# Patient Record
Sex: MALE | Race: WHITE | Employment: OTHER | URBAN - METROPOLITAN AREA
[De-identification: names, ages, dates, MRNs, and addresses within clinical notes are randomized per-mention and may not be internally consistent; named-entity substitution may affect disease eponyms.]

---

## 2017-02-16 ENCOUNTER — APPOINTMENT (OUTPATIENT)
Dept: GENERAL RADIOLOGY | Age: 77
DRG: 442 | End: 2017-02-16
Attending: EMERGENCY MEDICINE
Payer: MEDICARE

## 2017-02-16 ENCOUNTER — HOSPITAL ENCOUNTER (INPATIENT)
Age: 77
LOS: 5 days | Discharge: NURSING, REHAB OR LONG-TERM CARE | DRG: 442 | End: 2017-02-21
Attending: EMERGENCY MEDICINE | Admitting: INTERNAL MEDICINE
Payer: MEDICARE

## 2017-02-16 ENCOUNTER — HOSPITAL ENCOUNTER (OUTPATIENT)
Dept: CT IMAGING | Age: 77
Discharge: HOME | DRG: 442 | End: 2017-02-16
Attending: PHYSICIAN ASSISTANT
Payer: MEDICARE

## 2017-02-16 DIAGNOSIS — K76.9 LIVER LESION: Primary | ICD-10-CM

## 2017-02-16 DIAGNOSIS — R50.9 FEVER, UNSPECIFIED FEVER CAUSE: ICD-10-CM

## 2017-02-16 DIAGNOSIS — R10.31 COLICKY RLQ ABDOMINAL PAIN: ICD-10-CM

## 2017-02-16 DIAGNOSIS — R14.2 BELCHING: ICD-10-CM

## 2017-02-16 DIAGNOSIS — K59.00 CONSTIPATION, UNSPECIFIED CONSTIPATION TYPE: ICD-10-CM

## 2017-02-16 DIAGNOSIS — R50.9 FEVER AND CHILLS: ICD-10-CM

## 2017-02-16 PROBLEM — R10.9 ABDOMINAL PAIN: Status: ACTIVE | Noted: 2017-02-16

## 2017-02-16 PROBLEM — R16.0 LIVER MASS: Status: ACTIVE | Noted: 2017-02-16

## 2017-02-16 LAB
ALBUMIN SERPL BCP-MCNC: 3.4 G/DL (ref 3.2–4.6)
ALBUMIN/GLOB SERPL: 1.1 {RATIO} (ref 1.2–3.5)
ALP SERPL-CCNC: 62 U/L (ref 50–136)
ALT SERPL-CCNC: 17 U/L (ref 12–65)
ANION GAP BLD CALC-SCNC: 9 MMOL/L (ref 7–16)
AST SERPL W P-5'-P-CCNC: 9 U/L (ref 15–37)
BASOPHILS # BLD AUTO: 0 K/UL (ref 0–0.2)
BASOPHILS # BLD: 0 % (ref 0–2)
BILIRUB SERPL-MCNC: 1.3 MG/DL (ref 0.2–1.1)
BUN SERPL-MCNC: 23 MG/DL (ref 8–23)
CALCIUM SERPL-MCNC: 9 MG/DL (ref 8.3–10.4)
CHLORIDE SERPL-SCNC: 105 MMOL/L (ref 98–107)
CO2 SERPL-SCNC: 27 MMOL/L (ref 21–32)
CREAT BLD-MCNC: 1 MG/DL (ref 0.6–1)
CREAT SERPL-MCNC: 0.97 MG/DL (ref 0.8–1.5)
DIFFERENTIAL METHOD BLD: ABNORMAL
EOSINOPHIL # BLD: 0 K/UL (ref 0–0.8)
EOSINOPHIL NFR BLD: 0 % (ref 0.5–7.8)
ERYTHROCYTE [DISTWIDTH] IN BLOOD BY AUTOMATED COUNT: 13.5 % (ref 11.9–14.6)
FLUAV AG NPH QL IA: NEGATIVE
FLUBV AG NPH QL IA: NEGATIVE
GLOBULIN SER CALC-MCNC: 3.1 G/DL (ref 2.3–3.5)
GLUCOSE SERPL-MCNC: 115 MG/DL (ref 65–100)
HCT VFR BLD AUTO: 35 % (ref 41.1–50.3)
HGB BLD-MCNC: 11.5 G/DL (ref 13.6–17.2)
IMM GRANULOCYTES # BLD: 0 K/UL (ref 0–0.5)
IMM GRANULOCYTES NFR BLD AUTO: 0.2 % (ref 0–5)
LACTATE BLD-SCNC: 0.8 MMOL/L (ref 0.5–1.9)
LIPASE SERPL-CCNC: 141 U/L (ref 73–393)
LYMPHOCYTES # BLD AUTO: 12 % (ref 13–44)
LYMPHOCYTES # BLD: 0.7 K/UL (ref 0.5–4.6)
MCH RBC QN AUTO: 30 PG (ref 26.1–32.9)
MCHC RBC AUTO-ENTMCNC: 32.9 G/DL (ref 31.4–35)
MCV RBC AUTO: 91.4 FL (ref 79.6–97.8)
MONOCYTES # BLD: 0.7 K/UL (ref 0.1–1.3)
MONOCYTES NFR BLD AUTO: 12 % (ref 4–12)
NEUTS SEG # BLD: 4.5 K/UL (ref 1.7–8.2)
NEUTS SEG NFR BLD AUTO: 76 % (ref 43–78)
PLATELET # BLD AUTO: 102 K/UL (ref 150–450)
PMV BLD AUTO: 9.9 FL (ref 10.8–14.1)
POTASSIUM SERPL-SCNC: 4 MMOL/L (ref 3.5–5.1)
PROT SERPL-MCNC: 6.5 G/DL (ref 6.3–8.2)
RBC # BLD AUTO: 3.83 M/UL (ref 4.23–5.67)
SODIUM SERPL-SCNC: 141 MMOL/L (ref 136–145)
WBC # BLD AUTO: 5.9 K/UL (ref 4.3–11.1)

## 2017-02-16 PROCEDURE — 96361 HYDRATE IV INFUSION ADD-ON: CPT | Performed by: EMERGENCY MEDICINE

## 2017-02-16 PROCEDURE — 87040 BLOOD CULTURE FOR BACTERIA: CPT | Performed by: EMERGENCY MEDICINE

## 2017-02-16 PROCEDURE — 74011250637 HC RX REV CODE- 250/637: Performed by: INTERNAL MEDICINE

## 2017-02-16 PROCEDURE — 71020 XR CHEST PA LAT: CPT

## 2017-02-16 PROCEDURE — 96365 THER/PROPH/DIAG IV INF INIT: CPT | Performed by: EMERGENCY MEDICINE

## 2017-02-16 PROCEDURE — 65270000029 HC RM PRIVATE

## 2017-02-16 PROCEDURE — 85025 COMPLETE CBC W/AUTO DIFF WBC: CPT | Performed by: EMERGENCY MEDICINE

## 2017-02-16 PROCEDURE — 87804 INFLUENZA ASSAY W/OPTIC: CPT | Performed by: EMERGENCY MEDICINE

## 2017-02-16 PROCEDURE — 74011250636 HC RX REV CODE- 250/636: Performed by: INTERNAL MEDICINE

## 2017-02-16 PROCEDURE — 80053 COMPREHEN METABOLIC PANEL: CPT | Performed by: EMERGENCY MEDICINE

## 2017-02-16 PROCEDURE — 74011250637 HC RX REV CODE- 250/637: Performed by: EMERGENCY MEDICINE

## 2017-02-16 PROCEDURE — 84145 PROCALCITONIN (PCT): CPT | Performed by: INTERNAL MEDICINE

## 2017-02-16 PROCEDURE — 83605 ASSAY OF LACTIC ACID: CPT

## 2017-02-16 PROCEDURE — 83690 ASSAY OF LIPASE: CPT | Performed by: EMERGENCY MEDICINE

## 2017-02-16 PROCEDURE — 99285 EMERGENCY DEPT VISIT HI MDM: CPT | Performed by: EMERGENCY MEDICINE

## 2017-02-16 PROCEDURE — 74011250636 HC RX REV CODE- 250/636: Performed by: EMERGENCY MEDICINE

## 2017-02-16 PROCEDURE — 74011000258 HC RX REV CODE- 258: Performed by: EMERGENCY MEDICINE

## 2017-02-16 RX ORDER — MIRTAZAPINE 15 MG/1
15 TABLET, FILM COATED ORAL
Status: DISCONTINUED | OUTPATIENT
Start: 2017-02-16 | End: 2017-02-17

## 2017-02-16 RX ORDER — SODIUM CHLORIDE 0.9 % (FLUSH) 0.9 %
5-10 SYRINGE (ML) INJECTION AS NEEDED
Status: DISCONTINUED | OUTPATIENT
Start: 2017-02-16 | End: 2017-02-21 | Stop reason: HOSPADM

## 2017-02-16 RX ORDER — LISINOPRIL 5 MG/1
10 TABLET ORAL DAILY
Status: DISCONTINUED | OUTPATIENT
Start: 2017-02-17 | End: 2017-02-21 | Stop reason: HOSPADM

## 2017-02-16 RX ORDER — RIVASTIGMINE 9.5 MG/24H
1 PATCH, EXTENDED RELEASE TRANSDERMAL DAILY
COMMUNITY
End: 2019-11-06 | Stop reason: SDUPTHER

## 2017-02-16 RX ORDER — CARBIDOPA AND LEVODOPA 25; 250 MG/1; MG/1
0.5 TABLET ORAL 4 TIMES DAILY
COMMUNITY
End: 2017-02-21

## 2017-02-16 RX ORDER — MONTELUKAST SODIUM 10 MG/1
10 TABLET ORAL
Status: DISCONTINUED | OUTPATIENT
Start: 2017-02-17 | End: 2017-02-21 | Stop reason: HOSPADM

## 2017-02-16 RX ORDER — SODIUM CHLORIDE 0.9 % (FLUSH) 0.9 %
5-10 SYRINGE (ML) INJECTION AS NEEDED
Status: DISCONTINUED | OUTPATIENT
Start: 2017-02-16 | End: 2017-02-16 | Stop reason: SDUPTHER

## 2017-02-16 RX ORDER — ONDANSETRON 2 MG/ML
4 INJECTION INTRAMUSCULAR; INTRAVENOUS
Status: DISCONTINUED | OUTPATIENT
Start: 2017-02-16 | End: 2017-02-21 | Stop reason: HOSPADM

## 2017-02-16 RX ORDER — PANTOPRAZOLE SODIUM 40 MG/1
40 TABLET, DELAYED RELEASE ORAL
Status: DISCONTINUED | OUTPATIENT
Start: 2017-02-17 | End: 2017-02-21 | Stop reason: HOSPADM

## 2017-02-16 RX ORDER — ACETAMINOPHEN 325 MG/1
650 TABLET ORAL
Status: DISCONTINUED | OUTPATIENT
Start: 2017-02-16 | End: 2017-02-21 | Stop reason: HOSPADM

## 2017-02-16 RX ORDER — SODIUM CHLORIDE 9 MG/ML
125 INJECTION, SOLUTION INTRAVENOUS CONTINUOUS
Status: DISCONTINUED | OUTPATIENT
Start: 2017-02-16 | End: 2017-02-18

## 2017-02-16 RX ORDER — RIVASTIGMINE 4.6 MG/24H
1 PATCH, EXTENDED RELEASE TRANSDERMAL DAILY
Status: DISCONTINUED | OUTPATIENT
Start: 2017-02-17 | End: 2017-02-17

## 2017-02-16 RX ORDER — SODIUM CHLORIDE 0.9 % (FLUSH) 0.9 %
5-10 SYRINGE (ML) INJECTION EVERY 8 HOURS
Status: DISCONTINUED | OUTPATIENT
Start: 2017-02-16 | End: 2017-02-21 | Stop reason: HOSPADM

## 2017-02-16 RX ORDER — ACETAMINOPHEN 500 MG
1000 TABLET ORAL
Status: COMPLETED | OUTPATIENT
Start: 2017-02-16 | End: 2017-02-16

## 2017-02-16 RX ORDER — ESCITALOPRAM OXALATE 10 MG/1
10 TABLET ORAL DAILY
Status: DISCONTINUED | OUTPATIENT
Start: 2017-02-17 | End: 2017-02-21 | Stop reason: HOSPADM

## 2017-02-16 RX ORDER — CARBIDOPA AND LEVODOPA 25; 100 MG/1; MG/1
1 TABLET ORAL 3 TIMES DAILY
Status: DISCONTINUED | OUTPATIENT
Start: 2017-02-16 | End: 2017-02-21 | Stop reason: HOSPADM

## 2017-02-16 RX ORDER — ATORVASTATIN CALCIUM 10 MG/1
10 TABLET, FILM COATED ORAL
Status: DISCONTINUED | OUTPATIENT
Start: 2017-02-17 | End: 2017-02-21 | Stop reason: HOSPADM

## 2017-02-16 RX ORDER — OXYCODONE HYDROCHLORIDE 5 MG/1
5 TABLET ORAL
Status: DISCONTINUED | OUTPATIENT
Start: 2017-02-16 | End: 2017-02-21 | Stop reason: HOSPADM

## 2017-02-16 RX ORDER — SODIUM CHLORIDE 0.9 % (FLUSH) 0.9 %
5-10 SYRINGE (ML) INJECTION EVERY 8 HOURS
Status: DISCONTINUED | OUTPATIENT
Start: 2017-02-16 | End: 2017-02-16 | Stop reason: SDUPTHER

## 2017-02-16 RX ORDER — SODIUM CHLORIDE 9 MG/ML
100 INJECTION, SOLUTION INTRAVENOUS CONTINUOUS
Status: DISCONTINUED | OUTPATIENT
Start: 2017-02-16 | End: 2017-02-17

## 2017-02-16 RX ORDER — SODIUM CHLORIDE 0.9 % (FLUSH) 0.9 %
10 SYRINGE (ML) INJECTION
Status: COMPLETED | OUTPATIENT
Start: 2017-02-16 | End: 2017-02-16

## 2017-02-16 RX ADMIN — MIRTAZAPINE 15 MG: 15 TABLET, FILM COATED ORAL at 23:42

## 2017-02-16 RX ADMIN — MONTELUKAST SODIUM 10 MG: 10 TABLET, FILM COATED ORAL at 23:43

## 2017-02-16 RX ADMIN — CARBIDOPA AND LEVODOPA 1 TABLET: 25; 100 TABLET ORAL at 23:42

## 2017-02-16 RX ADMIN — SODIUM CHLORIDE 125 ML/HR: 900 INJECTION, SOLUTION INTRAVENOUS at 19:25

## 2017-02-16 RX ADMIN — PIPERACILLIN SODIUM,TAZOBACTAM SODIUM 4.5 G: 4; .5 INJECTION, POWDER, FOR SOLUTION INTRAVENOUS at 19:25

## 2017-02-16 RX ADMIN — DIATRIZOATE MEGLUMINE AND DIATRIZOATE SODIUM 15 ML: 660; 100 LIQUID ORAL; RECTAL at 17:43

## 2017-02-16 RX ADMIN — SODIUM CHLORIDE 100 ML/HR: 900 INJECTION, SOLUTION INTRAVENOUS at 23:47

## 2017-02-16 RX ADMIN — ATORVASTATIN CALCIUM 10 MG: 10 TABLET, FILM COATED ORAL at 23:42

## 2017-02-16 RX ADMIN — IOVERSOL 100 ML: 741 INJECTION INTRA-ARTERIAL; INTRAVENOUS at 17:43

## 2017-02-16 RX ADMIN — Medication 10 ML: at 17:43

## 2017-02-16 RX ADMIN — Medication 5 ML: at 23:44

## 2017-02-16 RX ADMIN — ACETAMINOPHEN 1000 MG: 500 TABLET ORAL at 19:43

## 2017-02-16 RX ADMIN — SODIUM CHLORIDE 100 ML: 900 INJECTION, SOLUTION INTRAVENOUS at 17:43

## 2017-02-16 NOTE — ED TRIAGE NOTES
Pt was sent from ultrasound because he had a positive test, fever for last 2 days and he is hurting in his right side

## 2017-02-17 ENCOUNTER — APPOINTMENT (OUTPATIENT)
Dept: ULTRASOUND IMAGING | Age: 77
DRG: 442 | End: 2017-02-17
Attending: INTERNAL MEDICINE
Payer: MEDICARE

## 2017-02-17 LAB
ANION GAP BLD CALC-SCNC: 7 MMOL/L (ref 7–16)
APPEARANCE UR: CLEAR
BILIRUB UR QL: ABNORMAL
BUN SERPL-MCNC: 19 MG/DL (ref 8–23)
CALCIUM SERPL-MCNC: 8.5 MG/DL (ref 8.3–10.4)
CHLORIDE SERPL-SCNC: 111 MMOL/L (ref 98–107)
CO2 SERPL-SCNC: 28 MMOL/L (ref 21–32)
COLOR UR: ABNORMAL
CREAT SERPL-MCNC: 1.02 MG/DL (ref 0.8–1.5)
GLUCOSE SERPL-MCNC: 100 MG/DL (ref 65–100)
GLUCOSE UR STRIP.AUTO-MCNC: NEGATIVE MG/DL
HGB UR QL STRIP: NEGATIVE
KETONES UR QL STRIP.AUTO: NEGATIVE MG/DL
LEUKOCYTE ESTERASE UR QL STRIP.AUTO: NEGATIVE
NITRITE UR QL STRIP.AUTO: NEGATIVE
PH UR STRIP: 5.5 [PH] (ref 5–9)
POTASSIUM SERPL-SCNC: 3.7 MMOL/L (ref 3.5–5.1)
PROCALCITONIN SERPL-MCNC: 0.3 NG/ML
PROT UR STRIP-MCNC: NEGATIVE MG/DL
SODIUM SERPL-SCNC: 146 MMOL/L (ref 136–145)
SP GR UR REFRACTOMETRY: 1.03 (ref 1–1.02)
UROBILINOGEN UR QL STRIP.AUTO: 1 EU/DL (ref 0.2–1)

## 2017-02-17 PROCEDURE — 36415 COLL VENOUS BLD VENIPUNCTURE: CPT | Performed by: INTERNAL MEDICINE

## 2017-02-17 PROCEDURE — 81003 URINALYSIS AUTO W/O SCOPE: CPT | Performed by: INTERNAL MEDICINE

## 2017-02-17 PROCEDURE — 74011250637 HC RX REV CODE- 250/637: Performed by: INTERNAL MEDICINE

## 2017-02-17 PROCEDURE — 97161 PT EVAL LOW COMPLEX 20 MIN: CPT

## 2017-02-17 PROCEDURE — 93970 EXTREMITY STUDY: CPT

## 2017-02-17 PROCEDURE — 74011000302 HC RX REV CODE- 302: Performed by: INTERNAL MEDICINE

## 2017-02-17 PROCEDURE — 76700 US EXAM ABDOM COMPLETE: CPT

## 2017-02-17 PROCEDURE — 86580 TB INTRADERMAL TEST: CPT | Performed by: INTERNAL MEDICINE

## 2017-02-17 PROCEDURE — 76705 ECHO EXAM OF ABDOMEN: CPT

## 2017-02-17 PROCEDURE — 65270000029 HC RM PRIVATE

## 2017-02-17 PROCEDURE — 80048 BASIC METABOLIC PNL TOTAL CA: CPT | Performed by: INTERNAL MEDICINE

## 2017-02-17 PROCEDURE — 74011250636 HC RX REV CODE- 250/636: Performed by: INTERNAL MEDICINE

## 2017-02-17 PROCEDURE — 74011000258 HC RX REV CODE- 258: Performed by: INTERNAL MEDICINE

## 2017-02-17 PROCEDURE — 97165 OT EVAL LOW COMPLEX 30 MIN: CPT

## 2017-02-17 RX ORDER — RIVASTIGMINE 9.5 MG/24H
1 PATCH, EXTENDED RELEASE TRANSDERMAL DAILY
Status: DISCONTINUED | OUTPATIENT
Start: 2017-02-17 | End: 2017-02-21 | Stop reason: HOSPADM

## 2017-02-17 RX ADMIN — Medication 10 ML: at 21:08

## 2017-02-17 RX ADMIN — CARBIDOPA AND LEVODOPA 1 TABLET: 25; 100 TABLET ORAL at 09:06

## 2017-02-17 RX ADMIN — CARBIDOPA AND LEVODOPA 1 TABLET: 25; 100 TABLET ORAL at 16:11

## 2017-02-17 RX ADMIN — Medication 5 ML: at 06:32

## 2017-02-17 RX ADMIN — PIPERACILLIN SODIUM,TAZOBACTAM SODIUM 3.38 G: 3; .375 INJECTION, POWDER, FOR SOLUTION INTRAVENOUS at 01:23

## 2017-02-17 RX ADMIN — PIPERACILLIN SODIUM,TAZOBACTAM SODIUM 3.38 G: 3; .375 INJECTION, POWDER, FOR SOLUTION INTRAVENOUS at 09:06

## 2017-02-17 RX ADMIN — ESCITALOPRAM OXALATE 10 MG: 10 TABLET ORAL at 09:06

## 2017-02-17 RX ADMIN — CARBIDOPA AND LEVODOPA 1 TABLET: 25; 100 TABLET ORAL at 21:08

## 2017-02-17 RX ADMIN — PANTOPRAZOLE SODIUM 40 MG: 40 TABLET, DELAYED RELEASE ORAL at 16:11

## 2017-02-17 RX ADMIN — MONTELUKAST SODIUM 10 MG: 10 TABLET, FILM COATED ORAL at 21:08

## 2017-02-17 RX ADMIN — PANTOPRAZOLE SODIUM 40 MG: 40 TABLET, DELAYED RELEASE ORAL at 07:46

## 2017-02-17 RX ADMIN — PIPERACILLIN SODIUM,TAZOBACTAM SODIUM 3.38 G: 3; .375 INJECTION, POWDER, FOR SOLUTION INTRAVENOUS at 16:11

## 2017-02-17 RX ADMIN — TUBERCULIN PURIFIED PROTEIN DERIVATIVE 5 UNITS: 5 INJECTION, SOLUTION INTRADERMAL at 13:19

## 2017-02-17 RX ADMIN — ATORVASTATIN CALCIUM 10 MG: 10 TABLET, FILM COATED ORAL at 21:08

## 2017-02-17 RX ADMIN — LISINOPRIL 10 MG: 5 TABLET ORAL at 09:06

## 2017-02-17 NOTE — PROGRESS NOTES
Problem: Nutrition Deficit  Goal: *Optimize nutritional status  Nutrition  Reason for assessment: Referral received from nursing admission Malnutrition Screening Tool for recently lost ~ 50-60# past 1-2 years without trying and eating poorly due to decreased appetite. Assessment:   Diet order(s):  NPO  Food/Nutrition Patient History:  Pt presented with abdominal pain and fever; pt has intractable hiccups (only 1 episode per family since admission), liver mass. Pt NPO today r/t scheduled test downtown today. Pt states he is hungry; hasn't had anything to eat since breakfast yesterday. Pt typically eats breakfast and supper meal outside home-[facility] for breakfast and will have a snack in the afternoon; states he has a good appetite; pt does not consume nutritional supplements at home. No difficult with chewing or swallowing per patient. Pertinent Labs:  Hypernatremia,   Pertinent Rx:  IVF: 0.9% sodium chloride at 100 ml/hr, Zosyn, Protonix, Sinemet  Anthropometrics:Height: 5' 11\" (180.3 cm),  Weight: 76.7 kg (169 lb), Weight Source: Patient stated, Body mass index is 23.57 kg/(m^2). BMI class of normal range. Recommend BMI > 23 for Older American Males. Pt currently at 77% usual body weight from 2 years ago and 98% IBW. **Please obtain an actual weight to assess reported weight loss. Macronutrient needs:  EER:  5529-6085 kcal /day (25-30 kcal/kg BW)  EPR:  77-92 grams protein/day (1-1.2 grams/kg BW)  Intake/Comparative Standards: NPO; does not meet estimated kcal or protein needs. Nutrition Diagnosis: Inadequate oral intake r/t dietary restriction as evidenced by NPO status. Intervention:  1. Meals and snacks: NPO; advance per MD recommendations.   2.  Nutrition Supplement Therapy: Initiate Ensure Enlive 2 X day once diet advanced to full liquid or >.   3.  Ordered weight  Sherron Kim RD, LD, MPH  [phone]     Addendum:  Patient's diet advanced to Regular; pt ate 100% mid afternoon meal; supper tray to be delivered soon. Ensure Enlive added 2 X day.   Yazmin Prieto, [address] [place] [address], [address] [place] [address], [facility]  [phone]

## 2017-02-17 NOTE — PROGRESS NOTES
Problem: Mobility Impaired (Adult and Pediatric)  Goal: *Acute Goals and Plan of Care (Insert Text)  STG:  (1.)Mr. Colletta Schlossman will move from supine to sit and sit to supine with SUPERVISION within 3 day(s). (2.)Mr. Colletta Schlossman will transfer from bed to chair and chair to bed with SUPERVISION using the least restrictive device within 3 day(s). (3.)Mr. Colletta Schlossman will ambulate with SUPERVISION for 150 feet with the least restrictive device within 3 day(s). (4.)Pt. Will increase B LE strength 1/2 grade within 3 days ________________________________________________________________________________________________      PHYSICAL THERAPY: INITIAL ASSESSMENT, PM 2/17/2017  INPATIENT: Hospital Day: 2  [place] / Product Type: Medicare /      NAME/AGE/GENDER: Ilir Bergeron is a 67 y.o. male    PRIMARY DIAGNOSIS: Abdominal pain Abdominal pain Abdominal pain        ICD-10: Treatment Diagnosis:       · Generalized Muscle Weakness (M62.81)  · Difficulty in walking, Not elsewhere classified (R26.2)  · Other abnormalities of gait and mobility (R26.89)   Precaution/Allergies:  Aricept [donepezil] and Onion       ASSESSMENT:      Mr. Colletta Schlossman presents with abdominal pain and demonstrates the following PT deficits: decreased general strength and ROm, standing balance, functional mobility and tremor in R hand. He would benefit from further PT while here to address these deficits and may benefit from [facility] PT at [place]. He has a very supportive friend here while his wife is at home. This section established at most recent assessment   PROBLEM LIST (Impairments causing functional limitations):  1. Decreased Strength  2. Decreased Transfer Abilities  3. Decreased Ambulation Ability/Technique  4. Decreased Balance  5. Decreased Activity Tolerance  6. Decreased Flexibility/Joint Mobility    INTERVENTIONS PLANNED: (Benefits and precautions of physical therapy have been discussed with the patient.)  1.  Bed Mobility  2. Cold  3. Family Education  4. Gait Training  5. Range of Motion (ROM)  6. Therapeutic Activites  7. Therapeutic Exercise/Strengthening  8. Transfer Training      TREATMENT PLAN: Frequency/Duration: daily for 1 week  Rehabilitation Potential For Stated Goals: FAIR      RECOMMENDED REHABILITATION/EQUIPMENT: (at time of discharge pending progress): Continue Skilled Therapy and Home Health: Physical Therapy. HISTORY:   History of Present Injury/Illness (Reason for Referral): Admitted with abdominal pain. Has a hx of Parkinson's  Past Medical History/Comorbidities:   Mr. Aan Shelley  has a past medical history of Acoustic neuroma ([address] (2012); Arthritis; Cancer ([address] Environmental allergies; GERD (gastroesophageal reflux disease); Hearing difficulty; Hiatal hernia; Hypertension; and Parkinson disease ([address] He also has no past medical history of Arrhythmia; Asthma; Autoimmune disease ([address] CAD (coronary artery disease); Chronic kidney disease; Chronic pain; Coagulation defects; COPD; Diabetes ([address] Difficult intubation; Heart failure ([address] Liver disease; Malignant hyperthermia due to anesthesia; Nausea & vomiting; Other ill-defined conditions(799.89); Pseudocholinesterase deficiency; Psychiatric disorder; PUD (peptic ulcer disease); Seizures ([address] Stroke [facility]); Thromboembolus ([address] Thyroid disease; Unspecified adverse effect of anesthesia; or Unspecified sleep apnea. Mr. Ana Shelley  has a past surgical history that includes abdomen surgery proc unlisted; gi; heent; prostatectomy (); and knee replacement.   Social History/Living Environment:   Home Environment: Private residence  # Steps to Enter: 2  One/Two Story Residence: One story  Living Alone: No  Support Systems: Spouse/Significant Other/Partner, Friends \ neighbors  Patient Expects to be Discharged to[de-identified] Private residence  Current DME Used/Available at Home: None  Tub or Shower Type: Shower  Prior Level of Function/Work/Activity:  Performs ADLs with his wife's supervision, ambulates without an assistive device. Does have some memory issues so wife offers supervision      Number of Personal Factors/Comorbidities that affect the Plan of Care: 1-2: MODERATE COMPLEXITY   EXAMINATION:   Most Recent Physical Functioning:   Gross Assessment:  AROM: Generally decreased, functional (B LEs)  Strength: Generally decreased, functional (B LEs)  Sensation: Intact (B LEs)               Posture:  Posture Assessment: Forward head, Rounded shoulders  Balance:  Sitting: Intact  Standing:  (high guard) Bed Mobility:  Supine to Sit: Contact guard assistance  Sit to Supine: Contact guard assistance  Wheelchair Mobility:     Transfers:  Sit to Stand: Minimum assistance  Stand to Sit: Contact guard assistance  Gait:     Base of Support: Narrowed  Speed/Gisell: Pace decreased (<100 feet/min)  Distance (ft): 15 Feet (ft) (x2)  Assistive Device:  (HHA)  Ambulation - Level of Assistance:  (close CGA)  Interventions: Safety awareness training;Verbal cues       Body Structures Involved:  1. Joints  2. Muscles Body Functions Affected:  1. Neuromusculoskeletal  2. Movement Related Activities and Participation Affected:  1. Mobility  2. Self Care   Number of elements that affect the Plan of Care: 4+: HIGH COMPLEXITY   CLINICAL PRESENTATION:   Presentation: Stable and uncomplicated: LOW COMPLEXITY   CLINICAL DECISION MAKIN [facility] [address] [id number] AM-PAC 6 Clicks   Basic Mobility Inpatient Short Form  How much difficulty does the patient currently have. .. Unable A Lot A Little None   1. Turning over in bed (including adjusting bedclothes, sheets and blankets)? [ ] 1   [ ] 2   [ ] 3   [X] 4   2. Sitting down on and standing up from a chair with arms ( e.g., wheelchair, bedside commode, etc.)   [ ] 1   [ ] 2   [X] 3   [ ] 4   3. Moving from lying on back to sitting on the side of the bed?    [ ] 1   [ ] 2   [X] 3   [ ] 4   How much help from another person does the patient currently need. .. Total A Lot A Little None   4. Moving to and from a bed to a chair (including a wheelchair)? [ ] 1   [ ] 2   [X] 3   [ ] 4   5. Need to walk in hospital room? [ ] 1   [ ] 2   [X] 3   [ ] 4   6. Climbing 3-5 steps with a railing? [ ] 1   [X] 2   [ ] 3   [ ] 4   © 2007, Trustees of [address] [place] [address] [address] [address], under license to [employer]. All rights reserved    Score:  Initial: 18 Most Recent: X (Date: -- )     Interpretation of Tool:  Represents activities that are increasingly more difficult (i.e. Bed mobility, Transfers, Gait). Score 24 23 22-20 19-15 14-10 9-7 6       Modifier CH CI CJ CK CL CM CN         · Mobility - Walking and Moving Around:               - CURRENT STATUS:    CK - 40%-59% impaired, limited or restricted               - GOAL STATUS:           CJ - 20%-39% impaired, limited or restricted               - D/C STATUS:                       ---------------To be determined---------------  [place] / [place] / Product Type: Medicare /       Medical Necessity:     · Patient demonstrates fair rehab potential due to higher previous functional level. Reason for Services/Other Comments:  · Patient continues to require present interventions due to patient's inability to perform functional mobility independently. Use of outcome tool(s) and clinical judgement create a POC that gives a: Clear prediction of patient's progress: LOW COMPLEXITY                 TREATMENT:   (In addition to Assessment/Re-Assessment sessions the following treatments were rendered)   Pre-treatment Symptoms/Complaints:  none  Pain: Initial:   Pain Intensity 1: 0  Post Session:  0      Assessment/Reassessment only, no treatment provided today     Braces/Orthotics/Lines/Etc:   · IV  · O2 Device: Room air  Treatment/Session Assessment:    · Response to Treatment:  Tolerated well.  Returned to bed  · Interdisciplinary Collaboration:  · Physical Therapist  · Occupational Therapist  · Registered Nurse  · After treatment position/precautions:  · Supine in bed  · Bed/Chair-wheels locked  · Bed in low position  · Call light within reach  · RN notified  · Visitors at bedside  · Side rails x 3  · Compliance with Program/Exercises: Will assess as treatment progresses. · Recommendations/Intent for next treatment session: \"Next visit will focus on advancements to more challenging activities and reduction in assistance provided\".   Total Treatment Duration:  PT Patient Time In/Time Out  Time In: 1310  Time Out: [address]

## 2017-02-17 NOTE — PROGRESS NOTES
TRANSFER - IN REPORT:    Verbal report received from Tahir Fuller RN (name) on David Singh  being received from ED (unit) for routine progression of care      Report consisted of patients Situation, Background, Assessment and   Recommendations(SBAR). Information from the following report(s) SBAR, Kardex, ED Summary, Intake/Output, MAR, Accordion, Recent Results and Med Rec Status was reviewed with the receiving nurse. Opportunity for questions and clarification was provided. Assessment completed upon patients arrival to unit and care assumed.

## 2017-02-17 NOTE — H&P
History and Physical    Subjective:     Deepak Tao is a 67 y.o.  male who presents with abd pain and fever. Started 2 days ago. RUQ. Has chronic hiccups. 60 lb weight loss over last 1-2 years. Recent DVT in left leg in November of 2016 and on xarelto ever since. Had CT scan ordered by PCP showing a complex cyst (hemorrhagic vs abscess). This cyst has been noted on CT since 2015 but the change in appearance has occurred since last CT in 2016. Lactic, WBC normal and flu negative. Hospitalist asked to admit to further workup the mass to rule out infectious process. 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    Hiatal hernia     Hypertension      on medication    Parkinson disease ([address]       Past Surgical History   Procedure Laterality Date    Pr abdomen surgery proc unlisted       inquinal hernia repair     Hx gi       hemrrhoidectomy    Hx heent       tonsilectomy    Hx prostatectomy       Astrid Brown DT    Hx knee replacement       left     Family History   Problem Relation Age of Onset    Cancer Father 77     prostate    Heart Disease Brother     Cancer Brother      Prostate    Cancer Mother      breast,melanoma    Malignant Hyperthermia Neg Hx     Pseudocholinesterase Deficiency Neg Hx     Delayed Awakening Neg Hx     Post-op Nausea/Vomiting Neg Hx     Emergence Delirium Neg Hx     Post-op Cognitive Dysfunction Neg Hx     Other Neg Hx       Social History   Substance Use Topics    Smoking status: Never Smoker    Smokeless tobacco: Not on file    Alcohol use No       Prior to Admission medications    Medication Sig Start Date End Date Taking?  Authorizing Provider HYDROcodone-acetaminophen (NORCO) 5-325 mg per tablet  2/15/17   Historical Provider   pimozide (ORAP) 1 mg tablet Take 1 mg by mouth. Historical Provider   escitalopram oxalate (LEXAPRO) 10 mg tablet Take 10 mg by mouth. Historical Provider   atorvastatin (LIPITOR) 10 mg tablet Take 1 Tab by mouth daily. 12/9/16   Conner Phillips MD   omeprazole (PRILOSEC) 40 mg capsule Take 1 Cap by mouth two (2) times a day. 12/9/16   Conner Phillips MD   rivaroxaban (XARELTO) 20 mg tab tablet Take 1 Tab by mouth daily (with breakfast). 12/9/16   Conner Phillips MD   megestrol (MEGACE) 400 mg/10 mL (10 mL) suspension Take 5 mL by mouth daily. 9/16/16   Conner Phillips MD   rivaroxaban (XARELTO) 15 mg (42)- 20 mg (9) DsPk Take  by mouth. Take with food, at approximately the same time each day. Historical Provider   rivastigmine (EXELON) 4.6 mg/24 hr patch 1 Patch by TransDERmal route. Historical Provider   mirtazapine (REMERON) 15 mg tablet Take  by mouth. Historical Provider   lisinopril (PRINIVIL, ZESTRIL) 10 mg tablet Take 1 Tab by mouth daily. 8/11/16   Conner Phillips MD   montelukast (SINGULAIR) 10 mg tablet Take 1 Tab by mouth daily. 8/11/16   Conner Phillips MD   carbidopa-levodopa (SINEMET)  mg per tablet Take 1 Tab by mouth three (3) times daily. 5/18/16   Conner Phillips MD   multivitamin (ONE A DAY) tablet Take 1 Tab by mouth daily. Historical Provider     Allergies   Allergen Reactions    Aricept [Donepezil] Nausea and Vomiting and Rash    Onion Nausea and Vomiting        Review of Systems:  A comprehensive review of systems was negative except for that written in the History of Present Illness. Objective:      Intake and Output:            Physical Exam:   Visit Vitals    /77 (BP 1 Location: Left arm, BP Patient Position: Sitting)    Pulse 86    Temp 99.3 °F (37.4 °C)    Resp 16    Ht 5' 11\" (1.803 m)    Wt 76.7 kg (169 lb)  SpO2 97%    BMI 23.57 kg/m2     General appearance: alert, cooperative, no distress, appears stated age  Head: Normocephalic, without obvious abnormality, atraumatic  Throat: Lips, mucosa, and tongue normal. Teeth and gums normal  Lungs: clear to auscultation bilaterally  Heart: regular rate and rhythm, S1, S2 normal, no murmur, click, rub or gallop  Abdomen: soft. abnormal findings:  tenderness moderate in the RUQ,  And no rebound present  Extremities: extremities normal, atraumatic, no cyanosis or edema  Skin: Skin color, texture, turgor normal. No rashes or lesions  Neurologic: Grossly normal        Data Review:   Recent Results (from the past 24 hour(s))   MRI/CT POC CREATININE    Collection Time: 02/16/17  4:35 PM   Result Value Ref Range    Creatinine (POC) 1.0 0.6 - 1.0 MG/DL    GFR-AA (POC) >60 >60 ml/min/1.73m2    GFR, non-AA (POC) >60 >60 EU/DTR/0.07W9   METABOLIC PANEL, COMPREHENSIVE    Collection Time: 02/16/17  7:35 PM   Result Value Ref Range    Sodium 141 136 - 145 mmol/L    Potassium 4.0 3.5 - 5.1 mmol/L    Chloride 105 98 - 107 mmol/L    CO2 27 21 - 32 mmol/L    Anion gap 9 7 - 16 mmol/L    Glucose 115 (H) 65 - 100 mg/dL    BUN 23 8 - 23 MG/DL    Creatinine 0.97 0.8 - 1.5 MG/DL    GFR est AA >60 >60 ml/min/1.73m2    GFR est non-AA >60 >60 ml/min/1.73m2    Calcium 9.0 8.3 - 10.4 MG/DL    Bilirubin, total 1.3 (H) 0.2 - 1.1 MG/DL    ALT (SGPT) 17 12 - 65 U/L    AST (SGOT) 9 (L) 15 - 37 U/L    Alk.  phosphatase 62 50 - 136 U/L    Protein, total 6.5 6.3 - 8.2 g/dL    Albumin 3.4 3.2 - 4.6 g/dL    Globulin 3.1 2.3 - 3.5 g/dL    A-G Ratio 1.1 (L) 1.2 - 3.5     LIPASE    Collection Time: 02/16/17  7:35 PM   Result Value Ref Range    Lipase 141 73 - 393 U/L   CBC WITH AUTOMATED DIFF    Collection Time: 02/16/17  7:35 PM   Result Value Ref Range    WBC 5.9 4.3 - 11.1 K/uL    RBC 3.83 (L) 4.23 - 5.67 M/uL    HGB 11.5 (L) 13.6 - 17.2 g/dL    HCT 35.0 (L) 41.1 - 50.3 %    MCV 91.4 79.6 - 97.8 FL    MCH 30.0 26.1 - 32.9 PG    MCHC 32.9 31.4 - 35.0 g/dL    RDW 13.5 11.9 - 14.6 %    PLATELET 091 (L) 826 - 450 K/uL    MPV 9.9 (L) 10.8 - 14.1 FL    DF AUTOMATED      NEUTROPHILS 76 43 - 78 %    LYMPHOCYTES 12 (L) 13 - 44 %    MONOCYTES 12 4.0 - 12.0 %    EOSINOPHILS 0 (L) 0.5 - 7.8 %    BASOPHILS 0 0.0 - 2.0 %    IMMATURE GRANULOCYTES 0.2 0.0 - 5.0 %    ABS. NEUTROPHILS 4.5 1.7 - 8.2 K/UL    ABS. LYMPHOCYTES 0.7 0.5 - 4.6 K/UL    ABS. MONOCYTES 0.7 0.1 - 1.3 K/UL    ABS. EOSINOPHILS 0.0 0.0 - 0.8 K/UL    ABS. BASOPHILS 0.0 0.0 - 0.2 K/UL    ABS. IMM. GRANS. 0.0 0.0 - 0.5 K/UL   POC LACTIC ACID    Collection Time: 02/16/17  7:38 PM   Result Value Ref Range    Lactic Acid (POC) 0.8 0.5 - 1.9 mmol/L   INFLUENZA A & B AG (RAPID TEST)    Collection Time: 02/16/17  8:28 PM   Result Value Ref Range    Influenza A Ag NEGATIVE  NEG      Influenza B Ag NEGATIVE  NEG             Assessment:     Principal Problem:    Abdominal pain (2/16/2017)    Active Problems:    Intractable hiccups (1/30/2015)      Liver mass (2/16/2017)      Fever (2/16/2017)      Recent DVT, 60 lb weight loss, and change in liver mass is concerning for malignancy. ? Mucinous tumor vs abscess. Plan:     Admit to medical bed  IVF  Hold xarelto for now in case this is hemorrhagic. Tre duplex of BLE to see if there is resolution of clot and if xarelto can be DC'd  Abd US to better visualize the cyst.   May need IR drainage vs interval CT following IV abx  Start zosyn  FU on blood cultures  Add procal  Labs in AM  FULL Code  DC in 2-3 days.      Signed By: Shantell Cerrato, DO     February 16, 2017

## 2017-02-17 NOTE — PROGRESS NOTES
Hospitalist Progress Note    2017  11:31 AM  Admit Date: 2017  7:10 PM   NAME: Elijah Henry   :  1940   MRN:  [id number]   Attending: Beryl Dyer DO  PCP:  Ulises Le MD  Treatment Team: Attending Provider: Beryl Dyer DO  SUBJECTIVE:       Mr. Arnav Phillip is a 68 yo WM with PMH of dementia and parkinsons disease, DVT on xarelto since   admitted with fever/abd pain. CT AP showing known complex liver mass, compatible with hemorrhagic cyst and gallstones. ABD US shows similar findings. I spoke with IR Dr. Goldstein Led who recommends stopping xarelto and IR outpatient followup for cyst drainage and subsequent alcohol ablation in 3 months- can make apt at discharge at [id number]. IR additionally recommends cholecystectomy at some point. Additionally he has persistent DVT LE, IR consulted for IVC filter. On zosyn day 1, CXR negative, BC pending, flu negative    Wife would prefer rehab at discharge       17 hard of hearing and has dementia, ROS difficult, wife at bedside          Recent Results (from the past 24 hour(s))   MRI/CT POC CREATININE    Collection Time: 17  4:35 PM   Result Value Ref Range    Creatinine (POC) 1.0 0.6 - 1.0 MG/DL    GFR-AA (POC) >60 >60 ml/min/1.73m2    GFR, non-AA (POC) >60 >60 DE/DOF/7.29D1   METABOLIC PANEL, COMPREHENSIVE    Collection Time: 17  7:35 PM   Result Value Ref Range    Sodium 141 136 - 145 mmol/L    Potassium 4.0 3.5 - 5.1 mmol/L    Chloride 105 98 - 107 mmol/L    CO2 27 21 - 32 mmol/L    Anion gap 9 7 - 16 mmol/L    Glucose 115 (H) 65 - 100 mg/dL    BUN 23 8 - 23 MG/DL    Creatinine 0.97 0.8 - 1.5 MG/DL    GFR est AA >60 >60 ml/min/1.73m2    GFR est non-AA >60 >60 ml/min/1.73m2    Calcium 9.0 8.3 - 10.4 MG/DL    Bilirubin, total 1.3 (H) 0.2 - 1.1 MG/DL    ALT (SGPT) 17 12 - 65 U/L    AST (SGOT) 9 (L) 15 - 37 U/L    Alk.  phosphatase 62 50 - 136 U/L    Protein, total 6.5 6.3 - 8.2 g/dL    Albumin 3.4 3.2 - 4.6 g/dL    Globulin 3.1 2.3 - 3.5 g/dL    A-G Ratio 1.1 (L) 1.2 - 3.5     LIPASE    Collection Time: 02/16/17  7:35 PM   Result Value Ref Range    Lipase 141 73 - 393 U/L   CBC WITH AUTOMATED DIFF    Collection Time: 02/16/17  7:35 PM   Result Value Ref Range    WBC 5.9 4.3 - 11.1 K/uL    RBC 3.83 (L) 4.23 - 5.67 M/uL    HGB 11.5 (L) 13.6 - 17.2 g/dL    HCT 35.0 (L) 41.1 - 50.3 %    MCV 91.4 79.6 - 97.8 FL    MCH 30.0 26.1 - 32.9 PG    MCHC 32.9 31.4 - 35.0 g/dL    RDW 13.5 11.9 - 14.6 %    PLATELET 455 (L) 323 - 450 K/uL    MPV 9.9 (L) 10.8 - 14.1 FL    DF AUTOMATED      NEUTROPHILS 76 43 - 78 %    LYMPHOCYTES 12 (L) 13 - 44 %    MONOCYTES 12 4.0 - 12.0 %    EOSINOPHILS 0 (L) 0.5 - 7.8 %    BASOPHILS 0 0.0 - 2.0 %    IMMATURE GRANULOCYTES 0.2 0.0 - 5.0 %    ABS. NEUTROPHILS 4.5 1.7 - 8.2 K/UL    ABS. LYMPHOCYTES 0.7 0.5 - 4.6 K/UL    ABS. MONOCYTES 0.7 0.1 - 1.3 K/UL    ABS. EOSINOPHILS 0.0 0.0 - 0.8 K/UL    ABS. BASOPHILS 0.0 0.0 - 0.2 K/UL    ABS. IMM.  GRANS. 0.0 0.0 - 0.5 K/UL   CULTURE, BLOOD    Collection Time: 02/16/17  7:35 PM   Result Value Ref Range    Special Requests: LEFT FOREARM      Culture result: NO GROWTH AFTER 10 HOURS     CULTURE, BLOOD    Collection Time: 02/16/17  7:35 PM   Result Value Ref Range    Special Requests: RIGHT ANTECUBITAL      Culture result: NO GROWTH AFTER 10 HOURS     PROCALCITONIN    Collection Time: 02/16/17  7:35 PM   Result Value Ref Range    Procalcitonin 0.3 ng/mL   POC LACTIC ACID    Collection Time: 02/16/17  7:38 PM   Result Value Ref Range    Lactic Acid (POC) 0.8 0.5 - 1.9 mmol/L   INFLUENZA A & B AG (RAPID TEST)    Collection Time: 02/16/17  8:28 PM   Result Value Ref Range    Influenza A Ag NEGATIVE  NEG      Influenza B Ag NEGATIVE  NEG     METABOLIC PANEL, BASIC    Collection Time: 02/17/17  5:07 AM   Result Value Ref Range    Sodium 146 (H) 136 - 145 mmol/L    Potassium 3.7 3.5 - 5.1 mmol/L    Chloride 111 (H) 98 - 107 mmol/L    CO2 28 21 - 32 mmol/L    Anion gap 7 7 - 16 mmol/L Glucose 100 65 - 100 mg/dL    BUN 19 8 - 23 MG/DL    Creatinine 1.02 0.8 - 1.5 MG/DL    GFR est AA >60 >60 ml/min/1.73m2    GFR est non-AA >60 >60 ml/min/1.73m2    Calcium 8.5 8.3 - 10.4 MG/DL       Allergies   Allergen Reactions    Aricept [Donepezil] Nausea and Vomiting and Rash    Onion Nausea and Vomiting     Current Facility-Administered Medications   Medication Dose Route Frequency Provider Last Rate Last Dose    rivastigmine (EXELON) 9.5 mg/24 hr patch 1 Patch  1 Patch TransDERmal DAILY Sonia Bile, DO   1 Patch at 02/17/17 0912    piperacillin-tazobactam (ZOSYN) 3.375 g in 0.9% sodium chloride (MBP/ADV) 100 mL  3.375 g IntraVENous Q8H Sonia Bile, DO 25 mL/hr at 02/17/17 0906 3.375 g at 02/17/17 4576    tuberculin injection 5 Units  5 Units IntraDERMal ONCE Chacorta Lima MD        0.9% sodium chloride infusion  125 mL/hr IntraVENous CONTINUOUS Kelly Naylor  mL/hr at 02/16/17 1925 125 mL/hr at 02/16/17 1925    atorvastatin (LIPITOR) tablet 10 mg  10 mg Oral QHS Sonia Bile, DO   10 mg at 02/16/17 2342    carbidopa-levodopa (SINEMET)  mg per tablet 1 Tab  1 Tab Oral TID Sonia Bile, DO   1 Tab at 02/17/17 7538    escitalopram oxalate (LEXAPRO) tablet 10 mg  10 mg Oral DAILY Sonia Bile, DO   10 mg at 02/17/17 1081    lisinopril (PRINIVIL, ZESTRIL) tablet 10 mg  10 mg Oral DAILY Sonia Bile, DO   10 mg at 02/17/17 0906    montelukast (SINGULAIR) tablet 10 mg  10 mg Oral QHS Sonia Bile, DO   10 mg at 02/16/17 2343    pantoprazole (PROTONIX) tablet 40 mg  40 mg Oral ACB&D Sonia Bile, DO   40 mg at 02/17/17 0746    sodium chloride (NS) flush 5-10 mL  5-10 mL IntraVENous Q8H Sonia Bile, DO   5 mL at 02/17/17 9615    sodium chloride (NS) flush 5-10 mL  5-10 mL IntraVENous PRN Sonia Bile, DO        0.9% sodium chloride infusion  100 mL/hr IntraVENous CONTINUOUS Sonia Bile,  mL/hr at 02/16/17 2347 100 mL/hr at 02/16/17 2344    acetaminophen (TYLENOL) tablet 650 mg  650 mg Oral Q4H PRN Romel Moots, DO        ondansetron [facility]) injection 4 mg  4 mg IntraVENous Q4H PRN Romel Moots, DO        oxyCODONE IR (ROXICODONE) tablet 5 mg  5 mg Oral Q4H PRN Romel Moots, DO               Review of Systems as noted above in HPI   PHYSICAL EXAM     Visit Vitals    /75 (BP 1 Location: Right arm, BP Patient Position: At rest)    Pulse 64    Temp 97.5 °F (36.4 °C)    Resp 16    Ht 5' 11\" (1.803 m)    Wt 76.7 kg (169 lb)    SpO2 99%    BMI 23.57 kg/m2      Temp (24hrs), Av.6 °F (37 °C), Min:96.3 °F (35.7 °C), Max:101.2 °F (38.4 °C)    Oxygen Therapy  O2 Sat (%): 99 % (17 0065)  O2 Device: Room air (17 6165)    Intake/Output Summary (Last 24 hours) at 17 1131  Last data filed at 17 5989   Gross per 24 hour   Intake                0 ml   Output              400 ml   Net             -400 ml      General: No acute distress,conversive  Lungs:  CTAB, good effort anterior exam   Heart:  Regular rate and rhythm, no murmur, no edema   Abdomen: Soft, nontender and nondistended, normal bowel sounds  Extremities: Warm and dry         DIAGNOSTIC STUDIES      Labs and Studies from previous 24 hours have been personally reviewed by myself           Full Code    ASSESSMENT / Tracy Chung 169 Problems    Diagnosis Date Noted    Abdominal pain 2017    Liver mass 2017    Fever 2017    Intractable hiccups 2015       - discussed with Dr. Evans Garay of IR above plans for cyst drainage/ablation in 3 months  -hold xarelto indefinitely   -IR consulted for IVC filter   -workup ongoing for fever etiology/possibility hemorrhagic cyst related, BC pending , ordered UA , continue zosyn   -PPD/PT/OT/SW for dispo   FEN:NPO,IVF  DVT Prophylaxis: off xarelto, no SCD due to having filter  Disposition:pending   Time spent with patient:  Care plan addressed:wife   Risk Assessment:  Signed By: Marni Maldonado Popeye Hutchison MD     February 17, 2017

## 2017-02-17 NOTE — PROGRESS NOTES
SW consult placed per Md. Pt just adm due to abd pain-pt found to have a hemorraghic liver cyst, DVT, Parkinsons, and Dementia. Wife has gone home to sleep. Per nurse, friends sitting with pt. Pt is fairly medically complex at this time, but per Md she believes wife is worn out and wants placement possibly even long term. Pt may require surg for filter due to DVTs. Sw to follow up with wife and determine a discharge plan once pt more stable.  Gary Tuttle

## 2017-02-17 NOTE — PROGRESS NOTES
Problem: Self Care Deficits Care Plan (Adult)  Goal: *Acute Goals and Plan of Care (Insert Text)  1. Patient will perform grooming with supervision. 2. Patient will perform Upper body dressing with supervision  3. Patient will perform lower body dressing with supervision  4. Patient will perform upper and lower body bathing with supervision. 5. Patient will perform toilet transfers with supervision. 6. Patient will perform shower transfer with supervision. 7. Patient will participate in 30 + minutes of ADL/ therapeutic exercise/therapeutic activity with min rest breaks to increase activity tolerance for self care. 8. Patient will perform ADL functional mobility in room with supervision. Goals to be achieved in 7 days. OCCUPATIONAL THERAPY: Initial Assessment 2/17/2017  INPATIENT: Hospital Day: 2  [place] / Product Type: Medicare /      NAME/AGE/GENDER: Steve Gabriel is a 67 y.o. male    PRIMARY DIAGNOSIS:  Abdominal pain Abdominal pain Abdominal pain        ICD-10: Treatment Diagnosis:        · Generalized Muscle Weakness (M62.81)   Precautions/Allergies:         Aricept [donepezil] and Onion       ASSESSMENT:      Mr. Valeria Garcia presents with above diagnosis. Pt is a pleasant man with dementia seen in room with friend sitting with him while his wife was out. Pt was noted to have generalized weakness and decrease balance. Pt would benefit from OT to maximize independence and safety with self care and functional mobility. This section established at most recent assessment   PROBLEM LIST (Impairments causing functional limitations):  1. Decreased Strength  2. Decreased ADL/Functional Activities  3. Decreased Transfer Abilities  4. Decreased Balance  5. Decreased Activity Tolerance  6. Increased Fatigue  7. Decreased Cognition    INTERVENTIONS PLANNED: (Benefits and precautions of occupational therapy have been discussed with the patient.)  1.  Activities of daily living training  2. Balance training  3. Cognitive training  4. Theraputic activity  5. Theraputic exercise      TREATMENT PLAN: Frequency/Duration: Follow patient 3 times per week to address above goals. Rehabilitation Potential For Stated Goals: GOOD      RECOMMENDED REHABILITATION/EQUIPMENT: (at time of discharge pending progress): Continue Skilled Therapy and Home Health: Physical Therapy and Occupational Therapy. OCCUPATIONAL PROFILE AND HISTORY:   History of Present Injury/Illness (Reason for Referral):  Weakness   Past Medical History/Comorbidities:   Mr. Ben Neumann  has a past medical history of Acoustic neuroma ([facility] [address] (2012); Arthritis; Cancer ([address] Environmental allergies; GERD (gastroesophageal reflux disease); Hearing difficulty; Hiatal hernia; Hypertension; and Parkinson disease ([address] He also has no past medical history of Arrhythmia; Asthma; Autoimmune disease ([address] CAD (coronary artery disease); Chronic kidney disease; Chronic pain; Coagulation defects; COPD; Diabetes ([address] Difficult intubation; Heart failure ([address] Liver disease; Malignant hyperthermia due to anesthesia; Nausea & vomiting; Other ill-defined conditions(799.89); Pseudocholinesterase deficiency; Psychiatric disorder; PUD (peptic ulcer disease); Seizures ([facility] [address] Stroke [facility]); Thromboembolus ([facility] [address] Thyroid disease; Unspecified adverse effect of anesthesia; or Unspecified sleep apnea. Mr. Ben Neumann  has a past surgical history that includes abdomen surgery proc unlisted; gi; heent; prostatectomy (); and knee replacement.   Social History/Living Environment:   Home Environment: Private residence  # Steps to Enter: 2  One/Two Story Residence: One story  Living Alone: No  Support Systems: Spouse/Significant Other/Partner, Friends \ neighbors  Patient Expects to be Discharged to[de-identified] Private residence  Current DME Used/Available at Home: None  Tub or Shower Type: Shower  Prior Level of Function/Work/Activity:  Independent to supervision with self care per friend       Number of Personal Factors/Comorbidities that affect the Plan of Care: Brief history (0):  LOW COMPLEXITY   ASSESSMENT OF OCCUPATIONAL PERFORMANCE[de-identified]   Activities of Daily Living:           Basic ADLs (From Assessment) Complex ADLs (From Assessment)   Basic ADL  Feeding: Setup  Oral Facial Hygiene/Grooming: Setup  Bathing: Minimum assistance  Upper Body Dressing: Minimum assistance  Lower Body Dressing: Minimum assistance  Toileting: Minimum assistance     Grooming/Bathing/Dressing Activities of Daily Living     Cognitive Retraining  Safety/Judgement: Awareness of environment                 Functional Transfers  Toilet Transfer : Contact guard assistance  Shower Transfer: Contact guard assistance     Bed/Mat Mobility  Supine to Sit: Contact guard assistance  Sit to Supine: Contact guard assistance  Sit to Stand: Minimum assistance          Most Recent Physical Functioning:   Gross Assessment:                  Posture:  Posture Assessment: Forward head, Rounded shoulders  Balance:  Sitting: Intact  Standing: Pull to stand; With support Bed Mobility:  Supine to Sit: Contact guard assistance  Sit to Supine: Contact guard assistance  Wheelchair Mobility:     Transfers:  Sit to Stand: Minimum assistance  Stand to Sit: Contact guard assistance                    Patient Vitals for the past 6 hrs:       BP BP Patient Position SpO2 Pulse   02/17/17 1514 134/77 At rest 98 % 68        Mental Status  Neurologic State: Alert  Orientation Level: Oriented to person  Cognition: Memory loss, Follows commands  Perception: Appears intact  Perseveration: No perseveration noted  Safety/Judgement: Awareness of environment                               Physical Skills Involved:  1. Balance  2. Mobility  3. Endurance Cognitive Skills Affected (resulting in the inability to perform in a timely and safe manner):  1. Thinking  2.  Remembering Psychosocial Skills Affected:  1. Environmental Adaptations   Number of elements that affect the Plan of Care: 3-5:  MODERATE COMPLEXITY   CLINICAL DECISION MAKIN[address] [place] [address] AM-PAC 6 Clicks   Basic Mobility Inpatient Short Form  How much help from another person does the patient currently need. .. Total A Lot A Little None   1. Putting on and taking off regular lower body clothing?   [ ] 1   [ ] 2   [X] 3   [ ] 4   2. Bathing (including washing, rinsing, drying)? [ ] 1   [ ] 2   [X] 3   [ ] 4   3. Toileting, which includes using toilet, bedpan or urinal?   [ ] 1   [ ] 2   [X] 3   [ ] 4   4. Putting on and taking off regular upper body clothing?   [ ] 1   [ ] 2   [X] 3   [ ] 4   5. Taking care of personal grooming such as brushing teeth? [ ] 1   [ ] 2   [ ] 3   [X] 4   6. Eating meals? [ ] 1   [ ] 2   [ ] 3   [X] 4   © , Trustees of [address] [place] [address], under license to [employer]. All rights reserved    Score:  Initial: 20 Most Recent: X (Date: -- )     Interpretation of Tool:  Represents activities that are increasingly more difficult (i.e. Bed mobility, Transfers, Gait). Score 24 23 22-20 19-15 14-10 9-7 6       Modifier CH CI CJ CK CL CM CN         · Self Care:               - CURRENT STATUS:    CJ - 20%-39% impaired, limited or restricted               - GOAL STATUS:           CI - 1%-19% impaired, limited or restricted               - D/C STATUS:                       ---------------To be determined---------------  [place] / Product Type: Medicare /       Medical Necessity:     · Patient is expected to demonstrate progress in balance and endurance to increase independence with self care and functional mobility. Reason for Services/Other Comments:  · Patient would benefit from OT to maximize independence and safety with self care and functional mobility.  .   Use of outcome tool(s) and clinical judgement create a POC that gives a: LOW COMPLEXITY             TREATMENT:   (In addition to Assessment/Re-Assessment sessions the following treatments were rendered)      Pre-treatment Symptoms/Complaints:  No complaint   Pain: Initial:   Pain Intensity 1: 0 0 Post Session:  0      Assessment/Reassessment only, no treatment provided today     Braces/Orthotics/Lines/Etc:   · IV  · O2 Device: Room air  Treatment/Session Assessment:    · Response to Treatment:  Pt did well and very pleasant  · Interdisciplinary Collaboration:  · Physical Therapist  · Occupational Therapist  · Registered Nurse  · After treatment position/precautions:  · Supine in bed  · Bed/Chair-wheels locked  · Bed in low position  · Call light within reach  · RN notified  · friend at bedside  · Compliance with Program/Exercises: Will assess as treatment progresses. · Recommendations/Intent for next treatment session: \"Next visit will focus on reduction in assistance provided\".   Total Treatment Duration:  OT Patient Time In/Time Out  Time In: 1320  Time Out: Danny Sampson [address]

## 2017-02-17 NOTE — PROGRESS NOTES
Assessment done via doc flow sheet. 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 Resp easy & regular, abd soft, pt denies pain. Pt to be taken to radiology by tech via stretcher.

## 2017-02-17 NOTE — ED PROVIDER NOTES
HPI Comments: 43-year-old gentleman with a history of a fever and right-sided pain that has gotten worse over the last 2 days. Patient saw the primary care doctor today who ordered a CT scan of his abdomen to evaluate those symptoms. The CT scan showed a large cystic structure that could potentially represent an abscess. Given the patient's fevers and pain he was then sent to the department for further care. Patient says he has not been having any significant cough and has had no nausea, vomiting, or diarrhea. Overall he says his pain is a 4 out of 10. Elements of this note were created using speech recognition software. As such, there may be errors of speech recognition present. Patient is a 67 y.o. male presenting with abdominal pain. The history is provided by the patient and the spouse. Abdominal Pain    Associated symptoms include a fever. Pertinent negatives include no diarrhea, no nausea, no vomiting, no dysuria, no hematuria, no headaches, no arthralgias, no myalgias and no chest pain.         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    Hx knee replacement       left         Family History:   Problem Relation Age of Onset    Cancer Father 77     prostate    Heart Disease Brother     Cancer Brother      Prostate    Cancer Mother      breast,melanoma    Malignant Hyperthermia Neg Hx     Pseudocholinesterase Deficiency Neg Hx     Delayed Awakening Neg Hx     Post-op Nausea/Vomiting Neg Hx     Emergence Delirium Neg Hx     Post-op Cognitive Dysfunction Neg Hx     Other Neg Hx        Social History     Social History    Marital status:      Spouse name: N/A    Number of children: N/A    Years of education: N/A     Occupational History    Not on file. Social History Main Topics    Smoking status: Never Smoker    Smokeless tobacco: Not on file    Alcohol use No    Drug use: No    Sexual activity: Not on file     Other Topics Concern    Not on file     Social History Narrative         ALLERGIES: Aricept [donepezil] and Onion    Review of Systems   Constitutional: Positive for chills and fever. Negative for diaphoresis. HENT: Negative for congestion, rhinorrhea and sore throat. Eyes: Negative for redness and visual disturbance. Respiratory: Negative for cough, chest tightness, shortness of breath and wheezing. Cardiovascular: Negative for chest pain and palpitations. Gastrointestinal: Positive for abdominal pain. Negative for blood in stool, diarrhea, nausea and vomiting. Endocrine: Negative for polydipsia and polyuria. Genitourinary: Negative for dysuria and hematuria. Musculoskeletal: Negative for arthralgias, myalgias and neck stiffness. Skin: Negative for rash. Allergic/Immunologic: Negative for environmental allergies and food allergies. Neurological: Negative for dizziness, weakness and headaches. Hematological: Negative for adenopathy. Does not bruise/bleed easily. Psychiatric/Behavioral: Negative for confusion and sleep disturbance. The patient is not nervous/anxious. Vitals:    02/16/17 1901   BP: 148/77   Pulse: 82   Resp: 16   Temp: (!) 101.2 °F (38.4 °C)   SpO2: 96%   Weight: 76.7 kg (169 lb)   Height: 5' 11\" (1.803 m)            Physical Exam   Constitutional: He is oriented to person, place, and time.  He appears well-developed and well-nourished. HENT:   Head: Normocephalic and atraumatic. Eyes: Conjunctivae and EOM are normal. Pupils are equal, round, and reactive to light. Neck: Normal range of motion. Cardiovascular: Normal rate and regular rhythm. Pulmonary/Chest: Effort normal and breath sounds normal. No respiratory distress. He has no wheezes. He has no rales. He exhibits no tenderness. Abdominal: Soft. Bowel sounds are normal. There is tenderness. There is no rebound and no guarding. Tenderness to palpation in the area of his right upper quadrant. Musculoskeletal: Normal range of motion. He exhibits no edema or tenderness. Lymphadenopathy:     He has no cervical adenopathy. Neurological: He is alert and oriented to person, place, and time. Skin: Skin is warm and dry. Psychiatric: He has a normal mood and affect. Nursing note and vitals reviewed. MDM  Number of Diagnoses or Management Options  Diagnosis management comments: Patient's blood work shows no elevated white blood cell count and no elevated lactic acid. He has no signs of sepsis at this time other than his fever spike do not think he needs a full septic workup. I will send some blood cultures and start him on some antibiotics due to the nature of the liver lesion can be further clarified. 9:35 PM  I discussed the case with the hospitalist who kindly agreed to see the patient.     ED Course       Procedures

## 2017-02-17 NOTE — CONSULTS
Department of Interventional Radiology  [phone]        Consult Note     Patient: Sheeba Gutierrez MRN: [id number]  SSN: [id number]    YOB: 1940  Age: 67 y.o. Sex: male      Referring Physician: Dr Sulma Griffin Date: 2/17/2017          EMR (including Care Everywhere) reviewed, [facility] imaging studies reviewed, case discussed w Dr Noble Guardian. The patient was not examined. No charge generated. History of LLE DVT in 2010. By report, acute DVT (Left CFV, FV, PopV, TibialVs) diagnosed in August, 2016 at [facility]. This was treated w Xarelto. Patient admitted 2/16/17 to [facility] with diagnosis of painful, hemorrhagic right hepatic cyst.  Xarelto has been held. Clearly, the risk of additional cyst hemorrhage supercedes the potential benefit of Xarelto anticoagulation. At this point, nearly 6 months after the diagnosis of acute DVT, I feel it is safe to stop Xarelto. I do recommend starting Aspirin 81 mg per day as he has had two episodes of LLE DVT. In the absence of acute DVT on today's US exam, I see no reason to place an IVC Filter. If the cyst pain warrants, it could be percutaneously drained in order to relieve the pressure. This does, of course, increase the risk of additional hemorrhage slightly and would only be performed if pain control was an issue. Consider percutaneous cyst drainage with alcohol sclerosis in the future (in 3 months) to minimize the risk of future hemorrhage.       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    Hiatal hernia     Hypertension      on medication    Parkinson disease [facility])      Past Surgical History Procedure Laterality Date    Pr abdomen surgery proc unlisted       inquinal hernia repair     Hx gi       hemrrhoidectomy    Hx heent       tonsilectomy    Hx prostatectomy       Chandrakant Menchaca DT    Hx knee replacement       left      Family History   Problem Relation Age of Onset    Cancer Father 77     prostate    Heart Disease Brother     Cancer Brother      Prostate    Cancer Mother      breast,melanoma    Malignant Hyperthermia Neg Hx     Pseudocholinesterase Deficiency Neg Hx     Delayed Awakening Neg Hx     Post-op Nausea/Vomiting Neg Hx     Emergence Delirium Neg Hx     Post-op Cognitive Dysfunction Neg Hx     Other Neg Hx      Social History   Substance Use Topics    Smoking status: Never Smoker    Smokeless tobacco: Not on file    Alcohol use No      Allergies   Allergen Reactions    Aricept [Donepezil] Nausea and Vomiting and Rash    Onion Nausea and Vomiting     Current Facility-Administered Medications   Medication Dose Route Frequency    rivastigmine (EXELON) 9.5 mg/24 hr patch 1 Patch  1 Patch TransDERmal DAILY    piperacillin-tazobactam (ZOSYN) 3.375 g in 0.9% sodium chloride (MBP/ADV) 100 mL  3.375 g IntraVENous Q8H    tuberculin injection 5 Units  5 Units IntraDERMal ONCE    0.9% sodium chloride infusion  125 mL/hr IntraVENous CONTINUOUS    atorvastatin (LIPITOR) tablet 10 mg  10 mg Oral QHS    carbidopa-levodopa (SINEMET)  mg per tablet 1 Tab  1 Tab Oral TID    escitalopram oxalate (LEXAPRO) tablet 10 mg  10 mg Oral DAILY    lisinopril (PRINIVIL, ZESTRIL) tablet 10 mg  10 mg Oral DAILY    montelukast (SINGULAIR) tablet 10 mg  10 mg Oral QHS    pantoprazole (PROTONIX) tablet 40 mg  40 mg Oral ACB&D    sodium chloride (NS) flush 5-10 mL  5-10 mL IntraVENous Q8H    sodium chloride (NS) flush 5-10 mL  5-10 mL IntraVENous PRN    acetaminophen (TYLENOL) tablet 650 mg  650 mg Oral Q4H PRN    ondansetron (ZOFRAN) injection 4 mg  4 mg IntraVENous Q4H PRN    oxyCODONE IR (ROXICODONE) tablet 5 mg  5 mg Oral Q4H PRN      Visit Vitals    /76 (BP 1 Location: Right arm, BP Patient Position: At rest)    Pulse 70    Temp 98 °F (36.7 °C)    Resp 16    Ht 5' 11\" (1.803 m)    Wt 76.7 kg (169 lb)    SpO2 98%    BMI 23.57 kg/m2        Principal Problem:    Abdominal pain (2/16/2017)    Active Problems:    Intractable hiccups (1/30/2015)      Liver mass (2/16/2017)      Fever (2/16/2017)

## 2017-02-17 NOTE — PROGRESS NOTES
Admission assessment complete via doc flow sheet. Patient is alert and oriented x 4. Respirations even and unlabored on room air. Lung sounds CTA bilaterally. Heart sounds S1, S2 auscultated and regular. Abdomen soft but tender to the right upper quadrant. Bowel sounds hypoactive to all 4 quadrants. Patient reported last BM on 2/15/17. Patient has  two IV sites that are patent and flushed without difficulty. Skin is warm and dry without breakdown or ecchymosis. Patient uses urinal at bedside. Patient denies pain and other needs at this time. Family at bedside. Bed is locked and in low position. Bed rails x 3. Patient is encouraged to call for assistance. Call light within reach.

## 2017-02-17 NOTE — ED NOTES
TRANSFER - OUT REPORT:    Verbal report given to [address] [facility]  being [facility] to  for routine progression of care       Report consisted of patients Situation, Background, Assessment and   Recommendations(SBAR). Information from the following report(s) SBAR, ED Summary, Procedure Summary, MAR and Recent Results was reviewed with the receiving nurse. Lines:       Opportunity for questions and clarification was provided.       Patient transported with:   [employer]

## 2017-02-17 NOTE — ED NOTES
IMPRESSION:   1. Complex liver mass, most compatible with hemorrhagic cyst. Abscess is less  likely but not excluded if there are clinically suspicious factors. 2. Complex small volume ascites. 3. Trace right pleural effusion. Gallstones again noted. This was the impression from CT prior to patient being sent to the ED.

## 2017-02-18 LAB
ALBUMIN SERPL BCP-MCNC: 2.6 G/DL (ref 3.2–4.6)
ALBUMIN/GLOB SERPL: 1 {RATIO} (ref 1.2–3.5)
ALP SERPL-CCNC: 50 U/L (ref 50–136)
ALT SERPL-CCNC: 9 U/L (ref 12–65)
ANION GAP BLD CALC-SCNC: 5 MMOL/L (ref 7–16)
AST SERPL W P-5'-P-CCNC: 11 U/L (ref 15–37)
BASOPHILS # BLD AUTO: 0 K/UL (ref 0–0.2)
BASOPHILS # BLD: 0 % (ref 0–2)
BILIRUB SERPL-MCNC: 1.1 MG/DL (ref 0.2–1.1)
BUN SERPL-MCNC: 15 MG/DL (ref 8–23)
CALCIUM SERPL-MCNC: 8.5 MG/DL (ref 8.3–10.4)
CHLORIDE SERPL-SCNC: 109 MMOL/L (ref 98–107)
CO2 SERPL-SCNC: 27 MMOL/L (ref 21–32)
CREAT SERPL-MCNC: 1.05 MG/DL (ref 0.8–1.5)
DIFFERENTIAL METHOD BLD: ABNORMAL
EOSINOPHIL # BLD: 0.1 K/UL (ref 0–0.8)
EOSINOPHIL NFR BLD: 2 % (ref 0.5–7.8)
ERYTHROCYTE [DISTWIDTH] IN BLOOD BY AUTOMATED COUNT: 13.4 % (ref 11.9–14.6)
GLOBULIN SER CALC-MCNC: 2.7 G/DL (ref 2.3–3.5)
GLUCOSE SERPL-MCNC: 93 MG/DL (ref 65–100)
HCT VFR BLD AUTO: 29.6 % (ref 41.1–50.3)
HCT VFR BLD AUTO: 31.3 % (ref 41.1–50.3)
HGB BLD-MCNC: 10.2 G/DL (ref 13.6–17.2)
HGB BLD-MCNC: 9.6 G/DL (ref 13.6–17.2)
IMM GRANULOCYTES # BLD: 0 K/UL (ref 0–0.5)
IMM GRANULOCYTES NFR BLD AUTO: 0 % (ref 0–5)
LYMPHOCYTES # BLD AUTO: 29 % (ref 13–44)
LYMPHOCYTES # BLD: 1.2 K/UL (ref 0.5–4.6)
MCH RBC QN AUTO: 30 PG (ref 26.1–32.9)
MCHC RBC AUTO-ENTMCNC: 32.4 G/DL (ref 31.4–35)
MCV RBC AUTO: 92.5 FL (ref 79.6–97.8)
MM INDURATION POC: NORMAL MM (ref 0–5)
MONOCYTES # BLD: 0.6 K/UL (ref 0.1–1.3)
MONOCYTES NFR BLD AUTO: 14 % (ref 4–12)
NEUTS SEG # BLD: 2.3 K/UL (ref 1.7–8.2)
NEUTS SEG NFR BLD AUTO: 55 % (ref 43–78)
PLATELET # BLD AUTO: 92 K/UL (ref 150–450)
PMV BLD AUTO: 9.9 FL (ref 10.8–14.1)
POTASSIUM SERPL-SCNC: 3.7 MMOL/L (ref 3.5–5.1)
PPD POC: NORMAL NEGATIVE
PROT SERPL-MCNC: 5.3 G/DL (ref 6.3–8.2)
RBC # BLD AUTO: 3.2 M/UL (ref 4.23–5.67)
SODIUM SERPL-SCNC: 141 MMOL/L (ref 136–145)
WBC # BLD AUTO: 4.1 K/UL (ref 4.3–11.1)

## 2017-02-18 PROCEDURE — 74011250637 HC RX REV CODE- 250/637: Performed by: INTERNAL MEDICINE

## 2017-02-18 PROCEDURE — 97535 SELF CARE MNGMENT TRAINING: CPT

## 2017-02-18 PROCEDURE — 36415 COLL VENOUS BLD VENIPUNCTURE: CPT | Performed by: INTERNAL MEDICINE

## 2017-02-18 PROCEDURE — 65270000029 HC RM PRIVATE

## 2017-02-18 PROCEDURE — 74011250636 HC RX REV CODE- 250/636: Performed by: INTERNAL MEDICINE

## 2017-02-18 PROCEDURE — 85018 HEMOGLOBIN: CPT | Performed by: INTERNAL MEDICINE

## 2017-02-18 PROCEDURE — 85025 COMPLETE CBC W/AUTO DIFF WBC: CPT | Performed by: INTERNAL MEDICINE

## 2017-02-18 PROCEDURE — 80053 COMPREHEN METABOLIC PANEL: CPT | Performed by: INTERNAL MEDICINE

## 2017-02-18 PROCEDURE — 74011250636 HC RX REV CODE- 250/636: Performed by: EMERGENCY MEDICINE

## 2017-02-18 PROCEDURE — 74011000258 HC RX REV CODE- 258: Performed by: INTERNAL MEDICINE

## 2017-02-18 RX ADMIN — ACETAMINOPHEN 650 MG: 325 TABLET, FILM COATED ORAL at 18:56

## 2017-02-18 RX ADMIN — PANTOPRAZOLE SODIUM 40 MG: 40 TABLET, DELAYED RELEASE ORAL at 17:44

## 2017-02-18 RX ADMIN — CARBIDOPA AND LEVODOPA 1 TABLET: 25; 100 TABLET ORAL at 21:38

## 2017-02-18 RX ADMIN — ATORVASTATIN CALCIUM 10 MG: 10 TABLET, FILM COATED ORAL at 21:39

## 2017-02-18 RX ADMIN — PIPERACILLIN SODIUM,TAZOBACTAM SODIUM 3.38 G: 3; .375 INJECTION, POWDER, FOR SOLUTION INTRAVENOUS at 01:37

## 2017-02-18 RX ADMIN — PANTOPRAZOLE SODIUM 40 MG: 40 TABLET, DELAYED RELEASE ORAL at 08:10

## 2017-02-18 RX ADMIN — Medication 10 ML: at 21:39

## 2017-02-18 RX ADMIN — CARBIDOPA AND LEVODOPA 1 TABLET: 25; 100 TABLET ORAL at 17:44

## 2017-02-18 RX ADMIN — PIPERACILLIN SODIUM,TAZOBACTAM SODIUM 3.38 G: 3; .375 INJECTION, POWDER, FOR SOLUTION INTRAVENOUS at 08:19

## 2017-02-18 RX ADMIN — ESCITALOPRAM OXALATE 10 MG: 10 TABLET ORAL at 08:10

## 2017-02-18 RX ADMIN — LISINOPRIL 10 MG: 5 TABLET ORAL at 08:10

## 2017-02-18 RX ADMIN — PIPERACILLIN SODIUM,TAZOBACTAM SODIUM 3.38 G: 3; .375 INJECTION, POWDER, FOR SOLUTION INTRAVENOUS at 17:45

## 2017-02-18 RX ADMIN — MONTELUKAST SODIUM 10 MG: 10 TABLET, FILM COATED ORAL at 21:38

## 2017-02-18 RX ADMIN — SODIUM CHLORIDE 125 ML/HR: 900 INJECTION, SOLUTION INTRAVENOUS at 05:05

## 2017-02-18 RX ADMIN — CARBIDOPA AND LEVODOPA 1 TABLET: 25; 100 TABLET ORAL at 08:10

## 2017-02-18 NOTE — PROGRESS NOTES
Problem: Self Care Deficits Care Plan (Adult)  Goal: *Acute Goals and Plan of Care (Insert Text)  1. Patient will perform grooming with supervision. 2. Patient will perform Upper body dressing with supervision  3. Patient will perform lower body dressing with supervision  4. Patient will perform upper and lower body bathing with supervision. 5. Patient will perform toilet transfers with supervision. 6. Patient will perform shower transfer with supervision. 7. Patient will participate in 30 + minutes of ADL/ therapeutic exercise/therapeutic activity with min rest breaks to increase activity tolerance for self care. 8. Patient will perform ADL functional mobility in room with supervision. Goals to be achieved in 7 days. OCCUPATIONAL THERAPY: Daily Note, Treatment Day: 1st and AM 2/18/2017  INPATIENT: Hospital Day: 3  [place] / Product Type: Medicare /      NAME/AGE/GENDER: Ilir Bergeron is a 67 y.o. male    PRIMARY DIAGNOSIS:  Abdominal pain Abdominal pain Abdominal pain        ICD-10: Treatment Diagnosis:        · Generalized Muscle Weakness (M62.81)   Precautions/Allergies:         Aricept [donepezil] and Onion       ASSESSMENT:      Mr. Colletta Schlossman presents with above diagnosis. Pt was supine in bed when therapist arrived. Pt completed functional mobility in room with hand held assistance. Pt stood at sink side to complete grooming. Pt was returned to bed with alarm on. Pt was noted to have generalized weakness and decrease balance. Pt would benefit from OT to maximize independence and safety with self care and functional mobility. This section established at most recent assessment   PROBLEM LIST (Impairments causing functional limitations):  1. Decreased Strength  2. Decreased ADL/Functional Activities  3. Decreased Transfer Abilities  4. Decreased Balance  5. Decreased Activity Tolerance  6. Increased Fatigue  7.  Decreased Cognition    INTERVENTIONS PLANNED: (Benefits and precautions of occupational therapy have been discussed with the patient.)  1. Activities of daily living training  2. Balance training  3. Cognitive training  4. Theraputic activity  5. Theraputic exercise      TREATMENT PLAN: Frequency/Duration: Follow patient 3 times per week to address above goals. Rehabilitation Potential For Stated Goals: GOOD      RECOMMENDED REHABILITATION/EQUIPMENT: (at time of discharge pending progress): Continue Skilled Therapy and Home Health: Physical Therapy and Occupational Therapy. OCCUPATIONAL PROFILE AND HISTORY:   History of Present Injury/Illness (Reason for Referral):  Weakness   Past Medical History/Comorbidities:   Mr. Amish Nelson  has a past medical history of Acoustic neuroma ([address] (2012); Arthritis; Cancer ([address] Environmental allergies; GERD (gastroesophageal reflux disease); Hearing difficulty; Hiatal hernia; Hypertension; and Parkinson disease ([address] He also has no past medical history of Arrhythmia; Asthma; Autoimmune disease ([address] CAD (coronary artery disease); Chronic kidney disease; Chronic pain; Coagulation defects; COPD; Diabetes ([address] Difficult intubation; Heart failure ([address] Liver disease; Malignant hyperthermia due to anesthesia; Nausea & vomiting; Other ill-defined conditions(799.89); Pseudocholinesterase deficiency; Psychiatric disorder; PUD (peptic ulcer disease); Seizures ([facility] [address] Stroke [facility]); Thromboembolus ([facility] [address] Thyroid disease; Unspecified adverse effect of anesthesia; or Unspecified sleep apnea. Mr. Amish Nelson  has a past surgical history that includes abdomen surgery proc unlisted; gi; heent; prostatectomy (); and knee replacement.   Social History/Living Environment:   Home Environment: Private residence  # Steps to Enter: 2  One/Two Story Residence: One story  Living Alone: No  Support Systems: Spouse/Significant Other/Partner, Friends \ neighbors  Patient Expects to be Discharged to[employer] residence  Current DME Used/Available at Home: None  Tub or Shower Type: Shower  Prior Level of Function/Work/Activity:  Independent to supervision with self care per friend       Number of Personal Factors/Comorbidities that affect the Plan of Care: Brief history (0):  LOW COMPLEXITY   ASSESSMENT OF OCCUPATIONAL PERFORMANCE[de-identified]   Activities of Daily Living:           Basic ADLs (From Assessment) Complex ADLs (From Assessment)   Basic ADL  Feeding: Setup  Oral Facial Hygiene/Grooming: Setup  Bathing: Minimum assistance  Upper Body Dressing: Minimum assistance  Lower Body Dressing: Minimum assistance  Toileting: Minimum assistance     Grooming/Bathing/Dressing Activities of Daily Living   Grooming  Washing Face: Supervision/set-up  Brushing Teeth: Supervision/set-up Cognitive Retraining  Safety/Judgement: Fall prevention                       Bed/Mat Mobility  Supine to Sit: Supervision  Sit to Supine: Supervision          Most Recent Physical Functioning:   Gross Assessment:                  Posture:  Posture Assessment: Forward head, Rounded shoulders  Balance:    Bed Mobility:  Supine to Sit: Supervision  Sit to Supine: Supervision  Wheelchair Mobility:     Transfers:                       Patient Vitals for the past 6 hrs:   BP BP Patient Position SpO2 Pulse   02/18/17 0656 148/74 At rest 99 % 75   02/18/17 1106 137/72 At rest 99 % 64        Mental Status  Neurologic State: Alert  Orientation Level: Oriented to person  Cognition: Follows commands  Perception: Tactile, Verbal  Perseveration: Tactile cues provided, Verbal cues provided  Safety/Judgement: Fall prevention                               Physical Skills Involved:  1. Balance  2. Mobility  3. Endurance Cognitive Skills Affected (resulting in the inability to perform in a timely and safe manner):  1. Thinking  2. Remembering Psychosocial Skills Affected:  1.  Environmental Adaptations   Number of elements that affect the Plan of Care: 3-5:  MODERATE COMPLEXITY   CLINICAL DECISION MAKIN[address] [place] [address] AM-PAC 6 Clicks   Basic Mobility Inpatient Short Form  How much help from another person does the patient currently need. .. Total A Lot A Little None   1. Putting on and taking off regular lower body clothing?   [ ] 1   [ ] 2   [X] 3   [ ] 4   2. Bathing (including washing, rinsing, drying)? [ ] 1   [ ] 2   [X] 3   [ ] 4   3. Toileting, which includes using toilet, bedpan or urinal?   [ ] 1   [ ] 2   [X] 3   [ ] 4   4. Putting on and taking off regular upper body clothing?   [ ] 1   [ ] 2   [X] 3   [ ] 4   5. Taking care of personal grooming such as brushing teeth? [ ] 1   [ ] 2   [ ] 3   [X] 4   6. Eating meals? [ ] 1   [ ] 2   [ ] 3   [X] 4   © , Trustees of [address] [place] [address], under license to [employer]. All rights reserved    Score:  Initial: 20 Most Recent: X (Date: -- )     Interpretation of Tool:  Represents activities that are increasingly more difficult (i.e. Bed mobility, Transfers, Gait). Score 24 23 22-20 19-15 14-10 9-7 6       Modifier CH CI CJ CK CL CM CN         · Self Care:               - CURRENT STATUS:    CJ - 20%-39% impaired, limited or restricted               - GOAL STATUS:           CI - 1%-19% impaired, limited or restricted               - D/C STATUS:                       ---------------To be determined---------------  [place] / Product Type: Medicare /       Medical Necessity:     · Patient is expected to demonstrate progress in balance and endurance to increase independence with self care and functional mobility. Reason for Services/Other Comments:  · Patient would benefit from OT to maximize independence and safety with self care and functional mobility.  .   Use of outcome tool(s) and clinical judgement create a POC that gives a: LOW COMPLEXITY             TREATMENT:   (In addition to Assessment/Re-Assessment sessions the following treatments were rendered)      Pre-treatment Symptoms/Complaints:  No complaint   Pain: Initial:   Pain Intensity 1: 0 0 Post Session:  0      Self Care: (15): Procedure(s) (per grid) utilized to improve and/or restore self-care/home management as related to grooming. Required min verbal and manual cueing to facilitate activities of daily living skills. Braces/Orthotics/Lines/Etc:   · IV  Treatment/Session Assessment:    · Response to Treatment:  Pt did well and very pleasant  · Interdisciplinary Collaboration:  · Certified Occupational Therapy Assistant and Registered Nurse  · After treatment position/precautions:  · Supine in bed, Bed alarm/tab alert on, Bed/Chair-wheels locked, Bed in low position, Call light within reach, RN notified and Side rails x 3  · Compliance with Program/Exercises: Will assess as treatment progresses. · Recommendations/Intent for next treatment session: \"Next visit will focus on reduction in assistance provided\".   Total Treatment Duration:  OT Patient Time In/Time Out  Time In: 1145  Time Out: [address]

## 2017-02-18 NOTE — PROGRESS NOTES
Hospitalist Progress Note    2017  2:22 PM  Admit Date: 2017  7:10 PM   NAME: Darron Chinchilla   :  1940   MRN:  [id number]   Attending: Palma Grider DO  PCP:  Suzy West MD  Treatment Team: Attending Provider: Palma Grider DO  SUBJECTIVE:       Mr. Leonor Santos is a 68 yo WM with PMH of dementia and parkinsons disease, DVT on xarelto since  followed by [facility] vascular  admitted with fever/abd pain. CT AP showing known complex liver mass, compatible with hemorrhagic cyst and gallstones. ABD US shows similar findings. I spoke with IR Dr. Warren Georges who recommends stopping xarelto and IR outpatient followup for cyst drainage and subsequent alcohol ablation in 3 months- can make apt at discharge at [phone]. IR additionally recommends cholecystectomy at some point. Additionally he has persistent DVT LE, IR consulted for IVC filter but does not feel this is indicated as this appears to be more of a chronic issue. IR recommends asa 81 mg daily and compression hose. He is On zosyn day 2, CXR negative, BC pending, flu negative , UA negative   Wife would prefer rehab at discharge , SW aware       16 hard of hearing and has dementia, ROS difficult, wife at bedside          Recent Results (from the past 24 hour(s))   CBC WITH AUTOMATED DIFF    Collection Time: 17  5:30 AM   Result Value Ref Range    WBC 4.1 (L) 4.3 - 11.1 K/uL    RBC 3.20 (L) 4.23 - 5.67 M/uL    HGB 9.6 (L) 13.6 - 17.2 g/dL    HCT 29.6 (L) 41.1 - 50.3 %    MCV 92.5 79.6 - 97.8 FL    MCH 30.0 26.1 - 32.9 PG    MCHC 32.4 31.4 - 35.0 g/dL    RDW 13.4 11.9 - 14.6 %    PLATELET 92 (L) 711 - 450 K/uL    MPV 9.9 (L) 10.8 - 14.1 FL    DF AUTOMATED      NEUTROPHILS 55 43 - 78 %    LYMPHOCYTES 29 13 - 44 %    MONOCYTES 14 (H) 4.0 - 12.0 %    EOSINOPHILS 2 0.5 - 7.8 %    BASOPHILS 0 0.0 - 2.0 %    IMMATURE GRANULOCYTES 0.0 0.0 - 5.0 %    ABS. NEUTROPHILS 2.3 1.7 - 8.2 K/UL    ABS. LYMPHOCYTES 1.2 0.5 - 4.6 K/UL    ABS. MONOCYTES 0.6 0.1 - 1.3 K/UL    ABS. EOSINOPHILS 0.1 0.0 - 0.8 K/UL    ABS. BASOPHILS 0.0 0.0 - 0.2 K/UL    ABS. IMM. GRANS. 0.0 0.0 - 0.5 K/UL   METABOLIC PANEL, COMPREHENSIVE    Collection Time: 02/18/17  5:30 AM   Result Value Ref Range    Sodium 141 136 - 145 mmol/L    Potassium 3.7 3.5 - 5.1 mmol/L    Chloride 109 (H) 98 - 107 mmol/L    CO2 27 21 - 32 mmol/L    Anion gap 5 (L) 7 - 16 mmol/L    Glucose 93 65 - 100 mg/dL    BUN 15 8 - 23 MG/DL    Creatinine 1.05 0.8 - 1.5 MG/DL    GFR est AA >60 >60 ml/min/1.73m2    GFR est non-AA >60 >60 ml/min/1.73m2    Calcium 8.5 8.3 - 10.4 MG/DL    Bilirubin, total 1.1 0.2 - 1.1 MG/DL    ALT (SGPT) 9 (L) 12 - 65 U/L    AST (SGOT) 11 (L) 15 - 37 U/L    Alk.  phosphatase 50 50 - 136 U/L    Protein, total 5.3 (L) 6.3 - 8.2 g/dL    Albumin 2.6 (L) 3.2 - 4.6 g/dL    Globulin 2.7 2.3 - 3.5 g/dL    A-G Ratio 1.0 (L) 1.2 - 3.5         Allergies   Allergen Reactions    Aricept [Donepezil] Nausea and Vomiting and Rash    Onion Nausea and Vomiting     Current Facility-Administered Medications   Medication Dose Route Frequency Provider Last Rate Last Dose    rivastigmine (EXELON) 9.5 mg/24 hr patch 1 Patch  1 Patch TransDERmal DAILY Shantell Flaming, DO   1 Patch at 02/18/17 0900    piperacillin-tazobactam (ZOSYN) 3.375 g in 0.9% sodium chloride (MBP/ADV) 100 mL  3.375 g IntraVENous Q8H Shantell Flaming, DO 25 mL/hr at 02/18/17 0819 3.375 g at 02/18/17 9772    atorvastatin (LIPITOR) tablet 10 mg  10 mg Oral QHS Shantell Flaming, DO   10 mg at 02/17/17 2108    carbidopa-levodopa (SINEMET)  mg per tablet 1 Tab  1 Tab Oral TID Shantell Flaming, DO   1 Tab at 02/18/17 0810    escitalopram oxalate (LEXAPRO) tablet 10 mg  10 mg Oral DAILY Shantell Flaming, DO   10 mg at 02/18/17 0810    lisinopril (PRINIVIL, ZESTRIL) tablet 10 mg  10 mg Oral DAILY Shantell Flaming, DO   10 mg at 02/18/17 0810    montelukast (SINGULAIR) tablet 10 mg  10 mg Oral QHS Shantell Flaming, DO   10 mg at 02/17/17 2108  pantoprazole (PROTONIX) tablet 40 mg  40 mg Oral ACB&D Tierra Juan David, DO   40 mg at 17 0810    sodium chloride (NS) flush 5-10 mL  5-10 mL IntraVENous Q8H Tierra Juan David, DO   10 mL at 17 2108    sodium chloride (NS) flush 5-10 mL  5-10 mL IntraVENous PRN Tierra Juan David, DO        acetaminophen (TYLENOL) tablet 650 mg  650 mg Oral Q4H PRN Tierra Juan David, DO        ondansetron [facility]) injection 4 mg  4 mg IntraVENous Q4H PRN Tierra Juan David, DO        oxyCODONE IR (ROXICODONE) tablet 5 mg  5 mg Oral Q4H PRN Tierra Juan David, DO               Review of Systems as noted above in HPI   PHYSICAL EXAM     Visit Vitals    /72 (BP 1 Location: Right arm, BP Patient Position: At rest)    Pulse 64    Temp 98.9 °F (37.2 °C)    Resp 18    Ht 5' 11\" (1.803 m)    Wt 76 kg (167 lb 9.6 oz)    SpO2 99%    BMI 23.38 kg/m2      Temp (24hrs), Av.7 °F (37.1 °C), Min:96.3 °F (35.7 °C), Max:99.7 °F (37.6 °C)    Oxygen Therapy  O2 Sat (%): 99 % (17 1106)  O2 Device: Room air (17 2148)    Intake/Output Summary (Last 24 hours) at 17 1422  Last data filed at 17 1300   Gross per 24 hour   Intake             2639 ml   Output                0 ml   Net             2639 ml      General: No acute distress,conversive  Lungs:  CTAB, good effort   Heart:  Regular rate and rhythm, no murmur, no edema   Abdomen: Soft, nontender and nondistended, normal bowel sounds  Extremities: Warm and dry         DIAGNOSTIC STUDIES      Labs and Studies from previous 24 hours have been personally reviewed by myself           Full Code    ASSESSMENT / Tracy Chung 169 Problems    Diagnosis Date Noted    Abdominal pain 2017    Liver mass 2017    Fever 2017    Intractable hiccups 2015       - discussed with Dr. Nicola Ge of IR above plans for cyst drainage/ablation in 3 months  -hold xarelto indefinitely , start asa once determined stability   -repeat HGB now due to drop overnight   -workup ongoing for fever etiology/possibility hemorrhagic cyst related, BC pending , continue zosyn for now  -PPD/PT/OT/SW for dispo   FEN:oral  DVT Prophylaxis: off xarelto, no SCD due to having DVT  Disposition:pending   Time spent with patient:  Care plan addressed:wife   Risk Assessment:  Signed By: Hannah Guerra MD     February 18, 2017

## 2017-02-18 NOTE — PROGRESS NOTES
Assessment completed via doc flowsheet, pt alert to person and place, periodically confused at times, respirations present, even and unlabored, HOB elevated, pt denies any SOB at this time, S1&S2 auscultated, HR regular, abd soft, tender, bowel sounds present in +4 quadrants, pt is up with assistance, voiding without difficulty, IVF infusing without difficulty, pt denies any pain or N/V at this time, pt instructed to call for assistance, pt verbalizes understanding, bed low and locked, side rails x3, call light within reach, bed alarm in place and functioning.

## 2017-02-18 NOTE — PROGRESS NOTES
Am assessment completed. (see flow sheets). Pt is alert to self only. Caught him attempting to crawl over rails - bed alarm going off. Assisted patient to bathroom and to chair. Does know where he is today or how he got here. Verbalizes needs. Appetite fair. Shallows pills whole with thin liquids. Skin warm, dry and intact. Denies pain. Safety measures in place. Continue to monitor.

## 2017-02-19 LAB
MM INDURATION POC: 0 MM (ref 0–5)
PPD POC: NEGATIVE NEGATIVE

## 2017-02-19 PROCEDURE — 74011250637 HC RX REV CODE- 250/637: Performed by: INTERNAL MEDICINE

## 2017-02-19 PROCEDURE — 65270000029 HC RM PRIVATE

## 2017-02-19 PROCEDURE — 74011000258 HC RX REV CODE- 258: Performed by: INTERNAL MEDICINE

## 2017-02-19 PROCEDURE — 74011250636 HC RX REV CODE- 250/636: Performed by: INTERNAL MEDICINE

## 2017-02-19 PROCEDURE — 97116 GAIT TRAINING THERAPY: CPT

## 2017-02-19 RX ORDER — ASPIRIN 81 MG/1
81 TABLET ORAL DAILY
Status: DISCONTINUED | OUTPATIENT
Start: 2017-02-19 | End: 2017-02-21 | Stop reason: HOSPADM

## 2017-02-19 RX ADMIN — MONTELUKAST SODIUM 10 MG: 10 TABLET, FILM COATED ORAL at 21:24

## 2017-02-19 RX ADMIN — PANTOPRAZOLE SODIUM 40 MG: 40 TABLET, DELAYED RELEASE ORAL at 07:16

## 2017-02-19 RX ADMIN — LISINOPRIL 10 MG: 5 TABLET ORAL at 08:44

## 2017-02-19 RX ADMIN — PIPERACILLIN SODIUM,TAZOBACTAM SODIUM 3.38 G: 3; .375 INJECTION, POWDER, FOR SOLUTION INTRAVENOUS at 17:19

## 2017-02-19 RX ADMIN — Medication 10 ML: at 21:28

## 2017-02-19 RX ADMIN — CARBIDOPA AND LEVODOPA 1 TABLET: 25; 100 TABLET ORAL at 15:46

## 2017-02-19 RX ADMIN — CARBIDOPA AND LEVODOPA 1 TABLET: 25; 100 TABLET ORAL at 08:43

## 2017-02-19 RX ADMIN — ESCITALOPRAM OXALATE 10 MG: 10 TABLET ORAL at 08:43

## 2017-02-19 RX ADMIN — PIPERACILLIN SODIUM,TAZOBACTAM SODIUM 3.38 G: 3; .375 INJECTION, POWDER, FOR SOLUTION INTRAVENOUS at 00:49

## 2017-02-19 RX ADMIN — ASPIRIN 81 MG: 81 TABLET, COATED ORAL at 14:39

## 2017-02-19 RX ADMIN — Medication 10 ML: at 05:14

## 2017-02-19 RX ADMIN — ATORVASTATIN CALCIUM 10 MG: 10 TABLET, FILM COATED ORAL at 21:24

## 2017-02-19 RX ADMIN — CARBIDOPA AND LEVODOPA 1 TABLET: 25; 100 TABLET ORAL at 21:24

## 2017-02-19 RX ADMIN — PIPERACILLIN SODIUM,TAZOBACTAM SODIUM 3.38 G: 3; .375 INJECTION, POWDER, FOR SOLUTION INTRAVENOUS at 08:44

## 2017-02-19 RX ADMIN — PANTOPRAZOLE SODIUM 40 MG: 40 TABLET, DELAYED RELEASE ORAL at 15:46

## 2017-02-19 NOTE — PROGRESS NOTES
Assessment completed via doc flowsheet, pt alert, oriented to person and place, confused at times, respirations present, even and unlabored, HOB elevated, S1&S2 auscultated, HR regular, abd soft, tender, bowel sounds present in +4 quadrants, pt is up with assistance, voiding without difficulty, IVF infusing without difficulty, pt denies any pain at thsi time, pt is [place], hearing aid to L ear, pt instructed to call for assistance, wife at bedside, bed low and locked, side rails x3, call light within reach, bed alarm in place and functioning.

## 2017-02-19 NOTE — PROGRESS NOTES
Hospitalist Progress Note    2017  12:42  PM  Admit Date: 2017  7:10 PM   NAME: Steve Gabriel   :  1940   MRN:  [id number]   Attending: Oliver Clifton DO  PCP:  [place] MD  Treatment Team: Attending Provider: Oliver Clifton DO  SUBJECTIVE:       Mr. Valeria Garcia is a 68 yo WM with PMH of dementia and parkinsons disease, DVT on xarelto since  followed by [facility] vascular  admitted with fever/abd pain. CT AP showing known complex liver mass, compatible with hemorrhagic cyst and gallstones. ABD US shows similar findings. I spoke with IR Dr. Melly Contreras who recommends stopping xarelto and IR outpatient followup for cyst drainage and subsequent alcohol ablation in 3 months- can make apt at discharge at [phone]. IR additionally recommends cholecystectomy at some point. Additionally he has persistent DVT left LE, IR consulted for IVC filter but does not feel this is indicated as this appears to be more of a chronic issue. IR recommends asa 81 mg daily and compression hose.    He is On zosyn day 3, CXR negative, BC NGTD, flu negative , UA negative   Wife would prefer rehab at discharge , SW aware       16 hard of hearing and has dementia, ROS difficult, wife at bedside with additional family           Recent Results (from the past 25 hour(s))   PLEASE READ & DOCUMENT PPD TEST IN 24 HRS    Collection Time: 17  1:30 PM   Result Value Ref Range    PPD  Negative    mm Induration  mm   HGB & HCT    Collection Time: 17  3:50 PM   Result Value Ref Range    HGB 10.2 (L) 13.6 - 17.2 g/dL    HCT 31.3 (L) 41.1 - 50.3 %       Allergies   Allergen Reactions    Aricept [Donepezil] Nausea and Vomiting and Rash    Onion Nausea and Vomiting     Current Facility-Administered Medications   Medication Dose Route Frequency Provider Last Rate Last Dose    rivastigmine (EXELON) 9.5 mg/24 hr patch 1 Patch  1 Patch TransDERmal DAILY Oliver Clifton DO   1 Patch at 17 3951    piperacillin-tazobactam (ZOSYN) 3.375 g in 0.9% sodium chloride (MBP/ADV) 100 mL  3.375 g IntraVENous Q8H Rosibel Sue, DO 25 mL/hr at 17 0844 3.375 g at 17 0844    atorvastatin (LIPITOR) tablet 10 mg  10 mg Oral QHS Rosibel Sue, DO   10 mg at 17 2139    carbidopa-levodopa (SINEMET)  mg per tablet 1 Tab  1 Tab Oral TID Rosibel Sue, DO   1 Tab at 17 4387    escitalopram oxalate (LEXAPRO) tablet 10 mg  10 mg Oral DAILY Rosibel Sue, DO   10 mg at 17 0843    lisinopril (PRINIVIL, ZESTRIL) tablet 10 mg  10 mg Oral DAILY Rosibel Sue, DO   10 mg at 17 0844    montelukast (SINGULAIR) tablet 10 mg  10 mg Oral QHS Rosibel Sue, DO   10 mg at 17 2138    pantoprazole (PROTONIX) tablet 40 mg  40 mg Oral ACB&D Rosibel Sue, DO   40 mg at 17 0716    sodium chloride (NS) flush 5-10 mL  5-10 mL IntraVENous Q8H Rosibel Sue, DO   10 mL at 17 0514    sodium chloride (NS) flush 5-10 mL  5-10 mL IntraVENous PRN Rosibel Sue, DO        acetaminophen (TYLENOL) tablet 650 mg  650 mg Oral Q4H PRN Rosibel Sue, DO   650 mg at 17 1856    ondansetron (ZOFRAN) injection 4 mg  4 mg IntraVENous Q4H PRN Rosibel Sue, DO        oxyCODONE IR (ROXICODONE) tablet 5 mg  5 mg Oral Q4H PRN Rosibel Sue, DO               Review of Systems as noted above in HPI   PHYSICAL EXAM     Visit Vitals    /72 (BP 1 Location: Right arm, BP Patient Position: At rest)    Pulse 67    Temp 98.7 °F (37.1 °C)    Resp 18    Ht 5' 11\" (1.803 m)    Wt 76 kg (167 lb 9.6 oz)    SpO2 95%    BMI 23.38 kg/m2      Temp (24hrs), Av.3 °F (37.4 °C), Min:97.4 °F (36.3 °C), Max:101.2 °F (38.4 °C)    Oxygen Therapy  O2 Sat (%): 95 % (17 1128)  O2 Device: Room air (17 1335)    Intake/Output Summary (Last 24 hours) at 17 1242  Last data filed at 17 1300   Gross per 24 hour   Intake             1301 ml   Output                0 ml   Net 1301 ml      General: No acute distress,conversive  Lungs:  CTAB, good effort   Heart:  Regular rate and rhythm, no murmur, no edema   Abdomen: Soft, nontender and nondistended, normal bowel sounds  Extremities: Warm and dry         DIAGNOSTIC STUDIES      Labs and Studies from previous 24 hours have been personally reviewed by myself           Full Code    ASSESSMENT / Tracy Chung 169 Problems    Diagnosis Date Noted    Abdominal pain 02/16/2017    Liver mass 02/16/2017    Fever 02/16/2017    Intractable hiccups 01/30/2015       - discussed with Dr. Signe Gilford of IR above plans for cyst drainage/ablation in 3 months  -hold xarelto indefinitely , start asa   -workup ongoing for fever etiology/possibility hemorrhagic cyst related , continue zosyn for now  -PPD/PT/OT/SW for dispo   -dicussed case again with wife with other family present and all questions answered   FEN:oral  DVT Prophylaxis: off xarelto, no SCD due to having DVT  Disposition:pending   Time spent with patient:  Care plan addressed:wife   Risk Assessment:  Signed By: Demond Lopez MD     February 19, 2017

## 2017-02-19 NOTE — PROGRESS NOTES
Problem: Mobility Impaired (Adult and Pediatric)  Goal: *Acute Goals and Plan of Care (Insert Text)  STG:  (1.)Mr. Alem Menjivar will move from supine to sit and sit to supine with SUPERVISION within 3 day(s). (2.)Mr. Alem Menjivar will transfer from bed to chair and chair to bed with SUPERVISION using the least restrictive device within 3 day(s). (3.)Mr. Alem Menjivar will ambulate with SUPERVISION for 150 feet with the least restrictive device within 3 day(s). (4.)Pt. Will increase B LE strength 1/2 grade within 3 days ________________________________________________________________________________________________      PHYSICAL THERAPY: Daily Note, Treatment Day: 1st and PM 2/19/2017  INPATIENT: Hospital Day: 4  [place] / Product Type: Medicare /      NAME/AGE/GENDER: Geni Chua is a 67 y.o. male    PRIMARY DIAGNOSIS: Abdominal pain Abdominal pain Abdominal pain        ICD-10: Treatment Diagnosis:       · Generalized Muscle Weakness (M62.81)  · Difficulty in walking, Not elsewhere classified (R26.2)  · Other abnormalities of gait and mobility (R26.89)   Precaution/Allergies:  Aricept [donepezil] and Onion       ASSESSMENT:      Mr. Alem Menjivar presents with abdominal pain and demonstrates the following PT deficits: decreased general strength and ROM, standing balance, functional mobility and tremor in R hand. Very good improvement in gait distance. LOB X 1; corrected with min assist. Progressing toward goals. This section established at most recent assessment   PROBLEM LIST (Impairments causing functional limitations):  1. Decreased Strength  2. Decreased Transfer Abilities  3. Decreased Ambulation Ability/Technique  4. Decreased Balance  5. Decreased Activity Tolerance  6. Decreased Flexibility/Joint Mobility    INTERVENTIONS PLANNED: (Benefits and precautions of physical therapy have been discussed with the patient.)  1. Bed Mobility  2. Cold  3. Family Education  4.  Gait Training  5. Range of Motion (ROM)  6. Therapeutic Activites  7. Therapeutic Exercise/Strengthening  8. Transfer Training      TREATMENT PLAN: Frequency/Duration: daily for 1 week  Rehabilitation Potential For Stated Goals: FAIR      RECOMMENDED REHABILITATION/EQUIPMENT: (at time of discharge pending progress): Continue Skilled Therapy and Home Health: Physical Therapy. HISTORY:   History of Present Injury/Illness (Reason for Referral): Admitted with abdominal pain. Has a hx of Parkinson's  Past Medical History/Comorbidities:   Mr. Arnav Phillip  has a past medical history of Acoustic neuroma ([address] (2012); Arthritis; Cancer ([address] Environmental allergies; GERD (gastroesophageal reflux disease); Hearing difficulty; Hiatal hernia; Hypertension; and Parkinson disease ([address] He also has no past medical history of Arrhythmia; Asthma; Autoimmune disease ([address] CAD (coronary artery disease); Chronic kidney disease; Chronic pain; Coagulation defects; COPD; Diabetes ([address] Difficult intubation; Heart failure ([address] Liver disease; Malignant hyperthermia due to anesthesia; Nausea & vomiting; Other ill-defined conditions(799.89); Pseudocholinesterase deficiency; Psychiatric disorder; PUD (peptic ulcer disease); Seizures ([facility] Stroke [facility]); Thromboembolus ([facility] Thyroid disease; Unspecified adverse effect of anesthesia; or Unspecified sleep apnea. Mr. Arnav Phillip  has a past surgical history that includes abdomen surgery proc unlisted; gi; heent; prostatectomy (); and knee replacement.   Social History/Living Environment:   Home Environment: Private residence  # Steps to Enter: 2  One/Two Story Residence: One story  Living Alone: No  Support Systems: Spouse/Significant Other/Partner, Friends \ neighbors  Patient Expects to be Discharged to[de-identified] Private residence  Current DME Used/Available at Home: None  Tub or Shower Type: Shower  Prior Level of Function/Work/Activity:  Performs ADLs with his wife's supervision, ambulates without an assistive device. Does have some memory issues so wife offers supervision      Number of Personal Factors/Comorbidities that affect the Plan of Care: 1-2: MODERATE COMPLEXITY   EXAMINATION:   Most Recent Physical Functioning:   Gross Assessment:                  Posture:     Balance:  Sitting: Intact  Standing: With support Bed Mobility:  Supine to Sit: Contact guard assistance  Sit to Supine:  (left up in chair)  Wheelchair Mobility:     Transfers:  Sit to Stand: Contact guard assistance  Stand to Sit: Stand-by asssistance  Bed to Chair: Contact guard assistance;Minimum assistance  Gait:     Base of Support: Narrowed  Speed/Gisell: Slow  Distance (ft): 150 Feet (ft)  Assistive Device:  (HHA 75% of the time)  Ambulation - Level of Assistance: Contact guard assistance;Minimal assistance  Interventions: Manual cues; Safety awareness training;Verbal cues  Duration: 15 Minutes       Body Structures Involved:  1. Joints  2. Muscles Body Functions Affected:  1. Neuromusculoskeletal  2. Movement Related Activities and Participation Affected:  1. Mobility  2. Self Care   Number of elements that affect the Plan of Care: 4+: HIGH COMPLEXITY   CLINICAL PRESENTATION:   Presentation: Stable and uncomplicated: LOW COMPLEXITY   CLINICAL DECISION MAKIN[address] [place], [place] [address] 6 Clicks   Basic Mobility Inpatient Short Form  How much difficulty does the patient currently have. .. Unable A Lot A Little None   1. Turning over in bed (including adjusting bedclothes, sheets and blankets)? [ ] 1   [ ] 2   [ ] 3   [X] 4   2. Sitting down on and standing up from a chair with arms ( e.g., wheelchair, bedside commode, etc.)   [ ] 1   [ ] 2   [X] 3   [ ] 4   3. Moving from lying on back to sitting on the side of the bed? [ ] 1   [ ] 2   [X] 3   [ ] 4   How much help from another person does the patient currently need. .. Total A Lot A Little None   4.   Moving to and from a bed to a chair (including a wheelchair)? [ ] 1   [ ] 2   [X] 3   [ ] 4   5. Need to walk in hospital room? [ ] 1   [ ] 2   [X] 3   [ ] 4   6. Climbing 3-5 steps with a railing? [ ] 1   [X] 2   [ ] 3   [ ] 4   © 2007, Trustees of [address] [place] [address] [address] [address], under license to [employer]. All rights reserved    Score:  Initial: 18 Most Recent: X (Date: -- )     Interpretation of Tool:  Represents activities that are increasingly more difficult (i.e. Bed mobility, Transfers, Gait). Score 24 23 22-20 19-15 14-10 9-7 6       Modifier CH CI CJ CK CL CM CN         · Mobility - Walking and Moving Around:               - CURRENT STATUS:    CK - 40%-59% impaired, limited or restricted               - GOAL STATUS:           CJ - 20%-39% impaired, limited or restricted               - D/C STATUS:                       ---------------To be determined---------------  [place] / Product Type: Medicare /       Medical Necessity:     · Patient demonstrates fair rehab potential due to higher previous functional level. Reason for Services/Other Comments:  · Patient continues to require present interventions due to patient's inability to perform functional mobility independently. Use of outcome tool(s) and clinical judgement create a POC that gives a: Clear prediction of patient's progress: LOW COMPLEXITY                 TREATMENT:   (In addition to Assessment/Re-Assessment sessions the following treatments were rendered)   Pre-treatment Symptoms/Complaints:  No pain  Pain: Initial:   Pain Intensity 1: 0  Post Session:  0      Gait Training (15 Minutes):  Gait training to improve and/or restore physical functioning as related to mobility, strength, balance and coordination. Ambulated 150 Feet (ft) with Contact guard assistance;Minimal assistance using a  (HHA 75% of the time) and minimal Manual cues; Safety awareness training;Verbal cues   Braces/Orthotics/Lines/Etc:   · O2 device: room air  Treatment/Session Assessment:    · Response to Treatment:  Tolerated well. Good improvement in gait distance. · Interdisciplinary Collaboration:  · Physical Therapist and Registered Nurse  · After treatment position/precautions:  · Up in chair, Bed/Chair-wheels locked, Call light within reach, RN notified and posey pad in chair  · Compliance with Program/Exercises: compliant most of time. · Recommendations/Intent for next treatment session: \"Next visit will focus on advancements to more challenging activities and reduction in assistance provided\".   Total Treatment Duration:  PT Patient Time In/Time Out  Time In: 1430  Time Out: 62 Brayden Lantigua PT

## 2017-02-19 NOTE — PROGRESS NOTES
Pt is alert and oriented to person and place. Confused to time and situation, poor safety [id number]. Lungs are clear, bilaterally. HR regular, pedal and radial pulses palpable. No calf edema or tenderness in left lower leg, which has DVT chronically. Abdomen is soft and non-tender to touch, BS active x4. Pt is voiding, but reynoso a brief on for incontinent episodes. Safety maintained and family currently with patient.

## 2017-02-20 LAB
ANION GAP BLD CALC-SCNC: 8 MMOL/L (ref 7–16)
BASOPHILS # BLD AUTO: 0 K/UL (ref 0–0.2)
BASOPHILS # BLD: 0 % (ref 0–2)
BUN SERPL-MCNC: 13 MG/DL (ref 8–23)
CALCIUM SERPL-MCNC: 8.8 MG/DL (ref 8.3–10.4)
CHLORIDE SERPL-SCNC: 109 MMOL/L (ref 98–107)
CO2 SERPL-SCNC: 27 MMOL/L (ref 21–32)
CREAT SERPL-MCNC: 1 MG/DL (ref 0.8–1.5)
DIFFERENTIAL METHOD BLD: ABNORMAL
EOSINOPHIL # BLD: 0.1 K/UL (ref 0–0.8)
EOSINOPHIL NFR BLD: 3 % (ref 0.5–7.8)
ERYTHROCYTE [DISTWIDTH] IN BLOOD BY AUTOMATED COUNT: 13.1 % (ref 11.9–14.6)
GLUCOSE SERPL-MCNC: 108 MG/DL (ref 65–100)
HCT VFR BLD AUTO: 30.1 % (ref 41.1–50.3)
HGB BLD-MCNC: 9.7 G/DL (ref 13.6–17.2)
IMM GRANULOCYTES # BLD: 0 K/UL (ref 0–0.5)
IMM GRANULOCYTES NFR BLD AUTO: 0 % (ref 0–5)
LYMPHOCYTES # BLD AUTO: 27 % (ref 13–44)
LYMPHOCYTES # BLD: 1.2 K/UL (ref 0.5–4.6)
MCH RBC QN AUTO: 29.5 PG (ref 26.1–32.9)
MCHC RBC AUTO-ENTMCNC: 32.2 G/DL (ref 31.4–35)
MCV RBC AUTO: 91.5 FL (ref 79.6–97.8)
MM INDURATION POC: NORMAL MM (ref 0–5)
MONOCYTES # BLD: 0.7 K/UL (ref 0.1–1.3)
MONOCYTES NFR BLD AUTO: 15 % (ref 4–12)
NEUTS SEG # BLD: 2.3 K/UL (ref 1.7–8.2)
NEUTS SEG NFR BLD AUTO: 55 % (ref 43–78)
PLATELET # BLD AUTO: 124 K/UL (ref 150–450)
PMV BLD AUTO: 9.7 FL (ref 10.8–14.1)
POTASSIUM SERPL-SCNC: 3.9 MMOL/L (ref 3.5–5.1)
PPD POC: NORMAL NEGATIVE
RBC # BLD AUTO: 3.29 M/UL (ref 4.23–5.67)
SODIUM SERPL-SCNC: 144 MMOL/L (ref 136–145)
WBC # BLD AUTO: 4.3 K/UL (ref 4.3–11.1)

## 2017-02-20 PROCEDURE — 74011250637 HC RX REV CODE- 250/637: Performed by: INTERNAL MEDICINE

## 2017-02-20 PROCEDURE — 36415 COLL VENOUS BLD VENIPUNCTURE: CPT | Performed by: INTERNAL MEDICINE

## 2017-02-20 PROCEDURE — 97110 THERAPEUTIC EXERCISES: CPT

## 2017-02-20 PROCEDURE — 85025 COMPLETE CBC W/AUTO DIFF WBC: CPT | Performed by: INTERNAL MEDICINE

## 2017-02-20 PROCEDURE — 80048 BASIC METABOLIC PNL TOTAL CA: CPT | Performed by: INTERNAL MEDICINE

## 2017-02-20 PROCEDURE — 65270000029 HC RM PRIVATE

## 2017-02-20 PROCEDURE — 74011250636 HC RX REV CODE- 250/636: Performed by: INTERNAL MEDICINE

## 2017-02-20 PROCEDURE — 97116 GAIT TRAINING THERAPY: CPT

## 2017-02-20 PROCEDURE — 97535 SELF CARE MNGMENT TRAINING: CPT

## 2017-02-20 PROCEDURE — 74011000258 HC RX REV CODE- 258: Performed by: INTERNAL MEDICINE

## 2017-02-20 RX ORDER — AMOXICILLIN AND CLAVULANATE POTASSIUM 875; 125 MG/1; MG/1
1 TABLET, FILM COATED ORAL EVERY 12 HOURS
Status: DISCONTINUED | OUTPATIENT
Start: 2017-02-20 | End: 2017-02-21 | Stop reason: HOSPADM

## 2017-02-20 RX ADMIN — ESCITALOPRAM OXALATE 10 MG: 10 TABLET ORAL at 08:05

## 2017-02-20 RX ADMIN — PANTOPRAZOLE SODIUM 40 MG: 40 TABLET, DELAYED RELEASE ORAL at 15:46

## 2017-02-20 RX ADMIN — ATORVASTATIN CALCIUM 10 MG: 10 TABLET, FILM COATED ORAL at 21:37

## 2017-02-20 RX ADMIN — AMOXICILLIN AND CLAVULANATE POTASSIUM 1 TABLET: 875; 125 TABLET, FILM COATED ORAL at 10:41

## 2017-02-20 RX ADMIN — CARBIDOPA AND LEVODOPA 1 TABLET: 25; 100 TABLET ORAL at 21:37

## 2017-02-20 RX ADMIN — Medication 10 ML: at 21:37

## 2017-02-20 RX ADMIN — PANTOPRAZOLE SODIUM 40 MG: 40 TABLET, DELAYED RELEASE ORAL at 08:05

## 2017-02-20 RX ADMIN — MONTELUKAST SODIUM 10 MG: 10 TABLET, FILM COATED ORAL at 21:37

## 2017-02-20 RX ADMIN — LISINOPRIL 10 MG: 5 TABLET ORAL at 08:06

## 2017-02-20 RX ADMIN — CARBIDOPA AND LEVODOPA 1 TABLET: 25; 100 TABLET ORAL at 08:05

## 2017-02-20 RX ADMIN — ASPIRIN 81 MG: 81 TABLET, COATED ORAL at 08:06

## 2017-02-20 RX ADMIN — AMOXICILLIN AND CLAVULANATE POTASSIUM 1 TABLET: 875; 125 TABLET, FILM COATED ORAL at 21:37

## 2017-02-20 RX ADMIN — Medication 10 ML: at 06:05

## 2017-02-20 RX ADMIN — CARBIDOPA AND LEVODOPA 1 TABLET: 25; 100 TABLET ORAL at 15:46

## 2017-02-20 RX ADMIN — PIPERACILLIN SODIUM,TAZOBACTAM SODIUM 3.38 G: 3; .375 INJECTION, POWDER, FOR SOLUTION INTRAVENOUS at 02:16

## 2017-02-20 NOTE — PROGRESS NOTES
Problem: Mobility Impaired (Adult and Pediatric)  Goal: *Acute Goals and Plan of Care (Insert Text)  STG:  (1.)Mr. Alem Menjivar will move from supine to sit and sit to supine with SUPERVISION within 3 day(s). (2.)Mr. Alem Menjivar will transfer from bed to chair and chair to bed with SUPERVISION using the least restrictive device within 3 day(s). (3.)Mr. Alem Menjivar will ambulate with SUPERVISION for 150 feet with the least restrictive device within 3 day(s). (4.)Pt. Will increase B LE strength 1/2 grade within 3 days ________________________________________________________________________________________________      PHYSICAL THERAPY: Daily Note, Treatment Day: 2nd and PM 2/20/2017  INPATIENT: Hospital Day: 5  [place] / Product Type: Medicare /      NAME/AGE/GENDER: Geni Chua is a 67 y.o. male    PRIMARY DIAGNOSIS: Abdominal pain Abdominal pain Abdominal pain        ICD-10: Treatment Diagnosis:       · Generalized Muscle Weakness (M62.81)  · Difficulty in walking, Not elsewhere classified (R26.2)  · Other abnormalities of gait and mobility (R26.89)   Precaution/Allergies:  Aricept [donepezil] and Onion       ASSESSMENT:      Mr. Alem Menjivar presents with abdominal pain and demonstrates the following PT deficits: decreased general strength and ROM, standing balance, functional mobility and tremor in R hand. Continue to make good progress with therapy. This section established at most recent assessment   PROBLEM LIST (Impairments causing functional limitations):  1. Decreased Strength  2. Decreased Transfer Abilities  3. Decreased Ambulation Ability/Technique  4. Decreased Balance  5. Decreased Activity Tolerance  6. Decreased Flexibility/Joint Mobility    INTERVENTIONS PLANNED: (Benefits and precautions of physical therapy have been discussed with the patient.)  1. Bed Mobility  2. Cold  3. Family Education  4. Gait Training  5. Range of Motion (ROM)  6.  Therapeutic Activites  7. Therapeutic Exercise/Strengthening  8. Transfer Training      TREATMENT PLAN: Frequency/Duration: daily for 1 week  Rehabilitation Potential For Stated Goals: FAIR      RECOMMENDED REHABILITATION/EQUIPMENT: (at time of discharge pending progress): Rehab. HISTORY:   History of Present Injury/Illness (Reason for Referral): Admitted with abdominal pain. Has a hx of Parkinson's  Past Medical History/Comorbidities:   Mr. Mariel Gilmore  has a past medical history of Acoustic neuroma ([address] (2012); Arthritis; Cancer ([address] Environmental allergies; GERD (gastroesophageal reflux disease); Hearing difficulty; Hiatal hernia; Hypertension; and Parkinson disease ([address] He also has no past medical history of Arrhythmia; Asthma; Autoimmune disease ([address] CAD (coronary artery disease); Chronic kidney disease; Chronic pain; Coagulation defects; COPD; Diabetes ([address] Difficult intubation; Heart failure ([address] Liver disease; Malignant hyperthermia due to anesthesia; Nausea & vomiting; Other ill-defined conditions(799.89); Pseudocholinesterase deficiency; Psychiatric disorder; PUD (peptic ulcer disease); Seizures ([address] Stroke [facility]); Thromboembolus ([address] Thyroid disease; Unspecified adverse effect of anesthesia; or Unspecified sleep apnea. Mr. Mariel Gilmore  has a past surgical history that includes abdomen surgery proc unlisted; gi; heent; prostatectomy (); and knee replacement. Social History/Living Environment:   Home Environment: Private residence  # Steps to Enter: 2  One/Two Story Residence: One story  Living Alone: No  Support Systems: Spouse/Significant Other/Partner, Friends \ neighbors  Patient Expects to be Discharged to[de-identified] Private residence  Current DME Used/Available at Home: None  Tub or Shower Type: Shower  Prior Level of Function/Work/Activity:  Performs ADLs with his wife's supervision, ambulates without an assistive device.  Does have some memory issues so wife offers supervision      Number of Personal Factors/Comorbidities that affect the Plan of Care: 1-2: MODERATE COMPLEXITY   EXAMINATION:   Most Recent Physical Functioning:   Gross Assessment:                  Posture:     Balance:    Bed Mobility:  Sit to Supine: Stand-by asssistance  Wheelchair Mobility:     Transfers:  Sit to Stand: Contact guard assistance  Stand to Sit: Contact guard assistance  Bed to Chair: Contact guard assistance  Gait:     Speed/Gisell: Slow  Distance (ft): 650 Feet (ft)  Ambulation - Level of Assistance: Contact guard assistance  Interventions: Safety awareness training  Duration: 23 Minutes       Body Structures Involved:  1. Joints  2. Muscles Body Functions Affected:  1. Neuromusculoskeletal  2. Movement Related Activities and Participation Affected:  1. Mobility  2. Self Care   Number of elements that affect the Plan of Care: 4+: HIGH COMPLEXITY   CLINICAL PRESENTATION:   Presentation: Stable and uncomplicated: LOW COMPLEXITY   CLINICAL DECISION MAKIN[address] [place] [id number] AM-PAC 6 Clicks   Basic Mobility Inpatient Short Form  How much difficulty does the patient currently have. .. Unable A Lot A Little None   1. Turning over in bed (including adjusting bedclothes, sheets and blankets)? [ ] 1   [ ] 2   [ ] 3   [X] 4   2. Sitting down on and standing up from a chair with arms ( e.g., wheelchair, bedside commode, etc.)   [ ] 1   [ ] 2   [X] 3   [ ] 4   3. Moving from lying on back to sitting on the side of the bed? [ ] 1   [ ] 2   [X] 3   [ ] 4   How much help from another person does the patient currently need. .. Total A Lot A Little None   4. Moving to and from a bed to a chair (including a wheelchair)? [ ] 1   [ ] 2   [X] 3   [ ] 4   5. Need to walk in hospital room? [ ] 1   [ ] 2   [X] 3   [ ] 4   6. Climbing 3-5 steps with a railing? [ ] 1   [X] 2   [ ] 3   [ ] 4   © , Trustees of [address] [place] [address], under license to [employer].  All rights reserved    Score:  Initial: 18 Most Recent: X (Date: -- )     Interpretation of Tool:  Represents activities that are increasingly more difficult (i.e. Bed mobility, Transfers, Gait). Score 24 23 22-20 19-15 14-10 9-7 6       Modifier CH CI CJ CK CL CM CN         · Mobility - Walking and Moving Around:               - CURRENT STATUS:    CK - 40%-59% impaired, limited or restricted               - GOAL STATUS:           CJ - 20%-39% impaired, limited or restricted               - D/C STATUS:                       ---------------To be determined---------------  [place] / Product Type: Medicare /       Medical Necessity:     · Patient demonstrates fair rehab potential due to higher previous functional level. Reason for Services/Other Comments:  · Patient continues to require present interventions due to patient's inability to perform functional mobility independently. Use of outcome tool(s) and clinical judgement create a POC that gives a: Clear prediction of patient's progress: LOW COMPLEXITY                 1TREATMENT:   (In addition to Assessment/Re-Assessment sessions the following treatments were rendered)   Pre-treatment Symptoms/Complaints:  No pain  Pain: Initial:   Pain Intensity 1: 0  Post Session:  0      Gait Training (23 Minutes):  Gait training to improve and/or restore physical functioning as related to mobility, strength, balance and coordination. Ambulated 650 Feet (ft) with Contact guard assistance using a   and minimal Safety awareness training Therapeutic Exercise: (8 Minutes):  Exercises per grid below to improve strength. Required minimal verbal cues to promote proper body alignment. Progressed range as indicated.      Date:  2/20 Date:   Date:     Activity/Exercise Parameters Parameters Parameters   Ankle pumps 12     Marching in place 12     Hip abd/add 12                                 Assessment/Reassessment only, no treatment provided today  Braces/Orthotics/Lines/Etc:   · O2 device: room air  Treatment/Session Assessment:    · Response to Treatment:  Tolerated well. Good improvement in gait distance. · Interdisciplinary Collaboration:  · Physical Therapist and Registered Nurse  · After treatment position/precautions:  · Supine in bed, Bed/Chair-wheels locked, Call light within reach and RN notified  · Compliance with Program/Exercises: compliant most of time. · Recommendations/Intent for next treatment session: \"Next visit will focus on advancements to more challenging activities and reduction in assistance provided\".   Total Treatment Duration:  PT Patient Time In/Time Out  Time In: 1330  Time Out: 200 [facility], PTA

## 2017-02-20 NOTE — PROGRESS NOTES
Hospitalist Progress Note    2017  2:06   PM  Admit Date: 2017  7:10 PM   NAME: Brandee Bruno   :  1940   MRN:  [id number]   Attending: Tierra Fall DO  PCP:  Jerri Antunez MD  Treatment Team: Attending Provider: Tierra Fall DO; Care Manager: AURA Patel  SUBJECTIVE:       Mr. Jackelyn Soto is a 66 yo WM with PMH of dementia and parkinsons disease, DVT on xarelto since  followed by [facility] vascular  admitted with fever/abd pain. CT AP showing known complex liver mass, compatible with hemorrhagic cyst and gallstones. ABD US shows similar findings. I spoke with IR Dr. Nicola Ge who recommends stopping xarelto and IR outpatient followup for cyst drainage and subsequent alcohol ablation in 3 months- can make apt at discharge at [phone]. IR additionally recommends cholecystectomy at some point. Additionally he has persistent DVT left LE, IR consulted for IVC filter but does not feel this is indicated as this appears to be more of a chronic issue. IR recommends asa 81 mg daily and compression hose.    He is On zosyn completed day 3, CXR negative, BC NGTD, flu negative , UA negative   Wife would prefer rehab at discharge , SW aware and likely to have bed 2-21      17 hard of hearing and has dementia, ROS difficult, wife at bedside, he is up shaving in the bathroom and \"feels fine\"         Recent Results (from the past 24 hour(s))   CBC WITH AUTOMATED DIFF    Collection Time: 17  5:56 AM   Result Value Ref Range    WBC 4.3 4.3 - 11.1 K/uL    RBC 3.29 (L) 4.23 - 5.67 M/uL    HGB 9.7 (L) 13.6 - 17.2 g/dL    HCT 30.1 (L) 41.1 - 50.3 %    MCV 91.5 79.6 - 97.8 FL    MCH 29.5 26.1 - 32.9 PG    MCHC 32.2 31.4 - 35.0 g/dL    RDW 13.1 11.9 - 14.6 %    PLATELET 914 (L) 294 - 450 K/uL    MPV 9.7 (L) 10.8 - 14.1 FL    DF AUTOMATED      NEUTROPHILS 55 43 - 78 %    LYMPHOCYTES 27 13 - 44 %    MONOCYTES 15 (H) 4.0 - 12.0 %    EOSINOPHILS 3 0.5 - 7.8 %    BASOPHILS 0 0.0 - 2.0 % IMMATURE GRANULOCYTES 0.0 0.0 - 5.0 %    ABS. NEUTROPHILS 2.3 1.7 - 8.2 K/UL    ABS. LYMPHOCYTES 1.2 0.5 - 4.6 K/UL    ABS. MONOCYTES 0.7 0.1 - 1.3 K/UL    ABS. EOSINOPHILS 0.1 0.0 - 0.8 K/UL    ABS. BASOPHILS 0.0 0.0 - 0.2 K/UL    ABS. IMM.  GRANS. 0.0 0.0 - 0.5 K/UL   METABOLIC PANEL, BASIC    Collection Time: 02/20/17  5:56 AM   Result Value Ref Range    Sodium 144 136 - 145 mmol/L    Potassium 3.9 3.5 - 5.1 mmol/L    Chloride 109 (H) 98 - 107 mmol/L    CO2 27 21 - 32 mmol/L    Anion gap 8 7 - 16 mmol/L    Glucose 108 (H) 65 - 100 mg/dL    BUN 13 8 - 23 MG/DL    Creatinine 1.00 0.8 - 1.5 MG/DL    GFR est AA >60 >60 ml/min/1.73m2    GFR est non-AA >60 >60 ml/min/1.73m2    Calcium 8.8 8.3 - 10.4 MG/DL       Allergies   Allergen Reactions    Aricept [Donepezil] Nausea and Vomiting and Rash    Onion Nausea and Vomiting     Current Facility-Administered Medications   Medication Dose Route Frequency Provider Last Rate Last Dose    amoxicillin-clavulanate (AUGMENTIN) 875-125 mg per tablet 1 Tab  1 Tab Oral Q12H Bradford Tierney MD   1 Tab at 02/20/17 1041    aspirin delayed-release tablet 81 mg  81 mg Oral DAILY Bradford Tierney MD   81 mg at 02/20/17 5606    rivastigmine (EXELON) 9.5 mg/24 hr patch 1 Patch  1 Patch TransDERmal DAILY Cayetano Ahumada, DO   1 Patch at 02/20/17 0805    atorvastatin (LIPITOR) tablet 10 mg  10 mg Oral QHS Hany Ortizda, DO   10 mg at 02/19/17 2124    carbidopa-levodopa (SINEMET)  mg per tablet 1 Tab  1 Tab Oral TID Cayetano Ahumada, DO   1 Tab at 02/20/17 0805    escitalopram oxalate (LEXAPRO) tablet 10 mg  10 mg Oral DAILY Cayetano Ahumada, DO   10 mg at 02/20/17 0805    lisinopril (PRINIVIL, ZESTRIL) tablet 10 mg  10 mg Oral DAILY Cayetano Ahumada, DO   10 mg at 02/20/17 0806    montelukast (SINGULAIR) tablet 10 mg  10 mg Oral QHS Cayetano Ahumada, DO   10 mg at 02/19/17 2124    pantoprazole (PROTONIX) tablet 40 mg  40 mg Oral ACB&D Cayetano Ahumada, DO   40 mg at 02/20/17 0805    sodium chloride (NS) flush 5-10 mL  5-10 mL IntraVENous Q8H Rhetta Loffler, DO   10 mL at 17 3371    sodium chloride (NS) flush 5-10 mL  5-10 mL IntraVENous PRN Rhetta Loffler, DO        acetaminophen (TYLENOL) tablet 650 mg  650 mg Oral Q4H PRN Rhetta Loffler, DO   650 mg at 17 1856    ondansetron (ZOFRAN) injection 4 mg  4 mg IntraVENous Q4H PRN Rhetta Loffler, DO        oxyCODONE IR (ROXICODONE) tablet 5 mg  5 mg Oral Q4H PRN Rhetta Loffler, DO               Review of Systems as noted above in HPI   PHYSICAL EXAM     Visit Vitals    /62 (BP 1 Location: Left arm, BP Patient Position: At rest)    Pulse 78    Temp 97.1 °F (36.2 °C)    Resp 15    Ht 5' 11\" (1.803 m)    Wt 76 kg (167 lb 9.6 oz)    SpO2 98%    BMI 23.38 kg/m2      Temp (24hrs), Av.2 °F (36.8 °C), Min:96.7 °F (35.9 °C), Max:99.8 °F (37.7 °C)    Oxygen Therapy  O2 Sat (%): 98 % (17 1130)  O2 Device: Room air (17 0237)  No intake or output data in the 24 hours ending 17 1406   General: No acute distress,conversive  Lungs:  CTAB, good effort   Heart:  Regular rate and rhythm, no murmur, no edema   Abdomen: Soft, nontender and nondistended, normal bowel sounds  Extremities: Warm and dry         DIAGNOSTIC STUDIES      Labs and Studies from previous 24 hours have been personally reviewed by myself           Full Code    ASSESSMENT / Tracy Chung 169 Problems    Diagnosis Date Noted    Abdominal pain 2017    Liver mass 2017    Fever 2017    Intractable hiccups 2015       - discussed with Dr. Henry Sandy of IR above plans for cyst drainage/ablation in 3 months  -stop xarelto indefinitely , start asa   -workup ongoing for fever etiology/possibility hemorrhagic cyst related , change to augmentin for 7 total days  -PPD/PT/OT/SW for dispo - rehab tomorrow     FEN:oral  DVT Prophylaxis: off xarelto, no SCD due to having DVT  Disposition:pending   Time spent with patient:  Care plan addressed:wife   Risk Assessment:  Signed By: Jose Mackenzie MD     February 20, 2017

## 2017-02-20 NOTE — PROGRESS NOTES
Assessment completed via flowsheet. Patient alert and oriented to person place and time. Periodic confusion noted. Heart and lung sounds clear, regular, and unlabored. Abd soft, non-tender with active bowel sounds x4 quadrants. Patient reports a good appetite. IVF infusing without difficulty, site c/d/i. 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 Patient denies pain at this time. Instructed to call with needs. Bed low and locked, call light within reach. Bed alarm on for patient safety.

## 2017-02-20 NOTE — PROGRESS NOTES
SW receives electronic response of bed offer at [place]. Bed available on Tues. RENEE spoke with pt's spouse regarding SNF choices. [facility]. Per Savannah Valiente with [facility] are full. RENEE spoke with Nisha Melgar at [place] who will have a bed available on Tues. RENEE sent clinical via [facility] to [place] and will await bed offer.

## 2017-02-20 NOTE — PROGRESS NOTES
Problem: Self Care Deficits Care Plan (Adult)  Goal: *Acute Goals and Plan of Care (Insert Text)  1. Patient will perform grooming with supervision. met  2. Patient will perform Upper body dressing with supervision met  3. Patient will perform lower body dressing with supervision met  4. Patient will perform upper and lower body bathing with supervision. met  5. Patient will perform toilet transfers with supervision. 6. Patient will perform shower transfer with supervision. 7. Patient will participate in 30 + minutes of ADL/ therapeutic exercise/therapeutic activity with min rest breaks to increase activity tolerance for self care. 8. Patient will perform ADL functional mobility in room with supervision. Goals to be achieved in 7 days. OCCUPATIONAL THERAPY: Daily Note, Treatment Day: 2nd and AM 2/20/2017  INPATIENT: Hospital Day: 5  [place] / [place] / Product Type: Medicare /      NAME/AGE/GENDER: Geni Chua is a 67 y.o. male    PRIMARY DIAGNOSIS:  Abdominal pain Abdominal pain Abdominal pain        ICD-10: Treatment Diagnosis:        · Generalized Muscle Weakness (M62.81)   Precautions/Allergies:         Aricept [donepezil] and Onion       ASSESSMENT:      Mr. Alem Menjivar completed am ADL (shower and dressing) with setup. Pt contact guard with functional mobility. Pt progressing well. This section established at most recent assessment   PROBLEM LIST (Impairments causing functional limitations):  1. Decreased Strength  2. Decreased ADL/Functional Activities  3. Decreased Transfer Abilities  4. Decreased Balance  5. Decreased Activity Tolerance  6. Increased Fatigue  7. Decreased Cognition    INTERVENTIONS PLANNED: (Benefits and precautions of occupational therapy have been discussed with the patient.)  1. Activities of daily living training  2. Balance training  3. Cognitive training  4. Theraputic activity  5.  Theraputic exercise      TREATMENT PLAN: Frequency/Duration: Follow patient 3 times per week to address above goals. Rehabilitation Potential For Stated Goals: GOOD      RECOMMENDED REHABILITATION/EQUIPMENT: (at time of discharge pending progress): Continue Skilled Therapy and Home Health: Physical Therapy and Occupational Therapy. OCCUPATIONAL PROFILE AND HISTORY:   History of Present Injury/Illness (Reason for Referral):  Weakness   Past Medical History/Comorbidities:   Mr. Sonu Jackson  has a past medical history of Acoustic neuroma ([facility] (2012); Arthritis; Cancer ([address] Environmental allergies; GERD (gastroesophageal reflux disease); Hearing difficulty; Hiatal hernia; Hypertension; and Parkinson disease ([address] He also has no past medical history of Arrhythmia; Asthma; Autoimmune disease ([address] CAD (coronary artery disease); Chronic kidney disease; Chronic pain; Coagulation defects; COPD; Diabetes ([address] Difficult intubation; Heart failure ([facility] Liver disease; Malignant hyperthermia due to anesthesia; Nausea & vomiting; Other ill-defined conditions(799.89); Pseudocholinesterase deficiency; Psychiatric disorder; PUD (peptic ulcer disease); Seizures ([facility] Stroke [facility]); Thromboembolus ([facility] Thyroid disease; Unspecified adverse effect of anesthesia; or Unspecified sleep apnea. Mr. Sonu Jackson  has a past surgical history that includes abdomen surgery proc unlisted; gi; heent; prostatectomy (); and knee replacement.   Social History/Living Environment:   Home Environment: Private residence  # Steps to Enter: 2  One/Two Story Residence: One story  Living Alone: No  Support Systems: Spouse/Significant Other/Partner, Friends \ neighbors  Patient Expects to be Discharged to[de-identified] Private residence  Current DME Used/Available at Home: None  Tub or Shower Type: Shower  Prior Level of Function/Work/Activity:  Independent to supervision with self care per friend       Number of Personal Factors/Comorbidities that affect the Plan of Care: Brief history (0):  LOW COMPLEXITY   ASSESSMENT OF OCCUPATIONAL PERFORMANCE[de-identified]   Activities of Daily Living:           Basic ADLs (From Assessment) Complex ADLs (From Assessment)   Basic ADL  Feeding: Setup  Oral Facial Hygiene/Grooming: Setup  Bathing: Minimum assistance  Upper Body Dressing: Minimum assistance  Lower Body Dressing: Minimum assistance  Toileting: Minimum assistance     Grooming/Bathing/Dressing Activities of Daily Living   Grooming  Grooming Assistance: Supervision/set up  Washing Face: Supervision/set-up  Brushing Teeth: Supervision/set-up  Brushing/Combing Hair: Supervision/set-up Cognitive Retraining  Safety/Judgement: Fall prevention   Upper Body Bathing  Bathing Assistance: Supervision/set-up  Position Performed: Standing  Cues: Verbal cues provided  Adaptive Equipment: Grab bar Feeding  Feeding Assistance: Supervision/set-up   Lower Body Bathing  Bathing Assistance: Supervision/set-up  Perineal  : Supervision/set-up  Position Performed: Standing  Cues: Verbal cues provided  Adaptive Equipment: Grab bar  Lower Body : Supervision/set-up  Position Performed: Standing  Cues: Verbal cues provided  Adaptive Equipment: Grab bar     Upper Body Dressing Assistance  Dressing Assistance: Supervision/set-up  Pullover Shirt: Supervision/set-up Functional Transfers  Bathroom Mobility: Contact guard assistance  Toilet Transfer : Contact guard assistance  Shower Transfer: Contact guard assistance  Cues: Verbal cues provided  Adaptive Equipment: Grab bars; Walker (comment)   Lower Body Dressing Assistance  Dressing Assistance: Supervision/set up  Underpants: Supervision/set-up  Pants With Elastic Waist: Supervision/set-up  Socks: Supervision/set-up  Adaptive Equipment Used: Grab bar Bed/Mat Mobility  Sit to Stand: Contact guard assistance  Bed to Chair: Contact guard assistance          Most Recent Physical Functioning:   Gross Assessment:                  Posture:  Posture Assessment:  Forward head, Rounded shoulders  Balance:  Sitting: Intact  Standing: With support Bed Mobility:     Wheelchair Mobility:     Transfers:  Sit to Stand: Contact guard assistance  Bed to Chair: Contact guard assistance                    Patient Vitals for the past 6 hrs:   BP BP Patient Position SpO2 Pulse   17 0806 (!) 155/95 At rest;Supine 97 % 73        Mental Status  Neurologic State: Alert  Orientation Level: Oriented to person  Cognition: Follows commands  Perception: Verbal  Perseveration: Verbal cues provided  Safety/Judgement: Fall prevention                               Physical Skills Involved:  1. Balance  2. Mobility  3. Endurance Cognitive Skills Affected (resulting in the inability to perform in a timely and safe manner):  1. Thinking  2. Remembering Psychosocial Skills Affected:  1. Environmental Adaptations   Number of elements that affect the Plan of Care: 3-5:  MODERATE COMPLEXITY   CLINICAL DECISION MAKIN[address] [place] [address] AM-PAC 6 Clicks   Basic Mobility Inpatient Short Form  How much help from another person does the patient currently need. .. Total A Lot A Little None   1. Putting on and taking off regular lower body clothing?   [ ] 1   [ ] 2   [X] 3   [ ] 4   2. Bathing (including washing, rinsing, drying)? [ ] 1   [ ] 2   [X] 3   [ ] 4   3. Toileting, which includes using toilet, bedpan or urinal?   [ ] 1   [ ] 2   [X] 3   [ ] 4   4. Putting on and taking off regular upper body clothing?   [ ] 1   [ ] 2   [X] 3   [ ] 4   5. Taking care of personal grooming such as brushing teeth? [ ] 1   [ ] 2   [ ] 3   [X] 4   6. Eating meals? [ ] 1   [ ] 2   [ ] 3   [X] 4   © , Trustees of [address] [place] [address], under license to [employer]. All rights reserved    Score:  Initial: 20 Most Recent: X (Date: -- )     Interpretation of Tool:  Represents activities that are increasingly more difficult (i.e. Bed mobility, Transfers, Gait).        Score 24 23 22-20 19-15 14-10 9-7 6       Modifier CH CI CJ CK CL CM CN         · Self Care:               - CURRENT STATUS:    CJ - 20%-39% impaired, limited or restricted               - GOAL STATUS:           CI - 1%-19% impaired, limited or restricted               - D/C STATUS:                       ---------------To be determined---------------  [place] / Product Type: Medicare /       Medical Necessity:     · Patient is expected to demonstrate progress in balance and endurance to increase independence with self care and functional mobility. Reason for Services/Other Comments:  · Patient would benefit from OT to maximize independence and safety with self care and functional mobility. .   Use of outcome tool(s) and clinical judgement create a POC that gives a: LOW COMPLEXITY             TREATMENT:   (In addition to Assessment/Re-Assessment sessions the following treatments were rendered)      Pre-treatment Symptoms/Complaints:  No complaint   Pain: Initial:   Pain Intensity 1: 0 0 Post Session:  0      Self Care: (30): Procedure(s) (per grid) utilized to improve and/or restore self-care/home management as related to grooming. Required min verbal and manual cueing to facilitate activities of daily living skills. Braces/Orthotics/Lines/Etc:   · IV  Treatment/Session Assessment:    · Response to Treatment:  Pt did well and very pleasant  · Interdisciplinary Collaboration:  · Registered Nurse  · After treatment position/precautions:  · Up in chair, Bed/Chair-wheels locked, Caregiver at bedside, Call light within reach and RN notified  · Compliance with Program/Exercises: compliant and progressing  · Recommendations/Intent for next treatment session: \"Next visit will focus on reduction in assistance provided\".   Total Treatment Duration:  OT Patient Time In/Time Out  Time In: 0915  Time Out: [address], OT

## 2017-02-20 NOTE — PROGRESS NOTES
Problem: Mobility Impaired (Adult and Pediatric)  Goal: *Acute Goals and Plan of Care (Insert Text)  STG:  (1.)Mr. Darcy Barboza will move from supine to sit and sit to supine with SUPERVISION within 3 day(s). (2.)Mr. Darcy Barboza will transfer from bed to chair and chair to bed with SUPERVISION using the least restrictive device within 3 day(s). (3.)Mr. Darcy Barboza will ambulate with SUPERVISION for 150 feet with the least restrictive device within 3 day(s). (4.)Pt. Will increase B LE strength 1/2 grade within 3 days ________________________________________________________________________________________________      PHYSICAL THERAPY: Daily Note, Treatment Day: 2nd and AM 2/20/2017  INPATIENT: Hospital Day: 5  [place] / Product Type: Medicare /      NAME/AGE/GENDER: Nelda Riggs is a 67 y.o. male    PRIMARY DIAGNOSIS: Abdominal pain Abdominal pain Abdominal pain        ICD-10: Treatment Diagnosis:       · Generalized Muscle Weakness (M62.81)  · Difficulty in walking, Not elsewhere classified (R26.2)  · Other abnormalities of gait and mobility (R26.89)   Precaution/Allergies:  Aricept [donepezil] and Onion       ASSESSMENT:      Mr. Darcy Barboza presents with abdominal pain and demonstrates the following PT deficits: decreased general strength and ROM, standing balance, functional mobility and tremor in R hand. Continue to make improvement with gait and exercises. This section established at most recent assessment   PROBLEM LIST (Impairments causing functional limitations):  1. Decreased Strength  2. Decreased Transfer Abilities  3. Decreased Ambulation Ability/Technique  4. Decreased Balance  5. Decreased Activity Tolerance  6. Decreased Flexibility/Joint Mobility    INTERVENTIONS PLANNED: (Benefits and precautions of physical therapy have been discussed with the patient.)  1. Bed Mobility  2. Cold  3. Family Education  4. Gait Training  5. Range of Motion (ROM)  6.  Therapeutic Activites  7. Therapeutic Exercise/Strengthening  8. Transfer Training      TREATMENT PLAN: Frequency/Duration: daily for 1 week  Rehabilitation Potential For Stated Goals: FAIR      RECOMMENDED REHABILITATION/EQUIPMENT: (at time of discharge pending progress): Rehab. HISTORY:   History of Present Injury/Illness (Reason for Referral): Admitted with abdominal pain. Has a hx of Parkinson's  Past Medical History/Comorbidities:   Mr. Tisha Oglesby  has a past medical history of Acoustic neuroma ([facility] (2012); Arthritis; Cancer ([address] Environmental allergies; GERD (gastroesophageal reflux disease); Hearing difficulty; Hiatal hernia; Hypertension; and Parkinson disease ([address] He also has no past medical history of Arrhythmia; Asthma; Autoimmune disease ([address] CAD (coronary artery disease); Chronic kidney disease; Chronic pain; Coagulation defects; COPD; Diabetes ([address] Difficult intubation; Heart failure ([address] Liver disease; Malignant hyperthermia due to anesthesia; Nausea & vomiting; Other ill-defined conditions(799.89); Pseudocholinesterase deficiency; Psychiatric disorder; PUD (peptic ulcer disease); Seizures ([facility] [address] Stroke [facility]); Thromboembolus ([address] Thyroid disease; Unspecified adverse effect of anesthesia; or Unspecified sleep apnea. Mr. Tisha Oglesby  has a past surgical history that includes abdomen surgery proc unlisted; gi; heent; prostatectomy (); and knee replacement. Social History/Living Environment:   Home Environment: Private residence  # Steps to Enter: 2  One/Two Story Residence: One story  Living Alone: No  Support Systems: Spouse/Significant Other/Partner, Friends \ neighbors  Patient Expects to be Discharged to[de-identified] Private residence  Current DME Used/Available at Home: None  Tub or Shower Type: Shower  Prior Level of Function/Work/Activity:  Performs ADLs with his wife's supervision, ambulates without an assistive device.  Does have some memory issues so wife offers supervision      Number of Personal Factors/Comorbidities that affect the Plan of Care: 1-2: MODERATE COMPLEXITY   EXAMINATION:   Most Recent Physical Functioning:   Gross Assessment:                  Posture:     Balance:    Bed Mobility:     Wheelchair Mobility:     Transfers:  Sit to Stand: Contact guard assistance  Bed to Chair: Contact guard assistance  Gait:     Speed/Gisell: Slow  Distance (ft): 250 Feet (ft)  Ambulation - Level of Assistance: Contact guard assistance  Interventions: Safety awareness training  Duration: 15 Minutes       Body Structures Involved:  1. Joints  2. Muscles Body Functions Affected:  1. Neuromusculoskeletal  2. Movement Related Activities and Participation Affected:  1. Mobility  2. Self Care   Number of elements that affect the Plan of Care: 4+: HIGH COMPLEXITY   CLINICAL PRESENTATION:   Presentation: Stable and uncomplicated: LOW COMPLEXITY   CLINICAL DECISION MAKIN[address] [place] [id number] AM-PAC 6 Clicks   Basic Mobility Inpatient Short Form  How much difficulty does the patient currently have. .. Unable A Lot A Little None   1. Turning over in bed (including adjusting bedclothes, sheets and blankets)? [ ] 1   [ ] 2   [ ] 3   [X] 4   2. Sitting down on and standing up from a chair with arms ( e.g., wheelchair, bedside commode, etc.)   [ ] 1   [ ] 2   [X] 3   [ ] 4   3. Moving from lying on back to sitting on the side of the bed? [ ] 1   [ ] 2   [X] 3   [ ] 4   How much help from another person does the patient currently need. .. Total A Lot A Little None   4. Moving to and from a bed to a chair (including a wheelchair)? [ ] 1   [ ] 2   [X] 3   [ ] 4   5. Need to walk in hospital room? [ ] 1   [ ] 2   [X] 3   [ ] 4   6. Climbing 3-5 steps with a railing? [ ] 1   [X] 2   [ ] 3   [ ] 4   © , Trustees of [address] [place] [address], under license to [employer].  All rights reserved    Score:  Initial: 18 Most Recent: X (Date: -- )     Interpretation of Tool:  Represents activities that are increasingly more difficult (i.e. Bed mobility, Transfers, Gait). Score 24 23 22-20 19-15 14-10 9-7 6       Modifier CH CI CJ CK CL CM CN         · Mobility - Walking and Moving Around:               - CURRENT STATUS:    CK - 40%-59% impaired, limited or restricted               - GOAL STATUS:           CJ - 20%-39% impaired, limited or restricted               - D/C STATUS:                       ---------------To be determined---------------  [place] / Product Type: Medicare /       Medical Necessity:     · Patient demonstrates fair rehab potential due to higher previous functional level. Reason for Services/Other Comments:  · Patient continues to require present interventions due to patient's inability to perform functional mobility independently. Use of outcome tool(s) and clinical judgement create a POC that gives a: Clear prediction of patient's progress: LOW COMPLEXITY                 TREATMENT:   (In addition to Assessment/Re-Assessment sessions the following treatments were rendered)   Pre-treatment Symptoms/Complaints:  No pain  Pain: Initial:      Post Session:  0      Gait Training (15 Minutes):  Gait training to improve and/or restore physical functioning as related to mobility, strength, balance and coordination. Ambulated 250 Feet (ft) with Contact guard assistance using a   and minimal Safety awareness training Therapeutic Exercise: (8 Minutes):  Exercises per grid below to improve strength. Required minimal verbal cues to promote proper body alignment. Progressed range as indicated.      Date:  2/20 Date:   Date:     Activity/Exercise Parameters Parameters Parameters   Ankle pumps 12     Marching in place 12     Hip abd/add 12                                 Assessment/Reassessment only, no treatment provided today  Braces/Orthotics/Lines/Etc:   · O2 device: room air  Treatment/Session Assessment:    · Response to Treatment: Tolerated well. Good improvement in gait distance. · Interdisciplinary Collaboration:  · Physical Therapist and Registered Nurse  · After treatment position/precautions:  · Up in chair, Bed/Chair-wheels locked, Call light within reach, RN notified and posey pad in chair  · Compliance with Program/Exercises: compliant most of time. · Recommendations/Intent for next treatment session: \"Next visit will focus on advancements to more challenging activities and reduction in assistance provided\".   Total Treatment Duration:  PT Patient Time In/Time Out  Time In: 0845  Time Out: 0158     Nichol Webster, PTA

## 2017-02-21 VITALS
SYSTOLIC BLOOD PRESSURE: 158 MMHG | TEMPERATURE: 97.7 F | WEIGHT: 167.6 LBS | HEART RATE: 68 BPM | DIASTOLIC BLOOD PRESSURE: 86 MMHG | BODY MASS INDEX: 23.46 KG/M2 | OXYGEN SATURATION: 97 % | HEIGHT: 71 IN | RESPIRATION RATE: 18 BRPM

## 2017-02-21 LAB
BACTERIA SPEC CULT: NORMAL
BACTERIA SPEC CULT: NORMAL
SERVICE CMNT-IMP: NORMAL
SERVICE CMNT-IMP: NORMAL

## 2017-02-21 PROCEDURE — 97116 GAIT TRAINING THERAPY: CPT

## 2017-02-21 PROCEDURE — 74011250637 HC RX REV CODE- 250/637: Performed by: INTERNAL MEDICINE

## 2017-02-21 RX ORDER — CARBIDOPA AND LEVODOPA 25; 100 MG/1; MG/1
1 TABLET ORAL 3 TIMES DAILY
Qty: 90 TAB | Refills: 0 | Status: SHIPPED | OUTPATIENT
Start: 2017-02-21 | End: 2017-06-29 | Stop reason: DRUGHIGH

## 2017-02-21 RX ORDER — ASPIRIN 81 MG/1
81 TABLET ORAL DAILY
Qty: 30 TAB | Refills: 0 | Status: SHIPPED | OUTPATIENT
Start: 2017-02-21

## 2017-02-21 RX ORDER — OXYCODONE HYDROCHLORIDE 5 MG/1
5 TABLET ORAL
Qty: 30 TAB | Refills: 0 | Status: SHIPPED | OUTPATIENT
Start: 2017-02-21 | End: 2017-06-12

## 2017-02-21 RX ORDER — AMOXICILLIN AND CLAVULANATE POTASSIUM 875; 125 MG/1; MG/1
1 TABLET, FILM COATED ORAL EVERY 12 HOURS
Qty: 12 TAB | Refills: 0 | Status: SHIPPED | OUTPATIENT
Start: 2017-02-21 | End: 2017-02-27

## 2017-02-21 RX ORDER — ACETAMINOPHEN 325 MG/1
650 TABLET ORAL
Qty: 30 TAB | Refills: 0 | Status: SHIPPED | OUTPATIENT
Start: 2017-02-21 | End: 2017-06-12

## 2017-02-21 RX ADMIN — PANTOPRAZOLE SODIUM 40 MG: 40 TABLET, DELAYED RELEASE ORAL at 08:39

## 2017-02-21 RX ADMIN — CARBIDOPA AND LEVODOPA 1 TABLET: 25; 100 TABLET ORAL at 08:38

## 2017-02-21 RX ADMIN — Medication 10 ML: at 06:02

## 2017-02-21 RX ADMIN — LISINOPRIL 10 MG: 5 TABLET ORAL at 08:38

## 2017-02-21 RX ADMIN — ASPIRIN 81 MG: 81 TABLET, COATED ORAL at 08:38

## 2017-02-21 RX ADMIN — ESCITALOPRAM OXALATE 10 MG: 10 TABLET ORAL at 08:39

## 2017-02-21 RX ADMIN — AMOXICILLIN AND CLAVULANATE POTASSIUM 1 TABLET: 875; 125 TABLET, FILM COATED ORAL at 08:38

## 2017-02-21 NOTE — DISCHARGE SUMMARY
Discharge Summary     Patient: Thalia Keene MRN: [id number]  SSN: [id number]    YOB: 1940  Age: 67 y.o. Sex: male       Admit Date: 2/16/2017    Discharge Date: 2/21/2017      Admission Diagnoses: Abdominal pain    Discharge Diagnoses:   Problem List as of 2/21/2017  Date Reviewed: 2/16/2017          Codes Class Noted - Resolved    * (Principal)Abdominal pain ICD-10-CM: R10.9  ICD-9-CM: 789.00  2/16/2017 - Present        Liver mass ICD-10-CM: R16.0  ICD-9-CM: 573.9  2/16/2017 - Present        Fever ICD-10-CM: R50.9  ICD-9-CM: 780.60  2/16/2017 - Present        Parkinsons disease ([facility] ICD-10-CM: Sofiya Pramonserrat  ICD-9-CM: 332.0  6/2/2016 - Present        Intractable hiccups ICD-10-CM: R06.6  ICD-9-CM: 786.8  1/30/2015 - Present        Acoustic neuroma syndrome ICD-10-CM: H93.3X9  ICD-9-CM: 388.5  1/30/2015 - Present        Dyslipidemia ICD-10-CM: E78.5  ICD-9-CM: 272.4  1/30/2015 - Present        Malignant neoplasm of prostate ([facility] ICD-10-CM: C61  ICD-9-CM: 577  1/7/2010 - Present        Hearing difficulty ICD-10-CM: H91.90  ICD-9-CM: 609. 9  Unknown - Present        RESOLVED: Alzheimer disease ICD-10-CM: G30.9  ICD-9-CM: 331.0  12/17/2015 - 6/2/2016        RESOLVED: Incidental pulmonary nodule, greater than or equal to 8mm ICD-10-CM: R91.1  ICD-9-CM: 793.11  8/17/2015 - 8/25/2015               Discharge Condition: [facility] Course:     Mr. Magno Coker is a 66 yo WM with PMH of dementia and parkinsons disease, DVT on xarelto since 11,2016, regularly  followed by [facility] vascular, admitted with fever/abd pain. CT of abdomen and pelvis showed  known complex liver mass, compatible with hemorrhagic cyst and gallstones. ABD US showed similar findings. Hospitalist admitted him and spoke with IR ( Dr. Dustin Palmer ) who recommends stopping xarelto and IR outpatient followup for cyst drainage and subsequent alcohol ablation in 3 months- can make apt at discharge at [phone].  IR additionally recommended cholecystectomy at some point. Additionally he has persistent DVT left LE, IR consulted for IVC filter but did not feel this was indicated as this appears to be more of a chronic issue. IR recommended asa 81 mg daily and compression hose. He was kept  On zosyn and  completed  3 days, CXR negative, BC NGTD, flu negative , UA negative. He was switched to oral Augmentin and will recommend to complete one week of this medication ( Day 6 today). He is being discharged to rehab today. General: No acute distress,conversive  Lungs:  CTAB, good effort   Heart:  Regular rate and rhythm, no murmur, no edema   Abdomen: Soft, nontender and nondistended, normal bowel sounds  Extremities: Warm and dry       Consults: interventional radiology     Significant Diagnostic Studies: see hospital course     Disposition: home    Discharge Medications:   Current Discharge Medication List      START taking these medications    Details   acetaminophen (TYLENOL) 325 mg tablet Take 2 Tabs by mouth every six (6) hours as needed. Qty: 30 Tab, Refills: 0      amoxicillin-clavulanate (AUGMENTIN) 875-125 mg per tablet Take 1 Tab by mouth every twelve (12) hours for 12 doses. Qty: 12 Tab, Refills: 0      aspirin delayed-release 81 mg tablet Take 1 Tab by mouth daily. Qty: 30 Tab, Refills: 0      oxyCODONE IR (ROXICODONE) 5 mg immediate release tablet Take 1 Tab by mouth every eight (8) hours as needed. Max Daily Amount: 15 mg.  Qty: 30 Tab, Refills: 0         CONTINUE these medications which have CHANGED    Details   carbidopa-levodopa (SINEMET)  mg per tablet Take 1 Tab by mouth three (3) times daily. Qty: 90 Tab, Refills: 0         CONTINUE these medications which have NOT CHANGED    Details   rivastigmine (EXELON) 9.5 mg/24 hr patch 1 Patch by TransDERmal route daily. escitalopram oxalate (LEXAPRO) 10 mg tablet Take 10 mg by mouth.     Associated Diagnoses: Parkinsons disease (HCC)      atorvastatin (LIPITOR) 10 mg tablet Take 1 Tab by mouth daily. Qty: 90 Tab, Refills: 3    Associated Diagnoses: Dyslipidemia      omeprazole (PRILOSEC) 40 mg capsule Take 1 Cap by mouth two (2) times a day. Qty: 180 Cap, Refills: 3    Associated Diagnoses: Acute deep vein thrombosis (DVT) of popliteal vein of left lower extremity (HCC)      lisinopril (PRINIVIL, ZESTRIL) 10 mg tablet Take 1 Tab by mouth daily. Qty: 90 Tab, Refills: 3      montelukast (SINGULAIR) 10 mg tablet Take 1 Tab by mouth daily. Qty: 90 Tab, Refills: 3      multivitamin (ONE A DAY) tablet Take 1 Tab by mouth daily. STOP taking these medications       carbidopa-levodopa (SINEMET-25/250)  mg per tablet Comments:   Reason for Stopping:         HYDROcodone-acetaminophen (NORCO) 5-325 mg per tablet Comments:   Reason for Stopping:         pimozide (ORAP) 1 mg tablet Comments:   Reason for Stopping:         rivaroxaban (XARELTO) 20 mg tab tablet Comments:   Reason for Stopping:         megestrol (MEGACE) 400 mg/10 mL (10 mL) suspension Comments:   Reason for Stopping:         rivaroxaban (XARELTO) 15 mg (42)- 20 mg (9) DsPk Comments:   Reason for Stopping:         mirtazapine (REMERON) 15 mg tablet Comments:   Reason for Stopping:         rivastigmine (EXELON) 4.6 mg/24 hr patch Comments:   Reason for Stopping:               Activity: Activity as tolerated  Diet: Cardiac Diet  Wound Care: None needed    Follow-up Appointments   Procedures    FOLLOW UP VISIT Appointment in: One Week pcp     pcp     Standing Status:   Standing     Number of Occurrences:   1     Order Specific Question:   Appointment in     Answer:    One Week    FOLLOW UP VISIT Appointment in: Ten Days Interventional radiology     Interventional radiology     Standing Status:   Standing     Number of Occurrences:   1     Standing Expiration Date:   2/22/2017     Order Specific Question:   Appointment in     Answer:   Ten Days       Signed By: Carri Nova MD     February 21, 2017

## 2017-02-21 NOTE — PROGRESS NOTES
Shift assessment complete via doc flowsheet. Pt A&O x 4. HR regular, S1S2. Respirations even and unlabored. Lung sounds CTA bilaterally. Abdomen soft. Bowel sounds active in all quadrants. Pt denies pain at this time. Pt resting in bed. Bed in low and locked position. Call light within reach. Pt instructed to call for assistance. Pt verbalizes understanding. No other needs expressed. No distress noted. Will continue to monitor.

## 2017-02-21 NOTE — PROGRESS NOTES
Problem: Mobility Impaired (Adult and Pediatric)  Goal: *Acute Goals and Plan of Care (Insert Text)  STG:  (1.)Mr. Ben Neumann will move from supine to sit and sit to supine with SUPERVISION within 3 day(s). (2.)Mr. Ben Neumann will transfer from bed to chair and chair to bed with SUPERVISION using the least restrictive device within 3 day(s). (3.)Mr. Ben Neumann will ambulate with SUPERVISION for 150 feet with the least restrictive device within 3 day(s). (4.)Pt. Will increase B LE strength 1/2 grade within 3 days ________________________________________________________________________________________________      PHYSICAL THERAPY: Daily Note, Treatment Day: 3rd and AM 2/21/2017  INPATIENT: Hospital Day: 6  [place] / Product Type: Medicare /      NAME/AGE/GENDER: Sravan Myles is a 67 y.o. male    PRIMARY DIAGNOSIS: Abdominal pain Abdominal pain Abdominal pain        ICD-10: Treatment Diagnosis:       · Generalized Muscle Weakness (M62.81)  · Difficulty in walking, Not elsewhere classified (R26.2)  · Other abnormalities of gait and mobility (R26.89)   Precaution/Allergies:  Aricept [donepezil] and Onion       ASSESSMENT:      Mr. Ben Neumann presents with abdominal pain and demonstrates the following PT deficits: decreased general strength and ROM, standing balance, functional mobility and tremor in R hand. Continue to make good progress with gait and exercises. This section established at most recent assessment   PROBLEM LIST (Impairments causing functional limitations):  1. Decreased Strength  2. Decreased Transfer Abilities  3. Decreased Ambulation Ability/Technique  4. Decreased Balance  5. Decreased Activity Tolerance  6. Decreased Flexibility/Joint Mobility    INTERVENTIONS PLANNED: (Benefits and precautions of physical therapy have been discussed with the patient.)  1. Bed Mobility  2. Cold  3. Family Education  4. Gait Training  5. Range of Motion (ROM)  6.  Therapeutic Activites  7. Therapeutic Exercise/Strengthening  8. Transfer Training      TREATMENT PLAN: Frequency/Duration: daily for 1 week  Rehabilitation Potential For Stated Goals: FAIR      RECOMMENDED REHABILITATION/EQUIPMENT: (at time of discharge pending progress): Rehab. HISTORY:   History of Present Injury/Illness (Reason for Referral): Admitted with abdominal pain. Has a hx of Parkinson's  Past Medical History/Comorbidities:   Mr. Ben Neumann  has a past medical history of Acoustic neuroma ([address] (2012); Arthritis; Cancer ([address] Environmental allergies; GERD (gastroesophageal reflux disease); Hearing difficulty; Hiatal hernia; Hypertension; and Parkinson disease ([address] He also has no past medical history of Arrhythmia; Asthma; Autoimmune disease ([address] CAD (coronary artery disease); Chronic kidney disease; Chronic pain; Coagulation defects; COPD; Diabetes ([address] Difficult intubation; Heart failure ([address] Liver disease; Malignant hyperthermia due to anesthesia; Nausea & vomiting; Other ill-defined conditions(799.89); Pseudocholinesterase deficiency; Psychiatric disorder; PUD (peptic ulcer disease); Seizures ([facility] Stroke [facility]); Thromboembolus ([address] Thyroid disease; Unspecified adverse effect of anesthesia; or Unspecified sleep apnea. Mr. Ben Neumann  has a past surgical history that includes abdomen surgery proc unlisted; gi; heent; prostatectomy (); and knee replacement. Social History/Living Environment:   Home Environment: Private residence  # Steps to Enter: 2  One/Two Story Residence: One story  Living Alone: No  Support Systems: Spouse/Significant Other/Partner, Friends \ neighbors  Patient Expects to be Discharged to[de-identified] Private residence  Current DME Used/Available at Home: None  Tub or Shower Type: Shower  Prior Level of Function/Work/Activity:  Performs ADLs with his wife's supervision, ambulates without an assistive device.  Does have some memory issues so wife offers supervision      Number of Personal Factors/Comorbidities that affect the Plan of Care: 1-2: MODERATE COMPLEXITY   EXAMINATION:   Most Recent Physical Functioning:   Gross Assessment:                  Posture:     Balance:    Bed Mobility:     Wheelchair Mobility:     Transfers:  Sit to Stand: Contact guard assistance  Stand to Sit: Contact guard assistance  Bed to Chair: Contact guard assistance  Gait:     Speed/Gisell: Slow  Distance (ft): 650 Feet (ft)  Ambulation - Level of Assistance: Contact guard assistance  Interventions: Safety awareness training  Duration: 23 Minutes       Body Structures Involved:  1. Joints  2. Muscles Body Functions Affected:  1. Neuromusculoskeletal  2. Movement Related Activities and Participation Affected:  1. Mobility  2. Self Care   Number of elements that affect the Plan of Care: 4+: HIGH COMPLEXITY   CLINICAL PRESENTATION:   Presentation: Stable and uncomplicated: LOW COMPLEXITY   CLINICAL DECISION MAKIN[address] [place] [id number] AM-PAC 6 Clicks   Basic Mobility Inpatient Short Form  How much difficulty does the patient currently have. .. Unable A Lot A Little None   1. Turning over in bed (including adjusting bedclothes, sheets and blankets)? [ ] 1   [ ] 2   [ ] 3   [X] 4   2. Sitting down on and standing up from a chair with arms ( e.g., wheelchair, bedside commode, etc.)   [ ] 1   [ ] 2   [X] 3   [ ] 4   3. Moving from lying on back to sitting on the side of the bed? [ ] 1   [ ] 2   [X] 3   [ ] 4   How much help from another person does the patient currently need. .. Total A Lot A Little None   4. Moving to and from a bed to a chair (including a wheelchair)? [ ] 1   [ ] 2   [X] 3   [ ] 4   5. Need to walk in hospital room? [ ] 1   [ ] 2   [X] 3   [ ] 4   6. Climbing 3-5 steps with a railing? [ ] 1   [X] 2   [ ] 3   [ ] 4   © , Trustees of [address] [place] [address], under license to [employer].  All rights reserved    Score:  Initial: 18 Most Recent: X (Date: -- )     Interpretation of Tool:  Represents activities that are increasingly more difficult (i.e. Bed mobility, Transfers, Gait). Score 24 23 22-20 19-15 14-10 9-7 6       Modifier CH CI CJ CK CL CM CN         · Mobility - Walking and Moving Around:               - CURRENT STATUS:    CK - 40%-59% impaired, limited or restricted               - GOAL STATUS:           CJ - 20%-39% impaired, limited or restricted               - D/C STATUS:                       ---------------To be determined---------------  [place] / Product Type: Medicare /       Medical Necessity:     · Patient demonstrates fair rehab potential due to higher previous functional level. Reason for Services/Other Comments:  · Patient continues to require present interventions due to patient's inability to perform functional mobility independently. Use of outcome tool(s) and clinical judgement create a POC that gives a: Clear prediction of patient's progress: LOW COMPLEXITY                 1TREATMENT:   (In addition to Assessment/Re-Assessment sessions the following treatments were rendered)   Pre-treatment Symptoms/Complaints:  No pain  Pain: Initial:   Pain Intensity 1: 0  Post Session:  0      Gait Training (23 Minutes):  Gait training to improve and/or restore physical functioning as related to mobility, strength, balance and coordination. Ambulated 650 Feet (ft) with Contact guard assistance using a   and minimal Safety awareness training Therapeutic Exercise: ( ):  Exercises per grid below to improve strength. Required minimal verbal cues to promote proper body alignment. Progressed range as indicated.      Date:  2/20 Date:   Date:     Activity/Exercise Parameters Parameters Parameters   Ankle pumps 12     Marching in place 12     Hip abd/add 12                                 Assessment/Reassessment only, no treatment provided today  Braces/Orthotics/Lines/Etc:   · O2 device: room air  Treatment/Session Assessment:    · Response to Treatment:  Did well with gait. · Interdisciplinary Collaboration:  · Registered Nurse  · After treatment position/precautions:  · Supine in bed, Bed/Chair-wheels locked, Call light within reach and RN notified  · Compliance with Program/Exercises: compliant most of time. · Recommendations/Intent for next treatment session: \"Next visit will focus on advancements to more challenging activities and reduction in assistance provided\".   Total Treatment Duration:  PT Patient Time In/Time Out  Time In: 1130  Time Out: Roberto Fay 22. Eleanor, PTA

## 2017-02-21 NOTE — PROGRESS NOTES
Called report to [facility] [place] number left if questions.

## 2017-02-22 ENCOUNTER — PATIENT OUTREACH (OUTPATIENT)
Dept: CASE MANAGEMENT | Age: 77
End: 2017-02-22

## 2017-02-22 NOTE — PROGRESS NOTES
This note will not be viewable in [address] Patient discharged to [facility] [facility] 02/21/2017, follow up call in 21 days post acute discharge to home. No [facility] Outreach at this time.

## 2017-06-29 PROBLEM — K76.89 LIVER CYST: Status: ACTIVE | Noted: 2017-02-16

## 2017-06-29 PROBLEM — R10.9 ABDOMINAL PAIN: Status: RESOLVED | Noted: 2017-02-16 | Resolved: 2017-06-29

## 2017-09-29 PROBLEM — Z86.718 HISTORY OF DVT (DEEP VEIN THROMBOSIS): Status: ACTIVE | Noted: 2017-09-29

## 2017-12-22 PROBLEM — I10 ESSENTIAL HYPERTENSION: Status: ACTIVE | Noted: 2017-12-22

## 2017-12-22 PROBLEM — R50.9 FEVER: Status: RESOLVED | Noted: 2017-02-16 | Resolved: 2017-12-22

## 2017-12-22 PROBLEM — Z86.718 HISTORY OF DVT (DEEP VEIN THROMBOSIS): Status: RESOLVED | Noted: 2017-09-29 | Resolved: 2017-12-22

## 2018-04-20 PROBLEM — G20 DEMENTIA DUE TO PARKINSON'S DISEASE WITHOUT BEHAVIORAL DISTURBANCE (HCC): Status: ACTIVE | Noted: 2018-04-20

## 2018-04-20 PROBLEM — F95.2 TOURETTE SYNDROME: Status: ACTIVE | Noted: 2018-04-20

## 2018-04-20 PROBLEM — F02.80 DEMENTIA DUE TO PARKINSON'S DISEASE WITHOUT BEHAVIORAL DISTURBANCE (HCC): Status: ACTIVE | Noted: 2018-04-20

## 2018-05-25 ENCOUNTER — HOSPITAL ENCOUNTER (OUTPATIENT)
Dept: PHYSICAL THERAPY | Age: 78
Discharge: HOME | End: 2018-05-25
Attending: NURSE PRACTITIONER
Payer: MEDICARE

## 2018-05-25 DIAGNOSIS — G20 PARKINSONS DISEASE (HCC): ICD-10-CM

## 2018-05-25 PROCEDURE — 97162 PT EVAL MOD COMPLEX 30 MIN: CPT

## 2018-05-25 PROCEDURE — G8978 MOBILITY CURRENT STATUS: HCPCS

## 2018-05-25 PROCEDURE — G8979 MOBILITY GOAL STATUS: HCPCS

## 2018-05-25 NOTE — THERAPY EVALUATION
Kaylee Cotter  : 1940 [facility] [place] [address] [facility], [address] [place] [address],[address], [place] [address].  [phone]   [phone]          OUTPATIENT PHYSICAL THERAPY:Initial Assessment 2018    ICD-10: Treatment Diagnosis: G42-Tntqfeuhh's, R29.3-Abnormal posture, R27.8-Lack of coordination, R26.89-other gait abnormalities  Precautions/Allergies:   Aricept [donepezil] and Onion falls  Fall Risk Score: 5 (? 5 = High Risk)  MD Orders: PT to eval and treat for Parkinson's MEDICAL/REFERRING DIAGNOSIS:  · Parkinsons disease ([facility] [address] Ginny Sanchez   DATE OF ONSET: 18   REFERRING PHYSICIAN: Kaity Luevano NP  RETURN PHYSICIAN APPOINTMENT: unknown     INITIAL ASSESSMENT:  Mr. Denisha Núñez is a 77year old who presents to therapy today with the below functional deficits and could benefit from skilled outpatient PT to improve the deficits as well as improve overall quality of life and independence. PT will focus on Parkinson's education as well as Parkinson's specific therapy/PWR program for patient to be able to maintain gains achieved during therapy when therapy is completed. Patient is unsure why he is being evaluated by therapy but is agreeable to attempt one session. \"I think I am doing fine. But obviously my wife thinks I need to be here. \"   PROBLEM LIST:  1. Decreased Balance  2. Decreased Bed Mobility  3. Decreased Ambulation/Gait Technique  4. Decreased Endurance  5. Rigidity  6. Bradykinesia INTERVENTIONS PLANNED:  1. Balance Exercise  2. Bed Mobility  3. Family Education  4. Gait Training  5. Home Exercise Program (HEP)  6. Manual Therapy  7. Neuromuscular Re-education/Strengthening  8. Range of Motion (ROM)  9. Therapeutic Activites  10. Therapeutic Exercise/Strengthening  11. Transfer Training   TREATMENT PLAN:  Effective Dates: 18 TO 2018 (90 days).   Frequency/Duration: 1 time a week for 90 Days  GOALS: (Goals have been discussed and agreed upon with patient.)  Discharge Goals: Time Frame: 12 weeks  1. Pt will improve 6 minute walk test distance to 1700 , to improve community safety. 2. Pt will improve 5 Time Sit to Stand to 10 seconds to improve overall strength need for activity participation. 3. Pt will improve 10 Meter test to 7 seconds to demo to improve normalization and efficiency for household and community ambulation. 4. Pt will improve MiniBEST to 24/28 to decrease risk of falls. 5. Pt will improve Functional Gait Assessment to 26/30 to decrease risk of falls. 6. Patient will be I with HEP to maintain functional gains achieved during therapy  Rehabilitation Potential For Stated Goals: Excellent  Regarding [place] [place] therapy, I certify that the treatment plan above will be carried out by a therapist or under their direction. Thank you for this referral,  Jet Arrieta PT     Referring Physician Signature: Duane Hyde NP              Date                    The information in this section was collected on 5/24/18 (except where otherwise noted). HISTORY:   History of Present Injury/Illness (Reason for Referral):  Tonie Cabrera is a 77 y.o. male who presents with a history of parkinson's that was diagnosed 2 years ago. He was seen by Stephie Fowler NP to Dr. Jean-Paul Baez on 4/20/18. She increased his Sinemet 25/520 to one full tab QID. He has multiple medical diagnosis:alzheimers and tourette's. 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 Dizziness is wife's major concern-happens with standing per her report. Causing imbalance. 2 falls in the last year. No injuries. Wife does all the answering as patient has dementia/poor recall. She reports a lot of furniture walking. She reports he is unable to bend over at all due to dizziness. No PT for parkinson's. She reports at one point 5 years ago he went to [address] [place] [address] but only went two times. Patient wife states patient is not motivated. Patient states he does not feel he needs therapy.  She states that she does not think he will do any work at home. \"He is not going to do anything that you tell him. \" upon speaking/questioning patient, Wife states, \"Honey he ain't corbin remember anything. \" Patient does not feel he needs therapy and that he is doing fine. He is agreeable to one session  BP at MD office: 129/76  HR at MD office: 62  Dominant Side:         RIGHT  Past Medical History/Comorbidities:   Mr. Angelica Garduno  has a past medical history of Acoustic neuroma ([address] (2012); Cholelithiasis (08/20/2015); Colon polyps (12/31/2014); Deep vein thrombosis (DVT) of popliteal vein ([address] Dementia due to Parkinson's disease without behavioral disturbance ([address] (4/20/2018); Dementia without behavioral disturbance; Dyslipidemia; Environmental allergies; GERD (gastroesophageal reflux disease); Hearing difficulty; Hiatal hernia; History of DVT (deep vein thrombosis) (9/29/2017); Hypertension; Intractable hiccups; Liver cyst; Mass of right lobe of liver (02/16/2017); Melanoma ([address] Parkinson disease ([address] Prostate cancer ([address] and Tourette syndrome (4/20/2018). He also has no past medical history of Arrhythmia; Asthma; Autoimmune disease ([address] CAD (coronary artery disease); Chronic kidney disease; Chronic pain; Coagulation defects; COPD; Diabetes ([address] Difficult intubation; Heart failure ([address] Malignant hyperthermia due to anesthesia; Nausea & vomiting; Other ill-defined conditions(799.89); Pseudocholinesterase deficiency; Psychiatric disorder; PUD (peptic ulcer disease); Seizures ([address] Stroke [facility]); Thromboembolus ([address] Thyroid disease; Unspecified adverse effect of anesthesia; or Unspecified sleep apnea. Mr. Angelica Garduno  has a past surgical history that includes hx prostatectomy (11/2009); hx knee replacement (Left); hx hernia repair (Bilateral); hx tonsillectomy; hx hemorrhoidectomy; hx heent (Right); and hx colonoscopy (12/31/2014).   Current Medications:    Current Outpatient Prescriptions:     carbidopa-levodopa (SINEMET-25/250)  mg per tablet, Take 1 Tab by mouth four (4) times daily. , Disp: 120 Tab, Rfl: 5    atorvastatin (LIPITOR) 10 mg tablet, Take 1 Tab by mouth daily. , Disp: 90 Tab, Rfl: 3    omeprazole (PRILOSEC) 40 mg capsule, Take 1 Cap by mouth two (2) times a day., Disp: 180 Cap, Rfl: 3    lisinopril (PRINIVIL, ZESTRIL) 10 mg tablet, Take 1 Tab by mouth daily. , Disp: 90 Tab, Rfl: 3    escitalopram oxalate (LEXAPRO) 10 mg tablet, Take 1 Tab by mouth daily. , Disp: 30 Tab, Rfl: 5    pimozide (ORAP) 1 mg tablet, Take 1 mg by mouth three (3) times daily. Takes 1/2 tablet daily, Disp: , Rfl:     montelukast (SINGULAIR) 10 mg tablet, Take 1 Tab by mouth daily. , Disp: 90 Tab, Rfl: 3    aspirin delayed-release 81 mg tablet, Take 1 Tab by mouth daily. , Disp: 30 Tab, Rfl: 0    rivastigmine (EXELON) 9.5 mg/24 hr patch, 1 Patch by TransDERmal route daily. , Disp: , Rfl:     multivitamin (ONE A DAY) tablet, Take 1 Tab by mouth daily. , Disp: , Rfl:    Date Last Reviewed:  5/28/2018  Social History/Living Environment:     one story home with full basement. Bed/bath on main floor. Basement-work bench etc and patient goes down there to work with tools etc. 2 steps to enter. Walk in shower-stands to shower. Regular height toilet. No grab bars installed. Regular height isadora bed. No driving. No AD with ambulation  Prior Level of Function:  Mod I  Durable Medical Equipment (DME)  · Current DME: [place]  Work/Activity History: retired  and bus [address]: none  Current Workout: was going to [employer] walking 1 mile on the track but due to [employer] to stop. Patient reports that is not why they stopped ambulating at the [employer]. \"no I did ok walking. \"   Number of Personal Factors/Comorbidities that affect the Plan of Care: 3+: HIGH COMPLEXITY   EXAMINATION:   Sensation: intact-wife reports diminshed in B feet  Skin Integrity:  intact  Neuromuscular/Motor:   · Muscle Tone:  2/4    Coordination:  Foot tapping 2 B, finger taps 1 on R/1 on L, DEVONTE 2 B, open/close 2 B  Observation/Orthostatic Postural Assessment:  FHP, protracted shoulders, slight flexion at hips  Lower Extremity:    Grossly 5/5 and WFL slightly rigid B LE's   Upper Extremity: grossly 5/5 and WFL  Additional Special Tests:  · 10m:   · Self selected: 10.4 seconds  · Fast pace: 5.84 seconds  · 5x sit to stand: 15.7 seconds  · 6 minute walk test: 1435 feet   Distance:  3000   Assistive Device:  none   Orthosis/Brace:  none   Amount of Assist:  Mod I on even uneven NT. Incline/decline SBA   Gait Characteristics:  Decreased B arm swing, rigid trunk, shuffling steps B but L greater than R, toe drag at times but no LOB nor dizziness    Stairs:    · Rails:  none       · Number of Stairs:  12      · Amount of Assist:   CGA due to unsteady but able to complete. Could complete safely with one HR          · Pattern:  reciprocal  · SOT:  NT  · Comments:  patient not wanting to complete therapy and states he does not need. Spent most of session on purpose of PT and PWR. Patient is agreeable to attempt one session   · Education:  Spent time on education to patient and loved one on Parkinson's symptoms of hypophonia, hypomemia, incoordination, bradykinesia, rigidity, automaticity as well as the [employer] program and purpose. Education on purpose of exercise for Parkinson's as well as attention to task and proper ambulation technique. Education on proper sit to stand technique to improve efficiency of sit to stand. FUNCTIONAL ASSESSMENT:  · Wheelchair:  na  · Bed Mobility: mod I and could do prone except bradykinetic    · Transfers: sit to stand without UE support mod I and good forward lean. Does not turn fully and reaches for chair with UE. No freezing noted. Body Structures Involved:  1. Joints  2. Muscles Body Functions Affected:  1. Cardio  2. Respiratory  3. Neuromusculoskeletal  4. Movement Related Activities and Participation Affected:  1. Mobility  2.  Community, Social and [place]   Number of elements that affect the Plan of Care: 4+: HIGH COMPLEXITY   CLINICAL PRESENTATION:   Presentation: Evolving clinical presentation with changing clinical characteristics: MODERATE COMPLEXITY   CLINICAL DECISION MAKING:   Outcome Measure: Tool Used: MINI BEST TEST   Score:  Initial: 14/28 Most Recent: X/28    Interpretation of Score: This is a 14 item test on a three level ordinal scale with a total score of 28. There are two sections scored, but only the lower score is recorded. This is a clinical balance assessment tool that aimes to target and identify six different balance control systems. Score 28 27-23 22-17 16-11 10-6 5-1 0   Modifier CH CI CJ CK CL CM CN ? Mobility - Walking and Moving Around:     - CURRENT STATUS: CK - 40%-59% impaired, limited or restricted    - GOAL STATUS: CI - 1%-19% impaired, limited or restricted    - D/C STATUS:             ---------------To be determined---------------    Tool Used: FUNCTIONAL GAIT ASSESSMENT   Score:  Initial: 16/30 Most Recent: X/30   Interpretation of Score: This test is a modification of the Dynamic Gait Index. It is a 10 item test with each item score 0-3 that assesses postural stability during various walking tasks. Medical Necessity:   · Patient is expected to demonstrate progress in balance, coordination, functional technique and ambulation to increase independence with all functional tasks above. Reason for Services/Other Comments:  · Patient can benefit from skilled outpatient PT to focus on Parkinson's specific treatment to improve all above functional deficits limiting patient daily as well as increasing fall risk to improve overall quality of life.    Use of outcome tool(s) and clinical judgement create a POC that gives a: Questionable prediction of patient's progress: MODERATE COMPLEXITY   TREATMENT:   (In addition to Assessment/Re-Assessment sessions the following treatments were rendered)  Pre-treatment Symptoms/Complaints:  5/28/2018: Patient reports no pain   Pain: Initial:   Pain Intensity 1: 0 0 Post Session:  0     Treatment/Session Assessment:    · Response to Treatment:  Patient tolerated well. Patient was pleasant   · Compliance with Program/Exercises: Will assess as treatment progresses. · Recommendations/Intent for next treatment session: \"Next visit will focus on advancements to more challenging activities\".   Nustep/ed on cardio, ed on proper attention to task/gait, balance HEP, standing PWR  Total Treatment Duration:  PT Patient Time In/Time Out  Time In: 1300  Time Out: 1358    Modesta Doe, PT

## 2018-05-25 NOTE — PROGRESS NOTES
Ambulatory/Rehab Services H2 Model Falls Risk Assessment    Risk Factor Pts. ·   Confusion/Disorientation/Impulsivity  [x]    4 ·   Symptomatic Depression  []   2 ·   Altered Elimination  []   1 ·   Dizziness/Vertigo  []   1 ·   Gender (Male)  [x]   1 ·   Any administered antiepileptics (anticonvulsants):  []   2 ·   Any administered benzodiazepines:  []   1 ·   Visual Impairment (specify):  []   1 ·   Portable Oxygen Use  []   1 ·   Orthostatic ? BP  []   1 ·   History of Recent Falls (within 3 mos.)  []   5     Ability to Rise from Chair (choose one) Pts. ·   Ability to rise in a single movement  [x]   0 ·   Pushes up, successful in one attempt  []   1 ·   Multiple attempts, but successful  []   3 ·   Unable to rise without assistance  []   4   Total: (5 or greater = High Risk) 5     Falls Prevention Plan:   [x]                Physical Limitations to Exercise (specify): supervision   []                Mobility Assistance Device (type):   []                Exercise/Equipment Adaptation (specify):    ©2010 [facility] [address] [place] [address] Patent #[id number].  Federal Law prohibits the replication, distribution or use without written permission from [facility] [employer]

## 2018-06-08 PROBLEM — G25.0 ESSENTIAL TREMOR: Status: ACTIVE | Noted: 2018-06-08

## 2018-06-12 ENCOUNTER — HOSPITAL ENCOUNTER (OUTPATIENT)
Dept: PHYSICAL THERAPY | Age: 78
Discharge: HOME | End: 2018-06-12
Attending: NURSE PRACTITIONER
Payer: MEDICARE

## 2018-06-12 NOTE — PROGRESS NOTES
Juan Weber  : 1940  [place]  [place] [address] at [place] [address] [facility], [address] [place] [address],[address], [facility] [address]  [phone]   [phone]         OUTPATIENT DAILY NOTE    NAME/AGE/GENDER: Juan Weber is a 77 y.o. male. DATE: 2018    Patient did not show up for appointment today due to forgetting her appointment. Called patient and got patient rescheduled. Will plan to follow up on next scheduled visit.     Freddy Mckee, PT

## 2018-06-21 ENCOUNTER — HOSPITAL ENCOUNTER (OUTPATIENT)
Dept: PHYSICAL THERAPY | Age: 78
Discharge: HOME | End: 2018-06-21
Attending: NURSE PRACTITIONER
Payer: MEDICARE

## 2018-06-21 PROCEDURE — 97116 GAIT TRAINING THERAPY: CPT

## 2018-06-21 PROCEDURE — 97112 NEUROMUSCULAR REEDUCATION: CPT

## 2018-06-21 PROCEDURE — 97110 THERAPEUTIC EXERCISES: CPT

## 2018-06-21 NOTE — PROGRESS NOTES
Amanda Raines  : 1940 [facility] at [facility], [address] [address],[address], [facility] [address].  [phone]   [phone]          OUTPATIENT PHYSICAL THERAPY:Daily Note and Treatment Day: 2018    ICD-10: Treatment Diagnosis: M03-Tszbsotpc's, R29.3-Abnormal posture, R27.8-Lack of coordination, R26.89-other gait abnormalities  Precautions/Allergies:   Aricept [donepezil] and Onion falls  Fall Risk Score: 5 (? 5 = High Risk)  MD Orders: PT to eval and treat for Parkinson's MEDICAL/REFERRING DIAGNOSIS:  · Parkinson's disease [G20]   DATE OF ONSET: 18   REFERRING PHYSICIAN: Qiana Lopez NP  RETURN PHYSICIAN APPOINTMENT: unknown     INITIAL ASSESSMENT:  Mr. Jay Heard is a 77year old who presents to therapy today with the below functional deficits and could benefit from skilled outpatient PT to improve the deficits as well as improve overall quality of life and independence. PT will focus on Parkinson's education as well as Parkinson's specific therapy/PWR program for patient to be able to maintain gains achieved during therapy when therapy is completed. Patient is unsure why he is being evaluated by therapy but is agreeable to attempt one session. \"I think I am doing fine. But obviously my wife thinks I need to be here. \"   PROBLEM LIST:  1. Decreased Balance  2. Decreased Bed Mobility  3. Decreased Ambulation/Gait Technique  4. Decreased Endurance  5. Rigidity  6. Bradykinesia INTERVENTIONS PLANNED:  1. Balance Exercise  2. Bed Mobility  3. Family Education  4. Gait Training  5. Home Exercise Program (HEP)  6. Manual Therapy  7. Neuromuscular Re-education/Strengthening  8. Range of Motion (ROM)  9. Therapeutic Activites  10. Therapeutic Exercise/Strengthening  11. Transfer Training   TREATMENT PLAN:  Effective Dates: 18 TO 2018 (90 days).   Frequency/Duration: 1 time a week for 90 Days  GOALS: (Goals have been discussed and agreed upon with patient.)  Discharge Goals: Time Frame: 12 weeks  1. Pt will improve 6 minute walk test distance to 1700 , to improve community safety. 2. Pt will improve 5 Time Sit to Stand to 10 seconds to improve overall strength need for activity participation. 3. Pt will improve 10 Meter test to 7 seconds to demo to improve normalization and efficiency for household and community ambulation. 4. Pt will improve MiniBEST to 24/28 to decrease risk of falls. 5. Pt will improve Functional Gait Assessment to 26/30 to decrease risk of falls. 6. Patient will be I with HEP to maintain functional gains achieved during therapy  Rehabilitation Potential For Stated Goals: Excellent              The information in this section was collected on 5/24/18 (except where otherwise noted). HISTORY:   History of Present Injury/Illness (Reason for Referral):  Jaskaran Magana is a 77 y.o. male who presents with a history of parkinson's that was diagnosed 2 years ago. He was seen by Shemar Vaughn NP to Dr. Nataly Ortiz on 4/20/18. She increased his Sinemet 25/520 to one full tab QID. He has multiple medical diagnosis:alzheimers and tourette's. 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 Dizziness is wife's major concern-happens with standing per her report. Causing imbalance. 2 falls in the last year. No injuries. Wife does all the answering as patient has dementia/poor recall. She reports a lot of furniture walking. She reports he is unable to bend over at all due to dizziness. No PT for parkinson's. She reports at one point 5 years ago he went to [address] [place] [address] but only went two times. Patient wife states patient is not motivated. Patient states he does not feel he needs therapy. She states that she does not think he will do any work at home. \"He is not going to do anything that you tell him. \" upon speaking/questioning patient, Wife states, \"Honey he ain't corbin remember anything. \" Patient does not feel he needs therapy and that he is doing fine.  He is agreeable to one session  6/21/2018-Patient with no falls nor changes in medication. \"I don't remember even being up here last time. \"    BP at MD office: 129/76  HR at MD office: 62  Dominant Side:         RIGHT  Past Medical History/Comorbidities:   Mr. Jannette Hess  has a past medical history of Acoustic neuroma ([address] (2012); Cholelithiasis (08/20/2015); Colon polyps (12/31/2014); Deep vein thrombosis (DVT) of popliteal vein ([address] Dementia due to Parkinson's disease without behavioral disturbance ([address] (4/20/2018); Dementia without behavioral disturbance; Dyslipidemia; Environmental allergies; Essential tremor (6/8/2018); GERD (gastroesophageal reflux disease); Hearing difficulty; Hiatal hernia; History of DVT (deep vein thrombosis) (9/29/2017); Hypertension; Intractable hiccups; Liver cyst; Mass of right lobe of liver (02/16/2017); Melanoma ([address] Parkinson disease ([address] Prostate cancer ([address] and Tourette syndrome (4/20/2018). He also has no past medical history of Arrhythmia; Asthma; Autoimmune disease ([address] CAD (coronary artery disease); Chronic kidney disease; Chronic pain; Coagulation defects; COPD; Diabetes ([address] Difficult intubation; Heart failure ([address] Malignant hyperthermia due to anesthesia; Nausea & vomiting; Other ill-defined conditions(799.89); Pseudocholinesterase deficiency; Psychiatric disorder; PUD (peptic ulcer disease); Seizures ([address] Stroke [facility]); Thromboembolus ([address] Thyroid disease; Unspecified adverse effect of anesthesia; or Unspecified sleep apnea. Mr. Jannette Hess  has a past surgical history that includes hx prostatectomy (11/2009); hx knee replacement (Left); hx hernia repair (Bilateral); hx tonsillectomy; hx hemorrhoidectomy; hx heent (Right); and hx colonoscopy (12/31/2014). Current Medications:    Current Outpatient Prescriptions:     escitalopram oxalate (LEXAPRO) 10 mg tablet, Take 1 Tab by mouth daily. , Disp: 30 Tab, Rfl: 5    omeprazole (PRILOSEC) 40 mg capsule, Take 1 Cap by mouth two (2) times a day., Disp: 180 Cap, Rfl: 3    lisinopril (PRINIVIL, ZESTRIL) 10 mg tablet, Take 1 Tab by mouth daily. , Disp: 90 Tab, Rfl: 3    atorvastatin (LIPITOR) 10 mg tablet, Take 1 Tab by mouth daily. , Disp: 90 Tab, Rfl: 3    montelukast (SINGULAIR) 10 mg tablet, Take 1 Tab by mouth daily. , Disp: 90 Tab, Rfl: 3    carbidopa-levodopa ER (SINEMET CR)  mg per tablet, Give one tab at 8 am daily, Disp: 90 Tab, Rfl: 3    rasagiline (AZILECT) 0.5 mg tab tablet, Take 1 Tab by mouth daily. , Disp: 90 Tab, Rfl: 2    primidone (MYSOLINE) 50 mg tablet, Take 1/2 tablet nightly for 2 weeks then increase to one tab nightly, Disp: 90 Tab, Rfl: 3    carbidopa-levodopa (SINEMET-25/250)  mg per tablet, Take 1 Tab by mouth four (4) times daily. , Disp: 120 Tab, Rfl: 5    pimozide (ORAP) 1 mg tablet, Take 1 mg by mouth three (3) times daily. Takes 1/2 tablet daily, Disp: , Rfl:     aspirin delayed-release 81 mg tablet, Take 1 Tab by mouth daily. , Disp: 30 Tab, Rfl: 0    rivastigmine (EXELON) 9.5 mg/24 hr patch, 1 Patch by TransDERmal route daily. , Disp: , Rfl:     multivitamin (ONE A DAY) tablet, Take 1 Tab by mouth daily. , Disp: , Rfl:    Date Last Reviewed:  6/21/2018  Social History/Living Environment:     one story home with full basement. Bed/bath on main floor. Basement-work bench etc and patient goes down there to work with tools etc. 2 steps to enter. Walk in shower-stands to shower. Regular height toilet. No grab bars installed. Regular height isadora bed. No driving. No AD with ambulation  Prior Level of Function:  Mod I  Durable Medical Equipment (DME)  · Current DME: [place]  Work/Activity History: retired  and bus [facility]: none  Current Workout: was going to [employer] walking 1 mile on the track but due to [employer] to stop. Patient reports that is not why they stopped ambulating at the [employer]. \"no I did ok walking. \"   Number of Personal Factors/Comorbidities that affect the Plan of Care: 3+: HIGH COMPLEXITY   EXAMINATION:   Sensation: intact-wife reports diminshed in B feet  Skin Integrity:  intact  Neuromuscular/Motor:   · Muscle Tone:  2/4    Coordination:  Foot tapping 2 B, finger taps 1 on R/1 on L, DEVONTE 2 B, open/close 2 B  Observation/Orthostatic Postural Assessment:  FHP, protracted shoulders, slight flexion at hips  Lower Extremity:    Grossly 5/5 and WFL slightly rigid B LE's   Upper Extremity: grossly 5/5 and WFL  Additional Special Tests:  · 10m:   · Self selected: 10.4 seconds  · Fast pace: 5.84 seconds  · 5x sit to stand: 15.7 seconds  · 6 minute walk test: 1435 feet   Distance:  3000   Assistive Device:  none   Orthosis/Brace:  none   Amount of Assist:  Mod I on even uneven NT. Incline/decline SBA   Gait Characteristics:  Decreased B arm swing, rigid trunk, shuffling steps B but L greater than R, toe drag at times but no LOB nor dizziness    Stairs:    · Rails:  none       · Number of Stairs:  12      · Amount of Assist:   CGA due to unsteady but able to complete. Could complete safely with one HR          · Pattern:  reciprocal  · SOT:  NT  · Comments:  patient not wanting to complete therapy and states he does not need. Spent most of session on purpose of PT and PWR. Patient is agreeable to attempt one session   · Education:  Spent time on education to patient and loved one on Parkinson's symptoms of hypophonia, hypomemia, incoordination, bradykinesia, rigidity, automaticity as well as the [employer] program and purpose. Education on purpose of exercise for Parkinson's as well as attention to task and proper ambulation technique. Education on proper sit to stand technique to improve efficiency of sit to stand. FUNCTIONAL ASSESSMENT:  · Wheelchair:  na  · Bed Mobility: mod I and could do prone except bradykinetic    · Transfers: sit to stand without UE support mod I and good forward lean.  Does not turn fully and reaches for chair with UE.  No freezing noted. Body Structures Involved:  1. Joints  2. Muscles Body Functions Affected:  1. Cardio  2. Respiratory  3. Neuromusculoskeletal  4. Movement Related Activities and Participation Affected:  1. Mobility  2. Community, Social and [place]   Number of elements that affect the Plan of Care: 4+: HIGH COMPLEXITY   CLINICAL PRESENTATION:   Presentation: Evolving clinical presentation with changing clinical characteristics: MODERATE COMPLEXITY   CLINICAL DECISION MAKING:   Outcome Measure: Tool Used: MINI BEST TEST   Score:  Initial: 14/28 Most Recent: X/28    Interpretation of Score: This is a 14 item test on a three level ordinal scale with a total score of 28. There are two sections scored, but only the lower score is recorded. This is a clinical balance assessment tool that aimes to target and identify six different balance control systems. Score 28 27-23 22-17 16-11 10-6 5-1 0   Modifier CH CI CJ CK CL CM CN ? Mobility - Walking and Moving Around:     - CURRENT STATUS: CK - 40%-59% impaired, limited or restricted    - GOAL STATUS: CI - 1%-19% impaired, limited or restricted    - D/C STATUS:             ---------------To be determined---------------    Tool Used: FUNCTIONAL GAIT ASSESSMENT   Score:  Initial: 16/30 Most Recent: X/30   Interpretation of Score: This test is a modification of the Dynamic Gait Index. It is a 10 item test with each item score 0-3 that assesses postural stability during various walking tasks. Medical Necessity:   · Patient is expected to demonstrate progress in balance, coordination, functional technique and ambulation to increase independence with all functional tasks above. Reason for Services/Other Comments:  · Patient can benefit from skilled outpatient PT to focus on Parkinson's specific treatment to improve all above functional deficits limiting patient daily as well as increasing fall risk to improve overall quality of life. Use of outcome tool(s) and clinical judgement create a POC that gives a: Questionable prediction of patient's progress: MODERATE COMPLEXITY   TREATMENT:   (In addition to Assessment/Re-Assessment sessions the following treatments were rendered)  Pre-treatment Symptoms/Complaints:  6/21/2018: Patient reports no pain   Pain: Initial:   Pain Intensity 1: 0 0 Post Session:  0     TREATMENT:   (In addition to Assessment/Re-Assessment sessions the following treatments were rendered)    GAIT TRAINING: (5 minutes):  Gait training to improve and/or restore physical functioning as related to mobility. Ambulation into and out of therapy with focus on maintaining big step and stride    THERAPEUTIC EXERCISE: (15 minutes):  Exercises per grid below to improve mobility. Required no visual and verbal cues to promote proper body alignment. Progressed resistance as indicated. Ed on importance of cardio  Completed nustep level 4 all extremities for 15 minutes-tolerated well maintaining above 45 steps per minute    NEUROMUSCULAR RE-EDUCATION: (26 minutes):  Exercise/activities per grid below to improve balance, coordination and posture. Required moderate visual and verbal cues to promote coordination of bilateral, upper extremity(s), lower extremity(s). Ed on attention to task and proper sit to stand-gave written HEP  He reports no dizzy spells recently-spoke with wife about encouraging to attempt small walks even in home for safety to improve mobility but monitor spells  Also added TUG for HEP-wrote out specifically for him to focus on not dragging his feet  Patient completed all standing PWR moves 1x10-completed with patietn first by mimic then gave him the pic form and he was able to copmlete appropriately without vc's      Treatment/Session Assessment:    · Response to Treatment:  Patient did well and was very perceptive to all of the above.  Upon patient agreeing to come back to therapy spoke with wife about this and scheduling patient and she became flustered, \"can we wait on that? \" Per wife, they are wanting her to go to therapy to another place as well as to a neurologist for assessment due to her multiple falls recently.  would like her to get therapy first before he continues. Patient wife would like to speak with children to help her decide what to do. Encouraged her to call back after speaking with them to let me know if they would continue PT at this time. · Compliance with Program/Exercises: Will assess as treatment progresses. · Recommendations/Intent for next treatment session: \"Next visit will focus on advancements to more challenging activities\".   Review standing PWR/TUG/sit to stand, nustep  Total Treatment Duration:  PT Patient Time In/Time Out  Time In: 1400  Time Out: 1446    Edel De Santiago, PT

## 2018-06-29 NOTE — PROGRESS NOTES
Claudine New  : 1940  [place]  Secondary: [address] at [facility], [address] [address], [facility] [address].  [phone]   [phone]       Called patient and left message with wife asking about whether she wouldlike to discharge patient or continue/make appointments as when they last left, she was concerned because she was going to start therapy herself and has mutliple MD appointment for herself as she is falling. She wanted to speak with her daughter and call me back but I did not hear from her so was calling her.

## 2018-07-05 NOTE — PROGRESS NOTES
Amanda Olu  : 1940  [place]  Secondary: [address] [facility] at [facility] 52, [address] [address],[address], [address] [facility]  [phone]   [phone]       Have called patient wife with no return phone call as she did not want to set up any further appointments upon evaluation of patient as she was concerned about herself getting therapy and wanted to chat with her daughter as well. Upon reading chart review, noted patient wife called Dr. Milady Lee office about getting referral to PT at [facility]. So will discontinue patient at this time due to the above.  Patient was only seen for evlaution and one session

## 2018-08-16 PROBLEM — G90.3 NEUROLOGIC ORTHOSTATIC HYPOTENSION (HCC): Status: ACTIVE | Noted: 2018-08-16

## 2018-10-31 PROBLEM — G47.19 EXCESSIVE DAYTIME SLEEPINESS: Status: ACTIVE | Noted: 2018-10-31

## 2018-12-17 PROBLEM — K76.89 LIVER CYST: Status: RESOLVED | Noted: 2017-02-16 | Resolved: 2018-12-17

## 2018-12-19 ENCOUNTER — HOME HEALTH ADMISSION (OUTPATIENT)
Dept: HOME HEALTH SERVICES | Facility: HOME HEALTH | Age: 78
End: 2018-12-19
Payer: MEDICARE

## 2018-12-21 ENCOUNTER — HOME CARE VISIT (OUTPATIENT)
Dept: SCHEDULING | Facility: HOME HEALTH | Age: 78
End: 2018-12-21
Payer: MEDICARE

## 2018-12-21 VITALS
HEART RATE: 67 BPM | RESPIRATION RATE: 18 BRPM | TEMPERATURE: 97.1 F | DIASTOLIC BLOOD PRESSURE: 66 MMHG | SYSTOLIC BLOOD PRESSURE: 106 MMHG

## 2018-12-21 PROCEDURE — G0151 HHCP-SERV OF PT,EA 15 MIN: HCPCS

## 2018-12-21 PROCEDURE — 3331090002 HH PPS REVENUE DEBIT

## 2018-12-21 PROCEDURE — 400013 HH SOC

## 2018-12-21 PROCEDURE — 3331090001 HH PPS REVENUE CREDIT

## 2018-12-22 PROCEDURE — 3331090002 HH PPS REVENUE DEBIT

## 2018-12-22 PROCEDURE — 3331090001 HH PPS REVENUE CREDIT

## 2018-12-23 PROCEDURE — 3331090001 HH PPS REVENUE CREDIT

## 2018-12-23 PROCEDURE — 3331090002 HH PPS REVENUE DEBIT

## 2018-12-24 PROCEDURE — 3331090002 HH PPS REVENUE DEBIT

## 2018-12-24 PROCEDURE — 3331090001 HH PPS REVENUE CREDIT

## 2018-12-25 PROCEDURE — 3331090001 HH PPS REVENUE CREDIT

## 2018-12-25 PROCEDURE — 3331090002 HH PPS REVENUE DEBIT

## 2018-12-26 PROCEDURE — 3331090001 HH PPS REVENUE CREDIT

## 2018-12-26 PROCEDURE — 3331090002 HH PPS REVENUE DEBIT

## 2018-12-27 ENCOUNTER — HOME CARE VISIT (OUTPATIENT)
Dept: SCHEDULING | Facility: HOME HEALTH | Age: 78
End: 2018-12-27
Payer: MEDICARE

## 2018-12-27 PROCEDURE — 3331090001 HH PPS REVENUE CREDIT

## 2018-12-27 PROCEDURE — 3331090002 HH PPS REVENUE DEBIT

## 2018-12-27 PROCEDURE — G0155 HHCP-SVS OF CSW,EA 15 MIN: HCPCS

## 2018-12-28 ENCOUNTER — HOME CARE VISIT (OUTPATIENT)
Dept: SCHEDULING | Facility: HOME HEALTH | Age: 78
End: 2018-12-28
Payer: MEDICARE

## 2018-12-28 VITALS
SYSTOLIC BLOOD PRESSURE: 122 MMHG | TEMPERATURE: 98.3 F | HEART RATE: 65 BPM | DIASTOLIC BLOOD PRESSURE: 71 MMHG | RESPIRATION RATE: 17 BRPM

## 2018-12-28 PROCEDURE — 3331090002 HH PPS REVENUE DEBIT

## 2018-12-28 PROCEDURE — G0151 HHCP-SERV OF PT,EA 15 MIN: HCPCS

## 2018-12-28 PROCEDURE — 3331090001 HH PPS REVENUE CREDIT

## 2018-12-29 PROCEDURE — 3331090002 HH PPS REVENUE DEBIT

## 2018-12-29 PROCEDURE — 3331090001 HH PPS REVENUE CREDIT

## 2018-12-30 PROCEDURE — 3331090002 HH PPS REVENUE DEBIT

## 2018-12-30 PROCEDURE — 3331090001 HH PPS REVENUE CREDIT

## 2018-12-31 PROCEDURE — 3331090002 HH PPS REVENUE DEBIT

## 2018-12-31 PROCEDURE — 3331090001 HH PPS REVENUE CREDIT

## 2019-01-01 ENCOUNTER — HOME CARE VISIT (OUTPATIENT)
Dept: SCHEDULING | Facility: HOME HEALTH | Age: 79
End: 2019-01-01
Payer: MEDICARE

## 2019-01-01 VITALS
HEART RATE: 80 BPM | TEMPERATURE: 97.2 F | RESPIRATION RATE: 17 BRPM | DIASTOLIC BLOOD PRESSURE: 64 MMHG | SYSTOLIC BLOOD PRESSURE: 132 MMHG

## 2019-01-01 PROCEDURE — G0157 HHC PT ASSISTANT EA 15: HCPCS

## 2019-01-01 PROCEDURE — 3331090002 HH PPS REVENUE DEBIT

## 2019-01-01 PROCEDURE — 3331090001 HH PPS REVENUE CREDIT

## 2019-01-02 PROCEDURE — 3331090001 HH PPS REVENUE CREDIT

## 2019-01-02 PROCEDURE — 3331090002 HH PPS REVENUE DEBIT

## 2019-01-03 ENCOUNTER — HOSPITAL ENCOUNTER (OUTPATIENT)
Dept: MRI IMAGING | Age: 79
Discharge: HOME | End: 2019-01-03
Attending: NURSE PRACTITIONER
Payer: MEDICARE

## 2019-01-03 DIAGNOSIS — D33.3 ACOUSTIC NEUROMA SYNDROME (HCC): ICD-10-CM

## 2019-01-03 DIAGNOSIS — G20 PARKINSONS DISEASE (HCC): ICD-10-CM

## 2019-01-03 PROCEDURE — 3331090002 HH PPS REVENUE DEBIT

## 2019-01-03 PROCEDURE — 3331090001 HH PPS REVENUE CREDIT

## 2019-01-03 PROCEDURE — A9575 INJ GADOTERATE MEGLUMI 0.1ML: HCPCS | Performed by: NURSE PRACTITIONER

## 2019-01-03 PROCEDURE — 74011250636 HC RX REV CODE- 250/636: Performed by: NURSE PRACTITIONER

## 2019-01-03 PROCEDURE — 70553 MRI BRAIN STEM W/O & W/DYE: CPT

## 2019-01-03 RX ORDER — SODIUM CHLORIDE 0.9 % (FLUSH) 0.9 %
10 SYRINGE (ML) INJECTION
Status: COMPLETED | OUTPATIENT
Start: 2019-01-03 | End: 2019-01-03

## 2019-01-03 RX ORDER — GADOTERATE MEGLUMINE 376.9 MG/ML
15 INJECTION INTRAVENOUS
Status: COMPLETED | OUTPATIENT
Start: 2019-01-03 | End: 2019-01-03

## 2019-01-03 RX ADMIN — Medication 10 ML: at 16:09

## 2019-01-03 RX ADMIN — GADOTERATE MEGLUMINE 15 ML: 376.9 INJECTION INTRAVENOUS at 16:09

## 2019-01-04 ENCOUNTER — HOME CARE VISIT (OUTPATIENT)
Dept: HOME HEALTH SERVICES | Facility: HOME HEALTH | Age: 79
End: 2019-01-04
Payer: MEDICARE

## 2019-01-04 ENCOUNTER — HOME CARE VISIT (OUTPATIENT)
Dept: SCHEDULING | Facility: HOME HEALTH | Age: 79
End: 2019-01-04
Payer: MEDICARE

## 2019-01-04 PROCEDURE — 3331090002 HH PPS REVENUE DEBIT

## 2019-01-04 PROCEDURE — G0157 HHC PT ASSISTANT EA 15: HCPCS

## 2019-01-04 PROCEDURE — 3331090001 HH PPS REVENUE CREDIT

## 2019-01-05 VITALS
SYSTOLIC BLOOD PRESSURE: 122 MMHG | HEART RATE: 68 BPM | DIASTOLIC BLOOD PRESSURE: 62 MMHG | TEMPERATURE: 97.2 F | RESPIRATION RATE: 17 BRPM

## 2019-01-05 PROCEDURE — 3331090002 HH PPS REVENUE DEBIT

## 2019-01-05 PROCEDURE — 3331090001 HH PPS REVENUE CREDIT

## 2019-01-06 PROCEDURE — 3331090001 HH PPS REVENUE CREDIT

## 2019-01-06 PROCEDURE — 3331090002 HH PPS REVENUE DEBIT

## 2019-01-07 ENCOUNTER — HOME CARE VISIT (OUTPATIENT)
Dept: SCHEDULING | Facility: HOME HEALTH | Age: 79
End: 2019-01-07
Payer: MEDICARE

## 2019-01-07 VITALS
TEMPERATURE: 97.9 F | RESPIRATION RATE: 18 BRPM | DIASTOLIC BLOOD PRESSURE: 65 MMHG | HEART RATE: 67 BPM | SYSTOLIC BLOOD PRESSURE: 114 MMHG

## 2019-01-07 PROCEDURE — 3331090001 HH PPS REVENUE CREDIT

## 2019-01-07 PROCEDURE — G0151 HHCP-SERV OF PT,EA 15 MIN: HCPCS

## 2019-01-07 PROCEDURE — 3331090002 HH PPS REVENUE DEBIT

## 2019-06-12 PROBLEM — G25.0 ESSENTIAL TREMOR: Status: RESOLVED | Noted: 2018-06-08 | Resolved: 2019-06-12

## 2020-05-19 ENCOUNTER — HOSPITAL ENCOUNTER (EMERGENCY)
Age: 80
Discharge: HOME | End: 2020-05-20
Attending: EMERGENCY MEDICINE | Admitting: EMERGENCY MEDICINE
Payer: MEDICARE

## 2020-05-19 DIAGNOSIS — R07.9 ACUTE CHEST PAIN: Primary | ICD-10-CM

## 2020-05-19 PROCEDURE — 83690 ASSAY OF LIPASE: CPT

## 2020-05-19 PROCEDURE — 80053 COMPREHEN METABOLIC PANEL: CPT

## 2020-05-19 PROCEDURE — 85025 COMPLETE CBC W/AUTO DIFF WBC: CPT

## 2020-05-19 PROCEDURE — 99284 EMERGENCY DEPT VISIT MOD MDM: CPT

## 2020-05-19 PROCEDURE — 84484 ASSAY OF TROPONIN QUANT: CPT

## 2020-05-20 ENCOUNTER — PATIENT OUTREACH (OUTPATIENT)
Dept: CASE MANAGEMENT | Age: 80
End: 2020-05-20

## 2020-05-20 ENCOUNTER — APPOINTMENT (OUTPATIENT)
Dept: GENERAL RADIOLOGY | Age: 80
End: 2020-05-20
Attending: EMERGENCY MEDICINE
Payer: MEDICARE

## 2020-05-20 VITALS
RESPIRATION RATE: 16 BRPM | SYSTOLIC BLOOD PRESSURE: 168 MMHG | OXYGEN SATURATION: 100 % | DIASTOLIC BLOOD PRESSURE: 75 MMHG | TEMPERATURE: 98.2 F | BODY MASS INDEX: 22.96 KG/M2 | HEART RATE: 78 BPM | WEIGHT: 160 LBS

## 2020-05-20 LAB
ALBUMIN SERPL-MCNC: 3.4 G/DL (ref 3.2–4.6)
ALBUMIN/GLOB SERPL: 1.2 {RATIO} (ref 1.2–3.5)
ALP SERPL-CCNC: 68 U/L (ref 50–136)
ALT SERPL-CCNC: 26 U/L (ref 12–65)
ANION GAP SERPL CALC-SCNC: 5 MMOL/L (ref 7–16)
AST SERPL-CCNC: 75 U/L (ref 15–37)
ATRIAL RATE: 60 BPM
BASOPHILS # BLD: 0 K/UL (ref 0–0.2)
BASOPHILS NFR BLD: 0 % (ref 0–2)
BILIRUB SERPL-MCNC: 0.4 MG/DL (ref 0.2–1.1)
BUN SERPL-MCNC: 26 MG/DL (ref 8–23)
CALCIUM SERPL-MCNC: 8.8 MG/DL (ref 8.3–10.4)
CALCULATED P AXIS, ECG09: 76 DEGREES
CALCULATED R AXIS, ECG10: 19 DEGREES
CALCULATED T AXIS, ECG11: 67 DEGREES
CHLORIDE SERPL-SCNC: 107 MMOL/L (ref 98–107)
CO2 SERPL-SCNC: 29 MMOL/L (ref 21–32)
CREAT SERPL-MCNC: 1.19 MG/DL (ref 0.8–1.5)
DIAGNOSIS, 93000: NORMAL
DIFFERENTIAL METHOD BLD: ABNORMAL
EOSINOPHIL # BLD: 0.1 K/UL (ref 0–0.8)
EOSINOPHIL NFR BLD: 3 % (ref 0.5–7.8)
ERYTHROCYTE [DISTWIDTH] IN BLOOD BY AUTOMATED COUNT: 13.3 % (ref 11.9–14.6)
GLOBULIN SER CALC-MCNC: 2.8 G/DL (ref 2.3–3.5)
GLUCOSE SERPL-MCNC: 95 MG/DL (ref 65–100)
HCT VFR BLD AUTO: 35.5 % (ref 41.1–50.3)
HGB BLD-MCNC: 11.7 G/DL (ref 13.6–17.2)
IMM GRANULOCYTES # BLD AUTO: 0 K/UL (ref 0–0.5)
IMM GRANULOCYTES NFR BLD AUTO: 0 % (ref 0–5)
LIPASE SERPL-CCNC: 237 U/L (ref 73–393)
LYMPHOCYTES # BLD: 1.2 K/UL (ref 0.5–4.6)
LYMPHOCYTES NFR BLD: 32 % (ref 13–44)
MCH RBC QN AUTO: 31 PG (ref 26.1–32.9)
MCHC RBC AUTO-ENTMCNC: 33 G/DL (ref 31.4–35)
MCV RBC AUTO: 93.9 FL (ref 79.6–97.8)
MONOCYTES # BLD: 0.4 K/UL (ref 0.1–1.3)
MONOCYTES NFR BLD: 11 % (ref 4–12)
NEUTS SEG # BLD: 2 K/UL (ref 1.7–8.2)
NEUTS SEG NFR BLD: 54 % (ref 43–78)
NRBC # BLD: 0 K/UL (ref 0–0.2)
P-R INTERVAL, ECG05: 162 MS
PLATELET # BLD AUTO: 125 K/UL (ref 150–450)
PMV BLD AUTO: 9.8 FL (ref 9.4–12.3)
POTASSIUM SERPL-SCNC: 3.7 MMOL/L (ref 3.5–5.1)
PROT SERPL-MCNC: 6.2 G/DL (ref 6.3–8.2)
Q-T INTERVAL, ECG07: 468 MS
QRS DURATION, ECG06: 102 MS
QTC CALCULATION (BEZET), ECG08: 468 MS
RBC # BLD AUTO: 3.78 M/UL (ref 4.23–5.6)
SODIUM SERPL-SCNC: 141 MMOL/L (ref 136–145)
TROPONIN I SERPL-MCNC: <0.02 NG/ML (ref 0.02–0.05)
VENTRICULAR RATE, ECG03: 60 BPM
WBC # BLD AUTO: 3.7 K/UL (ref 4.3–11.1)

## 2020-05-20 PROCEDURE — 74022 RADEX COMPL AQT ABD SERIES: CPT

## 2020-05-20 PROCEDURE — 93005 ELECTROCARDIOGRAM TRACING: CPT | Performed by: EMERGENCY MEDICINE

## 2020-05-20 RX ORDER — NITROGLYCERIN 0.4 MG/1
0.4 TABLET SUBLINGUAL
Qty: 1 BOTTLE | Refills: 0 | Status: SHIPPED | OUTPATIENT
Start: 2020-05-20

## 2020-05-20 NOTE — DISCHARGE INSTRUCTIONS
Consider using an acid if further symptoms occur. May also try nitroglycerin pill. Recheck with her primary care doctor or consider seeing cardiologist for further testing if symptoms continue. Patient Education        Chest Pain: Care Instructions  Your Care Instructions    There are many things that can cause chest pain. Some are not serious and will get better on their own in a few days. But some kinds of chest pain need more testing and treatment. Your doctor may have recommended a follow-up visit in the next 8 to 12 hours. If you are not getting better, you may need more tests or treatment. Even though your doctor has released you, you still need to watch for any problems. The doctor carefully checked you, but sometimes problems can develop later. If you have new symptoms or if your symptoms do not get better, get medical care right away. If you have worse or different chest pain or pressure that lasts more than 5 minutes or you passed out (lost consciousness), call 911 or seek other emergency help right away. A medical visit is only one step in your treatment. Even if you feel better, you still need to do what your doctor recommends, such as going to all suggested follow-up appointments and taking medicines exactly as directed. This will help you recover and help prevent future problems. How can you care for yourself at home? · Rest until you feel better. · Take your medicine exactly as prescribed. Call your doctor if you think you are having a problem with your medicine. · Do not drive after taking a prescription pain medicine. When should you call for help? Call 911 if:    · You passed out (lost consciousness).     · You have severe difficulty breathing.     · You have symptoms of a heart attack. These may include:  ? Chest pain or pressure, or a strange feeling in your chest.  ? Sweating. ? Shortness of breath. ? Nausea or vomiting.   ? Pain, pressure, or a strange feeling in your back, neck, jaw, or upper belly or in one or both shoulders or arms. ? Lightheadedness or sudden weakness. ? A fast or irregular heartbeat. After you call  911, the  may tell you to chew 1 adult-strength or 2 to 4 low-dose aspirin. Wait for an ambulance. Do not try to drive yourself.    Call your doctor today if:    · You have any trouble breathing.     · Your chest pain gets worse.     · You are dizzy or lightheaded, or you feel like you may faint.     · You are not getting better as expected.     · You are having new or different chest pain. Where can you learn more? Go to http://tom-jessica.info/  Enter A120 in the search box to learn more about \"Chest Pain: Care Instructions. \"  Current as of: June 26, 2019Content Version: 12.4  © 9012-2162 Healthwise, Incorporated. Care instructions adapted under license by [employer] (which disclaims liability or warranty for this information). If you have questions about a medical condition or this instruction, always ask your healthcare professional. [address] any warranty or liability for your use of this information.

## 2020-05-20 NOTE — PROGRESS NOTES
Patient contacted regarding recent visit for viral symptoms. This Onalee Coad contacted the wife by telephone to perform post discharge call. Verified name and  with wife as identifiers. Provided introduction to self, and reason for call due to viral symptoms of infection and/or exposure to COVID-19. Patient presented to emergency department/flu clinic with complaints of viral symptoms/exposure to COVID. Patient reports symptoms are the same. Due to no new or worsening symptoms the [facility] Care Coordinator was not notified for escalation. Discussed exposure protocols and quarantine with CDC Guidelines What To Do If You Are Sick    Wife was given an opportunity for questions and concerns. Stay home except to get medical care  Separate yourself from other people and animals in your home  Call ahead before visiting your doctor  Wear a facemask  Cover your coughs and sneezes  Clean your hands often  Avoid sharing personal household items  Clean all high-touch surfaces everyday    Monitor your symptoms  Seek prompt medical attention if your illness is worsening (e.g., difficulty breathing). Before seeking care, call your healthcare provider and tell them that you have, or are being evaluated for, COVID-19. Put on a facemask before you enter the facility. These steps will help the healthcare provider's office to keep other people in the office or waiting room from getting infected or exposed. Ask your healthcare provider to call the local or [facility] health department. Persons who are placed under If you have a medical emergency and need to call 911, notify the dispatch personnel that you have, or are being evaluated for COVID-19. If possible, put on a facemask before emergency medical services arrive. The wife agrees to contact the [employer] exposure line [phone], local health department [facility] [address] [place] [address]: ([phone]) and PCP office for questions related to their healthcare.  Author provided contact information for future reference. Patient/family/caregiver given information for [employer] and agrees to enroll no  Patient preferred e-mail:  Patient preferred phone contact:  Based on Loop alert triggers, patient will be contacted by nurse care manager for worsening symptoms. Patient was discharged early this morning from the ER. He is unable to talk and his wife was very tired and did not want to talk. She did however state that patient is doing fine. Will check back in 7 day.

## 2020-05-20 NOTE — ED NOTES
Family members called EMS concerned that the pt had an episode of chest pain/epigastric pain tonight and was brought to the ED. No pain at the present time.

## 2020-05-20 NOTE — ED TRIAGE NOTES
Pt presents from home by EMS, had an episode of chest/epigastric (LUQ) pain this evening that has since passed. Pt has dementia.  Masked on arrival.

## 2020-05-20 NOTE — ED NOTES
I have reviewed discharge instructions with the patient. The caregiver verbalized understanding. Patient left ED via Discharge Method: wheelchair to Home with (in family/, self). Opportunity for questions and clarification provided. Patient given 1 scripts. To continue your aftercare when you leave the hospital, you may receive an automated call from our care team to check in on how you are doing. This is a free service and part of our promise to provide the best care and service to meet your aftercare needs.  If you have questions, or wish to unsubscribe from this service please call [phone]. Thank you for Choosing our [employer] Emergency Department.

## 2020-05-20 NOTE — ED PROVIDER NOTES
69-year-old male has some dementia. His wife gives some history as well. He evidently complained of left-sided chest pain that lasted for a few minutes about 10:45 PM tonight. His wife states he complained of nausea. He did not have any vomiting or diaphoresis. No complaints of any radiation of the pain. No shortness of breath or cough. Has had no fever recently. Patient states he is fine and has no pain at this point. His wife states he is stoic and sometimes does not admit to having pain. No cardiac history. Review of old records reveals a history of DVT in the past.  This is chronic and I think was treated with IVC filter. Also has some history of hypertension and hernia repair. He has Parkinson's and Parkinson's related dementia. 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 The anticoagulants were stopped. He also was noted to have gallstones. The history is provided by the patient and the spouse. The history is limited by the condition of the patient. Chest Pain    This is a new problem. The current episode started less than 1 hour ago. The problem has been resolved. The problem occurs constantly. The pain is present in the left side. The pain is moderate. The quality of the pain is described as pressure-like. Pertinent negatives include no abdominal pain, no back pain, no cough, no diaphoresis, no dizziness, no fever, no headaches, no nausea, no palpitations, no shortness of breath and no vomiting. He has tried nothing for the symptoms. Risk factors include hypertension. His past medical history is significant for DVT. His past medical history does not include DM, HTN or PE. Pertinent negatives include no cardiac catheterization.        Past Medical History:   Diagnosis Date    Acoustic neuroma [facility]) 2012    Right    Cholelithiasis 08/20/2015    Colon polyps 12/31/2014    Tubular Adenoma x 1 & Hyperplastic Polyp x 2    Dementia due to Parkinson's disease without behavioral disturbance ([address] 4/20/2018    DVT (deep venous thrombosis) (HCC) 02/17/2017    Left Superficial Femoral, Popliteal, & Peroneal vein     Dyslipidemia     Environmental allergies     GERD (gastroesophageal reflux disease)     Hiatal hernia     Hypertension     Intractable hiccups     Liver cyst 02/16/2017    R Lobe    Melanoma ([address]     Back    Parkinson disease ([address]     Prostate cancer ([address]     Tourette syndrome 4/20/2018       Past Surgical History:   Procedure Laterality Date    HX COLONOSCOPY  12/31/2014    Tubular Adenoma & Hyperplastic Polyps, Diverticulosis, & Internal Hemorrhoids - Recall 2017    HX HEENT Right     Ear    HX HEMORRHOIDECTOMY      HX HERNIA REPAIR Bilateral     HX KNEE REPLACEMENT Left     HX PROSTATECTOMY  11/2009    [address]    HX TONSILLECTOMY           Family History:   Problem Relation Age of Onset    Cancer Father 77        Prostate    Heart Disease Brother     Cancer Brother         Prostate    Melanoma Brother     Breast Cancer Mother     Melanoma Mother     COPD Brother        Social History     Socioeconomic History    Marital status:      Spouse name: Not on file    Number of children: Not on file    Years of education: Not on file    Highest education level: Not on file   Occupational History    Not on file   Social Needs    Financial resource strain: Not on file    Food insecurity     Worry: Not on file     Inability: Not on file    Transportation needs     Medical: Not on file     Non-medical: Not on file   Tobacco Use    Smoking status: Never Smoker    Smokeless tobacco: Never Used   Substance and Sexual Activity    Alcohol use: No    Drug use: No    Sexual activity: Not on file   Lifestyle    Physical activity     Days per week: Not on file     Minutes per session: Not on file    Stress: Not on file   Relationships    Social connections Talks on phone: Not on file     Gets together: Not on file     Attends Restorationist service: Not on file     Active member of club or organization: Not on file     Attends meetings of clubs or organizations: Not on file     Relationship status: Not on file    Intimate partner violence     Fear of current or ex partner: Not on file     Emotionally abused: Not on file     Physically abused: Not on file     Forced sexual activity: Not on file   Other Topics Concern    Not on file   Social History Narrative    Not on file         ALLERGIES: Aricept [donepezil] and Onion    Review of Systems   Constitutional: Negative for chills, diaphoresis and fever. Respiratory: Negative for cough and shortness of breath. Cardiovascular: Positive for chest pain. Negative for palpitations. Gastrointestinal: Negative for abdominal pain, diarrhea, nausea and vomiting. Genitourinary: Negative for dysuria and flank pain. Musculoskeletal: Negative for back pain and neck pain. Skin: Negative for color change and rash. Neurological: Negative for dizziness, syncope and headaches. All other systems reviewed and are negative. Vitals:    05/19/20 2349   BP: 177/77   Pulse: 60   Resp: 16   Temp: 98.2 °F (36.8 °C)            Physical Exam  Vitals signs and nursing note reviewed. Constitutional:       General: He is not in acute distress. Appearance: He is well-developed. HENT:      Head: Normocephalic and atraumatic. Right Ear: External ear normal.      Left Ear: External ear normal.      Mouth/Throat:      Pharynx: No oropharyngeal exudate. Eyes:      Conjunctiva/sclera: Conjunctivae normal.      Pupils: Pupils are equal, round, and reactive to light. Neck:      Musculoskeletal: Normal range of motion and neck supple. Cardiovascular:      Rate and Rhythm: Normal rate and regular rhythm. Heart sounds: No murmur. Pulmonary:      Effort: No respiratory distress. Breath sounds: Normal breath sounds. Abdominal:      General: Bowel sounds are normal.      Palpations: Abdomen is soft. There is no mass. Tenderness: There is no abdominal tenderness. There is no guarding or rebound. Hernia: No hernia is present. Skin:     General: Skin is warm and dry. Neurological:      Mental Status: He is alert and oriented to person, place, and time. Gait: Gait normal.      Comments: Nl speech   Psychiatric:         Speech: Speech normal.          MDM  Number of Diagnoses or Management Options  Diagnosis management comments: Exam normal at this point. No known cardiac history. Check EKG and serial troponins. Check x-ray as well. Will think PE is less likely. Normal vital signs no complaints at this point. Do not believe CT imaging would alter course as patient is not able to take anticoagulants due to history of hemorrhagic liver issues. No abdominal tenderness. Possibility of gastritis as patient states he has had ulcers in the past.  Possible esophageal disease. Amount and/or Complexity of Data Reviewed  Clinical lab tests: ordered and reviewed  Tests in the radiology section of CPT®: ordered and reviewed  Tests in the medicine section of CPT®: ordered and reviewed  Review and summarize past medical records: yes (See HPI)  Independent visualization of images, tracings, or specimens: yes (EKG shows normal sinus rhythm.   No ST-T changes.)    Risk of Complications, Morbidity, and/or Mortality  Presenting problems: moderate  Diagnostic procedures: minimal  Management options: low    Patient Progress  Patient progress: stable         Procedures

## 2020-06-02 ENCOUNTER — PATIENT OUTREACH (OUTPATIENT)
Dept: CASE MANAGEMENT | Age: 80
End: 2020-06-02

## 2020-06-02 NOTE — PROGRESS NOTES
This note will not be viewable in [address] Attempted contacting patient for COVID FU. No answer, no VM picked up. Episode resolved.

## 2020-09-04 ENCOUNTER — HOSPITAL ENCOUNTER (OUTPATIENT)
Dept: CT IMAGING | Age: 80
Discharge: HOME | End: 2020-09-04
Attending: PHYSICIAN ASSISTANT
Payer: MEDICARE

## 2020-09-04 DIAGNOSIS — H90.A32 MIXED CONDUCTIVE AND SENSORINEURAL HEARING LOSS OF LEFT EAR WITH RESTRICTED HEARING OF RIGHT EAR: ICD-10-CM

## 2020-09-04 PROCEDURE — 70480 CT ORBIT/EAR/FOSSA W/O DYE: CPT

## 2020-11-19 PROBLEM — G25.0 ESSENTIAL TREMOR: Status: RESOLVED | Noted: 2018-06-08 | Resolved: 2020-11-19

## 2021-04-05 ENCOUNTER — HOME HEALTH ADMISSION (OUTPATIENT)
Dept: HOME HEALTH SERVICES | Facility: HOME HEALTH | Age: 81
End: 2021-04-05

## 2021-04-12 ENCOUNTER — HOSPITAL ENCOUNTER (OUTPATIENT)
Dept: WOUND CARE | Age: 81
Discharge: HOME | End: 2021-04-12
Attending: PHYSICAL MEDICINE & REHABILITATION
Payer: MEDICARE

## 2021-04-12 DIAGNOSIS — L30.4 INTERTRIGINOUS DERMATITIS ASSOCIATED WITH MOISTURE: ICD-10-CM

## 2021-04-12 PROCEDURE — 99284 EMERGENCY DEPT VISIT MOD MDM: CPT

## 2021-04-12 PROCEDURE — 99203 OFFICE O/P NEW LOW 30 MIN: CPT | Performed by: PHYSICAL MEDICINE & REHABILITATION

## 2021-04-12 PROCEDURE — 99213 OFFICE O/P EST LOW 20 MIN: CPT

## 2021-04-12 RX ORDER — HYDROCORTISONE 1 G/G
1 CREAM TOPICAL ONCE
Status: DISCONTINUED | OUTPATIENT
Start: 2021-04-12 | End: 2021-04-12

## 2021-04-12 RX ORDER — ELECTROLYTES/DEXTROSE
SOLUTION, ORAL ORAL 2 TIMES DAILY
Status: DISCONTINUED | OUTPATIENT
Start: 2021-04-12 | End: 2021-04-14 | Stop reason: HOSPADM

## 2021-04-12 RX ORDER — NYSTATIN 100000 [USP'U]/G
POWDER TOPICAL 2 TIMES DAILY
Qty: 60 G | Refills: 1 | Status: SHIPPED | OUTPATIENT
Start: 2021-04-12 | End: 2021-07-30

## 2021-04-12 RX ORDER — NYSTATIN 100000 [USP'U]/G
POWDER TOPICAL ONCE
Status: COMPLETED | OUTPATIENT
Start: 2021-04-12 | End: 2021-04-12

## 2021-04-12 RX ADMIN — NYSTATIN: 100000 POWDER TOPICAL at 13:46

## 2021-04-12 RX ADMIN — Medication: at 13:48

## 2021-04-12 NOTE — WOUND CARE
04/12/21 1321   Wound Hand Right   Date First Assessed/Time First Assessed: 04/12/21 1320   Present on Hospital Admission: Yes  Primary Wound Type: Moisture Assoc. Skin Damage  Location: Hand  Wound Location Orientation: Right   Wound Image     Wound Etiology   (antibacterial ointment)   Dressing Status Clean;Dry; Intact   Cleansed Cleansed with saline   Wound Length (cm) 4.5 cm   Wound Width (cm) 6.5 cm   Wound Depth (cm) 0.1 cm   Wound Surface Area (cm^2) 29.25 cm^2   Wound Volume (cm^3) 2.92 cm^3   Wound Assessment Pale granulation tissue   Drainage Amount None   Wound Odor None   Wound Thickness Description Partial thickness     Patient taking Aspirin daily.

## 2021-04-12 NOTE — DISCHARGE INSTRUCTIONS
[address]  [address], [address]   Phone: [phone]  Fax: [phone]    Patient: Nik Dela Cruz MRN: [id number]  SSN: [id number]    YOB: 1940  Age: 79 y.o. Sex: male       Return Appointment: 2 weeks with Alexandru Emery MD    Instructions:   Right Hand:  Cleanse wound with normal saline. Apply Hydrocortisone and Nystatin powder to wound bed daily. Cover with Tubigrip only. Apply cream and powder daily. *May try to roll wash cloth or place a ball in palm of hand after applying medicine then apply tubigrip over to help hold fingers open for some time during day or night. *MD to order Nystatin and Hydrocortisone and send to pharmacy. Should you experience increased redness, swelling, pain, foul odor, size of wound(s), or have a temperature over 101 degrees please contact the [address] [place] [address] Road at [phone] or if after hours contact your primary care physician or go to the hospital emergency department.     Signed By: Mikie Bradford RN     April 12, 2021

## 2021-04-12 NOTE — PROGRESS NOTES
[address], [address], [address] [place] [address] Rd  [phone]    Progress Notes   Brad Alexandre       MRN: [id number]     : 1940     Age: 79 y.o. Subjective/HPI:   This patient is a 79 y.o. male seen and evaluated at the wound clinic for fungal appearing skin changes in the folds of the right hand. He has a hx of Alzheimers and Parkinsons and has metacarpal contractures of the right hand. No fever or chills. Using nystatin/triamcinolone cream with improvement. Review of Systems  A comprehensive review of systems was negative except for that written in the HPI. Past Medical History:   Diagnosis Date    Acoustic neuroma ([address]     Right    Cholelithiasis 2015    Colon polyps 2014    Tubular Adenoma x 1 & Hyperplastic Polyp x 2    Dementia due to Parkinson's disease without behavioral disturbance ([address] 2018    DVT (deep venous thrombosis) (HCC) 2017    Left Superficial Femoral, Popliteal, & Peroneal vein     Dyslipidemia     Environmental allergies     GERD (gastroesophageal reflux disease)     Hiatal hernia     Hypertension     Intractable hiccups     Liver cyst 2017    R Lobe    Melanoma ([address]     Back    Parkinson disease ([address]     Prostate cancer ([address]     Tourette syndrome 2018      Past Surgical History:   Procedure Laterality Date    HX COLONOSCOPY  2014    Tubular Adenoma & Hyperplastic Polyps, Diverticulosis, & Internal Hemorrhoids - Recall 2017    HX HEENT Right     Ear    HX HEMORRHOIDECTOMY      HX HERNIA REPAIR Bilateral     HX KNEE REPLACEMENT Left     HX PROST[facility]CTOMY  2009    [facility]    HX TONSILLECTOMY        Allergies   Allergen Reactions    Aricept [Donepezil] Nausea and Vomiting    Onion Nausea and Vomiting      Social History     Tobacco Use    Smoking status: Never Smoker    Smokeless tobacco: Never Used   Substance Use Topics    Alcohol use:  No Social History     Social History Narrative    Not on file     Family History   Problem Relation Age of Onset    Cancer Father 77        Prostate    Heart Disease Brother     Cancer Brother         Prostate    Melanoma Brother     Breast Cancer Mother     Melanoma Mother     COPD Brother       Cannot display prior to admission medications because the patient has not been admitted in this contact. Current Outpatient Medications   Medication Sig    nystatin (MYCOSTATIN) powder Apply  to affected area two (2) times a day.  rivastigmine (Exelon) 13.3 mg/24 hour patch 1 Patch by TransDERmal route daily.  memantine (NAMENDA) 10 mg tablet Take 1 Tab by mouth two (2) times a day.  pyridostigmine (Mestinon) 60 mg tablet Take 1/2 tablet at 10 am and at 6 pm daily to support blood pressure.  carbidopa-levodopa (SINEMET)  mg per tablet TAKE ONE-HALF TABLET BY MOUTH FOUR TIMES A DAY    montelukast (SINGULAIR) 10 mg tablet Take 1 Tab by mouth daily.  carbidopa-levodopa ER (SINEMET CR)  mg per tablet TAKE ONE TABLET BY MOUTH EVERY NIGHT    carbidopa-levodopa ER (SINEMET CR)  mg per tablet TAKE ONE TABLET BY MOUTH AT BEDTIME    clotrimazole-betamethasone (LOTRISONE) topical cream Apply  to affected area two (2) times a day.  carbidopa-levodopa (SINEMET)  mg per tablet TAKE ONE TABLET BY MOUTH FOUR TIMES A DAY (Patient taking differently: three (3) times daily.)    pimozide (ORAP) 1 mg tablet TAKE ONE AND ONE-HALF TABLETS BY MOUTH TWICE A DAY (Patient taking differently: TAKE  ONE-HALF TABLETS BY MOUTH THREE TIMES A DAY)    atorvastatin (LIPITOR) 10 mg tablet Take 1 Tab by mouth daily.  escitalopram oxalate (Lexapro) 10 mg tablet Take 1 Tab by mouth daily.  nitroglycerin (NITROSTAT) 0.4 mg SL tablet Take 1 Tab by mouth every five (5) minutes as needed for Chest Pain.  Sit/Lay down then put one tab under the tongue every 5 minutes as needed for chest pain for 3 doses  aspirin delayed-release 81 mg tablet Take 1 Tab by mouth daily.  multivitamin (ONE A DAY) tablet Take 1 Tab by mouth daily. Current Facility-Administered Medications   Medication Dose Route Frequency    hydrocortisone-aloe vera 1 % topical cream   Topical BID     Objective:     Vitals:    04/12/21 1331   Weight: 149 lb 6.4 oz (67.8 kg)   Height: 5' 10\" (1.778 m)       Physical Exam:  BMI: 21.44  Ambulation: ambulation with supervision  Gen- the patient is well developed and in no acute distress  HEENT- no focal abnormalities of the scalp or face. Neck- no JVD or retractions  Lungs- normal respiratory effort. Ext- warm without cyanosis. Skin- no jaundice or rashes. Please see the specific measurements and descriptions of the wound(s) below. There is no evidence of periwound induration or warmth. No purulence or odor. Neuro- alert and oriented x 2. Pleasant and cooperative. No gross imotor deficits are present. Psychological: Affect normal. Masked facies. Wound Hand Right (Active)   Wound Image    04/12/21 1321   Dressing Status Clean;Dry; Intact 04/12/21 1321   Cleansed Cleansed with saline 04/12/21 1321   Wound Length (cm) 4.5 cm 04/12/21 1321   Wound Width (cm) 6.5 cm 04/12/21 1321   Wound Depth (cm) 0.1 cm 04/12/21 1321   Wound Surface Area (cm^2) 29.25 cm^2 04/12/21 1321   Wound Volume (cm^3) 2.92 cm^3 04/12/21 1321   Wound Assessment Pale granulation tissue 04/12/21 1321   Drainage Amount None 04/12/21 1321   Wound Odor None 04/12/21 1321   Wound Thickness Description Partial thickness 04/12/21 1321   Number of days: 0        Data Review     All Micro Results     None        All Micro Results     None        Radiology  Assessment:     Patient Active Problem List   Diagnosis Code    Malignant neoplasm of prostate ([facility] C61    Acoustic neuroma syndrome (HCC) D33.3    Dyslipidemia E78.5    Parkinsons disease ([facility] G20    Essential hypertension I10    Tourette syndrome F95.2    Dementia due to Parkinson's disease without behavioral disturbance ([facility] G20, F02.80    Neurologic orthostatic hypotension (HCC) G90.3    Excessive daytime sleepiness G47.19    Acoustic neuroma (HCC) D33.3     Plan: Active Orders   Nursing    WOUND CARE, DRESSING CHANGE     Frequency: ONE TIME     Number of Occurrences: 1 Occurrences     Order Comments: Right Hand:  Cleanse wound with normal saline. Apply Hydrocortisone and Nystatin powder to wound bed daily. Cover with Tubigrip only. Apply cream and powder daily. *May try to roll wash cloth or place a ball in palm of hand after applying medicine then apply tubigrip over to help hold fingers open for some time during day or night. *MD to order Nystatin and Hydrocortisone and send to pharmacy. Medications    hydrocortisone-aloe vera 1 % topical cream     Frequency: BID     Route: Topical       Follow-up Information     Follow up With Specialties Details Why Contact Info    [address] In 2 weeks  Elpidio Varela [address] [place] [id number] [phone]        We will try topical nystatin powder and apply a tubigrip at night to keep the medicine in place. It is okay to keep open to air during the day. Recheck in two weeks. TIME:  If total time for today's E/M service is used for level of service it is documented below.     This time includes physician non-face-to-face service time visit on the date of service and includes    Preparing to see the patient (eg, review of tests)  Obtaining and/or reviewing separately obtained history  Performing a medically necessary appropriate examination and/or evaluation  Counseling and educating the patient/family/caregiver  Ordering medications, tests, or procedures  Referring and communicating with other health care professionals as needed  Documenting clinical information in the electronic or other health record  Independently interpreting results (not reported separately) and communicating results to the patient/family/caregiver  Care coordination (not reported separately)    E/M time =     30     minutes. Dictated using voice recognition software. Proofread, but unrecognized errors may exist.       Thank you very much for the opportunity to participate in this patient's care.       Signed By: Shasta French MD     April 12, 2021

## 2021-04-12 NOTE — WOUND CARE
[address]  [address], [address]   Phone: [phone]  Fax: [phone]    Patient: Sukhdeep Patel MRN: [id number]  SSN: [id number]    YOB: 1940  Age: 79 y.o. Sex: male       Return Appointment: 2 weeks with Reynold Moise MD    Instructions:   Right Hand:  Cleanse wound with normal saline. Apply Hydrocortisone and Nystatin powder to wound bed daily. Cover with Tubigrip only. Apply cream and powder daily. *May try to roll wash cloth or place a ball in palm of hand after applying medicine then apply tubigrip over to help hold fingers open for some time during day or night. *MD to order Nystatin and Hydrocortisone and send to pharmacy. Should you experience increased redness, swelling, pain, foul odor, size of wound(s), or have a temperature over 101 degrees please contact the [address] [place] [address] Road at [phone] or if after hours contact your primary care physician or go to the hospital emergency department.     Signed By: Berdine Eisenmenger, RN     April 12, 2021

## 2021-04-13 ENCOUNTER — HOSPITAL ENCOUNTER (EMERGENCY)
Age: 81
Discharge: HOME | DRG: 493 | End: 2021-04-13
Attending: EMERGENCY MEDICINE
Payer: MEDICARE

## 2021-04-13 ENCOUNTER — HOME CARE VISIT (OUTPATIENT)
Dept: SCHEDULING | Facility: HOME HEALTH | Age: 81
End: 2021-04-13

## 2021-04-13 ENCOUNTER — HOSPITAL ENCOUNTER (OUTPATIENT)
Dept: SURGERY | Age: 81
Discharge: HOME | End: 2021-04-13

## 2021-04-13 ENCOUNTER — APPOINTMENT (OUTPATIENT)
Dept: CT IMAGING | Age: 81
DRG: 493 | End: 2021-04-13
Attending: EMERGENCY MEDICINE
Payer: MEDICARE

## 2021-04-13 ENCOUNTER — HOSPITAL ENCOUNTER (OUTPATIENT)
Dept: LAB | Age: 81
Discharge: HOME | DRG: 493 | End: 2021-04-13
Attending: ORTHOPAEDIC SURGERY
Payer: MEDICARE

## 2021-04-13 ENCOUNTER — APPOINTMENT (OUTPATIENT)
Dept: GENERAL RADIOLOGY | Age: 81
DRG: 493 | End: 2021-04-13
Attending: EMERGENCY MEDICINE
Payer: MEDICARE

## 2021-04-13 VITALS
OXYGEN SATURATION: 98 % | TEMPERATURE: 97.8 F | SYSTOLIC BLOOD PRESSURE: 147 MMHG | HEART RATE: 70 BPM | HEIGHT: 70 IN | BODY MASS INDEX: 21.39 KG/M2 | RESPIRATION RATE: 18 BRPM | DIASTOLIC BLOOD PRESSURE: 66 MMHG | WEIGHT: 149.4 LBS

## 2021-04-13 VITALS — HEIGHT: 70 IN | WEIGHT: 150 LBS | BODY MASS INDEX: 21.47 KG/M2

## 2021-04-13 VITALS
HEART RATE: 66 BPM | BODY MASS INDEX: 21.47 KG/M2 | HEIGHT: 70 IN | TEMPERATURE: 98.2 F | WEIGHT: 150 LBS | SYSTOLIC BLOOD PRESSURE: 156 MMHG | OXYGEN SATURATION: 99 % | RESPIRATION RATE: 16 BRPM | DIASTOLIC BLOOD PRESSURE: 89 MMHG

## 2021-04-13 DIAGNOSIS — S42.201A CLOSED FRACTURE OF PROXIMAL END OF RIGHT HUMERUS, UNSPECIFIED FRACTURE MORPHOLOGY, INITIAL ENCOUNTER: Primary | ICD-10-CM

## 2021-04-13 LAB
ALBUMIN SERPL-MCNC: 3.8 G/DL (ref 3.2–4.6)
ALBUMIN/GLOB SERPL: 1.3 {RATIO} (ref 1.2–3.5)
ALP SERPL-CCNC: 81 U/L (ref 50–136)
ALT SERPL-CCNC: 8 U/L (ref 12–65)
ANION GAP SERPL CALC-SCNC: 5 MMOL/L (ref 7–16)
APPEARANCE UR: CLEAR
APTT PPP: 32.7 SEC (ref 24.1–35.1)
AST SERPL-CCNC: 12 U/L (ref 15–37)
BACTERIA SPEC CULT: ABNORMAL
BACTERIA URNS QL MICRO: 0 /HPF
BILIRUB SERPL-MCNC: 0.7 MG/DL (ref 0.2–1.1)
BILIRUB UR QL: ABNORMAL
BUN SERPL-MCNC: 36 MG/DL (ref 8–23)
CALCIUM SERPL-MCNC: 9.5 MG/DL (ref 8.3–10.4)
CASTS URNS QL MICRO: ABNORMAL /LPF
CHLORIDE SERPL-SCNC: 108 MMOL/L (ref 98–107)
CO2 SERPL-SCNC: 28 MMOL/L (ref 21–32)
COLOR UR: ABNORMAL
CREAT SERPL-MCNC: 1.39 MG/DL (ref 0.8–1.5)
EPI CELLS #/AREA URNS HPF: ABNORMAL /HPF
ERYTHROCYTE [DISTWIDTH] IN BLOOD BY AUTOMATED COUNT: 13.2 % (ref 11.9–14.6)
EST. AVERAGE GLUCOSE BLD GHB EST-MCNC: NORMAL MG/DL
GLOBULIN SER CALC-MCNC: 3 G/DL (ref 2.3–3.5)
GLUCOSE SERPL-MCNC: 92 MG/DL (ref 65–100)
GLUCOSE UR STRIP.AUTO-MCNC: 100 MG/DL
HBA1C MFR BLD: 4.9 % (ref 4.2–6.3)
HCT VFR BLD AUTO: 35.3 % (ref 41.1–50.3)
HGB BLD-MCNC: 11.5 G/DL (ref 13.6–17.2)
HGB UR QL STRIP: NEGATIVE
HISTORY CHECKED?,CKHIST: NORMAL
INR PPP: 1.1
KETONES UR QL STRIP.AUTO: ABNORMAL MG/DL
LEUKOCYTE ESTERASE UR QL STRIP.AUTO: NEGATIVE
MAGNESIUM SERPL-MCNC: 2.4 MG/DL (ref 1.8–2.4)
MCH RBC QN AUTO: 30.9 PG (ref 26.1–32.9)
MCHC RBC AUTO-ENTMCNC: 32.6 G/DL (ref 31.4–35)
MCV RBC AUTO: 94.9 FL (ref 79.6–97.8)
MUCOUS THREADS URNS QL MICRO: ABNORMAL /LPF
NITRITE UR QL STRIP.AUTO: NEGATIVE
NRBC # BLD: 0 K/UL (ref 0–0.2)
OTHER OBSERVATIONS,UCOM: ABNORMAL
PH UR STRIP: 5.5 [PH] (ref 5–9)
PLATELET # BLD AUTO: 215 K/UL (ref 150–450)
PMV BLD AUTO: 9.6 FL (ref 9.4–12.3)
POTASSIUM SERPL-SCNC: 3.7 MMOL/L (ref 3.5–5.1)
PROT SERPL-MCNC: 6.8 G/DL (ref 6.3–8.2)
PROT UR STRIP-MCNC: 30 MG/DL
PROTHROMBIN TIME: 14.2 SEC (ref 12.5–14.7)
RBC # BLD AUTO: 3.72 M/UL (ref 4.23–5.6)
RBC #/AREA URNS HPF: ABNORMAL /HPF
SERVICE CMNT-IMP: ABNORMAL
SODIUM SERPL-SCNC: 141 MMOL/L (ref 136–145)
SP GR UR REFRACTOMETRY: 1.03 (ref 1–1.02)
UROBILINOGEN UR QL STRIP.AUTO: 1 EU/DL (ref 0.2–1)
WBC # BLD AUTO: 11.7 K/UL (ref 4.3–11.1)
WBC URNS QL MICRO: ABNORMAL /HPF

## 2021-04-13 PROCEDURE — 85027 COMPLETE CBC AUTOMATED: CPT

## 2021-04-13 PROCEDURE — 73060 X-RAY EXAM OF HUMERUS: CPT

## 2021-04-13 PROCEDURE — 80053 COMPREHEN METABOLIC PANEL: CPT

## 2021-04-13 PROCEDURE — 85610 PROTHROMBIN TIME: CPT

## 2021-04-13 PROCEDURE — 86901 BLOOD TYPING SEROLOGIC RH(D): CPT

## 2021-04-13 PROCEDURE — 70450 CT HEAD/BRAIN W/O DYE: CPT

## 2021-04-13 PROCEDURE — 83036 HEMOGLOBIN GLYCOSYLATED A1C: CPT

## 2021-04-13 PROCEDURE — 83735 ASSAY OF MAGNESIUM: CPT

## 2021-04-13 PROCEDURE — 85730 THROMBOPLASTIN TIME PARTIAL: CPT

## 2021-04-13 PROCEDURE — 87641 MR-STAPH DNA AMP PROBE: CPT

## 2021-04-13 PROCEDURE — 86923 COMPATIBILITY TEST ELECTRIC: CPT

## 2021-04-13 PROCEDURE — 81001 URINALYSIS AUTO W/SCOPE: CPT

## 2021-04-13 RX ORDER — BISMUTH SUBSALICYLATE 262 MG
1 TABLET,CHEWABLE ORAL DAILY
COMMUNITY

## 2021-04-13 RX ORDER — SODIUM CHLORIDE 0.9 % (FLUSH) 0.9 %
5-40 SYRINGE (ML) INJECTION EVERY 8 HOURS
Status: CANCELLED | OUTPATIENT
Start: 2021-04-13

## 2021-04-13 RX ORDER — SODIUM CHLORIDE 0.9 % (FLUSH) 0.9 %
5-40 SYRINGE (ML) INJECTION AS NEEDED
Status: CANCELLED | OUTPATIENT
Start: 2021-04-13

## 2021-04-13 NOTE — ED TRIAGE NOTES
Pt brought in by EMS from home for fall. Pt tripped while he was wondering around outside. Pt caught himself with right arm. No loc and did not hit head.

## 2021-04-13 NOTE — PERIOP NOTES
Patient's daughter Sue Wolfe [employer] [facility]) verified name and . Order for consent was found in EHR and matches case posting; patient's daughter verifies procedure. Type 1b surgery, PAT phone assessment complete. Orders for PAT  received. Labs per surgeon: UA, PT/PTT, CMP, CBC, HgbA1c, T&S, MRSA swab  Labs per anesthesia protocol: None    Patient COVID test will be done DOS    Pt has Parkinsons and is followed by Dr. Rolando Nieto at [facility]. Last office note 2021 found in Chart Review    Patient's daughter answered medical/surgical history questions at their best of ability. All prior to admission medications documented in [facility]. Patient's daughter instructed to take the following medications the day of surgery according to anesthesia guidelines with a small sip of water:See PTA med sheet in notes. Hold all vitamins 7 days prior to surgery and NSAIDS 5 days prior to surgery. Prescription meds to hold:none    Patient's daughter instructed on the following:    > Arrive at [facility], time of arrival to be called the day before by 1700  > NPO after midnight including gum, mints, and ice chips  > Responsible adult must drive patient to the hospital, stay during surgery, and patient will need supervision 24 hours after anesthesia  > Use hibiclens soap in shower the night before surgery and on the morning of surgery  > All piercings must be removed prior to arrival.    > Leave all valuables (money and jewelry) at home but bring insurance card and ID on DOS.   > Do not wear make-up, nail polish, lotions, cologne, perfumes, powders, or oil on skin. Patient here having OP labs drawn. Spoke with pt's wife and caregiver Ramakrishna Kennedy and gave them Patient's guide to surgery and copy of the meds to take DOS sheet found in Notes.  Called patient's daughter Sue Wolfe and notified her of the information given to wife and caregiver

## 2021-04-13 NOTE — H&P
[address] [place] [address]    Name: Fredrick Aguilar  YOB: 1940  Gender: male  MRN: [id number]      What: Right humeral shaft fracture how fall when yesterday for 1221      HPI: Fredrick Aguilar is a 79 y.o. right-hand-dominant gentleman seen for right shoulder problems. He has a history of GERD hypertension Parkinson's dementia he had a DVT in his lower legs. Hypercholesterolemia. He fell yesterday injuring his right arm. Was taken in [place] emergency room radiographs demonstrated a humeral shaft fracture is here for follow-up exam.      ROS/Meds/PSH/PMH/FH/SH: A ten system review of systems was performed and is negative other than what is in the HPI. Tobacco:  reports that he has never smoked. He has never used smokeless tobacco.  There were no vitals taken for this visit. Physical Examination:  On exam is awake alert pleasant gentleman sitting in his wheelchair. He does not want to move either arm. His right arm has limited motion due to pain there is marked bruising. He can extend his first second and third finger in his wrist his fourth and fifth finger he has difficulty extending although he can force his hands into extension he is otherwise neurovascularly intact    Data Reviewed:    Multiple radiographs of the right shoulder from the emergency room yesterday/this morning demonstrate a comminuted displaced right proximal humeral shaft fracture    Impression:     1. CLOSED, COMMINUTED, DISPLACED RIGHT HUMERAL SHAFT FRACTURE     Dementia   Parkinson's   Hypertension   Hypercholesterolemia   GERD   On aspirin    Plan:   I had an extended discussion with the wife the daughter and multiple family members. This is an unstable fracture the patient is in significant pain he got no pain medication from the emergency room he also has swelling and bruising in this represents a threat to limb and bodily function.   Treating this nonsurgically would be very difficult and in my opinion would not be successful. I believe the best course of action would be to undergo open reduction internal fixation of his right humeral shaft fracture. This will be performed utilizing general anesthesia with an interscalene block on an inpatient basis. I will place PPD and will consider short-term rehab stay at [facility]. I gave him an oxycodone 5 prescription today. I discussed risks and benefits of the procedure with him and expected outcomes which include death. This is extremely high risk given his age and his multiple medical comorbidities.   We had an extensive discussion we will proceed as outlined above  5 This is an illness/condition that threatens bodily function    S42.351  CPT 01722        Jeannine Harper MD

## 2021-04-13 NOTE — ED NOTES
I have reviewed discharge instructions with the patient. The spouse verbalized understanding. Patient left ED via Discharge Method: wheelchair to Home with family). Opportunity for questions and clarification provided. Patient given 0 scripts. To continue your aftercare when you leave the hospital, you may receive an automated call from our care team to check in on how you are doing. This is a free service and part of our promise to provide the best care and service to meet your aftercare needs.  If you have questions, or wish to unsubscribe from this service please call [phone]. Thank you for Choosing our [facility] Emergency Department.

## 2021-04-13 NOTE — ED PROVIDER NOTES
18-year-old gentleman presents with concerns about a fall. Patient has a history of some dementia and Parkinson's. His wife says that he tried to go for a walk falling and now has some pain in his right shoulder and right arm. He also has some abrasions on his right elbow and right knee. She says he did not say he hit his head and she does not not think he had any loss of consciousness. He is not on any blood thinners. No other associated symptoms. Elements of this note were created using speech recognition software. As such, errors of speech recognition may be present.            Past Medical History:   Diagnosis Date    Acoustic neuroma ([facility] 2012    Right    Cholelithiasis 08/20/2015    Colon polyps 12/31/2014    Tubular Adenoma x 1 & Hyperplastic Polyp x 2    Dementia due to Parkinson's disease without behavioral disturbance ([address] 4/20/2018    DVT (deep venous thrombosis) (HCC) 02/17/2017    Left Superficial Femoral, Popliteal, & Peroneal vein     Dyslipidemia     Environmental allergies     GERD (gastroesophageal reflux disease)     Hiatal hernia     Hypertension     Intractable hiccups     Liver cyst 02/16/2017    R Lobe    Melanoma ([address]     Back    Parkinson disease ([address]     Prostate cancer ([address]     Tourette syndrome 4/20/2018       Past Surgical History:   Procedure Laterality Date    HX COLONOSCOPY  12/31/2014    Tubular Adenoma & Hyperplastic Polyps, Diverticulosis, & Internal Hemorrhoids - Recall 2017    HX HEENT Right     Ear    HX HEMORRHOIDECTOMY      HX HERNIA REPAIR Bilateral     HX KNEE REPLACEMENT Left     HX PROSTATECTOMY  11/2009    [facility]    HX TONSILLECTOMY           Family History:   Problem Relation Age of Onset    Cancer Father 77        Prostate    Heart Disease Brother     Cancer Brother         Prostate    Melanoma Brother     Breast Cancer Mother     Melanoma Mother     COPD Brother        Social History     Socioeconomic History    Marital status:      Spouse name: Not on file    Number of children: Not on file    Years of education: Not on file    Highest education level: Not on file   Occupational History    Not on file   Social Needs    Financial resource strain: Not on file    Food insecurity     Worry: Not on file     Inability: Not on file    Transportation needs     Medical: Not on file     Non-medical: Not on file   Tobacco Use    Smoking status: Never Smoker    Smokeless tobacco: Never Used   Substance and Sexual Activity    Alcohol use: No    Drug use: No    Sexual activity: Not on file   Lifestyle    Physical activity     Days per week: Not on file     Minutes per session: Not on file    Stress: Not on file   Relationships    Social connections     Talks on phone: Not on file     Gets together: Not on file     Attends Tenriism service: Not on file     Active member of club or organization: Not on file     Attends meetings of clubs or organizations: Not on file     Relationship status: Not on file    Intimate partner violence     Fear of current or ex partner: Not on file     Emotionally abused: Not on file     Physically abused: Not on file     Forced sexual activity: Not on file   Other Topics Concern     Service Not Asked    Blood Transfusions Not Asked    Caffeine Concern Not Asked    Occupational Exposure Not Asked   Isadora Riderer Hazards Not Asked    Sleep Concern Not Asked    Stress Concern Not Asked    Weight Concern Not Asked    Special Diet Not Asked    Back Care Not Asked    Exercise Not Asked    Bike Helmet Not Asked   [address] Not Asked    Self-Exams Not Asked   Social History Narrative    Not on file         ALLERGIES: Aricept [donepezil] and Onion    Review of Systems   Constitutional: Negative for chills and fever. Respiratory: Negative for cough and shortness of breath. Cardiovascular: Negative for chest pain and palpitations.    Musculoskeletal: Positive for arthralgias and myalgias. Skin: Positive for wound. Negative for color change. Neurological: Negative for dizziness, light-headedness and headaches. Vitals:    04/13/21 0001   BP: (!) 156/89   Pulse: 66   Resp: 16   Temp: 98.2 °F (36.8 °C)   SpO2: 99%   Weight: 68 kg (150 lb)   Height: 5' 10\" (1.778 m)            Physical Exam  Vitals signs and nursing note reviewed. Constitutional:       Appearance: Normal appearance. HENT:      Head: Normocephalic and atraumatic. Mouth/Throat:      Mouth: Mucous membranes are moist.   Cardiovascular:      Rate and Rhythm: Normal rate. Heart sounds: No friction rub. No gallop. Pulmonary:      Effort: Pulmonary effort is normal.      Breath sounds: Normal breath sounds. Musculoskeletal:      Comments: He is tender in his right shoulder and right humerus. He has some mild abrasions over his right knee but full range of motion in the knee. No other bone or joint tenderness. Skin:     Comments: Abrasions to right elbow and right knee   Neurological:      Mental Status: He is alert and oriented to person, place, and time. Mental status is at baseline. MDM  Number of Diagnoses or Management Options  Closed fracture of proximal end of right humerus, unspecified fracture morphology, initial encounter  Diagnosis management comments: Given his age I will get a CT scan of his head. I will also get an x-ray of his right humerus. Patient's wife discussed his dementia and some of his sundowning symptoms. I discussed various treatment options given his age we will try to avoid benzodiazepines and anticholinergics. Given possible increased mortality from antipsychotics and his Parkinson's disease I will avoid antipsychotics at this time. I will suggest some over-the-counter melatonin to see if that will help decrease some of the sundowning episodes. I will encourage her to follow-up with her primary care doctor for further advice.     ED Course as of Apr 13 9861   Tue Apr 13, 2021   8772 X-ray reveals a proximal right humerus fracture. I will discussed the case with orthopedics. [AC]   [id number] I spoke with on-call for orthopedics who advised a sling and follow-up with Dr. Petey Hall.     [AC]      ED Course User Index  [AC] Munir Petty MD       Procedures

## 2021-04-13 NOTE — PERIOP NOTES
Dear Jennifer Cho,      Thank you for completing your phone assessment with me today. Here are your requested surgery instructions. Please call #[phone] with any questions/concerns. Your surgery is scheduled at [address] [place] [address], [place], [facility] (red brick [facility]. with green roof). Please arrive at Entrance C OUTPATIENT SURGERY (next door to the ER); pre-op (#[phone]) will call you on the business day before your surgery with your arrival time. If you have any questions on the day of surgery, please call the pre-op dept. at the telephone number above. No food or drink after midnight which includes any gum, mints, candy, or ice chips. Please take these medications on the morning of surgery with a small sip of water: Carbidopa-Levodopa 25/250, carbidopa levodopa 25/100, montelukast, escitalopram, namenda, pimozide, pyridostigmine, exelon patch and oxycodone (if needed). Please stop all vitamins/supplements 7 days prior to surgery and stop all NSAIDS (ibuprofen, naproxen, aleve, motrin, advil) 5 days before your surgery. A responsible adult must drive you to the hospital, remain in the building during surgery and you will need adult supervision for 24 hours after anesthesia. Please use an hibiclens soap  the night before surgery and on the morning of surgery. Do NOT wear deodorant, make-up, nail polish, lotions, cologne, perfumes, powders or oil on your skin. All piercings/metal/jewelry must be removed prior to arrival.  If you wear contacts then you will need to bring a case to store them in or wear your glasses. Please leave all your valuables at home but be sure to bring your insurance card and ID on the day of surgery for registration/identification. Our Guide to Surgery with additional information can be found:  [email or web address]

## 2021-04-13 NOTE — DISCHARGE INSTRUCTIONS
Return with any fevers, vomiting, difficulty breathing, worsening symptoms, or additional concerns. Try to keep the sling on as much as possible. You may take it off him so he can shower. As we discussed, consider trying 5 mg of melatonin once a day at bedtime to help his sleeping pattern. Please call Dr. Fortino Hudson, the orthopedic surgeon, tomorrow to arrange a follow-up appointment. Follow-up with your primary care doctor as needed.

## 2021-04-13 NOTE — PERIOP NOTES
PLEASE CONTINUE TAKING ALL PRESCRIPTION MEDICATIONS UP TO THE DAY OF SURGERY UNLESS OTHERWISE DIRECTED BELOW. DISCONTINUE all vitamins and supplements 7 days prior to surgery. DISCONTINUE Non-Steriodal Anti-Inflammatory (NSAIDS) such as Advil and Aleve 5 days prior to surgery. Home Medications to take  the day of surgery   Carbidopa-levodopa 25/250mg  Carbidopa-levodopa 25/100mg  Montelukast   Escitalopram  Namenda   Pimozide  Pyridostigmine  Exelon patch  Oxycodone (if needed)     Home Medications   to Hold   None        Comments   Bring Pimozide in original bottle      *Visitor policy of 2 visitor per patient discussed. Please do not bring home medications with you on the day of surgery unless otherwise directed by your nurse. If you are instructed to bring home medications, please give them to your nurse as they will be administered by the nursing staff. If you have any questions, please call [facility] [phone] or [address] [place] [address] [phone]. A copy of this note was provided to the patient for reference.

## 2021-04-14 ENCOUNTER — ANESTHESIA EVENT (OUTPATIENT)
Dept: SURGERY | Age: 81
DRG: 493 | End: 2021-04-14
Payer: MEDICARE

## 2021-04-14 NOTE — PERIOP NOTES
The below lab results routed via [address] [place] [address] to surgeon, Dr. Neto Pereira, per anesthesia protocol. All below lab results are within anesthesia limits. No further action is required.      Recent Results (from the past 24 hour(s))   URINALYSIS W/ RFLX MICROSCOPIC    Collection Time: 04/13/21  2:40 PM   Result Value Ref Range    Color MERRILL      Appearance CLEAR      Specific gravity 1.028 (H) 1.001 - 1.023      pH (UA) 5.5 5.0 - 9.0      Protein 30 (A) NEG mg/dL    Glucose 100 mg/dL    Ketone TRACE (A) NEG mg/dL    Bilirubin SMALL (A) NEG      Blood Negative NEG      Urobilinogen 1.0 0.2 - 1.0 EU/dL    Nitrites Negative NEG      Leukocyte Esterase Negative NEG      WBC 0-3 0 /hpf    RBC 0-3 0 /hpf    Epithelial cells 0-3 0 /hpf    Bacteria 0 0 /hpf    Casts 5-10 0 /lpf    Mucus TRACE 0 /lpf    Other observations RESULTS VERIFIED MANUALLY     TYPE & SCREEN    Collection Time: 04/13/21  2:48 PM   Result Value Ref Range    Crossmatch Expiration 04/16/2021,2359     ABO/Rh(D) A POSITIVE     Antibody screen NEG     Unit number [id number]     Blood component type [facility]     Unit division 00     Status of unit ALLOCATED     Crossmatch result Compatible     Unit number [id number]     Blood component type [facility]     Unit division 00     Status of unit ALLOCATED     Crossmatch result Compatible    CBC W/O DIFF    Collection Time: 04/13/21  2:48 PM   Result Value Ref Range    WBC 11.7 (H) 4.3 - 11.1 K/uL    RBC 3.72 (L) 4.23 - 5.6 M/uL    HGB 11.5 (L) 13.6 - 17.2 g/dL    HCT 35.3 (L) 41.1 - 50.3 %    MCV 94.9 79.6 - 97.8 FL    MCH 30.9 26.1 - 32.9 PG    MCHC 32.6 31.4 - 35.0 g/dL    RDW 13.2 11.9 - 14.6 %    PLATELET 692 731 - 532 K/uL    MPV 9.6 9.4 - 12.3 FL    ABSOLUTE NRBC 0.00 0.0 - 0.2 K/uL   MAGNESIUM    Collection Time: 04/13/21  2:48 PM   Result Value Ref Range    Magnesium 2.4 1.8 - 2.4 mg/dL   METABOLIC PANEL, COMPREHENSIVE    Collection Time: 04/13/21  2:48 PM   Result Value Ref Range    Sodium 141 136 - 145 mmol/L Potassium 3.7 3.5 - 5.1 mmol/L    Chloride 108 (H) 98 - 107 mmol/L    CO2 28 21 - 32 mmol/L    Anion gap 5 (L) 7 - 16 mmol/L    Glucose 92 65 - 100 mg/dL    BUN 36 (H) 8 - 23 MG/DL    Creatinine 1.39 0.8 - 1.5 MG/DL    GFR est AA >60 >60 ml/min/1.73m2    GFR est non-AA 52 (L) >60 ml/min/1.73m2    Calcium 9.5 8.3 - 10.4 MG/DL    Bilirubin, total 0.7 0.2 - 1.1 MG/DL    ALT (SGPT) 8 (L) 12 - 65 U/L    AST (SGOT) 12 (L) 15 - 37 U/L    Alk. phosphatase 81 50 - 136 U/L    Protein, total 6.8 6.3 - 8.2 g/dL    Albumin 3.8 3.2 - 4.6 g/dL    Globulin 3.0 2.3 - 3.5 g/dL    A-G Ratio 1.3 1.2 - 3.5     PROTHROMBIN TIME + INR    Collection Time: 04/13/21  2:48 PM   Result Value Ref Range    Prothrombin time 14.2 12.5 - 14.7 sec    INR 1.1     PTT    Collection Time: 04/13/21  2:48 PM   Result Value Ref Range    aPTT 32.7 24.1 - 35.1 SEC   HEMOGLOBIN A1C WITH EAG    Collection Time: 04/13/21  2:48 PM   Result Value Ref Range    Hemoglobin A1c 4.9 4.20 - 6.30 %    Est. average glucose Cannot be calculated mg/dL   MSSA/MRSA SC BY PCR, NASAL SWAB    Collection Time: 04/13/21  2:49 PM    Specimen: Swab   Result Value Ref Range    Special Requests: NASAL      Culture result: (A)       MRSA target DNA detected, SA target DNA detected. A positive test result does not necessarily indicate the presence of viable organisms. It is however, presumptive for the presence of MRSA or SA.   RBC, ALLOCATE    Collection Time: 04/13/21  2:51 PM   Result Value Ref Range    HISTORY CHECKED?  Historical check performed

## 2021-04-14 NOTE — PERIOP NOTES
Recent Results (from the past 24 hour(s))   URINALYSIS W/ RFLX MICROSCOPIC    Collection Time: 04/13/21  2:40 PM   Result Value Ref Range    Color MERRILL      Appearance CLEAR      Specific gravity 1.028 (H) 1.001 - 1.023      pH (UA) 5.5 5.0 - 9.0      Protein 30 (A) NEG mg/dL    Glucose 100 mg/dL    Ketone TRACE (A) NEG mg/dL    Bilirubin SMALL (A) NEG      Blood Negative NEG      Urobilinogen 1.0 0.2 - 1.0 EU/dL    Nitrites Negative NEG      Leukocyte Esterase Negative NEG      WBC 0-3 0 /hpf    RBC 0-3 0 /hpf    Epithelial cells 0-3 0 /hpf    Bacteria 0 0 /hpf    Casts 5-10 0 /lpf    Mucus TRACE 0 /lpf    Other observations RESULTS VERIFIED MANUALLY     TYPE & SCREEN    Collection Time: 04/13/21  2:48 PM   Result Value Ref Range    Crossmatch Expiration 04/16/2021,2359     ABO/Rh(D) A POSITIVE     Antibody screen NEG     Unit number [id number]     Blood component type [facility]     Unit division 00     Status of unit ALLOCATED     Crossmatch result Compatible     Unit number [id number]     Blood component type [facility]     Unit division 00     Status of unit ALLOCATED     Crossmatch result Compatible    CBC W/O DIFF    Collection Time: 04/13/21  2:48 PM   Result Value Ref Range    WBC 11.7 (H) 4.3 - 11.1 K/uL    RBC 3.72 (L) 4.23 - 5.6 M/uL    HGB 11.5 (L) 13.6 - 17.2 g/dL    HCT 35.3 (L) 41.1 - 50.3 %    MCV 94.9 79.6 - 97.8 FL    MCH 30.9 26.1 - 32.9 PG    MCHC 32.6 31.4 - 35.0 g/dL    RDW 13.2 11.9 - 14.6 %    PLATELET 945 721 - 769 K/uL    MPV 9.6 9.4 - 12.3 FL    ABSOLUTE NRBC 0.00 0.0 - 0.2 K/uL   MAGNESIUM    Collection Time: 04/13/21  2:48 PM   Result Value Ref Range    Magnesium 2.4 1.8 - 2.4 mg/dL   METABOLIC PANEL, COMPREHENSIVE    Collection Time: 04/13/21  2:48 PM   Result Value Ref Range    Sodium 141 136 - 145 mmol/L    Potassium 3.7 3.5 - 5.1 mmol/L    Chloride 108 (H) 98 - 107 mmol/L    CO2 28 21 - 32 mmol/L    Anion gap 5 (L) 7 - 16 mmol/L    Glucose 92 65 - 100 mg/dL    BUN 36 (H) 8 - 23 MG/DL Creatinine 1.39 0.8 - 1.5 MG/DL    GFR est AA >60 >60 ml/min/1.73m2    GFR est non-AA 52 (L) >60 ml/min/1.73m2    Calcium 9.5 8.3 - 10.4 MG/DL    Bilirubin, total 0.7 0.2 - 1.1 MG/DL    ALT (SGPT) 8 (L) 12 - 65 U/L    AST (SGOT) 12 (L) 15 - 37 U/L    Alk. phosphatase 81 50 - 136 U/L    Protein, total 6.8 6.3 - 8.2 g/dL    Albumin 3.8 3.2 - 4.6 g/dL    Globulin 3.0 2.3 - 3.5 g/dL    A-G Ratio 1.3 1.2 - 3.5     PROTHROMBIN TIME + INR    Collection Time: 04/13/21  2:48 PM   Result Value Ref Range    Prothrombin time 14.2 12.5 - 14.7 sec    INR 1.1     PTT    Collection Time: 04/13/21  2:48 PM   Result Value Ref Range    aPTT 32.7 24.1 - 35.1 SEC   HEMOGLOBIN A1C WITH EAG    Collection Time: 04/13/21  2:48 PM   Result Value Ref Range    Hemoglobin A1c 4.9 4.20 - 6.30 %    Est. average glucose Cannot be calculated mg/dL   MSSA/MRSA SC BY PCR, NASAL SWAB    Collection Time: 04/13/21  2:49 PM    Specimen: Swab   Result Value Ref Range    Special Requests: NASAL      Culture result: (A)       MRSA target DNA detected, SA target DNA detected. A positive test result does not necessarily indicate the presence of viable organisms. It is however, presumptive for the presence of MRSA or SA.   RBC, ALLOCATE    Collection Time: 04/13/21  2:51 PM   Result Value Ref Range    HISTORY CHECKED?  Historical check performed

## 2021-04-15 ENCOUNTER — ANESTHESIA (OUTPATIENT)
Dept: SURGERY | Age: 81
DRG: 493 | End: 2021-04-15
Payer: MEDICARE

## 2021-04-15 ENCOUNTER — APPOINTMENT (OUTPATIENT)
Dept: GENERAL RADIOLOGY | Age: 81
DRG: 493 | End: 2021-04-15
Attending: ORTHOPAEDIC SURGERY
Payer: MEDICARE

## 2021-04-15 ENCOUNTER — HOSPITAL ENCOUNTER (INPATIENT)
Age: 81
LOS: 4 days | Discharge: NURSING, REHAB OR LONG-TERM CARE | DRG: 493 | End: 2021-04-19
Attending: ORTHOPAEDIC SURGERY | Admitting: ORTHOPAEDIC SURGERY
Payer: MEDICARE

## 2021-04-15 DIAGNOSIS — S42.351A CLOSED DISPLACED COMMINUTED FRACTURE OF SHAFT OF RIGHT HUMERUS, INITIAL ENCOUNTER: ICD-10-CM

## 2021-04-15 LAB
ANION GAP SERPL CALC-SCNC: 4 MMOL/L (ref 7–16)
BUN SERPL-MCNC: 38 MG/DL (ref 8–23)
CALCIUM SERPL-MCNC: 8.6 MG/DL (ref 8.3–10.4)
CHLORIDE SERPL-SCNC: 110 MMOL/L (ref 98–107)
CO2 SERPL-SCNC: 28 MMOL/L (ref 21–32)
CREAT SERPL-MCNC: 1.09 MG/DL (ref 0.8–1.5)
ERYTHROCYTE [DISTWIDTH] IN BLOOD BY AUTOMATED COUNT: 13.2 % (ref 11.9–14.6)
EST. AVERAGE GLUCOSE BLD GHB EST-MCNC: ABNORMAL MG/DL
GLUCOSE SERPL-MCNC: 164 MG/DL (ref 65–100)
HBA1C MFR BLD: <3.5 % (ref 4.2–6.3)
HCT VFR BLD AUTO: 26.4 % (ref 41.1–50.3)
HGB BLD-MCNC: 8.5 G/DL (ref 13.6–17.2)
MAGNESIUM SERPL-MCNC: 2.2 MG/DL (ref 1.8–2.4)
MCH RBC QN AUTO: 30.9 PG (ref 26.1–32.9)
MCHC RBC AUTO-ENTMCNC: 32.2 G/DL (ref 31.4–35)
MCV RBC AUTO: 96 FL (ref 79.6–97.8)
NRBC # BLD: 0 K/UL (ref 0–0.2)
PLATELET # BLD AUTO: 145 K/UL (ref 150–450)
PMV BLD AUTO: 10.3 FL (ref 9.4–12.3)
POTASSIUM SERPL-SCNC: 4.3 MMOL/L (ref 3.5–5.1)
RBC # BLD AUTO: 2.75 M/UL (ref 4.23–5.6)
SODIUM SERPL-SCNC: 142 MMOL/L (ref 136–145)
WBC # BLD AUTO: 10.2 K/UL (ref 4.3–11.1)

## 2021-04-15 PROCEDURE — C1713 ANCHOR/SCREW BN/BN,TIS/BN: HCPCS | Performed by: ORTHOPAEDIC SURGERY

## 2021-04-15 PROCEDURE — 77030037088 HC TUBE ENDOTRACH ORAL NSL COVD-A: Performed by: ANESTHESIOLOGY

## 2021-04-15 PROCEDURE — 77030040361 HC SLV COMPR DVT MDII -B: Performed by: ORTHOPAEDIC SURGERY

## 2021-04-15 PROCEDURE — 2709999900 HC NON-CHARGEABLE SUPPLY: Performed by: ORTHOPAEDIC SURGERY

## 2021-04-15 PROCEDURE — 76010000171 HC OR TIME 2 TO 2.5 HR INTENSV-TIER 1: Performed by: ORTHOPAEDIC SURGERY

## 2021-04-15 PROCEDURE — 74011250636 HC RX REV CODE- 250/636

## 2021-04-15 PROCEDURE — 76942 ECHO GUIDE FOR BIOPSY: CPT | Performed by: ORTHOPAEDIC SURGERY

## 2021-04-15 PROCEDURE — 77030029637: Performed by: ORTHOPAEDIC SURGERY

## 2021-04-15 PROCEDURE — 74011250637 HC RX REV CODE- 250/637: Performed by: HOSPITALIST

## 2021-04-15 PROCEDURE — 0PSF04Z REPOSITION RIGHT HUMERAL SHAFT WITH INTERNAL FIXATION DEVICE, OPEN APPROACH: ICD-10-PCS | Performed by: ORTHOPAEDIC SURGERY

## 2021-04-15 PROCEDURE — 74011250636 HC RX REV CODE- 250/636: Performed by: ORTHOPAEDIC SURGERY

## 2021-04-15 PROCEDURE — 77030003602 HC NDL NRV BLK BBMI -B: Performed by: ANESTHESIOLOGY

## 2021-04-15 PROCEDURE — 74011000302 HC RX REV CODE- 302: Performed by: ORTHOPAEDIC SURGERY

## 2021-04-15 PROCEDURE — 74011000250 HC RX REV CODE- 250: Performed by: ORTHOPAEDIC SURGERY

## 2021-04-15 PROCEDURE — 2709999900 HC NON-CHARGEABLE SUPPLY

## 2021-04-15 PROCEDURE — 86580 TB INTRADERMAL TEST: CPT | Performed by: ORTHOPAEDIC SURGERY

## 2021-04-15 PROCEDURE — 77030011283 HC ELECTRD NDL COVD -A: Performed by: ORTHOPAEDIC SURGERY

## 2021-04-15 PROCEDURE — 74011250637 HC RX REV CODE- 250/637: Performed by: ANESTHESIOLOGY

## 2021-04-15 PROCEDURE — 73030 X-RAY EXAM OF SHOULDER: CPT

## 2021-04-15 PROCEDURE — 85027 COMPLETE CBC AUTOMATED: CPT

## 2021-04-15 PROCEDURE — 36415 COLL VENOUS BLD VENIPUNCTURE: CPT

## 2021-04-15 PROCEDURE — 77030002996 HC SUT SLK J&J -A: Performed by: ORTHOPAEDIC SURGERY

## 2021-04-15 PROCEDURE — 74011250636 HC RX REV CODE- 250/636: Performed by: ANESTHESIOLOGY

## 2021-04-15 PROCEDURE — 74011250636 HC RX REV CODE- 250/636: Performed by: NURSE ANESTHETIST, CERTIFIED REGISTERED

## 2021-04-15 PROCEDURE — 76010010054 HC POST OP PAIN BLOCK: Performed by: ORTHOPAEDIC SURGERY

## 2021-04-15 PROCEDURE — 76210000016 HC OR PH I REC 1 TO 1.5 HR: Performed by: ORTHOPAEDIC SURGERY

## 2021-04-15 PROCEDURE — 83735 ASSAY OF MAGNESIUM: CPT

## 2021-04-15 PROCEDURE — 74011000250 HC RX REV CODE- 250: Performed by: NURSE ANESTHETIST, CERTIFIED REGISTERED

## 2021-04-15 PROCEDURE — 73060 X-RAY EXAM OF HUMERUS: CPT

## 2021-04-15 PROCEDURE — 77030031139 HC SUT VCRL2 J&J -A: Performed by: ORTHOPAEDIC SURGERY

## 2021-04-15 PROCEDURE — 83036 HEMOGLOBIN GLYCOSYLATED A1C: CPT

## 2021-04-15 PROCEDURE — 65270000029 HC RM PRIVATE

## 2021-04-15 PROCEDURE — 77030039425 HC BLD LARYNG TRULITE DISP TELE -A: Performed by: ANESTHESIOLOGY

## 2021-04-15 PROCEDURE — 77030002916 HC SUT ETHLN J&J -A: Performed by: ORTHOPAEDIC SURGERY

## 2021-04-15 PROCEDURE — 77030014058 HC TIP IRR PULSVC ZIMM -A: Performed by: ORTHOPAEDIC SURGERY

## 2021-04-15 PROCEDURE — 77030040922 HC BLNKT HYPOTHRM STRY -A: Performed by: ANESTHESIOLOGY

## 2021-04-15 PROCEDURE — 80048 BASIC METABOLIC PNL TOTAL CA: CPT

## 2021-04-15 PROCEDURE — 24515 OPTX HUMRL SHFT FX PLATE/SCR: CPT | Performed by: ORTHOPAEDIC SURGERY

## 2021-04-15 PROCEDURE — 94762 N-INVAS EAR/PLS OXIMTRY CONT: CPT

## 2021-04-15 PROCEDURE — 76060000035 HC ANESTHESIA 2 TO 2.5 HR: Performed by: ORTHOPAEDIC SURGERY

## 2021-04-15 PROCEDURE — 77030020268 HC MISC GENERAL SUPPLY: Performed by: ORTHOPAEDIC SURGERY

## 2021-04-15 DEVICE — SCREW BNE L30MM DIA3.5MM CORT S STL ST LOK FULL THRD: Type: IMPLANTABLE DEVICE | Site: ARM | Status: FUNCTIONAL

## 2021-04-15 DEVICE — SCREW BNE L46MM DIA3.5MM S STL ST FULL THRD T15 STARDRV: Type: IMPLANTABLE DEVICE | Site: ARM | Status: FUNCTIONAL

## 2021-04-15 DEVICE — SCREW BNE L48MM DIA3.5MM CORT S STL ST LOK FULL THRD: Type: IMPLANTABLE DEVICE | Site: ARM | Status: FUNCTIONAL

## 2021-04-15 DEVICE — PLATE BNE W12XL196MM THK3.7MM LNG 8 H NONSTERILE PROX HUM S: Type: IMPLANTABLE DEVICE | Site: ARM | Status: FUNCTIONAL

## 2021-04-15 DEVICE — SCREW BNE L32MM DIA3.5MM CORT S STL ST LOK FULL THRD: Type: IMPLANTABLE DEVICE | Site: ARM | Status: FUNCTIONAL

## 2021-04-15 DEVICE — SCREW BNE L14MM DIA3.5MM CORT S STL ST LOK FULL THRD: Type: IMPLANTABLE DEVICE | Site: ARM | Status: FUNCTIONAL

## 2021-04-15 DEVICE — SCREW BNE L34MM DIA3.5MM CORT S STL ST LOK FULL THRD: Type: IMPLANTABLE DEVICE | Site: ARM | Status: FUNCTIONAL

## 2021-04-15 DEVICE — GII QUICKANCHOR PLUS SIZE 2 (5 METRIC) PANACRYL BRAIDED ABSORBABLE SUTURE 36 INCHES (91CM), DOUBLE ARMED WITH CP-2 NEEDLES, WITH DISPOSABLE INSERTER.
Type: IMPLANTABLE DEVICE | Site: ARM | Status: FUNCTIONAL
Brand: GII QUICKANCHOR PANACRYL

## 2021-04-15 DEVICE — SCREW BNE L42MM DIA3.5MM CORT S STL ST LOK FULL THRD: Type: IMPLANTABLE DEVICE | Site: ARM | Status: FUNCTIONAL

## 2021-04-15 DEVICE — SCREW BNE L50MM DIA3.5MM CORT S STL ST LOK FULL THRD: Type: IMPLANTABLE DEVICE | Site: ARM | Status: FUNCTIONAL

## 2021-04-15 RX ORDER — OXYCODONE HYDROCHLORIDE 5 MG/1
15 TABLET ORAL
Status: DISCONTINUED | OUTPATIENT
Start: 2021-04-15 | End: 2021-04-17

## 2021-04-15 RX ORDER — ROPIVACAINE HYDROCHLORIDE 5 MG/ML
INJECTION, SOLUTION EPIDURAL; INFILTRATION; PERINEURAL
Status: COMPLETED | OUTPATIENT
Start: 2021-04-15 | End: 2021-04-15

## 2021-04-15 RX ORDER — FACIAL-BODY WIPES
10 EACH TOPICAL DAILY PRN
Status: DISCONTINUED | OUTPATIENT
Start: 2021-04-15 | End: 2021-04-19 | Stop reason: HOSPADM

## 2021-04-15 RX ORDER — CARBIDOPA AND LEVODOPA 25; 100 MG/1; MG/1
1 TABLET ORAL 2 TIMES DAILY
Status: DISCONTINUED | OUTPATIENT
Start: 2021-04-16 | End: 2021-04-19 | Stop reason: HOSPADM

## 2021-04-15 RX ORDER — MIDAZOLAM HYDROCHLORIDE 1 MG/ML
2 INJECTION, SOLUTION INTRAMUSCULAR; INTRAVENOUS ONCE
Status: COMPLETED | OUTPATIENT
Start: 2021-04-15 | End: 2021-04-15

## 2021-04-15 RX ORDER — FLUMAZENIL 0.1 MG/ML
0.2 INJECTION INTRAVENOUS
Status: DISCONTINUED | OUTPATIENT
Start: 2021-04-15 | End: 2021-04-15 | Stop reason: HOSPADM

## 2021-04-15 RX ORDER — CARBIDOPA AND LEVODOPA 25; 100 MG/1; MG/1
0.5 TABLET ORAL DAILY
Status: DISCONTINUED | OUTPATIENT
Start: 2021-04-16 | End: 2021-04-19 | Stop reason: HOSPADM

## 2021-04-15 RX ORDER — HYDROMORPHONE HYDROCHLORIDE 1 MG/ML
1 INJECTION, SOLUTION INTRAMUSCULAR; INTRAVENOUS; SUBCUTANEOUS
Status: DISCONTINUED | OUTPATIENT
Start: 2021-04-15 | End: 2021-04-17

## 2021-04-15 RX ORDER — OXYCODONE HYDROCHLORIDE 5 MG/1
5 TABLET ORAL
Status: DISCONTINUED | OUTPATIENT
Start: 2021-04-15 | End: 2021-04-15 | Stop reason: HOSPADM

## 2021-04-15 RX ORDER — HYDROMORPHONE HYDROCHLORIDE 1 MG/ML
0.5 INJECTION, SOLUTION INTRAMUSCULAR; INTRAVENOUS; SUBCUTANEOUS
Status: DISCONTINUED | OUTPATIENT
Start: 2021-04-15 | End: 2021-04-15 | Stop reason: HOSPADM

## 2021-04-15 RX ORDER — OXYCODONE HYDROCHLORIDE 5 MG/1
5 TABLET ORAL
Status: DISCONTINUED | OUTPATIENT
Start: 2021-04-15 | End: 2021-04-17

## 2021-04-15 RX ORDER — SODIUM CHLORIDE, SODIUM LACTATE, POTASSIUM CHLORIDE, CALCIUM CHLORIDE 600; 310; 30; 20 MG/100ML; MG/100ML; MG/100ML; MG/100ML
100 INJECTION, SOLUTION INTRAVENOUS CONTINUOUS
Status: DISCONTINUED | OUTPATIENT
Start: 2021-04-15 | End: 2021-04-15 | Stop reason: HOSPADM

## 2021-04-15 RX ORDER — GLYCOPYRROLATE 0.2 MG/ML
INJECTION INTRAMUSCULAR; INTRAVENOUS AS NEEDED
Status: DISCONTINUED | OUTPATIENT
Start: 2021-04-15 | End: 2021-04-15 | Stop reason: HOSPADM

## 2021-04-15 RX ORDER — ONDANSETRON 2 MG/ML
INJECTION INTRAMUSCULAR; INTRAVENOUS AS NEEDED
Status: DISCONTINUED | OUTPATIENT
Start: 2021-04-15 | End: 2021-04-15 | Stop reason: HOSPADM

## 2021-04-15 RX ORDER — SODIUM CHLORIDE 9 MG/ML
75 INJECTION, SOLUTION INTRAVENOUS CONTINUOUS
Status: DISPENSED | OUTPATIENT
Start: 2021-04-15 | End: 2021-04-16

## 2021-04-15 RX ORDER — PROPOFOL 10 MG/ML
INJECTION, EMULSION INTRAVENOUS AS NEEDED
Status: DISCONTINUED | OUTPATIENT
Start: 2021-04-15 | End: 2021-04-15 | Stop reason: HOSPADM

## 2021-04-15 RX ORDER — MEMANTINE HYDROCHLORIDE 5 MG/1
10 TABLET ORAL 2 TIMES DAILY
Status: DISCONTINUED | OUTPATIENT
Start: 2021-04-16 | End: 2021-04-15 | Stop reason: SDUPTHER

## 2021-04-15 RX ORDER — DEXAMETHASONE SODIUM PHOSPHATE 4 MG/ML
INJECTION, SOLUTION INTRA-ARTICULAR; INTRALESIONAL; INTRAMUSCULAR; INTRAVENOUS; SOFT TISSUE AS NEEDED
Status: DISCONTINUED | OUTPATIENT
Start: 2021-04-15 | End: 2021-04-15 | Stop reason: HOSPADM

## 2021-04-15 RX ORDER — PYRIDOSTIGMINE BROMIDE 60 MG/1
30 TABLET ORAL 2 TIMES DAILY
Status: DISCONTINUED | OUTPATIENT
Start: 2021-04-16 | End: 2021-04-15 | Stop reason: SDUPTHER

## 2021-04-15 RX ORDER — NALOXONE HYDROCHLORIDE 0.4 MG/ML
0.1 INJECTION, SOLUTION INTRAMUSCULAR; INTRAVENOUS; SUBCUTANEOUS AS NEEDED
Status: DISCONTINUED | OUTPATIENT
Start: 2021-04-15 | End: 2021-04-15 | Stop reason: HOSPADM

## 2021-04-15 RX ORDER — MONTELUKAST SODIUM 10 MG/1
10 TABLET ORAL DAILY
Status: DISCONTINUED | OUTPATIENT
Start: 2021-04-16 | End: 2021-04-19 | Stop reason: HOSPADM

## 2021-04-15 RX ORDER — ACETAMINOPHEN 500 MG
1000 TABLET ORAL ONCE
Status: COMPLETED | OUTPATIENT
Start: 2021-04-15 | End: 2021-04-15

## 2021-04-15 RX ORDER — CEFAZOLIN SODIUM/WATER 2 G/20 ML
2 SYRINGE (ML) INTRAVENOUS EVERY 8 HOURS
Status: COMPLETED | OUTPATIENT
Start: 2021-04-15 | End: 2021-04-16

## 2021-04-15 RX ORDER — LIDOCAINE HYDROCHLORIDE 20 MG/ML
INJECTION, SOLUTION EPIDURAL; INFILTRATION; INTRACAUDAL; PERINEURAL AS NEEDED
Status: DISCONTINUED | OUTPATIENT
Start: 2021-04-15 | End: 2021-04-15 | Stop reason: HOSPADM

## 2021-04-15 RX ORDER — LANOLIN ALCOHOL/MO/W.PET/CERES
1 CREAM (GRAM) TOPICAL 2 TIMES DAILY WITH MEALS
Status: DISCONTINUED | OUTPATIENT
Start: 2021-04-16 | End: 2021-04-19 | Stop reason: HOSPADM

## 2021-04-15 RX ORDER — SODIUM CHLORIDE 0.9 % (FLUSH) 0.9 %
5-40 SYRINGE (ML) INJECTION EVERY 8 HOURS
Status: DISCONTINUED | OUTPATIENT
Start: 2021-04-15 | End: 2021-04-15 | Stop reason: HOSPADM

## 2021-04-15 RX ORDER — DIPHENHYDRAMINE HYDROCHLORIDE 50 MG/ML
12.5 INJECTION, SOLUTION INTRAMUSCULAR; INTRAVENOUS
Status: DISCONTINUED | OUTPATIENT
Start: 2021-04-15 | End: 2021-04-15 | Stop reason: HOSPADM

## 2021-04-15 RX ORDER — OXYCODONE HYDROCHLORIDE 5 MG/1
10 TABLET ORAL
Status: DISCONTINUED | OUTPATIENT
Start: 2021-04-15 | End: 2021-04-17

## 2021-04-15 RX ORDER — SODIUM CHLORIDE, SODIUM LACTATE, POTASSIUM CHLORIDE, CALCIUM CHLORIDE 600; 310; 30; 20 MG/100ML; MG/100ML; MG/100ML; MG/100ML
75 INJECTION, SOLUTION INTRAVENOUS CONTINUOUS
Status: DISCONTINUED | OUTPATIENT
Start: 2021-04-15 | End: 2021-04-15 | Stop reason: HOSPADM

## 2021-04-15 RX ORDER — EPHEDRINE SULFATE/0.9% NACL/PF 50 MG/5 ML
SYRINGE (ML) INTRAVENOUS AS NEEDED
Status: DISCONTINUED | OUTPATIENT
Start: 2021-04-15 | End: 2021-04-15 | Stop reason: HOSPADM

## 2021-04-15 RX ORDER — NEOSTIGMINE METHYLSULFATE 1 MG/ML
INJECTION, SOLUTION INTRAVENOUS AS NEEDED
Status: DISCONTINUED | OUTPATIENT
Start: 2021-04-15 | End: 2021-04-15 | Stop reason: HOSPADM

## 2021-04-15 RX ORDER — CARBIDOPA AND LEVODOPA 25; 100 MG/1; MG/1
2 TABLET, EXTENDED RELEASE ORAL
Status: DISCONTINUED | OUTPATIENT
Start: 2021-04-15 | End: 2021-04-19 | Stop reason: HOSPADM

## 2021-04-15 RX ORDER — FENTANYL CITRATE 50 UG/ML
100 INJECTION, SOLUTION INTRAMUSCULAR; INTRAVENOUS AS NEEDED
Status: COMPLETED | OUTPATIENT
Start: 2021-04-15 | End: 2021-04-15

## 2021-04-15 RX ORDER — ROCURONIUM BROMIDE 10 MG/ML
INJECTION, SOLUTION INTRAVENOUS AS NEEDED
Status: DISCONTINUED | OUTPATIENT
Start: 2021-04-15 | End: 2021-04-15 | Stop reason: HOSPADM

## 2021-04-15 RX ORDER — DEXAMETHASONE SODIUM PHOSPHATE 4 MG/ML
INJECTION, SOLUTION INTRA-ARTICULAR; INTRALESIONAL; INTRAMUSCULAR; INTRAVENOUS; SOFT TISSUE
Status: COMPLETED | OUTPATIENT
Start: 2021-04-15 | End: 2021-04-15

## 2021-04-15 RX ORDER — CARBIDOPA AND LEVODOPA 25; 250 MG/1; MG/1
1 TABLET ORAL 3 TIMES DAILY
Status: DISCONTINUED | OUTPATIENT
Start: 2021-04-16 | End: 2021-04-19 | Stop reason: HOSPADM

## 2021-04-15 RX ORDER — CEFAZOLIN SODIUM/WATER 2 G/20 ML
2 SYRINGE (ML) INTRAVENOUS ONCE
Status: COMPLETED | OUTPATIENT
Start: 2021-04-15 | End: 2021-04-15

## 2021-04-15 RX ORDER — SODIUM CHLORIDE 0.9 % (FLUSH) 0.9 %
5-40 SYRINGE (ML) INJECTION EVERY 8 HOURS
Status: DISCONTINUED | OUTPATIENT
Start: 2021-04-15 | End: 2021-04-19 | Stop reason: HOSPADM

## 2021-04-15 RX ORDER — ESCITALOPRAM OXALATE 10 MG/1
10 TABLET ORAL DAILY
Status: DISCONTINUED | OUTPATIENT
Start: 2021-04-16 | End: 2021-04-19 | Stop reason: HOSPADM

## 2021-04-15 RX ORDER — CARBIDOPA AND LEVODOPA 25; 250 MG/1; MG/1
1 TABLET ORAL 3 TIMES DAILY
Status: DISCONTINUED | OUTPATIENT
Start: 2021-04-15 | End: 2021-04-15 | Stop reason: SDUPTHER

## 2021-04-15 RX ORDER — RIVASTIGMINE 13.3 MG/24H
1 PATCH, EXTENDED RELEASE TRANSDERMAL DAILY
Status: DISCONTINUED | OUTPATIENT
Start: 2021-04-16 | End: 2021-04-19 | Stop reason: HOSPADM

## 2021-04-15 RX ORDER — SODIUM CHLORIDE 0.9 % (FLUSH) 0.9 %
5-40 SYRINGE (ML) INJECTION AS NEEDED
Status: DISCONTINUED | OUTPATIENT
Start: 2021-04-15 | End: 2021-04-19 | Stop reason: HOSPADM

## 2021-04-15 RX ORDER — DOCUSATE SODIUM 100 MG/1
100 CAPSULE, LIQUID FILLED ORAL 2 TIMES DAILY
Status: DISCONTINUED | OUTPATIENT
Start: 2021-04-16 | End: 2021-04-19 | Stop reason: HOSPADM

## 2021-04-15 RX ORDER — ATORVASTATIN CALCIUM 10 MG/1
10 TABLET, FILM COATED ORAL DAILY
Status: DISCONTINUED | OUTPATIENT
Start: 2021-04-16 | End: 2021-04-16

## 2021-04-15 RX ORDER — LIDOCAINE HYDROCHLORIDE 10 MG/ML
0.1 INJECTION INFILTRATION; PERINEURAL AS NEEDED
Status: DISCONTINUED | OUTPATIENT
Start: 2021-04-15 | End: 2021-04-15 | Stop reason: HOSPADM

## 2021-04-15 RX ORDER — PIMOZIDE 1 MG/1
TABLET ORAL 3 TIMES DAILY
Status: DISCONTINUED | OUTPATIENT
Start: 2021-04-16 | End: 2021-04-19 | Stop reason: HOSPADM

## 2021-04-15 RX ORDER — CARBIDOPA AND LEVODOPA 25; 100 MG/1; MG/1
0.5 TABLET ORAL 4 TIMES DAILY
Status: DISCONTINUED | OUTPATIENT
Start: 2021-04-15 | End: 2021-04-15 | Stop reason: SDUPTHER

## 2021-04-15 RX ORDER — SODIUM CHLORIDE 0.9 % (FLUSH) 0.9 %
5-40 SYRINGE (ML) INJECTION AS NEEDED
Status: DISCONTINUED | OUTPATIENT
Start: 2021-04-15 | End: 2021-04-15 | Stop reason: HOSPADM

## 2021-04-15 RX ADMIN — Medication 20 MG: at 12:21

## 2021-04-15 RX ADMIN — SODIUM CHLORIDE 75 ML/HR: 900 INJECTION, SOLUTION INTRAVENOUS at 22:35

## 2021-04-15 RX ADMIN — SODIUM CHLORIDE, SODIUM LACTATE, POTASSIUM CHLORIDE, AND CALCIUM CHLORIDE: 600; 310; 30; 20 INJECTION, SOLUTION INTRAVENOUS at 12:50

## 2021-04-15 RX ADMIN — ROCURONIUM BROMIDE 20 MG: 10 INJECTION, SOLUTION INTRAVENOUS at 12:11

## 2021-04-15 RX ADMIN — CEFAZOLIN 2 G: 1 INJECTION, POWDER, FOR SOLUTION INTRAVENOUS at 12:01

## 2021-04-15 RX ADMIN — Medication 10 MG: at 12:16

## 2021-04-15 RX ADMIN — PHENYLEPHRINE HYDROCHLORIDE 100 MCG: 10 INJECTION INTRAVENOUS at 12:52

## 2021-04-15 RX ADMIN — PHENYLEPHRINE HYDROCHLORIDE 50 MCG: 10 INJECTION INTRAVENOUS at 12:31

## 2021-04-15 RX ADMIN — ONDANSETRON 4 MG: 2 INJECTION INTRAMUSCULAR; INTRAVENOUS at 12:25

## 2021-04-15 RX ADMIN — Medication 10 ML: at 22:32

## 2021-04-15 RX ADMIN — TUBERCULIN PURIFIED PROTEIN DERIVATIVE 5 UNITS: 5 INJECTION, SOLUTION INTRADERMAL at 18:27

## 2021-04-15 RX ADMIN — ROCURONIUM BROMIDE 10 MG: 10 INJECTION, SOLUTION INTRAVENOUS at 12:10

## 2021-04-15 RX ADMIN — PHENYLEPHRINE HYDROCHLORIDE 50 MCG: 10 INJECTION INTRAVENOUS at 12:34

## 2021-04-15 RX ADMIN — PROPOFOL 80 MG: 10 INJECTION, EMULSION INTRAVENOUS at 12:10

## 2021-04-15 RX ADMIN — ROPIVACAINE HYDROCHLORIDE 20 ML: 5 INJECTION, SOLUTION EPIDURAL; INFILTRATION; PERINEURAL at 11:17

## 2021-04-15 RX ADMIN — Medication 10 MG: at 12:54

## 2021-04-15 RX ADMIN — LIDOCAINE HYDROCHLORIDE 100 MG: 20 INJECTION, SOLUTION EPIDURAL; INFILTRATION; INTRACAUDAL; PERINEURAL at 12:10

## 2021-04-15 RX ADMIN — FENTANYL CITRATE 50 MCG: 50 INJECTION INTRAMUSCULAR; INTRAVENOUS at 11:15

## 2021-04-15 RX ADMIN — CEFAZOLIN SODIUM 2 G: 100 INJECTION, POWDER, LYOPHILIZED, FOR SOLUTION INTRAVENOUS at 22:32

## 2021-04-15 RX ADMIN — NEOSTIGMINE METHYLSULFATE 3 MG: 1 INJECTION, SOLUTION INTRAVENOUS at 13:45

## 2021-04-15 RX ADMIN — MIDAZOLAM HYDROCHLORIDE 0.5 MG: 1 INJECTION, SOLUTION INTRAMUSCULAR; INTRAVENOUS at 11:15

## 2021-04-15 RX ADMIN — PHENYLEPHRINE HYDROCHLORIDE 50 MCG: 10 INJECTION INTRAVENOUS at 12:36

## 2021-04-15 RX ADMIN — PHENYLEPHRINE HYDROCHLORIDE 100 MCG: 10 INJECTION INTRAVENOUS at 12:22

## 2021-04-15 RX ADMIN — PHENYLEPHRINE HYDROCHLORIDE 100 MCG: 10 INJECTION INTRAVENOUS at 12:53

## 2021-04-15 RX ADMIN — PHENYLEPHRINE HYDROCHLORIDE 100 MCG: 10 INJECTION INTRAVENOUS at 12:56

## 2021-04-15 RX ADMIN — ACETAMINOPHEN 1000 MG: 500 TABLET, FILM COATED ORAL at 09:12

## 2021-04-15 RX ADMIN — CARBIDOPA AND LEVODOPA 2 TABLET: 25; 100 TABLET, EXTENDED RELEASE ORAL at 23:00

## 2021-04-15 RX ADMIN — PHENYLEPHRINE HYDROCHLORIDE 50 MCG: 10 INJECTION INTRAVENOUS at 12:27

## 2021-04-15 RX ADMIN — PHENYLEPHRINE HYDROCHLORIDE 100 MCG: 10 INJECTION INTRAVENOUS at 12:57

## 2021-04-15 RX ADMIN — SODIUM CHLORIDE 20 MCG/MIN: 900 INJECTION, SOLUTION INTRAVENOUS at 13:02

## 2021-04-15 RX ADMIN — DEXAMETHASONE SODIUM PHOSPHATE 5 MG: 4 INJECTION, SOLUTION INTRAMUSCULAR; INTRAVENOUS at 11:17

## 2021-04-15 RX ADMIN — SODIUM CHLORIDE, SODIUM LACTATE, POTASSIUM CHLORIDE, AND CALCIUM CHLORIDE 100 ML/HR: 600; 310; 30; 20 INJECTION, SOLUTION INTRAVENOUS at 09:12

## 2021-04-15 RX ADMIN — DEXAMETHASONE SODIUM PHOSPHATE 10 MG: 4 INJECTION, SOLUTION INTRAMUSCULAR; INTRAVENOUS at 12:25

## 2021-04-15 RX ADMIN — Medication 10 MG: at 12:52

## 2021-04-15 RX ADMIN — GLYCOPYRROLATE 0.4 MG: 0.2 INJECTION, SOLUTION INTRAMUSCULAR; INTRAVENOUS at 13:45

## 2021-04-15 NOTE — PERIOP NOTES
TRANSFER - OUT REPORT: 
 
Verbal report given to [facility]  on Una Bishop  being transferred to [phone] for routine post - op Report consisted of patients Situation, Background, Assessment and  
Recommendations(SBAR). Information from the following report(s) SBAR was reviewed with the receiving nurse. Opportunity for questions and clarification was provided. Patient transported with: 
 [employer]

## 2021-04-15 NOTE — BRIEF OP NOTE
BRIEF OPERATIVE NOTE    Date of Procedure: 4/15/2021     Preoperative Diagnosis:  CLOSED, COMMINUTED, DISPLACED RIGHT HUMERAL SHAFT FRACTURE    Postoperative Diagnosis:  SAME    Procedure(s): OPEN REDUCTION, INTERNAL FIXATION RIGHT HUMERAL SHAFT FRACTURE    Surgeon(s) and Role:     * Sara Martinez MD - Primary         Assistant Staff:  NONE      Surgical Staff:  Circ-1: Walter Hermosillo RN  Radiology Technician: Isabela Sanchez RT, R  Scrub Tech-1: Radha Philippe  Scrub Tech-2: Evorn Rim  Scrub Tech-3: Sunshine Bernal  Event Time In   Incision Start 1233   Incision Close 1355       Anesthesia:  GENERAL WITH INTERSCALENE BLOCK    Estimated Blood Loss: 378 cc        Complications: NONE    Implants:   Implant Name Type Inv. Item Serial No.  Lot No. LRB No. Used Action   PLATE BNE [id number] THK3. 7MM LNG 8 H NONSTERILE PROX HUM S - [id number]  PLATE BNE [id number] THK3. 7MM LNG 8 H NONSTERILE PROX Matheus Swan [facility] [id number] Right 1 Implanted       Jimmy Avilez MD

## 2021-04-15 NOTE — OP NOTES
[place]  OPERATIVE REPORT    Name:  Magdaleno Bonilla  MR#:  [id number]  :  1940  ACCOUNT #:  [de-identified]  DATE OF SERVICE:  04/15/2021    PREOPERATIVE DIAGNOSIS:  Closed comminuted displaced right humeral shaft fracture. POSTOPERATIVE DIAGNOSIS:  Closed comminuted displaced right humeral shaft fracture. PROCEDURE PERFORMED:  Open reduction, internal fixation right humeral shaft fracture. SURGEON:  Jose Elias. Chris Bassett MD    ANESTHESIA:  General with an interscalene block. COMPLICATIONS:  None. SPECIMENS REMOVED:  Comminuted displaced right humeral shaft fracture. .    IMPLANTS:  Hardware utilized is a [employer] proximal humeral locking plate 8 holes. Please see the hardware record. ESTIMATED BLOOD LOSS:  200 mL. CPT CODE:  33550. ICD-10 CODE:  S42.351. INDICATION:  The patient is an 19-year-old gentleman who fell injuring his right arm. Preoperative physical exam and radiographic studies demonstrate a comminuted displaced closed right humeral shaft fracture. The patient has exhausted nonoperative modalities and electively admitted for operative intervention. PROCEDURE:  Following identification of the patient, the patient was taken to the operative suite. Following administration of general anesthesia, interscalene block for postop pain control, 2 g of IV Ancef, the patient was very carefully positioned on the operative table in the beach-chair fashion. Right arm was then prepped and draped in sterile fashion. After both dependent lower extremities, the left arm and head were secured. Again, the right arm was then prepped and draped in the sterile fashion. An extensile deltopectoral incision was marked. InteguSeal was applied. The skin was noted to be significantly bruised with a lot of soft tissue swelling. The skin was incised. Subcutaneous tissue was then dissected down to the cephalic vein and the deltopectoral interval.  This was developed.   Cephalic vein and deltoid were retracted laterally. Pec major and strap muscles were retracted medially. Fracture hematoma was identified. Fracture hematoma was evacuated. The fracture site was identified. There was interposed soft tissue and periosteum. This was elevated. The fracture was noted to be markedly comminuted in multiple pieces. The dissection was carried distally and an 8-hole plate was selected to achieve adequate purchase distally to stabilize the fracture. At this point, an anterior stabilizing plate was applied to stabilize the fracture given its instability. The fracture was provisionally stabilized. It was imaged under all planes and noted to be in excellent position. At this point, an 8-hole locking plate was applied. All screws were placed under image guidance. The fracture was noted to be anatomically reduced. The temporary stabilization plate was removed as well. The radial nerve was identified and dissected and protected throughout the procedure. Once all screws were placed, the position of the screws was confirmed under image. The fracture was comminuted, but was anatomically reduced in excellent position with rigid stabilization. The arm was put through range of motion and stable. At this point, the wound was then irrigated. The deltopectoral fascia was approximated using #5 and #2 Mersilene sutures. Skin was closed with 0 Vicryl figure-of-eight sutures and a 2-0 Prolene subcuticular stitch. Xeroform dressings were placed throughout entire right extremity due to bruising and swelling. A sterile dressing was applied. The patient was then transferred to the recovery room in stable condition. Please make a note that the radial nerve was protected throughout the procedure. MD AMBER [email or web address]  D:  04/15/2021 14:16  T:  04/15/2021 18:07  JOB #:  [id number]  CC:   Anna Perez MD

## 2021-04-15 NOTE — ANESTHESIA POSTPROCEDURE EVALUATION
Procedure(s):  OPEN REDUCTION, INTERNAL FIXATION RIGHT HUMERAL SHAFT FRACTURE.     general    Anesthesia Post Evaluation      Multimodal analgesia: multimodal analgesia used between 6 hours prior to anesthesia start to PACU discharge  Patient location during evaluation: PACU  Patient participation: complete - patient participated  Level of consciousness: awake (baseline level of cognitive function noted)  Pain management: adequate  Airway patency: patent  Anesthetic complications: no  Cardiovascular status: acceptable  Respiratory status: acceptable  Hydration status: acceptable  Post anesthesia nausea and vomiting:  none  Final Post Anesthesia Temperature Assessment:  Normothermia (36.0-37.5 degrees C)      INITIAL Post-op Vital signs:   Vitals Value Taken Time   /72 04/15/21 1524   Temp 36.5 °C (97.7 °F) 04/15/21 1409   Pulse 91 04/15/21 1524   Resp 16 04/15/21 1524   SpO2 95 % 04/15/21 1524

## 2021-04-15 NOTE — H&P
Date of Surgery Update:  Rhina Simon was seen and examined. History and physical has been reviewed. The patient has been examined.  There have been no significant clinical changes since the completion of the originally dated History and Physical.    Signed By: Fermin Cooper MD     April 15, 2021 8:58 AM

## 2021-04-15 NOTE — ANESTHESIA PROCEDURE NOTES
Peripheral Block    Start time: 4/15/2021 11:14 AM  End time: 4/15/2021 11:17 AM  Performed by: Mounika Aguilar MD  Authorized by: Mounika Aguilar MD       Pre-procedure: Indications: at surgeon's request and post-op pain management    Preanesthetic Checklist: patient identified, risks and benefits discussed, site marked, timeout performed, anesthesia consent given and patient being monitored    Timeout Time: 11:14          Block Type:   Block Type:   Interscalene  Laterality:  Right  Monitoring:  Standard ASA monitoring, continuous pulse ox, frequent vital sign checks, heart rate, oxygen and responsive to questions  Injection Technique:  Single shot  Procedures: ultrasound guided and nerve stimulator    Patient Position: supine  Prep: alcohol    Location:  Interscalene  Needle Type:  Stimuplex  Needle Gauge:  20 G  Needle Localization:  Anatomical landmarks, nerve stimulator and ultrasound guidance  Medication Injected:  Ropivacaine (PF) (NAROPIN)(0.5%) 5 mg/mL injection, 20 mL  dexamethasone (DECADRON) 4 mg/mL injection, 5 mg  Med Admin Time: 4/15/2021 11:17 AM    Assessment:  Number of attempts:  1  Injection Assessment:  Incremental injection every 5 mL, local visualized surrounding nerve on ultrasound, no intravascular symptoms, negative aspiration for blood, no paresthesia and ultrasound image on chart  Patient tolerance:  Patient tolerated the procedure well with no immediate complications

## 2021-04-15 NOTE — ANESTHESIA PREPROCEDURE EVALUATION
Relevant Problems   NEUROLOGY   (+) Dementia due to Parkinson's disease without behavioral disturbance (HCC)   (+) Tourette syndrome      CARDIOVASCULAR   (+) Essential hypertension      PERSONAL HX & FAMILY HX OF CANCER   (+) Malignant neoplasm of prostate (HCC)       Anesthetic History   No history of anesthetic complications            Review of Systems / Medical History  Patient summary reviewed and pertinent labs reviewed    Pulmonary  Within defined limits              Comments: Hx of DVT 2016   Neuro/Psych             Comments: Tourette syndrome  Dementia  Parkinsons DZ--tremor  Neurologic orthostatic hypotension Cardiovascular    Hypertension              Exercise tolerance: <4 METS  Comments: Pyridostigmine for maintenance of BP   GI/Hepatic/Renal     GERD      Liver disease     Endo/Other  Within defined limits           Other Findings            Physical Exam    Airway  Mallampati: III  TM Distance: > 6 cm  Neck ROM: decreased range of motion   Mouth opening: Normal     Cardiovascular    Rhythm: regular           Dental         Pulmonary                 Abdominal  GI exam deferred       Other Findings            Anesthetic Plan    ASA: 3  Anesthesia type: general - interscalene block      Post-op pain plan if not by surgeon: peripheral nerve block single    Induction: Intravenous  Anesthetic plan and risks discussed with: Patient

## 2021-04-15 NOTE — PROGRESS NOTES
Received to room from PACU. Alert and oriented x4. Right shoulder dressing intact. Sling in place. Pt denies pain at this time. Discussed diet and pain control. Oriented to room and bed functions. Bed locked, in lowest position, call light within reach.

## 2021-04-15 NOTE — DISCHARGE SUMMARY
[address] [facility] Discharge Summary      Patient ID:  Diana Gamino  [id number]  50 y.o.  1940    Allergies: Aricept [donepezil] and Onion    Admit date: 4/15/2021    Discharge date and time: 4/19/20221    Admitting Physician: Kitty Williamson MD     Discharge Physician: Kitty Williamson MD      * Admission Diagnoses: Closed comminuted fracture of humerus, right, initial encounter oP [facility]; Closed displaced comminuted fracture of shaft of right humerus [S42.351A]; Closed displaced comminuted fracture of shaft of right humerus, initial encounter Po [facility]    * Discharge Diagnoses:   Hospital Problems as of 4/19/2021 Date Reviewed: 4/15/2021          Codes Class Noted - Resolved POA    Postoperative anemia due to acute blood loss ICD-10-CM: D62  ICD-9-CM: 285.1  4/19/2021 - Present Unknown        * (Principal) Closed displaced comminuted fracture of shaft of right humerus ICD-10-CM: S82.143A  ICD-9-CM: 812.21  4/15/2021 - Present Yes              Surgeon: Kitty Williamson MD          * Procedure: Procedure(s):  OPEN REDUCTION, INTERNAL FIXATION RIGHT HUMERAL SHAFT FRACTURE           Perioperative Antibiotics: Ancef  _x__                                                Vancomycin  ___          Post Op complications: none        * Discharge Condition: good  Wound appears to be healing without any evidence of infection.          * Discharged to: [facility]    * Follow-up Care/Discharge instructions:  - Resume pre hospital diet            - Resume home medications per medical continuation form     CONTINUE PHYSICAL THERAPY  Sling right shoulder  - Follow up in office as scheduled       Signed:  Kitty Williamson MD  4/19/2021  9:32 AM

## 2021-04-15 NOTE — PROGRESS NOTES
TRANSFER - IN REPORT:    Verbal report received from Win Crowder RN on [facility]  being received from PACU for routine post - op      Report consisted of patients Situation, Background, Assessment and   Recommendations(SBAR). Information from the following report(s) SBAR, Kardex, OR Summary, Procedure Summary, Intake/Output and MAR was reviewed with the receiving nurse. Opportunity for questions and clarification was provided. Assessment completed upon patients arrival to unit and care assumed.

## 2021-04-15 NOTE — CONSULTS
[facility] Hospitalist Consult Note    Patient: Juan Willoughby Date: 4/15/2021  male, 79 y.o. Admit Date: 4/15/2021  Attending: Juan Guzman MD     ASSESSMENT AND PLAN:   53-year-old male with past medical history of Parkinson's disease, dementia, tremors, Tourette syndrome neurologic orthostatic hypotension, neurologic orthostatic hypotension. 04/13 He tried to go for a walk and fell resulting in a   closed comminuted depressed placed right humeral shaft fracture for which an  open reduction and internal fixation of the right humeral shaft fracture was performed on 04/15 estimated blood loss 200 cc, anesthesia was general with interscalene nerve block. Closed comminuted depressed placed right humeral shaft fracture  -04/15  a open reduction and internal fixation of the right humeral shaft fracture was performed on, estimated blood loss 200 cc, anesthesia was general with interscalene nerve block  --04/16 Acute blood loss anemia post operatively hemoglobin down trended to 7.5, hemodynamically stable, he will receive 1 unit PRBCs and Plan for SNF Placement. Parkinson's disease, dementia, and essential tremor  Resume Namenda, carbidopa/L-dopa    Neurologic orthostatic hypotension  Resume pyridostigmine  60 mg tablet; Take 1/2 tablet at 10 am and at 6 pm daily to support blood pressure. Additional  Tourette's syndrome    REASON FOR CONSULT:  I was asked to consult on this patient at the kind request of Florin Sims MD, for medical management.     HISTORY OF PRESENT ILLNESS:      Patient Active Problem List   Diagnosis Code    Malignant neoplasm of prostate ([facility] [address] C61    Acoustic neuroma syndrome ([address] D33.3    Dyslipidemia E78.5    Parkinsons disease ([address] G20    Essential hypertension I10    Tourette syndrome F95.2    Dementia due to Parkinson's disease without behavioral disturbance ([address] G20, F02.80    Neurologic orthostatic hypotension (HCC) G90.3    Excessive daytime sleepiness G47.19    Acoustic neuroma ([facility]) D33.3    Closed displaced comminuted fracture of shaft of right humerus S42.351A         Allergy  Allergies   Allergen Reactions    Aricept [Donepezil] Nausea and Vomiting    Onion Nausea and Vomiting       Medication list  Prior to Admission Medications   Prescriptions Last Dose Informant Patient Reported? Taking?   aspirin delayed-release 81 mg tablet 4/14/2021 at Unknown time  No Yes   Sig: Take 1 Tab by mouth daily. Patient taking differently: Take 81 mg by mouth nightly. atorvastatin (LIPITOR) 10 mg tablet 4/14/2021 at Unknown time  No Yes   Sig: Take 1 Tab by mouth daily. carbidopa-levodopa (SINEMET)  mg per tablet 4/14/2021 at Unknown time  No Yes   Sig: TAKE ONE-HALF TABLET BY MOUTH FOUR TIMES A DAY   carbidopa-levodopa (SINEMET)  mg per tablet 4/14/2021 at Unknown time  No Yes   Sig: TAKE ONE TABLET BY MOUTH FOUR TIMES A DAY   Patient taking differently: three (3) times daily. carbidopa-levodopa ER (SINEMET CR)  mg per tablet 4/14/2021 at Unknown time  No Yes   Sig: TAKE ONE TABLET BY MOUTH EVERY NIGHT   carbidopa-levodopa ER (SINEMET CR)  mg per tablet 4/14/2021 at Unknown time  No Yes   Sig: TAKE ONE TABLET BY MOUTH AT BEDTIME   clotrimazole-betamethasone (LOTRISONE) topical cream   No No   Sig: Apply  to affected area two (2) times a day. escitalopram oxalate (Lexapro) 10 mg tablet 4/14/2021 at Unknown time  No Yes   Sig: Take 1 Tab by mouth daily. memantine (NAMENDA) 10 mg tablet 4/14/2021 at Unknown time  No Yes   Sig: Take 1 Tab by mouth two (2) times a day. montelukast (SINGULAIR) 10 mg tablet 4/14/2021 at Unknown time  No Yes   Sig: Take 1 Tab by mouth daily. multivitamin (ONE A DAY) tablet 4/8/2021 at Unknown time  Yes Yes   Sig: Take 1 Tab by mouth daily. nitroglycerin (NITROSTAT) 0.4 mg SL tablet Not Taking at Unknown time  No No   Sig: Take 1 Tab by mouth every five (5) minutes as needed for Chest Pain.  Sit/Lay down then put one tab under the tongue every 5 minutes as needed for chest pain for 3 doses   nystatin (MYCOSTATIN) powder 2021 at Unknown time  No Yes   Sig: Apply  to affected area two (2) times a day. oxyCODONE IR (ROXICODONE) 5 mg immediate release tablet 2021 at Unknown time  No Yes   Sig: Take 1 Tab by mouth every six (6) hours as needed for Pain for up to 10 days. Max Daily Amount: 20 mg.   pimozide (ORAP) 1 mg tablet 2021 at Unknown time  No Yes   Sig: TAKE ONE AND ONE-HALF TABLETS BY MOUTH TWICE A DAY   Patient taking differently: TAKE  ONE-HALF TABLETS BY MOUTH THREE TIMES A DAY   pyridostigmine (Mestinon) 60 mg tablet 2021 at Unknown time  No Yes   Sig: Take 1/2 tablet at 10 am and at 6 pm daily to support blood pressure. rivastigmine (Exelon) 13.3 mg/24 hour patch 2021 at Unknown time  No Yes   Si Patch by TransDERmal route daily.       Facility-Administered Medications: None         Past Medical History  Past Medical History:   Diagnosis Date    Acoustic neuroma ([facility] [address] 2012    Right    Cholelithiasis 2015    Colon polyps 2014    Tubular Adenoma x 1 & Hyperplastic Polyp x 2    Dementia due to Parkinson's disease without behavioral disturbance ([address] 2018    DVT (deep venous thrombosis) (HCC) 2017    Left Superficial Femoral, Popliteal, & Peroneal vein     Dyslipidemia     Environmental allergies     GERD (gastroesophageal reflux disease)     Hiatal hernia     Hypertension     Intractable hiccups     Liver cyst 2017    R Lobe    Melanoma ([facility] [address]     Back    Parkinson disease ([facility] [address]     [facility]    Prostate cancer [facility])     Tourette syndrome 2018    \"verbalizes as really loud hiccoughs\"       Past Surgical History:   Procedure Laterality Date    HX COLONOSCOPY  2014    Tubular Adenoma & Hyperplastic Polyps, Diverticulosis, & Internal Hemorrhoids - Recall 2017    HX HEENT Right Ear    HX HEMORRHOIDECTOMY      HX HERNIA REPAIR Bilateral     HX KNEE REPLACEMENT Left     [address]  [address]    HX TONSILLECTOMY         Social History  Social History     Socioeconomic History    Marital status:      Spouse name: Not on file    Number of children: Not on file    Years of education: Not on file    Highest education level: Not on file   Tobacco Use    Smoking status: Never Smoker    Smokeless tobacco: Never Used   Substance and Sexual Activity    Alcohol use: No    Drug use: No   Other Topics Concern       Family History:   Family History   Problem Relation Age of Onset    Cancer Father 77        Prostate    Heart Disease Brother     Cancer Brother         Prostate    Melanoma Brother     Breast Cancer Mother     Melanoma Mother     COPD Brother        REVIEW OF SYSTEMS:   A 14 point review of systems was taken and pertinent positive as per HPI.     PHYSICAL EXAMINATION:  Vital 24 Hour Range Most Recent Value  Temperature Temp  Min: 97.7 °F (36.5 °C)  Max: 97.9 °F (36.6 °C) 97.7 °F (36.5 °C)    Pulse Pulse  Min: 59  Max: 106 78  Respiratory Resp  Min: 14  Max: 22 16  Blood Pressure BP  Min: 115/71  Max: 165/73 125/73  Pulse Oximetry SpO2  Min: 95 %  Max: 100 % 95 %  O2 No data recorded      Vital Most Recent Value First Value  Weight      Height      BMI   N/A    Physical Exam:   General: No acute distress, speaking in full sentences, no use of accessory muscles   HEENT: Pupils equal and reactive to light and accommodation, oropharynx is clear   Neck: Supple, no lymphadenopathy, no JVD   Lungs: Clear to auscultation bilaterally   Cardiovascular: Regular rate and rhythm with normal S1 and S2   Abdomen: Soft, nontender, nondistended, normoactive bowel sounds   Extremities: No cyanosis clubbing or edema   Neuro: Nonfocal, A&O x person and place   Psych: Normal affect     Intake/Output last 3 shifts:  Date 04/14/21 1900 - 04/15/21 0659(Not Admitted) 04/15/21 0700 - 04/16/21 0659   Shift 2699-2113 24 Hour Total 0633-4750 5495-0284 24 Hour Total   INTAKE   I.V.   1300  1300     Volume (lactated Ringers infusion)   1300  1300   Shift Total   1300  1300   OUTPUT   Urine   0  0     Urine Output   0  0   Blood   200  200     Estimated Blood Loss   200  200   Shift Total   200  200   NET   1100  1100   Weight (kg)            Labs:  magnesium:7,phos:7)CMP:   Lab Results   Component Value Date/Time     04/15/2021 05:34 PM    K 4.3 04/15/2021 05:34 PM     (H) 04/15/2021 05:34 PM    CO2 28 04/15/2021 05:34 PM    AGAP 4 (L) 04/15/2021 05:34 PM     (H) 04/15/2021 05:34 PM    BUN 38 (H) 04/15/2021 05:34 PM    CREA 1.09 04/15/2021 05:34 PM    GFRAA >60 04/15/2021 05:34 PM    GFRNA >60 04/15/2021 05:34 PM    CA 8.6 04/15/2021 05:34 PM    MG 2.2 04/15/2021 05:34 PM         CBC:    Lab Results   Component Value Date/Time    WBC 10.2 04/15/2021 05:34 PM    HGB 8.5 (L) 04/15/2021 05:34 PM    HCT 26.4 (L) 04/15/2021 05:34 PM     (L) 04/15/2021 05:34 PM       Lab Results   Component Value Date/Time    INR 1.1 04/13/2021 02:48 PM    INR 1.0 05/04/2010 10:34 AM    INR 1.0 10/29/2009 12:17 PM    Prothrombin time 14.2 04/13/2021 02:48 PM    Prothrombin time 10.5 05/04/2010 10:34 AM    Prothrombin time 10.5 10/29/2009 12:17 PM       ABG:  No results found for: PH, PHI, PCO2, PCO2I, PO2, PO2I, HCO3, HCO3I, FIO2, FIO2I        Lab Results   Component Value Date/Time    Troponin-I, Qt. <0.02 (L) 05/19/2020 11:54 PM       Imagining & Other Studies    XR Results (maximum last 3): Results from [place] encounter on 04/15/21   XR SHOULDER RT AP/LAT MIN 2 V    Narrative Right humerus. CLINICAL INDICATION: Status post right shoulder surgery    FINDINGS: Two views of the right humerus show a lateral sideplate across the  proximal humeral diaphyseal fracture with multiple transverse fixation screws. Near anatomic alignment is restored.       Impression Status post ORIF of a proximal right diaphyseal fracture without  obvious complication. XR HUMERUS RT    Narrative Right humerus    CLINICAL INDICATION: Fluoroscopic spot images during a right proximal humerus  ORIF procedure    FINDINGS: Three fluoroscopic spot images show a long lateral sideplate along the  proximal humeral diaphysis with multiple transverse fixation screws. Near  anatomic alignment is restored. Impression Status post ORIF of a right proximal humerus fracture. No obvious  complications. Total fluoroscopy time: 21 seconds; three fluoroscopic spot images saved   Results from [place] encounter on 04/13/21   XR HUMERUS RT    Narrative EXAM: Right humerus x-rays. INDICATION: Pain after falling. COMPARISON: None. TECHNIQUE: 4 views. FINDINGS: There is a comminuted and mildly displaced fracture in the proximal  shaft of the humerus. No fractures are seen in the more distal humerus. Impression Comminuted proximal humeral shaft fracture. CT Results (maximum last 3): Results from [place] encounter on 04/13/21   CT HEAD WO CONT    Narrative EXAM: Noncontrast CT head. INDICATION: Pain, fall injury. COMPARISON: Prior MRI brain on January 3, 2019. TECHNIQUE: Noncontrast CT images of the head were obtained. Radiation dose  reduction techniques were used for this study. Our CT scanners use one or all  of the following:  Automated exposure control, adjustment of the mA or kV  according to patient size, iterative reconstruction. FINDINGS: There is mild volume loss. No acute infarct, hemorrhage or mass is  identified. There is no mass effect, midline shift or depressed fracture. The  visualized paranasal sinuses and mastoid air cells are clear. Impression No acute process.    Results from [place] encounter on 09/04/20   CT ORBIT EAR FOSSA WO CONT    Narrative TEMPORAL BONE CT WITHOUT CONTRAST 9/4/2020    HISTORY: History of right ear schwannoma. Mixed hearing loss left ear. TECHNIQUE: Noncontrast axial images were obtained through the temporal bone. Multiplanar reformatted images were generated. All CT scans at this facility  used dose modulation, iterative reconstruction and/or weight based dosing when  appropriate to reduce radiation dose to as low as reasonably achievable. COMPARISON: MRI IAC January 3, 2019. FINDINGS:    Right side: The external auditory canal is clear. The middle ear cavity is well  aerated. A stapes prosthesis is present. The mastoid air cells are clear. The  facial nerve canal follows a normal course. The tegmen tympani is intact. The  semicircular canals are appropriately surrounded by bone. The cochlea is normal  in morphology. There is abnormal sclerosis within the middle and apical turn of  the cochlea (image 94). The internal carotid artery and jugular bulb are in a normal position. Left side: The external auditory canal is clear. The middle ear cavity is well  aerated with normal appearing ossicles. The tegmen tympani is intact. The  mastoid air cells are clear with the exception of a trace mastoid tip effusion. The semicircular canals are appropriately surrounded by bone. The cochlea is  normal in morphology. There is subtle faint density at the level of the middle  turn of the cochlea (image 107). The facial nerve canal follows a normal course. The internal carotid artery and jugular bulb are in a normal position. Degenerative changes are present at the craniovertebral junction with atypical  articulation on the left side. There is mucosal thickening in the left sphenoid sinus. Impression IMPRESSION:    1. Right-sided stapes prosthesis. 2. Abnormal sclerosis within the right cochlea. These may represent changes of  labyrinthitis ossificans. 3. Faint density at the level of the middle turn of the left cochlea.  Given the  findings on the right side, this may represent early changes of labyrinthitis  ossificans. Results from [place] encounter on 02/16/17   CT ABD PELV W CONT    Narrative CT of the Abdomen and Pelvis with contrast    CLINICAL INDICATION:  Acute right lower quadrant abdominal pain, constipation,  belching, prostate cancer    COMPARISON: 5/20/2016    TECHNIQUE: Automated exposure Control was used. Multiple axial images were  obtained through the abdomen and pelvis after intravenous injection of 125cc of  isovue 370. Oral contrast given for evaluation of GI tract. Coronal reformatted  images obtained for further evaluation of organs. FINDINGS: The partially seen lung bases are notable for a trace dependent right  pleural effusion. Coronary artery calcifications are partially seen. Abdomen: There is interval enlargement of a 9.9 x 6.9 cm cyst in the right liver  (axial image 16, previously 8.8 x 5.4 cm) which now demonstrates complex  internal heterogeneous fluid. Innumerable other smaller rounded lesions  throughout the liver are similar to prior and likely benign cysts. Extensive  stones are again seen throughout the gallbladder without obvious biliary  dilatation or surrounding inflammation. The spleen, adrenal glands and pancreas  appear unremarkable. There is normal enhancement of the kidneys, no  hydronephrosis. Small left renal cysts are unchanged. No evidence of free air. Trace fluid is noted throughout the mesentery. Aorta  normal caliber with scattered atherosclerotic changes again noted. There is no evidence of bowel obstruction. Portions of the GI tract are not  contrast opacified limiting detail. Scattered diverticulosis is noted without  obvious diverticulitis. The right lower quadrant appendix demonstrates no  dilatation or inflammation. Pelvis:  No acute inflammatory changes or fluid collections in the pelvis. No  lymphadenopathy or mass. Prostate is absent. Urinary bladder is unremarkable. No acute fractures are seen. Tiny scattered sclerotic foci are again noted  within pelvic bones, too small to characterize, and there are unchanged small  lucencies in multiple lumbar vertebrae that may represent Schmorl nodes. Impression IMPRESSION:   1. Complex liver mass, most compatible with hemorrhagic cyst. Abscess is less  likely but not excluded if there are clinically suspicious factors. 2. Complex small volume ascites. 3. Trace right pleural effusion. Gallstones again noted. MRI Results (maximum last 3):      Nuclear Medicine Results (maximum last 3): No results found for this or any previous visit. US Results (maximum last 3):      DEXA Results (maximum last 3): No results found for this or any previous visit. JOHNNY Results (maximum last 3): No results found for this or any previous visit. IR Results (maximum last 3): No results found for this or any previous visit. VAS/US Results (maximum last 3): Results from [place] encounter on 02/16/17   DUPLEX LOWER EXT VENOUS BILAT    Narrative RIGHT LOWER EXTREMITY VENOUS DUPLEX ULTRASOUND. HISTORY: DVT history on anticoagulation. TECHNIQUE: Direct skin-contact high resolution grayscale images, color-flow  Doppler and duplex waveform analysis. COMPARISON: None. FINDINGS: There is compressibility, color-flow assignment and augmentation of  the venous waveform with calf compression in the common femoral, superficial  femoral and popliteal veins. Upper calf veins are unremarkable      Impression IMPRESSION: Negative for sonographic evidence of deep venous thrombosis right  lower extremity. LEFT LOWER EXTREMITY VENOUS DUPLEX ULTRASOUND. HISTORY: DVT history on anticoagulation. TECHNIQUE: Direct skin-contact high resolution grayscale images, color-flow  Doppler and duplex waveform analysis. COMPARISON: June 2010.     FINDINGS: Abnormal intraluminal echoes are present in the superficial femoral  vein, popliteal vein and peroneal vein. This is noncompressible. It is not  totally excluded occlusive. IMPRESSION: Deep venous thrombosis, not totally occlusive, possibly chronic in  the superficial femoral vein, popliteal vein and peroneal vein left lower  extremity. PET Results (maximum last 3): No results found for this or any previous visit. EKG Results     None            Thank you Ramez Velazquez MD for allowing us to participate in the care of this interesting patient. We shall follow with you.     Rikki Rowan MD  4/15/2021 7:13 PM

## 2021-04-15 NOTE — PERIOP NOTES
TRANSFER - OUT REPORT:    Verbal report given to JAMEEL RN on Nik Jose De Jesus  being transferred to  for routine post - op       Report consisted of patients Situation, Background, Assessment and   Recommendations(SBAR). Information from the following report(s) SBAR was reviewed with the receiving nurse. Opportunity for questions and clarification was provided.       Patient transported with:   Melodigram

## 2021-04-16 LAB
25(OH)D3 SERPL-MCNC: 25.6 NG/ML (ref 30–100)
ANION GAP SERPL CALC-SCNC: 5 MMOL/L (ref 7–16)
BUN SERPL-MCNC: 38 MG/DL (ref 8–23)
CALCIUM SERPL-MCNC: 8.6 MG/DL (ref 8.3–10.4)
CHLORIDE SERPL-SCNC: 108 MMOL/L (ref 98–107)
CO2 SERPL-SCNC: 27 MMOL/L (ref 21–32)
COVID-19 RAPID TEST, COVR: NOT DETECTED
CREAT SERPL-MCNC: 1.17 MG/DL (ref 0.8–1.5)
ERYTHROCYTE [DISTWIDTH] IN BLOOD BY AUTOMATED COUNT: 13.2 % (ref 11.9–14.6)
GLUCOSE SERPL-MCNC: 203 MG/DL (ref 65–100)
HCT VFR BLD AUTO: 23.8 % (ref 41.1–50.3)
HGB BLD-MCNC: 7.5 G/DL (ref 13.6–17.2)
MAGNESIUM SERPL-MCNC: 2.2 MG/DL (ref 1.8–2.4)
MCH RBC QN AUTO: 30.2 PG (ref 26.1–32.9)
MCHC RBC AUTO-ENTMCNC: 31.5 G/DL (ref 31.4–35)
MCV RBC AUTO: 96 FL (ref 79.6–97.8)
MM INDURATION POC: 0 MM (ref 0–5)
NRBC # BLD: 0 K/UL (ref 0–0.2)
PLATELET # BLD AUTO: 132 K/UL (ref 150–450)
PMV BLD AUTO: 10.2 FL (ref 9.4–12.3)
POTASSIUM SERPL-SCNC: 4.4 MMOL/L (ref 3.5–5.1)
PPD POC: NEGATIVE NEGATIVE
RBC # BLD AUTO: 2.48 M/UL (ref 4.23–5.6)
SARS-COV-2, COV2: NORMAL
SODIUM SERPL-SCNC: 140 MMOL/L (ref 136–145)
SOURCE, COVRS: NORMAL
WBC # BLD AUTO: 8.1 K/UL (ref 4.3–11.1)

## 2021-04-16 PROCEDURE — 36415 COLL VENOUS BLD VENIPUNCTURE: CPT

## 2021-04-16 PROCEDURE — 2709999900 HC NON-CHARGEABLE SUPPLY

## 2021-04-16 PROCEDURE — 30233N1 TRANSFUSION OF NONAUTOLOGOUS RED BLOOD CELLS INTO PERIPHERAL VEIN, PERCUTANEOUS APPROACH: ICD-10-PCS | Performed by: ORTHOPAEDIC SURGERY

## 2021-04-16 PROCEDURE — 83735 ASSAY OF MAGNESIUM: CPT

## 2021-04-16 PROCEDURE — 74011250637 HC RX REV CODE- 250/637: Performed by: ORTHOPAEDIC SURGERY

## 2021-04-16 PROCEDURE — P9016 RBC LEUKOCYTES REDUCED: HCPCS

## 2021-04-16 PROCEDURE — 74011250636 HC RX REV CODE- 250/636: Performed by: FAMILY MEDICINE

## 2021-04-16 PROCEDURE — 97110 THERAPEUTIC EXERCISES: CPT

## 2021-04-16 PROCEDURE — 74011000250 HC RX REV CODE- 250: Performed by: ORTHOPAEDIC SURGERY

## 2021-04-16 PROCEDURE — 65270000029 HC RM PRIVATE

## 2021-04-16 PROCEDURE — 97161 PT EVAL LOW COMPLEX 20 MIN: CPT

## 2021-04-16 PROCEDURE — 80048 BASIC METABOLIC PNL TOTAL CA: CPT

## 2021-04-16 PROCEDURE — 85027 COMPLETE CBC AUTOMATED: CPT

## 2021-04-16 PROCEDURE — 74011250637 HC RX REV CODE- 250/637: Performed by: HOSPITALIST

## 2021-04-16 PROCEDURE — 74011000250 HC RX REV CODE- 250: Performed by: HOSPITALIST

## 2021-04-16 PROCEDURE — 82306 VITAMIN D 25 HYDROXY: CPT

## 2021-04-16 PROCEDURE — 74011250636 HC RX REV CODE- 250/636: Performed by: ORTHOPAEDIC SURGERY

## 2021-04-16 PROCEDURE — 36430 TRANSFUSION BLD/BLD COMPNT: CPT

## 2021-04-16 PROCEDURE — 87635 SARS-COV-2 COVID-19 AMP PRB: CPT

## 2021-04-16 PROCEDURE — 74011250636 HC RX REV CODE- 250/636: Performed by: HOSPITALIST

## 2021-04-16 PROCEDURE — 97530 THERAPEUTIC ACTIVITIES: CPT

## 2021-04-16 PROCEDURE — 77030040830 HC CATH URETH FOL MDII -A

## 2021-04-16 PROCEDURE — U0005 INFEC AGEN DETEC AMPLI PROBE: HCPCS

## 2021-04-16 RX ORDER — SODIUM CHLORIDE 9 MG/ML
250 INJECTION, SOLUTION INTRAVENOUS AS NEEDED
Status: DISCONTINUED | OUTPATIENT
Start: 2021-04-16 | End: 2021-04-19 | Stop reason: HOSPADM

## 2021-04-16 RX ORDER — HALOPERIDOL 5 MG/ML
2.5 INJECTION INTRAMUSCULAR
Status: DISCONTINUED | OUTPATIENT
Start: 2021-04-16 | End: 2021-04-19 | Stop reason: HOSPADM

## 2021-04-16 RX ORDER — ATORVASTATIN CALCIUM 10 MG/1
10 TABLET, FILM COATED ORAL
Status: DISCONTINUED | OUTPATIENT
Start: 2021-04-16 | End: 2021-04-19 | Stop reason: HOSPADM

## 2021-04-16 RX ORDER — PYRIDOSTIGMINE BROMIDE 60 MG/1
30 TABLET ORAL 2 TIMES DAILY
Status: DISCONTINUED | OUTPATIENT
Start: 2021-04-16 | End: 2021-04-19 | Stop reason: HOSPADM

## 2021-04-16 RX ORDER — MEMANTINE HYDROCHLORIDE 5 MG/1
10 TABLET ORAL 2 TIMES DAILY
Status: DISCONTINUED | OUTPATIENT
Start: 2021-04-16 | End: 2021-04-19 | Stop reason: HOSPADM

## 2021-04-16 RX ADMIN — OXYCODONE 10 MG: 5 TABLET ORAL at 21:53

## 2021-04-16 RX ADMIN — HALOPERIDOL LACTATE 2.5 MG: 5 INJECTION, SOLUTION INTRAMUSCULAR at 21:48

## 2021-04-16 RX ADMIN — CARBIDOPA AND LEVODOPA 0.5 TABLET: 25; 100 TABLET ORAL at 09:25

## 2021-04-16 RX ADMIN — CARBIDOPA AND LEVODOPA 1 TABLET: 25; 100 TABLET ORAL at 15:10

## 2021-04-16 RX ADMIN — CEFAZOLIN SODIUM 2 G: 100 INJECTION, POWDER, LYOPHILIZED, FOR SOLUTION INTRAVENOUS at 06:34

## 2021-04-16 RX ADMIN — PIMOZIDE: 1 TABLET ORAL at 00:15

## 2021-04-16 RX ADMIN — MEMANTINE 10 MG: 5 TABLET ORAL at 09:25

## 2021-04-16 RX ADMIN — Medication 10 ML: at 14:00

## 2021-04-16 RX ADMIN — CARBIDOPA AND LEVODOPA 1 TABLET: 25; 250 TABLET ORAL at 09:25

## 2021-04-16 RX ADMIN — PYRIDOSTIGMINE BROMIDE 30 MG: 60 TABLET ORAL at 09:26

## 2021-04-16 RX ADMIN — PYRIDOSTIGMINE BROMIDE 30 MG: 60 TABLET ORAL at 17:03

## 2021-04-16 RX ADMIN — DOCUSATE SODIUM 100 MG: 100 CAPSULE, LIQUID FILLED ORAL at 17:03

## 2021-04-16 RX ADMIN — CARBIDOPA AND LEVODOPA 1 TABLET: 25; 250 TABLET ORAL at 17:04

## 2021-04-16 RX ADMIN — MEMANTINE 10 MG: 5 TABLET ORAL at 17:03

## 2021-04-16 RX ADMIN — ATORVASTATIN CALCIUM 10 MG: 10 TABLET, FILM COATED ORAL at 00:13

## 2021-04-16 RX ADMIN — CARBIDOPA AND LEVODOPA 2 TABLET: 25; 100 TABLET, EXTENDED RELEASE ORAL at 21:53

## 2021-04-16 RX ADMIN — FERROUS SULFATE TAB 325 MG (65 MG ELEMENTAL FE) 325 MG: 325 (65 FE) TAB at 09:26

## 2021-04-16 RX ADMIN — CEFAZOLIN SODIUM 2 G: 100 INJECTION, POWDER, LYOPHILIZED, FOR SOLUTION INTRAVENOUS at 12:46

## 2021-04-16 RX ADMIN — PIMOZIDE: 1 TABLET ORAL at 09:29

## 2021-04-16 RX ADMIN — ESCITALOPRAM OXALATE 10 MG: 10 TABLET ORAL at 09:25

## 2021-04-16 RX ADMIN — CARBIDOPA AND LEVODOPA 1 TABLET: 25; 250 TABLET ORAL at 15:11

## 2021-04-16 RX ADMIN — Medication 10 ML: at 06:34

## 2021-04-16 RX ADMIN — PIMOZIDE: 1 TABLET ORAL at 17:04

## 2021-04-16 RX ADMIN — FERROUS SULFATE TAB 325 MG (65 MG ELEMENTAL FE) 325 MG: 325 (65 FE) TAB at 17:03

## 2021-04-16 RX ADMIN — CARBIDOPA AND LEVODOPA 1 TABLET: 25; 100 TABLET ORAL at 17:03

## 2021-04-16 RX ADMIN — ATORVASTATIN CALCIUM 10 MG: 10 TABLET, FILM COATED ORAL at 21:53

## 2021-04-16 RX ADMIN — MONTELUKAST 10 MG: 10 TABLET, FILM COATED ORAL at 09:25

## 2021-04-16 RX ADMIN — DOCUSATE SODIUM 100 MG: 100 CAPSULE, LIQUID FILLED ORAL at 09:26

## 2021-04-16 RX ADMIN — OXYCODONE 10 MG: 5 TABLET ORAL at 06:34

## 2021-04-16 RX ADMIN — WATER 10 MG: 1 INJECTION INTRAMUSCULAR; INTRAVENOUS; SUBCUTANEOUS at 19:07

## 2021-04-16 NOTE — PROGRESS NOTES
Pt agitated in bed, continues to attempt to climb out of bed. Wife concerned for pt safety. Pt is confused stating he must be getting home and has to go back to work.  Messaged

## 2021-04-16 NOTE — PROGRESS NOTES
Pt in bed with wife at bedside. LUE in sling with dressing clean, dry and intact. Infusing 1 unit of blood without difficulty or reaction. Montoya catheter in place, draining and patent. Bed locked, in lowest position, call light within reach.

## 2021-04-16 NOTE — PROGRESS NOTES
Problem: Mobility Impaired (Adult and Pediatric)  Goal: *Acute Goals and Plan of Care (Insert Text)  Outcome: Progressing Towards Goal  Note: GOALS (1-4 days):  (1.)  Patient will move from supine to sit and sit to supine  in bed with CONTACT GUARD ASSIST.    (2.)  Patient will transfer from bed to chair and chair to bed with CONTACT GUARD ASSIST using the least restrictive device. (3.)  Patient will ambulate with CONTACT GUARD ASSIST for 100 feet with the least restrictive device. (4.)  Patient will be independent with HEP to increase range of motion per MD orders. ________________________________________________________________________________________________       PHYSICAL THERAPY: Initial Assessment and AM 4/16/2021  INPATIENT: Hospital Day: 2  [place] / Product Type: Medicare /      NAME/AGE/GENDER: Nilesh Brown is a 79 y.o. male   PRIMARY DIAGNOSIS: Closed comminuted fracture of humerus, right, initial encounter [S42.351A]  Closed displaced comminuted fracture of shaft of right humerus [S42.351A]  Closed displaced comminuted fracture of shaft of right humerus, initial encounter Stephane Mccallum <principal problem not specified> <principal problem not specified>  Procedure(s) (LRB):  OPEN REDUCTION, INTERNAL FIXATION RIGHT HUMERAL SHAFT FRACTURE (Right)  1 Day Post-Op  ICD-10: Treatment Diagnosis:   Pain in Right Shoulder (M25.511)  Stiffness of Right Shoulder, Not elsewhere classified (M25.611)  Other abnormalities of gait and mobility (R26.89)   Precaution/Allergies:  Aricept [donepezil] and Onion      ASSESSMENT:     Mr. Jose G Granados presents decreased functional mobility and gait as well as decreased rom and strength of right UE s/p Procedure(s) (LRB):  OPEN REDUCTION, INTERNAL FIXATION RIGHT HUMERAL SHAFT FRACTURE (Right)  Wife and caregiver present. Pt. Ardelia Goltz he was in the hospital but needed guidance as to why.   He normally ambulates around his house and is fairly independent with adls until his recent fall. Reviewed safety and precautions below. He needed assistance to get out of bed and into chair. He did some right wrist/hand elbow rom with assistance. He has chronic wound in skin crease of hand of 3rd-5th digits. Plans are for rehab. Active and passive range of motion right elbow hand     gentle pendulums   nwb ue right   wbat ble   Sling right shoulder   Question: Reason for PT? Answer: s/p orif right humeral shaft       This section established at most recent assessment   PROBLEM LIST (Impairments causing functional limitations):  Decreased [place] with Bed Mobility  Decreased [place] with Transfers  Decreased [place] with Ambulation   Decreased [place] with shoulder HEP   INTERVENTIONS PLANNED: (Benefits and precautions of physical therapy have been discussed with the patient.)  Bed Mobility Training  Transfer Training  Gait Training  Therapeutic Exercises per MD orders  Modalities for Pain     TREATMENT PLAN: Frequency/Duration: twice daily for duration of hospital stay  Rehabilitation Potential For Stated Goals: Good     RECOMMENDED REHABILITATION/EQUIPMENT: (at time of discharge pending progress): Continue Skilled Therapy and Rehab.               HISTORY:   History of Present Injury/Illness (Reason for Referral):  S/p Procedure(s) (LRB):  OPEN REDUCTION, INTERNAL FIXATION RIGHT HUMERAL SHAFT FRACTURE (Right)  Past Medical History/Comorbidities:   Mr. Jose G Granados  has a past medical history of Acoustic neuroma ([address] (2012), Cholelithiasis (08/20/2015), Colon polyps (12/31/2014), Dementia due to Parkinson's disease without behavioral disturbance ([address] (4/20/2018), DVT (deep venous thrombosis) ([address] (02/17/2017), Dyslipidemia, Environmental allergies, GERD (gastroesophageal reflux disease), Hiatal hernia, Hypertension, Intractable hiccups, Liver cyst (02/16/2017), Melanoma ([address] Parkinson disease ([address] Prostate cancer ([address] and Tourette syndrome (04/20/2018). He also has no past medical history of Arrhythmia, Asthma, Autoimmune disease ([address] Chronic pain, Coagulation defects, COPD, Diabetes ([address] Heart failure ([address] Malignant hyperthermia due to anesthesia, Other ill-defined conditions(799.89), Pseudocholinesterase deficiency, Psychiatric disorder, Stroke ([address] Thromboembolus ([address] Unspecified adverse effect of anesthesia, or Unspecified sleep apnea. Mr. Solomon Mcgraw  has a past surgical history that includes hx prostatectomy (11/2009); hx knee replacement (Left); hx hernia repair (Bilateral); hx tonsillectomy; hx hemorrhoidectomy; hx heent (Right); and hx colonoscopy (12/31/2014). Social History/Living Environment:      Prior Level of Function/Work/Activity:  Mostly independent, occasional assist with adl's, but more assist since his fall this week   Number of Personal Factors/Comorbidities that affect the Plan of Care: 1-2: MODERATE COMPLEXITY   EXAMINATION:   Most Recent Physical Functioning:   Gross Assessment:  AROM: Generally decreased, functional(except right UE)  Strength: Generally decreased, functional(except right UE)  RUE AROM  R Elbow Flexion: 100  R Elbow Extension: 60  R Wrist Flexion: (wfl)  R Wrist Extension: (wfl)  Right Hand Grasp: Yes(3rd-5th digits flexed causing chronic wound in skin crease)            Posture:     Balance:  Sitting: Intact; High guard  Standing: With support;Pull to stand Bed Mobility:  Supine to Sit: Minimum assistance  Wheelchair Mobility:     Transfers:  Sit to Stand: Minimum assistance  Stand to Sit: Minimum assistance  Bed to Chair: Minimum assistance  Gait:     Speed/Gisell: Delayed  Step Length: Left shortened;Right shortened  Gait Abnormalities: Decreased step clearance;Shuffling gait  Distance (ft): 3 Feet (ft)(steps to chair)  Assistive Device: Brace/Splint  Ambulation - Level of Assistance: Minimal assistance  Interventions: Manual cues; Tactile cues; Safety awareness training;Verbal cues      Body Structures Involved:  Bones  Joints  Muscles  Ligaments Body Functions Affected: Movement Related Activities and Participation Affected: Mobility   Number of elements that affect the Plan of Care: 3: MODERATE COMPLEXITY   CLINICAL PRESENTATION:   Presentation: Stable and uncomplicated: LOW COMPLEXITY   CLINICAL DECISION MAKING:   [facility] AM-PAC 6 Clicks   Basic Mobility Inpatient Short Form  How much difficulty does the patient currently have. .. Unable A Lot A Little None   1. Turning over in bed (including adjusting bedclothes, sheets and blankets)? [] 1   [] 2   [x] 3   [] 4   2. Sitting down on and standing up from a chair with arms ( e.g., wheelchair, bedside commode, etc.)   [] 1   [] 2   [x] 3   [] 4   3. Moving from lying on back to sitting on the side of the bed? [] 1   [] 2   [x] 3   [] 4   How much help from another person does the patient currently need. .. Total A Lot A Little None   4. Moving to and from a bed to a chair (including a wheelchair)? [] 1   [] 2   [x] 3   [] 4   5. Need to walk in hospital room? [] 1   [x] 2   [] 3   [] 4   6. Climbing 3-5 steps with a railing? [] 1   [x] 2   [] 3   [] 4   © 2007, Trustees of [facility], under license to [email or web address]. All rights reserved      Score:  Initial: 16 Most Recent: X (Date: -- )    Interpretation of Tool:  Represents activities that are increasingly more difficult (i.e. Bed mobility, Transfers, Gait). Medical Necessity:     Patient is expected to demonstrate progress in   strength, range of motion, and balance   to   decrease assistance required with exercises and functional mobility  . Reason for Services/Other Comments:  Patient   continues to require present interventions due to patient's inability to perform exercises and functional mobility independently  .    Use of outcome tool(s) and clinical judgement create a POC that gives a: Questionable prediction of patient's progress: MODERATE COMPLEXITY TREATMENT:   (In addition to Assessment/Re-Assessment sessions the following treatments were rendered)   Pre-treatment Symptoms/Complaints:  none  Pain: Initial:      Post Session:  0     Therapeutic Exercise: (10 Minutes):  Exercises per grid below to improve mobility and strength. Required minimal visual, verbal, and manual cues to promote proper body alignment, promote proper body posture, and promote proper body mechanics. Progressed range and repetitions as indicated. Date:  4/16 Date:   Date:     ACTIVITY/EXERCISE AM PM AM PM AM PM   Gripping 10aa        Wrist Flexion/Extension 10        Wrist Ulnar/Radial Deviation         Pronation/Supination 10aa        Elbow Flexion/Extension 10aa        Shoulder Flexion/Extension         Shoulder AB/ADduction         Shoulder IR/ER         Pulleys         Pendulums         Shrugs         Isometric:                 Flexion         Extension         ABduction         ADduction         Biceps/Triceps                  B = bilateral; AA = active assistive; A = active; P = passive  Education:  [x]  Home Exercises  [x]  Sling Application   [x]  Movement Precautions   []  Pulleys   []  Use of Ice   []  Other:   Treatment/Session Assessment:    Response to Treatment:  did fine  Interdisciplinary Collaboration:   Registered Nurse  After treatment position/precautions:   Up in chair  Bed alarm/tab alert on  Bed/Chair-wheels locked  Bed in low position  Caregiver at bedside  Call light within reach  Family at bedside   Compliance with Program/Exercises: Will assess as treatment progresses. Recommendations/Intent for next treatment session:  Treatment next visit will focus on increasing Mr. Jerri Callaway independence with bed mobility, transfers, gait training, strength/ROM exercises, modalities for pain, and patient education.    Total Treatment Duration:  PT Patient Time In/Time Out  Time In: 1140  Time Out: [address]

## 2021-04-16 NOTE — PROGRESS NOTES
Problem: Mobility Impaired (Adult and Pediatric)  Goal: *Acute Goals and Plan of Care (Insert Text)  Outcome: Progressing Towards Goal  Note: GOALS (1-4 days):  (1.)  Patient will move from supine to sit and sit to supine  in bed with CONTACT GUARD ASSIST.    (2.)  Patient will transfer from bed to chair and chair to bed with CONTACT GUARD ASSIST using the least restrictive device. (3.)  Patient will ambulate with CONTACT GUARD ASSIST for 100 feet with the least restrictive device. (4.)  Patient will be independent with HEP to increase range of motion per MD orders. ________________________________________________________________________________________________       PHYSICAL THERAPY: Daily Note and PM 4/16/2021  INPATIENT: Hospital Day: 2  [place] / Product Type: Medicare /      NAME/AGE/GENDER: Erick Johnson is a 79 y.o. male   PRIMARY DIAGNOSIS: Closed comminuted fracture of humerus, right, initial encounter [S42.351A]  Closed displaced comminuted fracture of shaft of right humerus [S42.351A]  Closed displaced comminuted fracture of shaft of right humerus, initial encounter Adolfo Do <principal problem not specified> <principal problem not specified>  Procedure(s) (LRB):  OPEN REDUCTION, INTERNAL FIXATION RIGHT HUMERAL SHAFT FRACTURE (Right)  1 Day Post-Op  ICD-10: Treatment Diagnosis:   · Pain in Right Shoulder (M25.511)  · Stiffness of Right Shoulder, Not elsewhere classified (M25.611)  · Other abnormalities of gait and mobility (R26.89)   Precaution/Allergies:  Aricept [donepezil] and Onion      ASSESSMENT:     Mr. Maria De Jesus Duffy presents decreased functional mobility and gait as well as decreased rom and strength of right UE s/p Procedure(s) (LRB):  OPEN REDUCTION, INTERNAL FIXATION RIGHT HUMERAL SHAFT FRACTURE (Right)  Wife and caregiver present. Pt. Odalis Cabello he was in the hospital but needed guidance as to why.   He normally ambulates around his house and is fairly independent with adls until his recent fall. Reviewed safety and precautions below. He needed assistance to get out of bed and into chair. He did some right wrist/hand elbow rom with assistance. He has chronic wound in skin crease of hand of 3rd-5th digits. Plans are for rehab. PM- pt. Still up in chair without complaints. He reports some shoulder soreness. He performed exercises listed below with assistance. Reminded of no shoulder motion and safety. Instructed in using his left hand to extend his right fingers to help with skin breakdown. Also, gave him a towel roll to use in his hand to keep fingers extended. He ambulated around the bed, unsteady on his feet but did fine. Left supine with bed alarm on. Tremors noted. Plans for rehab. Active and passive range of motion right elbow hand     gentle pendulums   nwb ue right   wbat ble   Sling right shoulder   Question: Reason for PT? Answer: s/p orif right humeral shaft       This section established at most recent assessment   PROBLEM LIST (Impairments causing functional limitations):  1. Decreased [place] with Bed Mobility  2. Decreased [place] with Transfers  3. Decreased [place] with Ambulation   4. Decreased [place] with shoulder HEP   INTERVENTIONS PLANNED: (Benefits and precautions of physical therapy have been discussed with the patient.)  1. Bed Mobility Training  2. Transfer Training  3. Gait Training  4. Therapeutic Exercises per MD orders  5. Modalities for Pain     TREATMENT PLAN: Frequency/Duration: twice daily for duration of hospital stay  Rehabilitation Potential For Stated Goals: Good     RECOMMENDED REHABILITATION/EQUIPMENT: (at time of discharge pending progress): Continue Skilled Therapy and Rehab.               HISTORY:   History of Present Injury/Illness (Reason for Referral):  S/p Procedure(s) (LRB):  OPEN REDUCTION, INTERNAL FIXATION RIGHT HUMERAL SHAFT FRACTURE (Right)  Past Medical History/Comorbidities: Mr. Eliud Cooley  has a past medical history of Acoustic neuroma ([facility] (2012), Cholelithiasis (08/20/2015), Colon polyps (12/31/2014), Dementia due to Parkinson's disease without behavioral disturbance ([address] (4/20/2018), DVT (deep venous thrombosis) ([address] (02/17/2017), Dyslipidemia, Environmental allergies, GERD (gastroesophageal reflux disease), Hiatal hernia, Hypertension, Intractable hiccups, Liver cyst (02/16/2017), Melanoma ([address] Parkinson disease ([address] Prostate cancer ([address] and Tourette syndrome (04/20/2018). He also has no past medical history of Arrhythmia, Asthma, Autoimmune disease ([address] Chronic pain, Coagulation defects, COPD, Diabetes ([address] Heart failure ([address] Malignant hyperthermia due to anesthesia, Other ill-defined conditions(799.89), Pseudocholinesterase deficiency, Psychiatric disorder, Stroke ([address] Thromboembolus ([address] Unspecified adverse effect of anesthesia, or Unspecified sleep apnea. Mr. Eliud Cooley  has a past surgical history that includes hx prostatectomy (11/2009); hx knee replacement (Left); hx hernia repair (Bilateral); hx tonsillectomy; hx hemorrhoidectomy; hx heent (Right); and hx colonoscopy (12/31/2014).   Social History/Living Environment:      Prior Level of Function/Work/Activity:  Mostly independent, occasional assist with adl's, but more assist since his fall this week   Number of Personal Factors/Comorbidities that affect the Plan of Care: 1-2: MODERATE COMPLEXITY   EXAMINATION:   Most Recent Physical Functioning:   Gross Assessment:  AROM: Generally decreased, functional(except right UE)  Strength: Generally decreased, functional(except right UE)  RUE AROM  R Elbow Flexion: 100  R Elbow Extension: 60  R Wrist Flexion: (wfl)  R Wrist Extension: (wfl)  Right Hand Grasp: Yes(3rd-5th digits flexed causing chronic wound in skin crease)            Posture:     Balance:  Sitting: Intact  Standing: With support Bed Mobility:  Supine to Sit: Minimum assistance  Sit to Supine: Contact guard assistance  Wheelchair Mobility:     Transfers:  Sit to Stand: Minimum assistance  Stand to Sit: Minimum assistance  Bed to Chair: Minimum assistance  Duration: 15 Minutes  Gait:     Speed/Gisell: Delayed  Step Length: Left shortened;Right shortened  Gait Abnormalities: Decreased step clearance  Distance (ft): 15 Feet (ft)  Assistive Device: Brace/Splint;Gait belt  Ambulation - Level of Assistance: Minimal assistance  Interventions: Safety awareness training;Verbal cues      Body Structures Involved:  1. Bones  2. Joints  3. Muscles  4. Ligaments Body Functions Affected:  1. Movement Related Activities and Participation Affected:  1. Mobility   Number of elements that affect the Plan of Care: 3: MODERATE COMPLEXITY   CLINICAL PRESENTATION:   Presentation: Stable and uncomplicated: LOW COMPLEXITY   CLINICAL DECISION MAKIN [facility] [id number] AM-PAC 6 Clicks   Basic Mobility Inpatient Short Form  How much difficulty does the patient currently have. .. Unable A Lot A Little None   1. Turning over in bed (including adjusting bedclothes, sheets and blankets)? [] 1   [] 2   [x] 3   [] 4   2. Sitting down on and standing up from a chair with arms ( e.g., wheelchair, bedside commode, etc.)   [] 1   [] 2   [x] 3   [] 4   3. Moving from lying on back to sitting on the side of the bed? [] 1   [] 2   [x] 3   [] 4   How much help from another person does the patient currently need. .. Total A Lot A Little None   4. Moving to and from a bed to a chair (including a wheelchair)? [] 1   [] 2   [x] 3   [] 4   5. Need to walk in hospital room? [] 1   [x] 2   [] 3   [] 4   6. Climbing 3-5 steps with a railing? [] 1   [x] 2   [] 3   [] 4   © 2007, Trustees of [facility] [address] [address], under license to [employer].  All rights reserved      Score:  Initial: 16 Most Recent: X (Date: -- )    Interpretation of Tool:  Represents activities that are increasingly more difficult (i.e. Bed mobility, Transfers, Gait).    Medical Necessity:     · Patient is expected to demonstrate progress in   · strength, range of motion, and balance  ·  to   · decrease assistance required with exercises and functional mobility  · . Reason for Services/Other Comments:  · Patient   · continues to require present interventions due to patient's inability to perform exercises and functional mobility independently  · . Use of outcome tool(s) and clinical judgement create a POC that gives a: Questionable prediction of patient's progress: MODERATE COMPLEXITY            TREATMENT:   (In addition to Assessment/Re-Assessment sessions the following treatments were rendered)   Pre-treatment Symptoms/Complaints:  Shoulder sore  Pain: Initial:      Post Session:  0     Therapeutic Exercise: (10 Minutes):  Exercises per grid below to improve mobility and strength. Required minimal visual, verbal, and manual cues to promote proper body alignment, promote proper body posture, and promote proper body mechanics. Progressed range and repetitions as indicated. Therapeutic Activity: (  15 Minutes ):  Therapeutic activities including Bed transfers, Chair transfers, Ambulation on level ground and standing to improve mobility, strength and balance. Required minimal Safety awareness training;Verbal cues to promote static and dynamic balance in standing.       Date:  4/16 Date:   Date:     ACTIVITY/EXERCISE AM PM AM PM AM PM   Gripping 10aa 12aa       Wrist Flexion/Extension 10 12a       Wrist Ulnar/Radial Deviation         Pronation/Supination 10aa 12aa       Elbow Flexion/Extension 10aa 12aa       Shoulder Flexion/Extension         Shoulder AB/ADduction         Shoulder IR/ER         Pulleys         Pendulums  20 seconds       Shrugs         Isometric:                 Flexion         Extension         ABduction         ADduction         Biceps/Triceps                  B = bilateral; AA = active assistive; A = active; P = passive  Education:  [x]  Home Exercises  [x]  Sling Application   [x]  Movement Precautions   []  Pulleys   []  Use of Ice   []  Other:   Treatment/Session Assessment:    · Response to Treatment:  Doing well  · Interdisciplinary Collaboration:   o Registered Nurse  · After treatment position/precautions:   o Supine in bed  o Bed alarm/tab alert on  o Bed/Chair-wheels locked  o Bed in low position  o Call light within reach   · Compliance with Program/Exercises: Will assess as treatment progresses. · Recommendations/Intent for next treatment session:  Treatment next visit will focus on increasing Mr. Ricardo Caballero independence with bed mobility, transfers, gait training, strength/ROM exercises, modalities for pain, and patient education.    Total Treatment Duration:  PT Patient Time In/Time Out  Time In: 1425  Time Out: [address], PT

## 2021-04-16 NOTE — PROGRESS NOTES
Orthopedic Joint Progress Note    2021  Admit Date: 4/15/2021  Admit Diagnosis: Closed comminuted fracture of humerus, right, initial encounter Tracey Chino; Closed displaced comminuted fracture of shaft of right humerus [S42.351A]; Closed displaced comminuted fracture of shaft of right humerus, initial encounter [S42.351A]    1 Day Post-Op    Subjective: confused pulling on catheter     Rosario Jean     Review of Systems: Pertinent items are noted in HPI. Objective:     PT/OT:     PATIENT MOBILITY    Bed Mobility  Supine to Sit: Stand-by assistance  Sit to Supine: Contact guard assistance  Scooting: Stand-by assistance  Transfers  Sit to Stand: Contact guard assistance, Minimum assistance  Stand to Sit: Contact guard assistance  Bed to Chair: Minimum assistance      Gait  Speed/Gisell: Pace decreased (<100 feet/min)  Step Length: Left shortened, Right shortened  Gait Abnormalities: Decreased step clearance  Ambulation - Level of Assistance: Minimal assistance  Distance (ft): 15 Feet (ft)(x 1, 20' x 1)  Assistive Device: Gait belt  Interventions: Safety awareness training, Verbal cues            Vital Signs:    Blood pressure 113/68, pulse 79, temperature 98.3 °F (36.8 °C), resp. rate 14, SpO2 96 %.   Temp (24hrs), Av °F (36.7 °C), Min:97.6 °F (36.4 °C), Max:98.3 °F (36.8 °C)      Pain Control:   Pain Assessment  Pain Scale 1: Numeric (0 - 10)  Pain Intensity 1: 0  Pain Location 1: Shoulder  Pain Orientation 1: Right  Pain Intervention(s) 1: Medication (see MAR), Rest    Meds:  Current Facility-Administered Medications   Medication Dose Route Frequency    cholecalciferol (VITAMIN D3) (1000 Units /25 mcg) tablet 1,000 Units  1,000 Units Oral DAILY    acetaminophen (TYLENOL) tablet 650 mg  650 mg Oral Q6H PRN    atorvastatin (LIPITOR) tablet 10 mg  10 mg Oral QHS    pyridostigmine (MESTINON) tablet 30 mg  30 mg Oral BID    memantine (NAMENDA) tablet 10 mg  10 mg Oral BID    0.9% sodium chloride infusion 250 mL  250 mL IntraVENous PRN    ziprasidone (GEODON) 10 mg in sterile water (preservative free) 0.5 mL injection  10 mg IntraMUSCular Q12H PRN    haloperidol lactate (HALDOL) injection 2.5 mg  2.5 mg IntraVENous Q4H PRN    sodium chloride (NS) flush 5-40 mL  5-40 mL IntraVENous Q8H    sodium chloride (NS) flush 5-40 mL  5-40 mL IntraVENous PRN    bisacodyL (DULCOLAX) suppository 10 mg  10 mg Rectal DAILY PRN    sodium phosphate (FLEET'S) enema 1 Enema  1 Enema Rectal PRN    docusate sodium (COLACE) capsule 100 mg  100 mg Oral BID    ferrous sulfate tablet 325 mg  1 Tab Oral BID WITH MEALS    rivastigmine (EXELON) 13.3 mg/24 hour patch 1 Patch  1 Patch TransDERmal DAILY    escitalopram oxalate (LEXAPRO) tablet 10 mg  10 mg Oral DAILY    montelukast (SINGULAIR) tablet 10 mg  10 mg Oral DAILY    pimozide (ORAP) tablet (Patient Supplied)   Oral TID    carbidopa-levodopa ER (SINEMET CR)  mg per tablet 2 Tab  2 Tab Oral QHS    carbidopa-levodopa (SINEMET)  mg per tablet 1 Tab  1 Tab Oral TID    carbidopa-levodopa (SINEMET)  mg per tablet 0.5 Tab  0.5 Tab Oral DAILY    And    carbidopa-levodopa (SINEMET)  mg per tablet 1 Tab  1 Tab Oral BID        LAB:    Lab Results   Component Value Date/Time    INR 1.1 04/13/2021 02:48 PM    INR 1.0 05/04/2010 10:34 AM    INR 1.0 10/29/2009 12:17 PM     Lab Results   Component Value Date/Time    HGB 7.6 (L) 04/19/2021 04:51 AM    HGB 7.4 (L) 04/18/2021 04:26 AM    HGB 7.3 (L) 04/17/2021 03:56 AM       Wound Hand Right (Active)   Number of days: 4       Incision 04/15/21 Arm Right (Active)   Dressing Status Clean;Dry; Intact 04/15/21 1640   Cleansed Cleansed with saline 04/15/21 1316   Dressing/Treatment ABD pad; Ace wrap;Cast padding;Xeroform 04/15/21 1640   Number of days: 1         Physical Exam:  No significant changes    Assessment:      Principal Problem:    Closed displaced comminuted fracture of shaft of right humerus (4/15/2021)    Active Problems:    Postoperative anemia due to acute blood loss (4/19/2021)         Plan:     Continue PT/OT/Rehab  Hemoglobin 7.5 transfuse one unit  Observe  Continue care  Transfer to [facility] when stable    [facility]

## 2021-04-16 NOTE — PROGRESS NOTES
Patient had not voided yet on my shift and patient got to floor at 17:04. Bladder scanned patient and shoed 420 cc. Patient's wife was concerned on patient would go to the bathroom due to patient not able to walk and unable to hold urinal due patient's shaking from Parkinson's. Patient's wife requesting a marion catheter to be put in since patient had fallen at home. Seen there was a hospitalist consult but no hospitalist note so messaged night time hospitalist about marion catheter. Will Pagan MD put order in for marion catheter and to pass on for AM shift to [facility] AM hospitalist concerning consult. Marion catheter inserted and drained 400 amberish color urine in the bag.

## 2021-04-16 NOTE — PROGRESS NOTES
Care Management Interventions  PCP Verified by CM: Yes  Mode of Transport at Discharge: BLS  Transition of Care Consult (CM Consult): SNF  Partner SNF: Yes  Physical Therapy Consult: Yes  Occupational Therapy Consult: Yes  Current Support Network: Lives with Spouse  Confirm Follow Up Transport: Family  The Plan for Transition of Care is Related to the Following Treatment Goals : return home with assistance  The Patient and/or Patient Representative was Provided with a Choice of Provider and Agrees with the Discharge Plan?: Yes  Freedom of Choice List was Provided with Basic Dialogue that Supports the Patient's Individualized Plan of Care/Goals, Treatment Preferences and Shares the Quality Data Associated with the Providers?: Yes  Discharge Location  Discharge Placement: Skilled nursing facility    Rec'd call from Jonathan Blum at [facility]. They have accepted patient for rehab but cannot admit over the weekend. We will plan for admission Monday 4-19 if medically ready. Pt/spouse updated.

## 2021-04-16 NOTE — PROGRESS NOTES
Care Management Interventions  PCP Verified by CM: Yes  Mode of Transport at Discharge: BLS  Transition of Care Consult (CM Consult): SNF  Partner SNF: Yes  Physical Therapy Consult: Yes  Occupational Therapy Consult: Yes  Current Support Network: Lives with Spouse  Confirm Follow Up Transport: Family  The Plan for Transition of Care is Related to the Following Treatment Goals : return home with assistance  The Patient and/or Patient Representative was Provided with a Choice of Provider and Agrees with the Discharge Plan?: Yes  Freedom of Choice List was Provided with Basic Dialogue that Supports the Patient's Individualized Plan of Care/Goals, Treatment Preferences and Shares the Quality Data Associated with the Providers?: Yes  Discharge Location  Discharge Placement: Skilled nursing facility    MD order rec'd to arrange [facility]. I spoke with pt and spouse and dtr was on the phone. Everyone agreeable with STR with plans to return home. Dtr inquiring if we can get a Neurology consult while here as his neuro is out with [facility]. Dr Buzz Lang (Hospitalist notified)   NH referral sent to [facility]. I hope to have an offer today for admission on Sun or Mon. PPD and COVID ordered.

## 2021-04-16 NOTE — ADDENDUM NOTE
Addendum  created 04/16/21 0748 by Stephania Riley CRNA    Flowsheet accepted, Intraprocedure Flowsheets edited

## 2021-04-16 NOTE — PROGRESS NOTES
04/15/21 2135   Oxygen Therapy   O2 Sat (%) 95 %   Pulse via Oximetry 82 beats per minute   O2 Device None (Room air)   Pt on continuous monitor for HS. Alarm limits set. Pt working on IS.

## 2021-04-17 LAB
ABO + RH BLD: NORMAL
ANION GAP SERPL CALC-SCNC: 3 MMOL/L (ref 7–16)
BLD PROD TYP BPU: NORMAL
BLD PROD TYP BPU: NORMAL
BLOOD GROUP ANTIBODIES SERPL: NORMAL
BPU ID: NORMAL
BPU ID: NORMAL
BUN SERPL-MCNC: 27 MG/DL (ref 8–23)
CALCIUM SERPL-MCNC: 8.5 MG/DL (ref 8.3–10.4)
CHLORIDE SERPL-SCNC: 108 MMOL/L (ref 98–107)
CO2 SERPL-SCNC: 30 MMOL/L (ref 21–32)
CREAT SERPL-MCNC: 0.84 MG/DL (ref 0.8–1.5)
CROSSMATCH RESULT,%XM: NORMAL
CROSSMATCH RESULT,%XM: NORMAL
ERYTHROCYTE [DISTWIDTH] IN BLOOD BY AUTOMATED COUNT: 13.8 % (ref 11.9–14.6)
GLUCOSE SERPL-MCNC: 113 MG/DL (ref 65–100)
HCT VFR BLD AUTO: 22.3 % (ref 41.1–50.3)
HGB BLD-MCNC: 7.3 G/DL (ref 13.6–17.2)
MAGNESIUM SERPL-MCNC: 1.9 MG/DL (ref 1.8–2.4)
MCH RBC QN AUTO: 30.7 PG (ref 26.1–32.9)
MCHC RBC AUTO-ENTMCNC: 32.7 G/DL (ref 31.4–35)
MCV RBC AUTO: 93.7 FL (ref 79.6–97.8)
NRBC # BLD: 0 K/UL (ref 0–0.2)
PLATELET # BLD AUTO: 130 K/UL (ref 150–450)
PMV BLD AUTO: 9.6 FL (ref 9.4–12.3)
POTASSIUM SERPL-SCNC: 3.4 MMOL/L (ref 3.5–5.1)
RBC # BLD AUTO: 2.38 M/UL (ref 4.23–5.6)
SARS-COV-2, COV2: NOT DETECTED
SODIUM SERPL-SCNC: 141 MMOL/L (ref 136–145)
SPECIMEN EXP DATE BLD: NORMAL
SPECIMEN SOURCE, FCOV2M: NORMAL
STATUS OF UNIT,%ST: NORMAL
STATUS OF UNIT,%ST: NORMAL
UNIT DIVISION, %UDIV: 0
UNIT DIVISION, %UDIV: 0
WBC # BLD AUTO: 7.4 K/UL (ref 4.3–11.1)

## 2021-04-17 PROCEDURE — 83735 ASSAY OF MAGNESIUM: CPT

## 2021-04-17 PROCEDURE — 65270000029 HC RM PRIVATE

## 2021-04-17 PROCEDURE — 74011250637 HC RX REV CODE- 250/637: Performed by: ORTHOPAEDIC SURGERY

## 2021-04-17 PROCEDURE — 85027 COMPLETE CBC AUTOMATED: CPT

## 2021-04-17 PROCEDURE — 97110 THERAPEUTIC EXERCISES: CPT

## 2021-04-17 PROCEDURE — 97530 THERAPEUTIC ACTIVITIES: CPT

## 2021-04-17 PROCEDURE — 80048 BASIC METABOLIC PNL TOTAL CA: CPT

## 2021-04-17 PROCEDURE — 36415 COLL VENOUS BLD VENIPUNCTURE: CPT

## 2021-04-17 PROCEDURE — 99024 POSTOP FOLLOW-UP VISIT: CPT | Performed by: ORTHOPAEDIC SURGERY

## 2021-04-17 PROCEDURE — 74011250637 HC RX REV CODE- 250/637: Performed by: HOSPITALIST

## 2021-04-17 RX ORDER — MELATONIN
1000 DAILY
Status: DISCONTINUED | OUTPATIENT
Start: 2021-04-17 | End: 2021-04-19 | Stop reason: HOSPADM

## 2021-04-17 RX ORDER — ACETAMINOPHEN 325 MG/1
650 TABLET ORAL
Status: DISCONTINUED | OUTPATIENT
Start: 2021-04-17 | End: 2021-04-19 | Stop reason: HOSPADM

## 2021-04-17 RX ADMIN — DOCUSATE SODIUM 100 MG: 100 CAPSULE, LIQUID FILLED ORAL at 17:07

## 2021-04-17 RX ADMIN — FERROUS SULFATE TAB 325 MG (65 MG ELEMENTAL FE) 325 MG: 325 (65 FE) TAB at 08:51

## 2021-04-17 RX ADMIN — MEMANTINE 10 MG: 5 TABLET ORAL at 08:51

## 2021-04-17 RX ADMIN — ATORVASTATIN CALCIUM 10 MG: 10 TABLET, FILM COATED ORAL at 21:13

## 2021-04-17 RX ADMIN — CARBIDOPA AND LEVODOPA 0.5 TABLET: 25; 100 TABLET ORAL at 08:51

## 2021-04-17 RX ADMIN — CARBIDOPA AND LEVODOPA 2 TABLET: 25; 100 TABLET, EXTENDED RELEASE ORAL at 21:13

## 2021-04-17 RX ADMIN — VITAMIN D, TAB 1000IU (100/BT) 1000 UNITS: 25 TAB at 08:51

## 2021-04-17 RX ADMIN — PYRIDOSTIGMINE BROMIDE 30 MG: 60 TABLET ORAL at 08:51

## 2021-04-17 RX ADMIN — PIMOZIDE: 1 TABLET ORAL at 22:00

## 2021-04-17 RX ADMIN — CARBIDOPA AND LEVODOPA 1 TABLET: 25; 100 TABLET ORAL at 17:07

## 2021-04-17 RX ADMIN — DOCUSATE SODIUM 100 MG: 100 CAPSULE, LIQUID FILLED ORAL at 08:51

## 2021-04-17 RX ADMIN — FERROUS SULFATE TAB 325 MG (65 MG ELEMENTAL FE) 325 MG: 325 (65 FE) TAB at 17:07

## 2021-04-17 RX ADMIN — PYRIDOSTIGMINE BROMIDE 30 MG: 60 TABLET ORAL at 17:08

## 2021-04-17 RX ADMIN — MONTELUKAST 10 MG: 10 TABLET, FILM COATED ORAL at 08:51

## 2021-04-17 RX ADMIN — CARBIDOPA AND LEVODOPA 1 TABLET: 25; 250 TABLET ORAL at 17:07

## 2021-04-17 RX ADMIN — Medication 10 ML: at 21:07

## 2021-04-17 RX ADMIN — MEMANTINE 10 MG: 5 TABLET ORAL at 17:07

## 2021-04-17 RX ADMIN — ESCITALOPRAM OXALATE 10 MG: 10 TABLET ORAL at 08:51

## 2021-04-17 RX ADMIN — CARBIDOPA AND LEVODOPA 1 TABLET: 25; 250 TABLET ORAL at 13:06

## 2021-04-17 RX ADMIN — CARBIDOPA AND LEVODOPA 1 TABLET: 25; 100 TABLET ORAL at 13:06

## 2021-04-17 RX ADMIN — CARBIDOPA AND LEVODOPA 1 TABLET: 25; 250 TABLET ORAL at 08:52

## 2021-04-17 NOTE — PROGRESS NOTES
[facility]    INTERVAL HISTORY  / Subjective: Had exhibited sundowning overnight / evening. Blood pressure and hemoglobin stable     Assessment / Plan:  79-year-old male with past medical history of Parkinson's disease, dementia, tremors, Tourette syndrome neurologic orthostatic hypotension, neurologic orthostatic hypotension. 04/13 He tried to go for a walk and fell resulting in a   closed comminuted depressed placed right humeral shaft fracture for which an  open reduction and internal fixation of the right humeral shaft fracture was performed on 04/15 estimated blood loss 200 cc, anesthesia was general with interscalene nerve block. Closed comminuted depressed placed right humeral shaft fracture  -04/15  a open reduction and internal fixation of the right humeral shaft fracture was performed on, estimated blood loss 200 cc, anesthesia was general with interscalene nerve block  --04/16 Acute blood loss anemia post operatively hemoglobin down trended to 7.5, hemodynamically stable, he will receive 1 unit PRBCs and Plan for SNF Placement.   --04/17 HGB 7.3 Today, Blood pressure stable. Plan for SNF Placement. Parkinson's disease, dementia, and essential tremor  Resume Namenda, carbidopa/L-dopa  --04/17 PRN IM Geodon for sundowning while admitted      Neurologic orthostatic hypotension  Resume pyridostigmine  60 mg tablet; Take 1/2 tablet at 10 am and at 6 pm daily to support blood pressure.      Additional  Tourette's syndrome        Objective:  Visit Vitals  BP (!) 144/78 (BP 1 Location: Left upper arm, BP Patient Position: At rest)   Pulse 76   Temp 99.1 °F (37.3 °C)   Resp 18   SpO2 91%                 Physical Exam:  Physical Exam:   General: Thin elderly male, he is  No acute distress, speaking in full sentences, no use of accessory muscles   HEENT: Pupils equal and reactive to light and accommodation, oropharynx is clear   Neck: Supple, no lymphadenopathy, no JVD   Lungs: Clear to auscultation bilaterally   Cardiovascular: Regular rate and rhythm with normal S1 and S2   Abdomen: Soft, nontender, nondistended, normoactive bowel sounds   Extremities: No cyanosis clubbing or edema, tremors   Neuro: Nonfocal, A&O x person and place   Psych: Normal affect        Intake and Output:  Date 04/16/21 0700 - 04/17/21 0659 04/17/21 0700 - 04/18/21 0659   Shift [id number] 1900-0659 24 Hour Total 1057-6288 1756-2794 24 Hour Total   INTAKE   P.O. 480  480        P. O. 480  480      Blood 342.9  342.9        Volume (TRANSFUSE PACKED RBC'S) 342.9  342.9      Shift Total 822.9  822.9      OUTPUT   Urine 300  300        Urine Voided 300  300      Shift Total 300  300      .9  522.9      Weight (kg)             LAB:  Admission on 04/15/2021   Component Date Value    Hemoglobin A1c 04/15/2021 <3.5*    Est. average glucose 04/15/2021 Cannot be calculated     PPD 04/16/2021 Negative     mm Induration 04/16/2021 0     WBC 04/15/2021 10.2     RBC 04/15/2021 2.75*    HGB 04/15/2021 8.5*    HCT 04/15/2021 26.4*    MCV 04/15/2021 96.0     MCH 04/15/2021 30.9     MCHC 04/15/2021 32.2     RDW 04/15/2021 13.2     PLATELET 62/89/0200 604*    MPV 04/15/2021 10.3     ABSOLUTE NRBC 04/15/2021 0.00     Sodium 04/15/2021 142     Potassium 04/15/2021 4.3     Chloride 04/15/2021 110*    CO2 04/15/2021 28     Anion gap 04/15/2021 4*    Glucose 04/15/2021 164*    BUN 04/15/2021 38*    Creatinine 04/15/2021 1.09     GFR est AA 04/15/2021 >60     GFR est non-AA 04/15/2021 >60     Calcium 04/15/2021 8.6     Magnesium 04/15/2021 2.2     Sodium 04/16/2021 140     Potassium 04/16/2021 4.4     Chloride 04/16/2021 108*    CO2 04/16/2021 27     Anion gap 04/16/2021 5*    Glucose 04/16/2021 203*    BUN 04/16/2021 38*    Creatinine 04/16/2021 1.17     GFR est AA 04/16/2021 >60     GFR est non-AA 04/16/2021 >60     Calcium 04/16/2021 8.6     Magnesium 04/16/2021 2.2     WBC 04/16/2021 8.1     RBC 04/16/2021 2.48*    HGB 04/16/2021 7.5*    HCT 04/16/2021 23.8*    MCV 04/16/2021 96.0     MCH 04/16/2021 30.2     MCHC 04/16/2021 31.5     RDW 04/16/2021 13.2     PLATELET 33/50/2543 709*    MPV 04/16/2021 10.2     ABSOLUTE NRBC 04/16/2021 0.00     SARS-CoV-2 04/16/2021 Please find results under separate order     Specimen source 04/16/2021 Nasopharyngeal     COVID-19 rapid test 04/16/2021 Not detected     Specimen source 04/16/2021 Nasopharyngeal     SARS-CoV-2 04/16/2021 Not detected     Vitamin D 25-Hydroxy 04/16/2021 25.6*    Sodium 04/17/2021 141     Potassium 04/17/2021 3.4*    Chloride 04/17/2021 108*    CO2 04/17/2021 30     Anion gap 04/17/2021 3*    Glucose 04/17/2021 113*    BUN 04/17/2021 27*    Creatinine 04/17/2021 0.84     GFR est AA 04/17/2021 >60     GFR est non-AA 04/17/2021 >60     Calcium 04/17/2021 8.5     Magnesium 04/17/2021 1.9     WBC 04/17/2021 7.4     RBC 04/17/2021 2.38*    HGB 04/17/2021 7.3*    HCT 04/17/2021 22.3*    MCV 04/17/2021 93.7     MCH 04/17/2021 30.7     MCHC 04/17/2021 32.7     RDW 04/17/2021 13.8     PLATELET 57/10/8775 296*    MPV 04/17/2021 9.6     ABSOLUTE NRBC 04/17/2021 0.00        IMAGING:  Xr Shoulder Rt Ap/lat Min 2 V    Result Date: 4/15/2021  Status post ORIF of a proximal right diaphyseal fracture without obvious complication. Xr Humerus Rt    Result Date: 4/15/2021  Status post ORIF of a right proximal humerus fracture. No obvious complications. Total fluoroscopy time: 21 seconds; three fluoroscopic spot images saved    Xr Humerus Rt    Result Date: 4/13/2021  Comminuted proximal humeral shaft fracture. Ct Head Wo Cont    Result Date: 4/13/2021  No acute process. EKG:  No results found for this or any previous visit.           Ruby Bailon MD  4/17/2021 8:17 AM

## 2021-04-17 NOTE — PROGRESS NOTES
Problem: Mobility Impaired (Adult and Pediatric)  Goal: *Acute Goals and Plan of Care (Insert Text)  Outcome: Progressing Towards Goal  Note: GOALS (1-4 days):  (1.)  Patient will move from supine to sit and sit to supine  in bed with CONTACT GUARD ASSIST.    (2.)  Patient will transfer from bed to chair and chair to bed with CONTACT GUARD ASSIST using the least restrictive device. (3.)  Patient will ambulate with CONTACT GUARD ASSIST for 100 feet with the least restrictive device. (4.)  Patient will be independent with HEP to increase range of motion per MD orders. ________________________________________________________________________________________________       PHYSICAL THERAPY: Daily Note and PM 4/17/2021  INPATIENT: Hospital Day: 3  [place] / [place] / Product Type: Medicare /      NAME/AGE/GENDER: Ayse Gamez is a 79 y.o. male   PRIMARY DIAGNOSIS: Closed comminuted fracture of humerus, right, initial encounter [S42.351A]  Closed displaced comminuted fracture of shaft of right humerus [S42.351A]  Closed displaced comminuted fracture of shaft of right humerus, initial encounter Rey Sandoval <principal problem not specified> <principal problem not specified>  Procedure(s) (LRB):  OPEN REDUCTION, INTERNAL FIXATION RIGHT HUMERAL SHAFT FRACTURE (Right)  2 Days Post-Op  ICD-10: Treatment Diagnosis:   · Pain in Right Shoulder (M25.511)  · Stiffness of Right Shoulder, Not elsewhere classified (M25.611)  · Other abnormalities of gait and mobility (R26.89)   Precaution/Allergies:  Aricept [donepezil] and Onion     Active and passive range of motion right elbow hand     gentle pendulums   nwb ue right   wbat ble   Sling right shoulder   Question: Reason for PT?  Answer: s/p orif right humeral shaft        ASSESSMENT:     Mr. Clotilde Coffey presents decreased functional mobility and gait as well as decreased rom and strength of right UE s/p Procedure(s) (LRB):  OPEN REDUCTION, INTERNAL FIXATION RIGHT HUMERAL SHAFT FRACTURE (Right)  Wife and caregiver present. Pt. Yanick Colin he was in the hospital but needed guidance as to why. He normally ambulates around his house and is fairly independent with adls until his recent fall. Reviewed safety and precautions below. He needed assistance to get out of bed and into chair. He did some right wrist/hand elbow rom with assistance. He has chronic wound in skin crease of hand of 3rd-5th digits. Plans are for rehab. PM- pt. Still up in chair without complaints. He reports some shoulder soreness. He performed exercises listed below with assistance. Reminded of no shoulder motion and safety. Instructed in using his left hand to extend his right fingers to help with skin breakdown. Also, gave him a towel roll to use in his hand to keep fingers extended. He ambulated around the bed, unsteady on his feet but did fine. Left supine with bed alarm on. Tremors noted. Plans for rehab.  4/17/21:  Patient kept asking if I was going to take him home. Also asking if people are fishing. Required assist for all exercises. Unable to perform pendulums-unstable in standing and unable to follow directions in sitting. Unstable with ambulating in the room. PM:  Patient still up in chair and daughter present. Able to follow directions a bit better for exercises. Not stable enough to do pendulums in standing so performed with assist sitting edge of chair. Ambulated around the room with less assist for stability. Assisted RN with dressing change due to drainage. This section established at most recent assessment   PROBLEM LIST (Impairments causing functional limitations):  1. Decreased Salyer with Bed Mobility  2. Decreased Salyer with Transfers  3. Decreased Salyer with Ambulation   4.  Decreased Salyer with shoulder HEP   INTERVENTIONS PLANNED: (Benefits and precautions of physical therapy have been discussed with the patient.)  1. Bed Mobility Training  2. Transfer Training  3. Gait Training  4. Therapeutic Exercises per MD orders  5. Modalities for Pain     TREATMENT PLAN: Frequency/Duration: twice daily for duration of hospital stay  Rehabilitation Potential For Stated Goals: Good     RECOMMENDED REHABILITATION/EQUIPMENT: (at time of discharge pending progress): Continue Skilled Therapy and Rehab. HISTORY:   History of Present Injury/Illness (Reason for Referral):  S/p Procedure(s) (LRB):  OPEN REDUCTION, INTERNAL FIXATION RIGHT HUMERAL SHAFT FRACTURE (Right)  Past Medical History/Comorbidities:   Mr. Steph Mckinley  has a past medical history of Acoustic neuroma ([facility] [address] (2012), Cholelithiasis (08/20/2015), Colon polyps (12/31/2014), Dementia due to Parkinson's disease without behavioral disturbance ([address] (4/20/2018), DVT (deep venous thrombosis) ([address] (02/17/2017), Dyslipidemia, Environmental allergies, GERD (gastroesophageal reflux disease), Hiatal hernia, Hypertension, Intractable hiccups, Liver cyst (02/16/2017), Melanoma ([address] Parkinson disease ([address] Prostate cancer ([address] and Tourette syndrome (04/20/2018). He also has no past medical history of Arrhythmia, Asthma, Autoimmune disease ([address] Chronic pain, Coagulation defects, COPD, Diabetes ([address] Heart failure ([address] Malignant hyperthermia due to anesthesia, Other ill-defined conditions(799.89), Pseudocholinesterase deficiency, Psychiatric disorder, Stroke ([address] Thromboembolus ([address] Unspecified adverse effect of anesthesia, or Unspecified sleep apnea. Mr. Steph Mckinley  has a past surgical history that includes hx prostatectomy (11/2009); hx knee replacement (Left); hx hernia repair (Bilateral); hx tonsillectomy; hx hemorrhoidectomy; hx heent (Right); and hx colonoscopy (12/31/2014).   Social History/Living Environment:   Home Environment: Private residence  One/Two Story Residence: One story  Living Alone: No  Support Systems: Spouse/Significant Other/Partner  Patient Expects to be Discharged to[de-identified] Private residence  Current DME Used/Available at Home: None  Prior Level of Function/Work/Activity:  Mostly independent, occasional assist with adl's, but more assist since his fall this week   Number of Personal Factors/Comorbidities that affect the Plan of Care: 1-2: MODERATE COMPLEXITY   EXAMINATION:   Most Recent Physical Functioning:   Gross Assessment:  AROM: Generally decreased, functional(L UE and B LEs)  Strength: Generally decreased, functional(L UE and B LEs)  Coordination: Generally decreased, functional               Posture:     Balance:  Sitting: Impaired  Sitting - Static: Fair (occasional)  Sitting - Dynamic: Fair (occasional)  Standing: Impaired  Standing - Static: Constant support  Standing - Dynamic : Constant support Bed Mobility:  Supine to Sit: Contact guard assistance  Wheelchair Mobility:     Transfers:  Sit to Stand: Minimum assistance  Stand to Sit: Minimum assistance  Bed to Chair: Minimum assistance  Gait:     Speed/Gisell: Slow  Step Length: Left shortened;Right shortened  Gait Abnormalities: Decreased step clearance  Distance (ft): 25 Feet (ft)  Assistive Device: Brace/Splint;Gait belt  Ambulation - Level of Assistance: Minimal assistance  Interventions: Safety awareness training;Verbal cues      Body Structures Involved:  1. Bones  2. Joints  3. Muscles  4. Ligaments Body Functions Affected:  1. Movement Related Activities and Participation Affected:  1. Mobility   Number of elements that affect the Plan of Care: 3: MODERATE COMPLEXITY   CLINICAL PRESENTATION:   Presentation: Stable and uncomplicated: LOW COMPLEXITY   CLINICAL DECISION MAKING:   [facility] AM-PAC 6 Clicks   Basic Mobility Inpatient Short Form  How much difficulty does the patient currently have. .. Unable A Lot A Little None   1. Turning over in bed (including adjusting bedclothes, sheets and blankets)? [] 1   [] 2   [x] 3   [] 4   2.   Sitting down on and standing up from a chair with arms ( e.g., wheelchair, bedside commode, etc.)   [] 1   [] 2   [x] 3   [] 4   3. Moving from lying on back to sitting on the side of the bed? [] 1   [] 2   [x] 3   [] 4   How much help from another person does the patient currently need. .. Total A Lot A Little None   4. Moving to and from a bed to a chair (including a wheelchair)? [] 1   [] 2   [x] 3   [] 4   5. Need to walk in hospital room? [] 1   [x] 2   [] 3   [] 4   6. Climbing 3-5 steps with a railing? [] 1   [x] 2   [] 3   [] 4   © 2007, Trustees of [address] [place] [address] [address] [id number], under license to [employer]. All rights reserved      Score:  Initial: 16 Most Recent: X (Date: -- )    Interpretation of Tool:  Represents activities that are increasingly more difficult (i.e. Bed mobility, Transfers, Gait). Medical Necessity:     · Patient is expected to demonstrate progress in   · strength, range of motion, and balance  ·  to   · decrease assistance required with exercises and functional mobility  · . Reason for Services/Other Comments:  · Patient   · continues to require present interventions due to patient's inability to perform exercises and functional mobility independently  · . Use of outcome tool(s) and clinical judgement create a POC that gives a: Questionable prediction of patient's progress: MODERATE COMPLEXITY            TREATMENT:   (In addition to Assessment/Re-Assessment sessions the following treatments were rendered)   Pre-treatment Symptoms/Complaints:  Patient agreeable. Pain: Initial:   Pain Intensity 1: 4  Post Session:  4     Therapeutic Exercise: (10 Minutes):  Exercises per grid below to improve mobility and strength. Required minimal visual, verbal, and manual cues to promote proper body alignment, promote proper body posture, and promote proper body mechanics. Progressed range and repetitions as indicated.   Therapeutic Activity: (   20 minutes ):  Therapeutic activities including Chair transfers, Ambulation on level ground and standing to improve mobility, strength and balance. Required minimal Safety awareness training;Verbal cues to promote static and dynamic balance in standing. Date:  4/16 Date:  4/17/21 Date:     ACTIVITY/EXERCISE AM PM AM PM AM PM   Gripping 10aa 12aa 15 AA 15 AA     Wrist Flexion/Extension 10 12a 15 AA 15 AA     Wrist Ulnar/Radial Deviation         Pronation/Supination 10aa 12aa 15 AA 15 AA     Elbow Flexion/Extension 10aa 12aa 15 AA 15 AA     Shoulder Flexion/Extension         Shoulder AB/ADduction         Shoulder IR/ER         Pulleys         Pendulums  20 seconds  10 lateral, 10 a/p, 10 CW, 10 CCW-all AA     Shrugs         Isometric:                 Flexion         Extension         ABduction         ADduction         Biceps/Triceps                  B = bilateral; AA = active assistive; A = active; P = passive  Education:  [x]  Home Exercises  [x]  Sling Application   [x]  Movement Precautions   []  Pulleys   []  Use of Ice   []  Other:   Treatment/Session Assessment:    · Response to Treatment:  Patient moving better this pm.  · Interdisciplinary Collaboration:   o Physical Therapist  o Registered Nurse  · After treatment position/precautions:   o Up in chair  o Bed alarm/tab alert on  o Bed/Chair-wheels locked  o Bed in low position  o Call light within reach  o RN notified  o Family at bedside   · Compliance with Program/Exercises: Will assess as treatment progresses. · Recommendations/Intent for next treatment session:  Treatment next visit will focus on increasing Mr. Jerri Callaway independence with bed mobility, transfers, gait training, strength/ROM exercises, modalities for pain, and patient education.    Total Treatment Duration:  PT Patient Time In/Time Out  Time In: 1330  [facility]: [facility]

## 2021-04-17 NOTE — PROGRESS NOTES
April 17, 2021         Post Op day: 2 Days Post-OpProcedure(s) (LRB):  OPEN REDUCTION, INTERNAL FIXATION RIGHT HUMERAL SHAFT FRACTURE (Right)      Admit Date: 4/15/2021  Admit Diagnosis: Closed comminuted fracture of humerus, right, initial encounter [facility]; Closed displaced comminuted fracture of shaft of right humerus [S42.351A]; Closed [facility]us[facility]    LAB:    Recent Results (from the past 24 hour(s))   SARS-COV-2    Collection Time: 04/16/21 10:16 AM   Result Value Ref Range    SARS-CoV-2 Please find results under separate order     COVID-19 RAPID TEST    Collection Time: 04/16/21 10:16 AM   Result Value Ref Range    Specimen source Nasopharyngeal      COVID-19 rapid test Not detected NOTD     SARS-COV-2, PCR    Collection Time: 04/16/21 10:16 AM    Specimen: Nasopharyngeal   Result Value Ref Range    Specimen source Nasopharyngeal      SARS-CoV-2 Not detected NOTD     PLEASE READ & DOCUMENT PPD TEST IN 24 HRS    Collection Time: 04/16/21  6:27 PM   Result Value Ref Range    PPD Negative Negative    mm Induration 0 0 - 5 mm   METABOLIC PANEL, BASIC    Collection Time: 04/17/21  3:56 AM   Result Value Ref Range    Sodium 141 136 - 145 mmol/L    Potassium 3.4 (L) 3.5 - 5.1 mmol/L    Chloride 108 (H) 98 - 107 mmol/L    CO2 30 21 - 32 mmol/L    Anion gap 3 (L) 7 - 16 mmol/L    Glucose 113 (H) 65 - 100 mg/dL    BUN 27 (H) 8 - 23 MG/DL    Creatinine 0.84 0.8 - 1.5 MG/DL    GFR est AA >60 >60 ml/min/1.73m2    GFR est non-AA >60 >60 ml/min/1.73m2    Calcium 8.5 8.3 - 10.4 MG/DL   MAGNESIUM    Collection Time: 04/17/21  3:56 AM   Result Value Ref Range    Magnesium 1.9 1.8 - 2.4 mg/dL   CBC W/O DIFF    Collection Time: 04/17/21  3:56 AM   Result Value Ref Range    WBC 7.4 4.3 - 11.1 K/uL    RBC 2.38 (L) 4.23 - 5.6 M/uL    HGB 7.3 (L) 13.6 - 17.2 g/dL    HCT 22.3 (L) 41.1 - 50.3 %    MCV 93.7 79.6 - 97.8 FL    MCH 30.7 26.1 - 32.9 PG    MCHC 32.7 31.4 - 35.0 g/dL    RDW 13.8 11.9 - 14.6 %    PLATELET 188 (L) 542 - 450 K/uL    MPV 9.6 9.4 - 12.3 FL    ABSOLUTE NRBC 0.00 0.0 - 0.2 K/uL     Vital Signs:    Patient Vitals for the past 8 hrs:   BP Temp Pulse Resp SpO2   21 0756 (!) 144/78 99.1 °F (37.3 °C) 76 18 91 %     Temp (24hrs), Av.3 °F (36.8 °C), Min:97.5 °F (36.4 °C), Max:99.1 °F (37.3 °C)    There is no height or weight on file to calculate BMI. Pain Control:   Pain Assessment  Pain Scale 1: Numeric (0 - 10)  Pain Intensity 1: 2  Pain Location 1: Shoulder  Pain Orientation 1: Right  Pain Intervention(s) 1: Medication (see MAR), Rest    Subjective:  No SOB, No Chest Pain, No nausea or vomiting     Objective: Vital Signs are Stable, No Acute Distress, confused, Dressing is dry,  Neurovascular exam is normal.       PT/OT:            Assistive Device: Sling                Wieght Bearing Status: WBAT    Meds:  [unfilled]  [unfilled]  [unfilled]    Assessment:   Patient Active Problem List   Diagnosis Code    Malignant neoplasm of prostate ([facility] [address] C61    Acoustic neuroma syndrome (HCC) D33.3    Dyslipidemia E78.5    Parkinsons disease ([facility] [address] G20    Essential hypertension I10    Tourette syndrome F95.2    Dementia due to Parkinson's disease without behavioral disturbance (HCC) G20, F02.80    Neurologic orthostatic hypotension (HCC) G90.3    Excessive daytime sleepiness G47.19    Acoustic neuroma (HCC) D33.3    Closed displaced comminuted fracture of shaft of right humerus S42.351A             Plan: Continue Physical Therapy, Monitor labs, await rehab placement.  D/c pain meds        Signed By: Jake Garcia MD

## 2021-04-17 NOTE — PROGRESS NOTES
Problem: Falls - Risk of  Goal: *Absence of Falls  Description: Document Jude Landa Fall Risk and appropriate interventions in the flowsheet.   Outcome: Progressing Towards Goal  Note: Fall Risk Interventions:  Mobility Interventions: Bed/chair exit alarm    Mentation Interventions: Bed/chair exit alarm    Medication Interventions: Bed/chair exit alarm    Elimination Interventions: Call light in reach    History of Falls Interventions: Bed/chair exit alarm

## 2021-04-17 NOTE — PROGRESS NOTES
Problem: Mobility Impaired (Adult and Pediatric)  Goal: *Acute Goals and Plan of Care (Insert Text)  Outcome: Progressing Towards Goal  Note: GOALS (1-4 days):  (1.)  Patient will move from supine to sit and sit to supine  in bed with CONTACT GUARD ASSIST.    (2.)  Patient will transfer from bed to chair and chair to bed with CONTACT GUARD ASSIST using the least restrictive device. (3.)  Patient will ambulate with CONTACT GUARD ASSIST for 100 feet with the least restrictive device. (4.)  Patient will be independent with HEP to increase range of motion per MD orders. ________________________________________________________________________________________________       PHYSICAL THERAPY: Daily Note and AM 4/17/2021  INPATIENT: Hospital Day: 3  [place] / [place] / Product Type: Medicare /      NAME/AGE/GENDER: Ryder Jj is a 79 y.o. male   PRIMARY DIAGNOSIS: Closed comminuted fracture of humerus, right, initial encounter [S42.351A]  Closed displaced comminuted fracture of shaft of right humerus [S42.351A]  Closed displaced comminuted fracture of shaft of right humerus, initial encounter Jazmine Wayne <principal problem not specified> <principal problem not specified>  Procedure(s) (LRB):  OPEN REDUCTION, INTERNAL FIXATION RIGHT HUMERAL SHAFT FRACTURE (Right)  2 Days Post-Op  ICD-10: Treatment Diagnosis:   · Pain in Right Shoulder (M25.511)  · Stiffness of Right Shoulder, Not elsewhere classified (M25.611)  · Other abnormalities of gait and mobility (R26.89)   Precaution/Allergies:  Aricept [donepezil] and Onion     Active and passive range of motion right elbow hand     gentle pendulums   nwb ue right   wbat ble   Sling right shoulder   Question: Reason for PT?  Answer: s/p orif right humeral shaft        ASSESSMENT:     Mr. Jerica Land presents decreased functional mobility and gait as well as decreased rom and strength of right UE s/p Procedure(s) (LRB):  OPEN REDUCTION, INTERNAL FIXATION RIGHT HUMERAL SHAFT FRACTURE (Right)  Wife and caregiver present. Pt. Juan A Cline he was in the hospital but needed guidance as to why. He normally ambulates around his house and is fairly independent with adls until his recent fall. Reviewed safety and precautions below. He needed assistance to get out of bed and into chair. He did some right wrist/hand elbow rom with assistance. He has chronic wound in skin crease of hand of 3rd-5th digits. Plans are for rehab. PM- pt. Still up in chair without complaints. He reports some shoulder soreness. He performed exercises listed below with assistance. Reminded of no shoulder motion and safety. Instructed in using his left hand to extend his right fingers to help with skin breakdown. Also, gave him a towel roll to use in his hand to keep fingers extended. He ambulated around the bed, unsteady on his feet but did fine. Left supine with bed alarm on. Tremors noted. Plans for rehab.  4/17/21:  Patient kept asking if I was going to take him home. Also asking if people are fishing. Required assist for all exercises. Unable to perform pendulums-unstable in standing and unable to follow directions in sitting. Unstable with ambulating in the room. This section established at most recent assessment   PROBLEM LIST (Impairments causing functional limitations):  1. Decreased Casey with Bed Mobility  2. Decreased Casey with Transfers  3. Decreased Casey with Ambulation   4. Decreased [place] with shoulder HEP   INTERVENTIONS PLANNED: (Benefits and precautions of physical therapy have been discussed with the patient.)  1. Bed Mobility Training  2. Transfer Training  3. Gait Training  4. Therapeutic Exercises per MD orders  5.  Modalities for Pain     TREATMENT PLAN: Frequency/Duration: twice daily for duration of hospital stay  Rehabilitation Potential For Stated Goals: Good     RECOMMENDED REHABILITATION/EQUIPMENT: (at time of discharge pending progress): Continue Skilled Therapy and Rehab. HISTORY:   History of Present Injury/Illness (Reason for Referral):  S/p Procedure(s) (LRB):  OPEN REDUCTION, INTERNAL FIXATION RIGHT HUMERAL SHAFT FRACTURE (Right)  Past Medical History/Comorbidities:   Mr. Nasim Badillo  has a past medical history of Acoustic neuroma ([facility] [address] (2012), Cholelithiasis (08/20/2015), Colon polyps (12/31/2014), Dementia due to Parkinson's disease without behavioral disturbance ([address] (4/20/2018), DVT (deep venous thrombosis) ([address] (02/17/2017), Dyslipidemia, Environmental allergies, GERD (gastroesophageal reflux disease), Hiatal hernia, Hypertension, Intractable hiccups, Liver cyst (02/16/2017), Melanoma ([address] Parkinson disease ([address] Prostate cancer ([address] and Tourette syndrome (04/20/2018). He also has no past medical history of Arrhythmia, Asthma, Autoimmune disease ([address] Chronic pain, Coagulation defects, COPD, Diabetes ([address] Heart failure ([address] Malignant hyperthermia due to anesthesia, Other ill-defined conditions(799.89), Pseudocholinesterase deficiency, Psychiatric disorder, Stroke ([address] Thromboembolus ([address] Unspecified adverse effect of anesthesia, or Unspecified sleep apnea. Mr. Nasim Badillo  has a past surgical history that includes hx prostatectomy (11/2009); hx knee replacement (Left); hx hernia repair (Bilateral); hx tonsillectomy; hx hemorrhoidectomy; hx heent (Right); and hx colonoscopy (12/31/2014).   Social History/Living Environment:   Home Environment: Private residence  One/Two Story Residence: One story  Living Alone: No  Support Systems: Spouse/Significant Other/Partner  Patient Expects to be Discharged to[de-identified] Private residence  Current DME Used/Available at Home: None  Prior Level of Function/Work/Activity:  Mostly independent, occasional assist with adl's, but more assist since his fall this week   Number of Personal Factors/Comorbidities that affect the Plan of Care: 1-2: MODERATE COMPLEXITY EXAMINATION:   Most Recent Physical Functioning:   Gross Assessment:  AROM: Generally decreased, functional(L UE and B LEs)  Strength: Generally decreased, functional(L UE and B LEs)  Coordination: Generally decreased, functional               Posture:     Balance:  Sitting: Impaired  Sitting - Static: Fair (occasional)  Sitting - Dynamic: Fair (occasional)  Standing: Impaired  Standing - Static: Constant support  Standing - Dynamic : Constant support Bed Mobility:  Supine to Sit: Contact guard assistance  Wheelchair Mobility:     Transfers:  Sit to Stand: Minimum assistance  Stand to Sit: Minimum assistance  Bed to Chair: Minimum assistance; Moderate assistance  Gait:     Speed/Gisell: Delayed  Step Length: Left shortened;Right shortened  Gait Abnormalities: Decreased step clearance  Distance (ft): 15 Feet (ft)  Assistive Device: Brace/Splint;Gait belt  Ambulation - Level of Assistance: Minimal assistance; Moderate assistance  Interventions: Manual cues; Safety awareness training;Verbal cues      Body Structures Involved:  1. Bones  2. Joints  3. Muscles  4. Ligaments Body Functions Affected:  1. Movement Related Activities and Participation Affected:  1. Mobility   Number of elements that affect the Plan of Care: 3: MODERATE COMPLEXITY   CLINICAL PRESENTATION:   Presentation: Stable and uncomplicated: LOW COMPLEXITY   CLINICAL DECISION MAKIN[address] [place] [address] AM-PAC 6 Clicks   Basic Mobility Inpatient Short Form  How much difficulty does the patient currently have. .. Unable A Lot A Little None   1. Turning over in bed (including adjusting bedclothes, sheets and blankets)? [] 1   [] 2   [x] 3   [] 4   2. Sitting down on and standing up from a chair with arms ( e.g., wheelchair, bedside commode, etc.)   [] 1   [] 2   [x] 3   [] 4   3. Moving from lying on back to sitting on the side of the bed? [] 1   [] 2   [x] 3   [] 4   How much help from another person does the patient currently need. ..  Total A Lot A Little None   4. Moving to and from a bed to a chair (including a wheelchair)? [] 1   [] 2   [x] 3   [] 4   5. Need to walk in hospital room? [] 1   [x] 2   [] 3   [] 4   6. Climbing 3-5 steps with a railing? [] 1   [x] 2   [] 3   [] 4   © 2007, Trustees of [address] [place] [address] [address] [address], under license to [employer]. All rights reserved      Score:  Initial: 16 Most Recent: X (Date: -- )    Interpretation of Tool:  Represents activities that are increasingly more difficult (i.e. Bed mobility, Transfers, Gait). Medical Necessity:     · Patient is expected to demonstrate progress in   · strength, range of motion, and balance  ·  to   · decrease assistance required with exercises and functional mobility  · . Reason for Services/Other Comments:  · Patient   · continues to require present interventions due to patient's inability to perform exercises and functional mobility independently  · . Use of outcome tool(s) and clinical judgement create a POC that gives a: Questionable prediction of patient's progress: MODERATE COMPLEXITY            TREATMENT:   (In addition to Assessment/Re-Assessment sessions the following treatments were rendered)   Pre-treatment Symptoms/Complaints:  \"I'll pay your $5 to take us home. \"  Pain: Initial:   Pain Intensity 1: 0  Post Session:  0     Therapeutic Exercise: (10 Minutes):  Exercises per grid below to improve mobility and strength. Required minimal visual, verbal, and manual cues to promote proper body alignment, promote proper body posture, and promote proper body mechanics. Progressed range and repetitions as indicated. Therapeutic Activity: (   20 minutes ):  Therapeutic activities including Bed transfers, Chair transfers, Ambulation on level ground and standing to improve mobility, strength and balance. Required minimal Manual cues; Safety awareness training;Verbal cues to promote static and dynamic balance in standing.       Date:  4/16 Date:  4/17/21 Date: ACTIVITY/EXERCISE AM PM AM PM AM PM   Gripping 10aa 12aa 15 AA      Wrist Flexion/Extension 10 12a 15 AA      Wrist Ulnar/Radial Deviation         Pronation/Supination 10aa 12aa 15 AA      Elbow Flexion/Extension 10aa 12aa 15 AA      Shoulder Flexion/Extension         Shoulder AB/ADduction         Shoulder IR/ER         Pulleys         Pendulums  20 seconds       Shrugs         Isometric:                 Flexion         Extension         ABduction         ADduction         Biceps/Triceps                  B = bilateral; AA = active assistive; A = active; P = passive  Education:  [x]  Home Exercises  [x]  Sling Application   [x]  Movement Precautions   []  Pulleys   []  Use of Ice   []  Other:   Treatment/Session Assessment:    · Response to Treatment:  Patient pleasant but unable to follow directions. Unsteady in standing and with gait. · Interdisciplinary Collaboration:   o Physical Therapist  o Registered Nurse  · After treatment position/precautions:   o Up in chair  o Bed alarm/tab alert on  o Bed/Chair-wheels locked  o Bed in low position  o Caregiver at bedside  o Call light within reach   · Compliance with Program/Exercises: Will assess as treatment progresses. · Recommendations/Intent for next treatment session:  Treatment next visit will focus on increasing Mr. Ryan Suárez independence with bed mobility, transfers, gait training, strength/ROM exercises, modalities for pain, and patient education.    Total Treatment Duration:  PT Patient Time In/Time Out  Time In: 0800  Time Out: 0830    Alfonso Prakash, PT

## 2021-04-17 NOTE — PROGRESS NOTES
Shift assessment complete. Pt resting in bed. Disoriented x4 but reoriented. Surgical dsg to right shoulder c/d/i. Sling in place. Bed in lowest position, call light within reach, side rails x3, bed alarm in place. Encouraged to call for help when needed.

## 2021-04-18 LAB
ANION GAP SERPL CALC-SCNC: 4 MMOL/L (ref 7–16)
BUN SERPL-MCNC: 19 MG/DL (ref 8–23)
CALCIUM SERPL-MCNC: 8.8 MG/DL (ref 8.3–10.4)
CHLORIDE SERPL-SCNC: 108 MMOL/L (ref 98–107)
CO2 SERPL-SCNC: 28 MMOL/L (ref 21–32)
CREAT SERPL-MCNC: 0.58 MG/DL (ref 0.8–1.5)
ERYTHROCYTE [DISTWIDTH] IN BLOOD BY AUTOMATED COUNT: 13.5 % (ref 11.9–14.6)
GLUCOSE SERPL-MCNC: 103 MG/DL (ref 65–100)
HCT VFR BLD AUTO: 23 % (ref 41.1–50.3)
HGB BLD-MCNC: 7.4 G/DL (ref 13.6–17.2)
MAGNESIUM SERPL-MCNC: 1.9 MG/DL (ref 1.8–2.4)
MCH RBC QN AUTO: 30.5 PG (ref 26.1–32.9)
MCHC RBC AUTO-ENTMCNC: 32.2 G/DL (ref 31.4–35)
MCV RBC AUTO: 94.7 FL (ref 79.6–97.8)
MM INDURATION POC: 0 MM (ref 0–5)
NRBC # BLD: 0 K/UL (ref 0–0.2)
PLATELET # BLD AUTO: 156 K/UL (ref 150–450)
PMV BLD AUTO: 9.8 FL (ref 9.4–12.3)
POTASSIUM SERPL-SCNC: 3.6 MMOL/L (ref 3.5–5.1)
PPD POC: NEGATIVE NEGATIVE
RBC # BLD AUTO: 2.43 M/UL (ref 4.23–5.6)
SODIUM SERPL-SCNC: 140 MMOL/L (ref 136–145)
WBC # BLD AUTO: 6 K/UL (ref 4.3–11.1)

## 2021-04-18 PROCEDURE — 2709999900 HC NON-CHARGEABLE SUPPLY

## 2021-04-18 PROCEDURE — 74011250636 HC RX REV CODE- 250/636: Performed by: HOSPITALIST

## 2021-04-18 PROCEDURE — 85027 COMPLETE CBC AUTOMATED: CPT

## 2021-04-18 PROCEDURE — 74011000250 HC RX REV CODE- 250: Performed by: HOSPITALIST

## 2021-04-18 PROCEDURE — 83735 ASSAY OF MAGNESIUM: CPT

## 2021-04-18 PROCEDURE — 65270000029 HC RM PRIVATE

## 2021-04-18 PROCEDURE — 97110 THERAPEUTIC EXERCISES: CPT

## 2021-04-18 PROCEDURE — 80048 BASIC METABOLIC PNL TOTAL CA: CPT

## 2021-04-18 PROCEDURE — 74011250637 HC RX REV CODE- 250/637: Performed by: ORTHOPAEDIC SURGERY

## 2021-04-18 PROCEDURE — 36415 COLL VENOUS BLD VENIPUNCTURE: CPT

## 2021-04-18 PROCEDURE — 97530 THERAPEUTIC ACTIVITIES: CPT

## 2021-04-18 PROCEDURE — 74011250637 HC RX REV CODE- 250/637: Performed by: HOSPITALIST

## 2021-04-18 RX ADMIN — CARBIDOPA AND LEVODOPA 1 TABLET: 25; 100 TABLET ORAL at 17:58

## 2021-04-18 RX ADMIN — FERROUS SULFATE TAB 325 MG (65 MG ELEMENTAL FE) 325 MG: 325 (65 FE) TAB at 17:57

## 2021-04-18 RX ADMIN — FERROUS SULFATE TAB 325 MG (65 MG ELEMENTAL FE) 325 MG: 325 (65 FE) TAB at 10:58

## 2021-04-18 RX ADMIN — PYRIDOSTIGMINE BROMIDE 30 MG: 60 TABLET ORAL at 10:58

## 2021-04-18 RX ADMIN — PYRIDOSTIGMINE BROMIDE 30 MG: 60 TABLET ORAL at 17:57

## 2021-04-18 RX ADMIN — ATORVASTATIN CALCIUM 10 MG: 10 TABLET, FILM COATED ORAL at 21:26

## 2021-04-18 RX ADMIN — CARBIDOPA AND LEVODOPA 2 TABLET: 25; 100 TABLET, EXTENDED RELEASE ORAL at 21:26

## 2021-04-18 RX ADMIN — CARBIDOPA AND LEVODOPA 1 TABLET: 25; 250 TABLET ORAL at 14:27

## 2021-04-18 RX ADMIN — Medication 10 ML: at 21:27

## 2021-04-18 RX ADMIN — VITAMIN D, TAB 1000IU (100/BT) 1000 UNITS: 25 TAB at 10:58

## 2021-04-18 RX ADMIN — MEMANTINE 10 MG: 5 TABLET ORAL at 10:57

## 2021-04-18 RX ADMIN — DOCUSATE SODIUM 100 MG: 100 CAPSULE, LIQUID FILLED ORAL at 10:58

## 2021-04-18 RX ADMIN — CARBIDOPA AND LEVODOPA 1 TABLET: 25; 250 TABLET ORAL at 17:57

## 2021-04-18 RX ADMIN — Medication 10 ML: at 05:34

## 2021-04-18 RX ADMIN — ESCITALOPRAM OXALATE 10 MG: 10 TABLET ORAL at 10:58

## 2021-04-18 RX ADMIN — CARBIDOPA AND LEVODOPA 1 TABLET: 25; 100 TABLET ORAL at 14:27

## 2021-04-18 RX ADMIN — PIMOZIDE: 1 TABLET ORAL at 22:00

## 2021-04-18 RX ADMIN — MONTELUKAST 10 MG: 10 TABLET, FILM COATED ORAL at 10:58

## 2021-04-18 RX ADMIN — CARBIDOPA AND LEVODOPA 0.5 TABLET: 25; 100 TABLET ORAL at 10:58

## 2021-04-18 RX ADMIN — CARBIDOPA AND LEVODOPA 1 TABLET: 25; 250 TABLET ORAL at 10:58

## 2021-04-18 RX ADMIN — MEMANTINE 10 MG: 5 TABLET ORAL at 17:57

## 2021-04-18 RX ADMIN — THIAMINE HYDROCHLORIDE: 100 INJECTION, SOLUTION INTRAMUSCULAR; INTRAVENOUS at 16:35

## 2021-04-18 RX ADMIN — DOCUSATE SODIUM 100 MG: 100 CAPSULE, LIQUID FILLED ORAL at 17:57

## 2021-04-18 NOTE — PROGRESS NOTES
Problem: Mobility Impaired (Adult and Pediatric)  Goal: *Acute Goals and Plan of Care (Insert Text)  Outcome: Progressing Towards Goal  Note: GOALS (1-4 days):  (1.)  Patient will move from supine to sit and sit to supine  in bed with CONTACT GUARD ASSIST.    (2.)  Patient will transfer from bed to chair and chair to bed with CONTACT GUARD ASSIST using the least restrictive device. (3.)  Patient will ambulate with CONTACT GUARD ASSIST for 100 feet with the least restrictive device. (4.)  Patient will be independent with HEP to increase range of motion per MD orders. ________________________________________________________________________________________________       PHYSICAL THERAPY: Daily Note and PM 4/18/2021  INPATIENT: Hospital Day: 4  [place] / Product Type: Medicare /      NAME/AGE/GENDER: Sharmila Tang is a 79 y.o. male   PRIMARY DIAGNOSIS: Closed comminuted fracture of humerus, right, initial encounter [S42.351A]  Closed displaced comminuted fracture of shaft of right humerus [S42.351A]  Closed displaced comminuted fracture of shaft of right humerus, initial encounter Cindia Bottom <principal problem not specified> <principal problem not specified>  Procedure(s) (LRB):  OPEN REDUCTION, INTERNAL FIXATION RIGHT HUMERAL SHAFT FRACTURE (Right)  3 Days Post-Op  ICD-10: Treatment Diagnosis:   · Pain in Right Shoulder (M25.511)  · Stiffness of Right Shoulder, Not elsewhere classified (M25.611)  · Other abnormalities of gait and mobility (R26.89)   Precaution/Allergies:  Aricept [donepezil] and Onion     Active and passive range of motion right elbow hand     gentle pendulums   nwb ue right   wbat ble   Sling right shoulder   Question: Reason for PT?  Answer: s/p orif right humeral shaft    4/18/21:  NEW ORDERS PER [facility] WITH DR. Andrea Steve OFFICE:  WEIGHT BEARING AT TOLERATED  OUT OF SLING  NO MOVEMENT RESTRICTIONS        ASSESSMENT:     Mr. Georgia Calabrese presents decreased functional mobility and gait as well as decreased rom and strength of right UE s/p Procedure(s) (LRB):  OPEN REDUCTION, INTERNAL FIXATION RIGHT HUMERAL SHAFT FRACTURE (Right)  Wife and caregiver present. Pt. Karthik Fuller he was in the hospital but needed guidance as to why. He normally ambulates around his house and is fairly independent with adls until his recent fall. Reviewed safety and precautions below. He needed assistance to get out of bed and into chair. He did some right wrist/hand elbow rom with assistance. He has chronic wound in skin crease of hand of 3rd-5th digits. Plans are for rehab. PM- pt. Still up in chair without complaints. He reports some shoulder soreness. He performed exercises listed below with assistance. Reminded of no shoulder motion and safety. Instructed in using his left hand to extend his right fingers to help with skin breakdown. Also, gave him a towel roll to use in his hand to keep fingers extended. He ambulated around the bed, unsteady on his feet but did fine. Left supine with bed alarm on. Tremors noted. Plans for rehab.  4/17/21:  Patient kept asking if I was going to take him home. Also asking if people are fishing. Required assist for all exercises. Unable to perform pendulums-unstable in standing and unable to follow directions in sitting. Unstable with ambulating in the room. PM:  Patient still up in chair and daughter present. Able to follow directions a bit better for exercises. Not stable enough to do pendulums in standing so performed with assist sitting edge of chair. Ambulated around the room with less assist for stability. Assisted RN with dressing change due to drainage. 4/18/21:  Patient seen in pm.  Out of sling and dressing removed by Dr. Penelope Masters this am.  Patient not as alert as yesterday but tolerated well. Ambulated to chair for exercises and tolerated well.   Ambulated again after exercises and a bit more stable but not safe to be up without assist.      This section established at most recent assessment   PROBLEM LIST (Impairments causing functional limitations):  1. Decreased [place] with Bed Mobility  2. Decreased [place] with Transfers  3. Decreased [place] with Ambulation   4. Decreased [place] with shoulder HEP   INTERVENTIONS PLANNED: (Benefits and precautions of physical therapy have been discussed with the patient.)  1. Bed Mobility Training  2. Transfer Training  3. Gait Training  4. Therapeutic Exercises per MD orders  5. Modalities for Pain     TREATMENT PLAN: Frequency/Duration: twice daily for duration of hospital stay  Rehabilitation Potential For Stated Goals: Good     RECOMMENDED REHABILITATION/EQUIPMENT: (at time of discharge pending progress): Continue Skilled Therapy and Rehab. HISTORY:   History of Present Injury/Illness (Reason for Referral):  S/p Procedure(s) (LRB):  OPEN REDUCTION, INTERNAL FIXATION RIGHT HUMERAL SHAFT FRACTURE (Right)  Past Medical History/Comorbidities:   Mr. Ebony Boggs  has a past medical history of Acoustic neuroma ([address] (2012), Cholelithiasis (08/20/2015), Colon polyps (12/31/2014), Dementia due to Parkinson's disease without behavioral disturbance ([address] (4/20/2018), DVT (deep venous thrombosis) ([address] (02/17/2017), Dyslipidemia, Environmental allergies, GERD (gastroesophageal reflux disease), Hiatal hernia, Hypertension, Intractable hiccups, Liver cyst (02/16/2017), Melanoma ([address] Parkinson disease ([address] Prostate cancer ([address] and Tourette syndrome (04/20/2018). He also has no past medical history of Arrhythmia, Asthma, Autoimmune disease ([address] Chronic pain, Coagulation defects, COPD, Diabetes ([address] Heart failure ([address] Malignant hyperthermia due to anesthesia, Other ill-defined conditions(799.89), Pseudocholinesterase deficiency, Psychiatric disorder, Stroke ([address] Thromboembolus ([address] Unspecified adverse effect of anesthesia, or Unspecified sleep apnea.   Mr. Ebony Boggs has a past surgical history that includes hx prostatectomy (11/2009); hx knee replacement (Left); hx hernia repair (Bilateral); hx tonsillectomy; hx hemorrhoidectomy; hx heent (Right); and hx colonoscopy (12/31/2014). Social History/Living Environment:   Home Environment: Private residence  One/Two Story Residence: One story  Living Alone: No  Support Systems: Spouse/Significant Other/Partner  Patient Expects to be Discharged to[de-identified] Private residence  Current DME Used/Available at Home: None  Prior Level of Function/Work/Activity:  Mostly independent, occasional assist with adl's, but more assist since his fall this week   Number of Personal Factors/Comorbidities that affect the Plan of Care: 1-2: MODERATE COMPLEXITY   EXAMINATION:   Most Recent Physical Functioning:   Gross Assessment:  AROM: Generally decreased, functional  Strength: Generally decreased, functional  Coordination: Generally decreased, functional   RUE PROM  R Shoulder Flexion: 40        RUE Strength  R Elbow Flexion: 2  R : 2  Posture:     Balance:  Sitting: Intact  Standing: Impaired  Standing - Static: Constant support  Standing - Dynamic : Constant support Bed Mobility:  Supine to Sit: Stand-by assistance  Scooting: Stand-by assistance  Wheelchair Mobility:     Transfers:  Sit to Stand: Contact guard assistance;Minimum assistance  Stand to Sit: Contact guard assistance  Bed to Chair: Minimum assistance  Gait:     Speed/Gisell: Pace decreased (<100 feet/min)  Step Length: Left shortened;Right shortened  Gait Abnormalities: Decreased step clearance  Distance (ft): 15 Feet (ft)(x 1, 20' x 1)  Assistive Device: Gait belt  Ambulation - Level of Assistance: Minimal assistance  Interventions: Safety awareness training;Verbal cues      Body Structures Involved:  1. Bones  2. Joints  3. Muscles  4. Ligaments Body Functions Affected:  1. Movement Related Activities and Participation Affected:  1.  Mobility   Number of elements that affect the Plan of Care: 3: MODERATE COMPLEXITY   CLINICAL PRESENTATION:   Presentation: Stable and uncomplicated: LOW COMPLEXITY   CLINICAL DECISION MAKING:   [facility] AM-PAC 6 Clicks   Basic Mobility Inpatient Short Form  How much difficulty does the patient currently have. .. Unable A Lot A Little None   1. Turning over in bed (including adjusting bedclothes, sheets and blankets)? [] 1   [] 2   [x] 3   [] 4   2. Sitting down on and standing up from a chair with arms ( e.g., wheelchair, bedside commode, etc.)   [] 1   [] 2   [x] 3   [] 4   3. Moving from lying on back to sitting on the side of the bed? [] 1   [] 2   [x] 3   [] 4   How much help from another person does the patient currently need. .. Total A Lot A Little None   4. Moving to and from a bed to a chair (including a wheelchair)? [] 1   [] 2   [x] 3   [] 4   5. Need to walk in hospital room? [] 1   [x] 2   [] 3   [] 4   6. Climbing 3-5 steps with a railing? [] 1   [x] 2   [] 3   [] 4   © 2007, Trustees of [facility], under license to [employer]. All rights reserved      Score:  Initial: 16 Most Recent: X (Date: -- )    Interpretation of Tool:  Represents activities that are increasingly more difficult (i.e. Bed mobility, Transfers, Gait). Medical Necessity:     · Patient is expected to demonstrate progress in   · strength, range of motion, and balance  ·  to   · decrease assistance required with exercises and functional mobility  · . Reason for Services/Other Comments:  · Patient   · continues to require present interventions due to patient's inability to perform exercises and functional mobility independently  · . Use of outcome tool(s) and clinical judgement create a POC that gives a: Questionable prediction of patient's progress: MODERATE COMPLEXITY            TREATMENT:   (In addition to Assessment/Re-Assessment sessions the following treatments were rendered)   Pre-treatment Symptoms/Complaints:  Patient agreeable.   Pain: Initial:   Pain Intensity 1: 4  Post Session:  4     Therapeutic Exercise: (10 Minutes):  Exercises per grid below to improve mobility and strength. Required minimal visual, verbal, and manual cues to promote proper body alignment, promote proper body posture, and promote proper body mechanics. Progressed range and repetitions as indicated. Therapeutic Activity: (   15 minutes ):  Therapeutic activities including Bed mobility, Chair transfers, and Ambulation on level ground to improve mobility, strength and balance. Required minimal Safety awareness training;Verbal cues to promote static and dynamic balance in standing. Date:  4/16 Date:  4/17/21 Date:  4/18/21   ACTIVITY/EXERCISE AM PM AM PM AM PM   Gripping 10aa 12aa 15 AA 15 AA 20 AA    Wrist Flexion/Extension 10 12a 15 AA 15 AA 20 A    Wrist Ulnar/Radial Deviation         Pronation/Supination 10aa 12aa 15 AA 15 AA 20 AA    Elbow Flexion/Extension 10aa 12aa 15 AA 15 AA 20 AA    Shoulder Flexion/Extension     20 AA/P    Shoulder AB/ADduction         Shoulder IR/ER         Pulleys         Pendulums  20 seconds  10 lateral, 10 a/p, 10 CW, 10 CCW-all AA     Shrugs         Isometric:                 Flexion         Extension         ABduction         ADduction         Biceps/Triceps                  B = bilateral; AA = active assistive; A = active; P = passive  Education:  [x]  Home Exercises  [x]  Sling Application   [x]  Movement Precautions   []  Pulleys   []  Use of Ice   []  Other:   Treatment/Session Assessment:    · Response to Treatment:  Patient participated well. No reports of complaint out of sling or with shoulder motion. · Interdisciplinary Collaboration:   o Physical Therapist  o Registered Nurse  · After treatment position/precautions:   o Up in chair  o Bed alarm/tab alert on  o Bed/Chair-wheels locked  o Bed in low position  o Call light within reach  o RN notified  o Family at bedside   · Compliance with Program/Exercises:  Will assess as treatment progresses. · Recommendations/Intent for next treatment session:  Treatment next visit will focus on increasing Mr. Vernell Sims independence with bed mobility, transfers, gait training, strength/ROM exercises, modalities for pain, and patient education.    Total Treatment Duration:  PT Patient Time In/Time Out  Time In: 1400  Time Out: 4500 [address] [place] [address],[address]

## 2021-04-18 NOTE — PROGRESS NOTES
Notified MD of pt being very drowsy today and that he has a small skin tear on upper right forearm that is draining. Applied guaze and secured with [facility]. Edema has gone down in right arm, have kept it elevated on pillows.

## 2021-04-18 NOTE — PROGRESS NOTES
[facility] Hospitalist Service Progress Note    INTERVAL HISTORY  / Subjective: Had exhibited sundowning overnight / evening. Blood pressure and hemoglobin stable     Assessment / Plan:  50-year-old male with past medical history of Parkinson's disease, dementia, tremors, Tourette syndrome neurologic orthostatic hypotension, neurologic orthostatic hypotension. 04/13 He tried to go for a walk and fell resulting in a   closed comminuted depressed placed right humeral shaft fracture for which an  open reduction and internal fixation of the right humeral shaft fracture was performed on 04/15 estimated blood loss 200 cc, anesthesia was general with interscalene nerve block. Closed comminuted depressed placed right humeral shaft fracture  -04/15  a open reduction and internal fixation of the right humeral shaft fracture was performed on, estimated blood loss 200 cc, anesthesia was general with interscalene nerve block  --04/16 Acute blood loss anemia post operatively hemoglobin down trended to 7.5, hemodynamically stable, he will receive 1 unit PRBCs and Plan for SNF Placement.   --04/17 HGB 7.3 Today, Blood pressure stable. Plan for SNF Placement.   --04/18 HGB 7.4 Today, Blood pressure stable. Plan for SNF Placement        Parkinson's disease, dementia, and essential tremor  Resume Namenda, carbidopa/L-dopa  --04/17-18 PRN IM Geraldodon for sundowning while admitted     Drowsiness  - 04/18 Drowsy this AM , no sedating medcations are ordered, perhaps intake is scant recently start D5 4/9OJ + Thiamine + Folic Acid + MVIT X 1L     Neurologic orthostatic hypotension  Resume pyridostigmine  60 mg tablet; Take 1/2 tablet at 10 am and at 6 pm daily to support blood pressure.      Additional  Tourette's syndrome        Objective:  Visit Vitals  BP (!) 110/59   Pulse 80   Temp 98 °F (36.7 °C)   Resp 14   SpO2 93%                 Physical Exam:  Physical Exam:   General: Thin elderly male, he is  No acute distress, speaking in full sentences, no use of accessory muscles   HEENT: Pupils equal and reactive to light and accommodation, oropharynx is clear   Neck: Supple, no lymphadenopathy, no JVD   Lungs: Clear to auscultation bilaterally   Cardiovascular: Regular rate and rhythm with normal S1 and S2   Abdomen: Soft, nontender, nondistended, normoactive bowel sounds   Extremities: No cyanosis clubbing or edema, tremors   Neuro: Nonfocal, A&O x person and place   Psych: Normal affect        Intake and Output:  Date 04/17/21 0700 - 04/18/21 0659 04/18/21 0700 - 04/19/21 0659   Shift 0041-4193 3261-9253 24 Hour Total 0278-1413 1949-1392 24 Hour Total   INTAKE   Shift Total         OUTPUT   Urine         Urine Voided       Shift Total       NET -711 -700 1078      Weight (kg)             LAB:  Admission on 04/15/2021   Component Date Value    Hemoglobin A1c 04/15/2021 <3.5*    Est. average glucose 04/15/2021 Cannot be calculated     PPD 04/16/2021 Negative     mm Induration 04/16/2021 0     WBC 04/15/2021 10.2     RBC 04/15/2021 2.75*    HGB 04/15/2021 8.5*    HCT 04/15/2021 26.4*    MCV 04/15/2021 96.0     MCH 04/15/2021 30.9     MCHC 04/15/2021 32.2     RDW 04/15/2021 13.2     PLATELET 06/43/0773 562*    MPV 04/15/2021 10.3     ABSOLUTE NRBC 04/15/2021 0.00     Sodium 04/15/2021 142     Potassium 04/15/2021 4.3     Chloride 04/15/2021 110*    CO2 04/15/2021 28     Anion gap 04/15/2021 4*    Glucose 04/15/2021 164*    BUN 04/15/2021 38*    Creatinine 04/15/2021 1.09     GFR est AA 04/15/2021 >60     GFR est non-AA 04/15/2021 >60     Calcium 04/15/2021 8.6     Magnesium 04/15/2021 2.2     Sodium 04/16/2021 140     Potassium 04/16/2021 4.4     Chloride 04/16/2021 108*    CO2 04/16/2021 27     Anion gap 04/16/2021 5*    Glucose 04/16/2021 203*    BUN 04/16/2021 38*    Creatinine 04/16/2021 1.17     GFR est AA 04/16/2021 >60     GFR est non-AA 04/16/2021 >60     Calcium 04/16/2021 8.6     Magnesium 04/16/2021 2.2     WBC 04/16/2021 8.1     RBC 04/16/2021 2.48*    HGB 04/16/2021 7.5*    HCT 04/16/2021 23.8*    MCV 04/16/2021 96.0     MCH 04/16/2021 30.2     MCHC 04/16/2021 31.5     RDW 04/16/2021 13.2     PLATELET 97/05/7024 541*    MPV 04/16/2021 10.2     ABSOLUTE NRBC 04/16/2021 0.00     SARS-CoV-2 04/16/2021 Please find results under separate order     Specimen source 04/16/2021 Nasopharyngeal     COVID-19 rapid test 04/16/2021 Not detected     Specimen source 04/16/2021 Nasopharyngeal     SARS-CoV-2 04/16/2021 Not detected     Vitamin D 25-Hydroxy 04/16/2021 25.6*    Sodium 04/17/2021 141     Potassium 04/17/2021 3.4*    Chloride 04/17/2021 108*    CO2 04/17/2021 30     Anion gap 04/17/2021 3*    Glucose 04/17/2021 113*    BUN 04/17/2021 27*    Creatinine 04/17/2021 0.84     GFR est AA 04/17/2021 >60     GFR est non-AA 04/17/2021 >60     Calcium 04/17/2021 8.5     Magnesium 04/17/2021 1.9     WBC 04/17/2021 7.4     RBC 04/17/2021 2.38*    HGB 04/17/2021 7.3*    HCT 04/17/2021 22.3*    MCV 04/17/2021 93.7     MCH 04/17/2021 30.7     MCHC 04/17/2021 32.7     RDW 04/17/2021 13.8     PLATELET 64/69/7577 582*    MPV 04/17/2021 9.6     ABSOLUTE NRBC 04/17/2021 0.00     Sodium 04/18/2021 140     Potassium 04/18/2021 3.6     Chloride 04/18/2021 108*    CO2 04/18/2021 28     Anion gap 04/18/2021 4*    Glucose 04/18/2021 103*    BUN 04/18/2021 19     Creatinine 04/18/2021 0.58*    GFR est AA 04/18/2021 >60     GFR est non-AA 04/18/2021 >60     Calcium 04/18/2021 8.8     Magnesium 04/18/2021 1.9     WBC 04/18/2021 6.0     RBC 04/18/2021 2.43*    HGB 04/18/2021 7.4*    HCT 04/18/2021 23.0*    MCV 04/18/2021 94.7     MCH 04/18/2021 30.5     MCHC 04/18/2021 32.2     RDW 04/18/2021 13.5     PLATELET 16/60/0252 882     MPV 04/18/2021 9.8     ABSOLUTE NRBC 04/18/2021 0.00        IMAGING:  Xr Shoulder Rt Ap/lat Min 2 V    Result Date: 4/15/2021  Status post ORIF of a proximal right diaphyseal fracture without obvious complication. Xr Humerus Rt    Result Date: 4/15/2021  Status post ORIF of a right proximal humerus fracture. No obvious complications. Total fluoroscopy time: 21 seconds; three fluoroscopic spot images saved    Xr Humerus Rt    Result Date: 4/13/2021  Comminuted proximal humeral shaft fracture. Ct Head Wo Cont    Result Date: 4/13/2021  No acute process. EKG:  No results found for this or any previous visit.           Meeta Ruff MD  4/18/2021 8:17 AM

## 2021-04-18 NOTE — PROGRESS NOTES
Shift assessment complete. Patient's right arm is swollen and discolored. Pulses are present and palpable, sensation is present, arm is warm and dry and patient can move fingers. He denies pain. Per patient's wife, his arm has been like this all day. Will continue to monitor.

## 2021-04-18 NOTE — PROGRESS NOTES
PT note: Attempted to see patient for am session. Patient sleeping soundly in bed and would not wake. Sling off and bandages removed but draining from R elbow. Family present and reports Dr. Becky Doty told them he could use his arm. No new orders in chart. Texted with [facility] from Dr. Violet Mendieta office regarding previous orders, sling and movement restrictions. Susan texted back with following reply:  \"Weight bearing as tolerated. Out of sling. No movement restrictions. \"  Will attempt therapy session later today.       Mackenzie Carmichael, PT

## 2021-04-18 NOTE — PROGRESS NOTES
Notified on call provider CARA Zimmerman of patient's swollen and discolored arm. Will continue to monitor.

## 2021-04-18 NOTE — PROGRESS NOTES
md removed dressing and new orders. Applied gauze and tegaderm, sterile technique. Sutures, intact and edges well approximated, scant amt of blood at mid incision.

## 2021-04-18 NOTE — PROGRESS NOTES
Orthopedic Joint Progress Note    2021  Admit Date: 4/15/2021  Admit Diagnosis: Closed comminuted fracture of humerus, right, initial encounter Chen Peters; Closed displaced comminuted fracture of shaft of right humerus [S42.351A]; Closed displaced comminuted fracture of shaft of right humerus, initial encounter [S42.351A]    3 Days Post-Op    Subjective: doing better     Davis Laazr     Review of Systems: Pertinent items are noted in HPI. Objective:     PT/OT:     PATIENT MOBILITY    Bed Mobility  Supine to Sit: Contact guard assistance  Sit to Supine: Contact guard assistance  Transfers  Sit to Stand: Minimum assistance  Stand to Sit: Minimum assistance  Bed to Chair: Minimum assistance      Gait  Speed/Gisell: Slow  Step Length: Left shortened, Right shortened  Gait Abnormalities: Decreased step clearance  Ambulation - Level of Assistance: Minimal assistance  Distance (ft): 25 Feet (ft)  Assistive Device: Brace/Splint, Gait belt  Interventions: Safety awareness training, Verbal cues            Vital Signs:    Blood pressure (!) 110/59, pulse 80, temperature 98 °F (36.7 °C), resp. rate 14, SpO2 93 %.   Temp (24hrs), Av.2 °F (36.8 °C), Min:97.7 °F (36.5 °C), Max:98.8 °F (37.1 °C)      Pain Control:   Pain Assessment  Pain Scale 1: Numeric (0 - 10)  Pain Intensity 1: 0  Pain Location 1: Shoulder  Pain Orientation 1: Right  Pain Intervention(s) 1: Medication (see MAR), Rest    Meds:  Current Facility-Administered Medications   Medication Dose Route Frequency    cholecalciferol (VITAMIN D3) (1000 Units /25 mcg) tablet 1,000 Units  1,000 Units Oral DAILY    acetaminophen (TYLENOL) tablet 650 mg  650 mg Oral Q6H PRN    atorvastatin (LIPITOR) tablet 10 mg  10 mg Oral QHS    pyridostigmine (MESTINON) tablet 30 mg  30 mg Oral BID    memantine (NAMENDA) tablet 10 mg  10 mg Oral BID    0.9% sodium chloride infusion 250 mL  250 mL IntraVENous PRN    ziprasidone (GEODON) 10 mg in sterile water (preservative free) 0.5 mL injection  10 mg IntraMUSCular Q12H PRN    haloperidol lactate (HALDOL) injection 2.5 mg  2.5 mg IntraVENous Q4H PRN    sodium chloride (NS) flush 5-40 mL  5-40 mL IntraVENous Q8H    sodium chloride (NS) flush 5-40 mL  5-40 mL IntraVENous PRN    bisacodyL (DULCOLAX) suppository 10 mg  10 mg Rectal DAILY PRN    sodium phosphate (FLEET'S) enema 1 Enema  1 Enema Rectal PRN    docusate sodium (COLACE) capsule 100 mg  100 mg Oral BID    ferrous sulfate tablet 325 mg  1 Tab Oral BID WITH MEALS    rivastigmine (EXELON) 13.3 mg/24 hour patch 1 Patch  1 Patch TransDERmal DAILY    escitalopram oxalate (LEXAPRO) tablet 10 mg  10 mg Oral DAILY    montelukast (SINGULAIR) tablet 10 mg  10 mg Oral DAILY    pimozide (ORAP) tablet (Patient Supplied)   Oral TID    carbidopa-levodopa ER (SINEMET CR)  mg per tablet 2 Tab  2 Tab Oral QHS    carbidopa-levodopa (SINEMET)  mg per tablet 1 Tab  1 Tab Oral TID    carbidopa-levodopa (SINEMET)  mg per tablet 0.5 Tab  0.5 Tab Oral DAILY    And    carbidopa-levodopa (SINEMET)  mg per tablet 1 Tab  1 Tab Oral BID        LAB:    Lab Results   Component Value Date/Time    INR 1.1 04/13/2021 02:48 PM    INR 1.0 05/04/2010 10:34 AM    INR 1.0 10/29/2009 12:17 PM     Lab Results   Component Value Date/Time    HGB 7.4 (L) 04/18/2021 04:26 AM    HGB 7.3 (L) 04/17/2021 03:56 AM    HGB 7.5 (L) 04/16/2021 03:32 AM       Wound Hand Right (Active)   Number of days: 6       Incision 04/15/21 Arm Right (Active)   Dressing Status Clean;Dry; Intact 04/17/21 1503   Cleansed Cleansed with saline 04/15/21 1316   Dressing/Treatment ABD pad; Ace wrap;Xeroform 04/17/21 1503   Number of days: 3         Physical Exam:  No significant changes   Right shoulder wound cdi  nv intact  Bruising improved    Assessment:      Active Problems:    Closed displaced comminuted fracture of shaft of right humerus (4/15/2021)         Plan:     Continue PT/OT/Rehab  Labs stable  Wound clean dry intact dressing changed  Needs help to eat  Observe   Continue care  Transfer to [facility] tomorrow if stable    Patient Expects to be Discharged to[de-identified] Private residence

## 2021-04-19 VITALS
OXYGEN SATURATION: 95 % | DIASTOLIC BLOOD PRESSURE: 80 MMHG | HEART RATE: 81 BPM | TEMPERATURE: 98.1 F | SYSTOLIC BLOOD PRESSURE: 124 MMHG | RESPIRATION RATE: 16 BRPM

## 2021-04-19 PROBLEM — D62 POSTOPERATIVE ANEMIA DUE TO ACUTE BLOOD LOSS: Status: ACTIVE | Noted: 2021-04-19

## 2021-04-19 LAB
ANION GAP SERPL CALC-SCNC: 4 MMOL/L (ref 7–16)
BUN SERPL-MCNC: 19 MG/DL (ref 8–23)
CALCIUM SERPL-MCNC: 8.9 MG/DL (ref 8.3–10.4)
CHLORIDE SERPL-SCNC: 107 MMOL/L (ref 98–107)
CO2 SERPL-SCNC: 30 MMOL/L (ref 21–32)
CREAT SERPL-MCNC: 0.51 MG/DL (ref 0.8–1.5)
ERYTHROCYTE [DISTWIDTH] IN BLOOD BY AUTOMATED COUNT: 13.6 % (ref 11.9–14.6)
GLUCOSE SERPL-MCNC: 120 MG/DL (ref 65–100)
HCT VFR BLD AUTO: 23.2 % (ref 41.1–50.3)
HGB BLD-MCNC: 7.6 G/DL (ref 13.6–17.2)
MAGNESIUM SERPL-MCNC: 2 MG/DL (ref 1.8–2.4)
MCH RBC QN AUTO: 31 PG (ref 26.1–32.9)
MCHC RBC AUTO-ENTMCNC: 32.8 G/DL (ref 31.4–35)
MCV RBC AUTO: 94.7 FL (ref 79.6–97.8)
NRBC # BLD: 0 K/UL (ref 0–0.2)
PLATELET # BLD AUTO: 174 K/UL (ref 150–450)
PMV BLD AUTO: 9.6 FL (ref 9.4–12.3)
POTASSIUM SERPL-SCNC: 3.7 MMOL/L (ref 3.5–5.1)
RBC # BLD AUTO: 2.45 M/UL (ref 4.23–5.6)
SODIUM SERPL-SCNC: 141 MMOL/L (ref 136–145)
WBC # BLD AUTO: 7.6 K/UL (ref 4.3–11.1)

## 2021-04-19 PROCEDURE — 80048 BASIC METABOLIC PNL TOTAL CA: CPT

## 2021-04-19 PROCEDURE — 74011250637 HC RX REV CODE- 250/637: Performed by: ORTHOPAEDIC SURGERY

## 2021-04-19 PROCEDURE — 74011250637 HC RX REV CODE- 250/637: Performed by: HOSPITALIST

## 2021-04-19 PROCEDURE — 36415 COLL VENOUS BLD VENIPUNCTURE: CPT

## 2021-04-19 PROCEDURE — 2709999900 HC NON-CHARGEABLE SUPPLY

## 2021-04-19 PROCEDURE — 97530 THERAPEUTIC ACTIVITIES: CPT

## 2021-04-19 PROCEDURE — 83735 ASSAY OF MAGNESIUM: CPT

## 2021-04-19 PROCEDURE — 85027 COMPLETE CBC AUTOMATED: CPT

## 2021-04-19 RX ADMIN — DOCUSATE SODIUM 100 MG: 100 CAPSULE, LIQUID FILLED ORAL at 09:41

## 2021-04-19 RX ADMIN — MEMANTINE 10 MG: 5 TABLET ORAL at 09:39

## 2021-04-19 RX ADMIN — CARBIDOPA AND LEVODOPA 0.5 TABLET: 25; 100 TABLET ORAL at 09:39

## 2021-04-19 RX ADMIN — FERROUS SULFATE TAB 325 MG (65 MG ELEMENTAL FE) 325 MG: 325 (65 FE) TAB at 09:41

## 2021-04-19 RX ADMIN — PYRIDOSTIGMINE BROMIDE 30 MG: 60 TABLET ORAL at 09:40

## 2021-04-19 RX ADMIN — MONTELUKAST 10 MG: 10 TABLET, FILM COATED ORAL at 09:40

## 2021-04-19 RX ADMIN — CARBIDOPA AND LEVODOPA 1 TABLET: 25; 250 TABLET ORAL at 09:39

## 2021-04-19 RX ADMIN — ESCITALOPRAM OXALATE 10 MG: 10 TABLET ORAL at 09:40

## 2021-04-19 RX ADMIN — Medication 10 ML: at 05:12

## 2021-04-19 RX ADMIN — VITAMIN D, TAB 1000IU (100/BT) 1000 UNITS: 25 TAB at 09:40

## 2021-04-19 NOTE — PROGRESS NOTES
[facility] Hospitalist Service Progress Note    INTERVAL HISTORY  / Subjective:  Standing up out of bed to chair with assistance, appears to be in no distress      Assessment / Plan:  40-year-old male with past medical history of Parkinson's disease, dementia, tremors, Tourette syndrome neurologic orthostatic hypotension, neurologic orthostatic hypotension. 04/13 He tried to go for a walk and fell resulting in a   closed comminuted depressed placed right humeral shaft fracture for which an  open reduction and internal fixation of the right humeral shaft fracture was performed on 04/15 estimated blood loss 200 cc, anesthesia was general with interscalene nerve block. Closed comminuted depressed placed right humeral shaft fracture  --04/15  a open reduction and internal fixation of the right humeral shaft fracture was performed on, estimated blood loss 200 cc, anesthesia was general with interscalene nerve block  --04/16 Acute blood loss anemia post operatively hemoglobin down trended to 7.5, hemodynamically stable, he will receive 1 unit PRBCs and Plan for SNF Placement.   --04/17 HGB 7.3 Today, Blood pressure stable. Plan for SNF Placement.   --04/18 HGB 7.4 Today, Blood pressure stable. Plan for SNF Placement   --04/19 HGB 7.6 today, blood pressure stable, Plan for SNF placement       Parkinson's disease, dementia, and essential tremor  Resume Namenda, carbidopa/L-dopa  --04/17-18-19 PRN HEATH Geodon for sundowning while admitted      Neurologic orthostatic hypotension  Resume pyridostigmine  60 mg tablet; Take 1/2 tablet at 10 am and at 6 pm daily to support blood pressure.      Additional  Tourette's syndrome        Objective:  Visit Vitals  /80 (BP 1 Location: Left upper arm, BP Patient Position: At rest)   Pulse 81   Temp 98.1 °F (36.7 °C)   Resp 16   SpO2 95%                 Physical Exam:  Physical Exam:   General: Thin elderly male, he is  No acute distress, speaking in full sentences, no use of accessory muscles   HEENT: Pupils equal and reactive to light and accommodation, oropharynx is clear   Neck: Supple, no lymphadenopathy, no JVD   Lungs: Clear to auscultation bilaterally   Cardiovascular: Regular rate and rhythm with normal S1 and S2   Abdomen: Soft, nontender, nondistended, normoactive bowel sounds   Extremities: No cyanosis clubbing or edema, tremors   Neuro: Nonfocal, A&O x person and place   Psych: Normal affect        Intake and Output:  Date 04/18/21 0700 - 04/19/21 0659 04/19/21 0700 - 04/20/21 0659   Shift 4537-6776 3025-5951 24 Hour Total 0934-0091 3459-9383 24 Hour Total   INTAKE   Shift Total         OUTPUT   Urine 1850 1000 2850        Urine Output (mL) (Urinary Catheter 2- way) 1850 1000 2850      Shift Total 1850 1000 2850      NET -1850 -1000 -2850      Weight (kg)             LAB:  Admission on 04/15/2021   Component Date Value    Hemoglobin A1c 04/15/2021 <3.5*    Est. average glucose 04/15/2021 Cannot be calculated     PPD 04/16/2021 Negative     mm Induration 04/16/2021 0     WBC 04/15/2021 10.2     RBC 04/15/2021 2.75*    HGB 04/15/2021 8.5*    HCT 04/15/2021 26.4*    MCV 04/15/2021 96.0     MCH 04/15/2021 30.9     MCHC 04/15/2021 32.2     RDW 04/15/2021 13.2     PLATELET 22/25/8528 543*    MPV 04/15/2021 10.3     ABSOLUTE NRBC 04/15/2021 0.00     Sodium 04/15/2021 142     Potassium 04/15/2021 4.3     Chloride 04/15/2021 110*    CO2 04/15/2021 28     Anion gap 04/15/2021 4*    Glucose 04/15/2021 164*    BUN 04/15/2021 38*    Creatinine 04/15/2021 1.09     GFR est AA 04/15/2021 >60     GFR est non-AA 04/15/2021 >60     Calcium 04/15/2021 8.6     Magnesium 04/15/2021 2.2     Sodium 04/16/2021 140     Potassium 04/16/2021 4.4     Chloride 04/16/2021 108*    CO2 04/16/2021 27     Anion gap 04/16/2021 5*    Glucose 04/16/2021 203*    BUN 04/16/2021 38*    Creatinine 04/16/2021 1.17     GFR est AA 04/16/2021 >60     GFR est non-AA 04/16/2021 >60  Calcium 04/16/2021 8.6     Magnesium 04/16/2021 2.2     WBC 04/16/2021 8.1     RBC 04/16/2021 2.48*    HGB 04/16/2021 7.5*    HCT 04/16/2021 23.8*    MCV 04/16/2021 96.0     MCH 04/16/2021 30.2     MCHC 04/16/2021 31.5     RDW 04/16/2021 13.2     PLATELET 87/23/6787 942*    MPV 04/16/2021 10.2     ABSOLUTE NRBC 04/16/2021 0.00     SARS-CoV-2 04/16/2021 Please find results under separate order     Specimen source 04/16/2021 Nasopharyngeal     COVID-19 rapid test 04/16/2021 Not detected     Specimen source 04/16/2021 Nasopharyngeal     SARS-CoV-2 04/16/2021 Not detected     Vitamin D 25-Hydroxy 04/16/2021 25.6*    Sodium 04/17/2021 141     Potassium 04/17/2021 3.4*    Chloride 04/17/2021 108*    CO2 04/17/2021 30     Anion gap 04/17/2021 3*    Glucose 04/17/2021 113*    BUN 04/17/2021 27*    Creatinine 04/17/2021 0.84     GFR est AA 04/17/2021 >60     GFR est non-AA 04/17/2021 >60     Calcium 04/17/2021 8.5     Magnesium 04/17/2021 1.9     WBC 04/17/2021 7.4     RBC 04/17/2021 2.38*    HGB 04/17/2021 7.3*    HCT 04/17/2021 22.3*    MCV 04/17/2021 93.7     MCH 04/17/2021 30.7     MCHC 04/17/2021 32.7     RDW 04/17/2021 13.8     PLATELET 41/48/2431 679*    MPV 04/17/2021 9.6     ABSOLUTE NRBC 04/17/2021 0.00     Sodium 04/18/2021 140     Potassium 04/18/2021 3.6     Chloride 04/18/2021 108*    CO2 04/18/2021 28     Anion gap 04/18/2021 4*    Glucose 04/18/2021 103*    BUN 04/18/2021 19     Creatinine 04/18/2021 0.58*    GFR est AA 04/18/2021 >60     GFR est non-AA 04/18/2021 >60     Calcium 04/18/2021 8.8     Magnesium 04/18/2021 1.9     WBC 04/18/2021 6.0     RBC 04/18/2021 2.43*    HGB 04/18/2021 7.4*    HCT 04/18/2021 23.0*    MCV 04/18/2021 94.7     MCH 04/18/2021 30.5     MCHC 04/18/2021 32.2     RDW 04/18/2021 13.5     PLATELET 57/34/7061 465     MPV 04/18/2021 9.8     ABSOLUTE NRBC 04/18/2021 0.00     Sodium 04/19/2021 141     Potassium 04/19/2021 3.7     Chloride 04/19/2021 107     CO2 04/19/2021 30     Anion gap 04/19/2021 4*    Glucose 04/19/2021 120*    BUN 04/19/2021 19     Creatinine 04/19/2021 0.51*    GFR est AA 04/19/2021 >60     GFR est non-AA 04/19/2021 >60     Calcium 04/19/2021 8.9     Magnesium 04/19/2021 2.0     WBC 04/19/2021 7.6     RBC 04/19/2021 2.45*    HGB 04/19/2021 7.6*    HCT 04/19/2021 23.2*    MCV 04/19/2021 94.7     MCH 04/19/2021 31.0     MCHC 04/19/2021 32.8     RDW 04/19/2021 13.6     PLATELET 95/64/6296 928     MPV 04/19/2021 9.6     ABSOLUTE NRBC 04/19/2021 0.00        IMAGING:  Xr Shoulder Rt Ap/lat Min 2 V    Result Date: 4/15/2021  Status post ORIF of a proximal right diaphyseal fracture without obvious complication. Xr Humerus Rt    Result Date: 4/15/2021  Status post ORIF of a right proximal humerus fracture. No obvious complications. Total fluoroscopy time: 21 seconds; three fluoroscopic spot images saved    Xr Humerus Rt    Result Date: 4/13/2021  Comminuted proximal humeral shaft fracture. Ct Head Wo Cont    Result Date: 4/13/2021  No acute process. EKG:  No results found for this or any previous visit.           Reggie Tinajero MD  4/19/2021 8:17 AM

## 2021-04-19 NOTE — PROGRESS NOTES
Orthopedic Joint Progress Note    2021  Admit Date: 4/15/2021  Admit Diagnosis: Closed comminuted fracture of humerus, right, initial encounter Jorden Tariq; Closed displaced comminuted fracture of shaft of right humerus [S42.351A]; Closed displaced comminuted fracture of shaft of right humerus, initial encounter [S42.351A]    4 Days Post-Op    Subjective: sleeping no complaints     Elton Perdomo     Review of Systems: Pertinent items are noted in HPI. Objective:     PT/OT:     PATIENT MOBILITY    Bed Mobility  Supine to Sit: Stand-by assistance  Sit to Supine: Contact guard assistance  Scooting: Stand-by assistance  Transfers  Sit to Stand: Contact guard assistance, Minimum assistance  Stand to Sit: Contact guard assistance  Bed to Chair: Minimum assistance      Gait  Speed/Gisell: Pace decreased (<100 feet/min)  Step Length: Left shortened, Right shortened  Gait Abnormalities: Decreased step clearance  Ambulation - Level of Assistance: Minimal assistance  Distance (ft): 15 Feet (ft)(x 1, 20' x 1)  Assistive Device: Gait belt  Interventions: Safety awareness training, Verbal cues            Vital Signs:    Blood pressure 113/68, pulse 79, temperature 98.3 °F (36.8 °C), resp. rate 14, SpO2 96 %.   Temp (24hrs), Av °F (36.7 °C), Min:97.6 °F (36.4 °C), Max:98.3 °F (36.8 °C)      Pain Control:   Pain Assessment  Pain Scale 1: Numeric (0 - 10)  Pain Intensity 1: 0  Pain Location 1: Shoulder  Pain Orientation 1: Right  Pain Intervention(s) 1: Medication (see MAR), Rest    Meds:  Current Facility-Administered Medications   Medication Dose Route Frequency    cholecalciferol (VITAMIN D3) (1000 Units /25 mcg) tablet 1,000 Units  1,000 Units Oral DAILY    acetaminophen (TYLENOL) tablet 650 mg  650 mg Oral Q6H PRN    atorvastatin (LIPITOR) tablet 10 mg  10 mg Oral QHS    pyridostigmine (MESTINON) tablet 30 mg  30 mg Oral BID    memantine (NAMENDA) tablet 10 mg  10 mg Oral BID    0.9% sodium chloride infusion 250 mL  250 mL IntraVENous PRN    ziprasidone (GEODON) 10 mg in sterile water (preservative free) 0.5 mL injection  10 mg IntraMUSCular Q12H PRN    haloperidol lactate (HALDOL) injection 2.5 mg  2.5 mg IntraVENous Q4H PRN    sodium chloride (NS) flush 5-40 mL  5-40 mL IntraVENous Q8H    sodium chloride (NS) flush 5-40 mL  5-40 mL IntraVENous PRN    bisacodyL (DULCOLAX) suppository 10 mg  10 mg Rectal DAILY PRN    sodium phosphate (FLEET'S) enema 1 Enema  1 Enema Rectal PRN    docusate sodium (COLACE) capsule 100 mg  100 mg Oral BID    ferrous sulfate tablet 325 mg  1 Tab Oral BID WITH MEALS    rivastigmine (EXELON) 13.3 mg/24 hour patch 1 Patch  1 Patch TransDERmal DAILY    escitalopram oxalate (LEXAPRO) tablet 10 mg  10 mg Oral DAILY    montelukast (SINGULAIR) tablet 10 mg  10 mg Oral DAILY    pimozide (ORAP) tablet (Patient Supplied)   Oral TID    carbidopa-levodopa ER (SINEMET CR)  mg per tablet 2 Tab  2 Tab Oral QHS    carbidopa-levodopa (SINEMET)  mg per tablet 1 Tab  1 Tab Oral TID    carbidopa-levodopa (SINEMET)  mg per tablet 0.5 Tab  0.5 Tab Oral DAILY    And    carbidopa-levodopa (SINEMET)  mg per tablet 1 Tab  1 Tab Oral BID        LAB:    Lab Results   Component Value Date/Time    INR 1.1 04/13/2021 02:48 PM    INR 1.0 05/04/2010 10:34 AM    INR 1.0 10/29/2009 12:17 PM     Lab Results   Component Value Date/Time    HGB 7.6 (L) 04/19/2021 04:51 AM    HGB 7.4 (L) 04/18/2021 04:26 AM    HGB 7.3 (L) 04/17/2021 03:56 AM       Wound Hand Right (Active)   Number of days: 7       Incision 04/15/21 Arm Right (Active)   Dressing Status Clean;Dry; Intact 04/17/21 1503   Cleansed Cleansed with saline 04/15/21 1316   Dressing/Treatment ABD pad; Ace wrap;Xeroform 04/17/21 1503   Number of days: 4         Physical Exam:  No significant changes    Assessment:      Principal Problem:    Closed displaced comminuted fracture of shaft of right humerus (4/15/2021)    Active Problems:    Postoperative anemia due to acute blood loss (4/19/2021)         Plan:     Continue PT/OT/Rehab  Transfer to rehab    Patient Expects to be Discharged to[de-identified] Private residence

## 2021-04-19 NOTE — PROGRESS NOTES
Care Management Interventions  PCP Verified by CM: Yes  Mode of Transport at Discharge: BLS  Transition of Care Consult (CM Consult): SNF  Partner SNF: Yes  Physical Therapy Consult: Yes  Occupational Therapy Consult: Yes  Current Support Network: Lives with Spouse  Confirm Follow Up Transport: Family  The Plan for Transition of Care is Related to the Following Treatment Goals : return home with assistance  The Patient and/or Patient Representative was Provided with a Choice of Provider and Agrees with the Discharge Plan?: Yes  Freedom of Choice List was Provided with Basic Dialogue that Supports the Patient's Individualized Plan of Care/Goals, Treatment Preferences and Shares the Quality Data Associated with the Providers?: Yes  Discharge Location  Discharge Placement: Skilled nursing facility    Patient discharging today to [facility]. 1:30pm ambulance  scheduled. Rn given number for verbal report. Orders faxed prior to transfer. Wife and his sitter were at bedside. Wife aware that she will not be able to visit on a regula basis. She is not happy about it but agreeable. Wife aware that she will need additional support when he returns home after rehab.  Jing Blount

## 2021-04-19 NOTE — PROGRESS NOTES
Patient in recliner. Disoriented x4. Sleepy. Right shoulder and elbow dressing dry and intact. Patient denies needs at present. Neurovascular status WDL. Palpable pulses. Strong equal  to bilateral hands. Instructed to call for assistance or any needs. Patient verbalized understanding. Call bell within reach. Side rails up x3. Bed alarm on. Bed low and locked. No distress noted. Family member at bedside.

## 2021-04-19 NOTE — PROGRESS NOTES
Problem: Falls - Risk of  Goal: *Absence of Falls  Description: Document Randi Kamara Fall Risk and appropriate interventions in the flowsheet.   Outcome: Progressing Towards Goal  Note: Fall Risk Interventions:  Mobility Interventions: Patient to call before getting OOB, Utilize walker, cane, or other assistive device, Bed/chair exit alarm    Mentation Interventions: Evaluate medications/consider consulting pharmacy, Increase mobility, Toileting rounds, More frequent rounding, Eyeglasses and hearing aids, Bed/chair exit alarm, Adequate sleep, hydration, pain control, Door open when patient unattended    Medication Interventions: Evaluate medications/consider consulting pharmacy, Bed/chair exit alarm, Patient to call before getting OOB, Teach patient to arise slowly    Elimination Interventions: Elevated toilet seat, Call light in reach, Bed/chair exit alarm, Patient to call for help with toileting needs, Urinal in reach, Toilet paper/wipes in reach, Toileting schedule/hourly rounds, Stay With Me (per policy)    History of Falls Interventions: Bed/chair exit alarm, Room close to nurse's station, Door open when patient unattended, Evaluate medications/consider consulting pharmacy, Assess for delayed presentation/identification of injury for 48 hrs (comment for end date)

## 2021-04-19 NOTE — PROGRESS NOTES
Montoya cath removed. IV line removed with tip intact. Right shoulder dressing changed using sterile technique- gauze and Tegaderm applied, site dry with no oozing to site. Right elbow dressing changed using sterile technique- Xerofrom, gauze and gauze wrap applied, site had small ooze to distal surgical puncture site. Patient tolerated procedure well.

## 2021-04-19 NOTE — PROGRESS NOTES
Problem: Mobility Impaired (Adult and Pediatric)  Goal: *Acute Goals and Plan of Care (Insert Text)  Outcome: Progressing Towards Goal  Note: GOALS RE-ASSESSED & STILL APPLY 4/19/21 :  (1.)  Patient will move from supine to sit and sit to supine  in bed with CONTACT GUARD ASSIST.    (2.)  Patient will transfer from bed to chair and chair to bed with CONTACT GUARD ASSIST using the least restrictive device. (3.)  Patient will ambulate with CONTACT GUARD ASSIST for 100 feet with the least restrictive device. (4.)  Patient will be safe with HEP, with caregiver's help, to increase range of motion per MD orders. ________________________________________________________________________________________________       PHYSICAL THERAPY: Re-evaluation, Treatment Day: Day of Assessment and AM 4/19/2021  INPATIENT: Hospital Day: 5  [place] / [place] / Product Type: Medicare /      NAME/AGE/GENDER: Araceli Santo is a 79 y.o. male   PRIMARY DIAGNOSIS: Closed comminuted fracture of humerus, right, initial encounter [S42.351A]  Closed displaced comminuted fracture of shaft of right humerus [S42.351A]  Closed displaced comminuted fracture of shaft of right humerus, initial encounter [S42.351A] Closed displaced comminuted fracture of shaft of right humerus Closed displaced comminuted fracture of shaft of right humerus  Procedure(s) (LRB):  OPEN REDUCTION, INTERNAL FIXATION RIGHT HUMERAL SHAFT FRACTURE (Right)  4 Days Post-Op  ICD-10: Treatment Diagnosis:   · Pain in Right Shoulder (M25.511)  · Stiffness of Right Shoulder, Not elsewhere classified (M25.611)  · Other abnormalities of gait and mobility (R26.89)   Precaution/Allergies:  Aricept [donepezil] and Onion     Active and passive range of motion right elbow hand     gentle pendulums   nwb ue right   wbat ble   Sling right shoulder   Question: Reason for PT?  Answer: s/p orif right humeral shaft    UPDATED RX 4/19/21 : Reed Isabel RN confirmed that PT is to ONLY follow original Posta's Rx as                                                                  noted  Above, from this point forward. ASSESSMENT:     Mr. Ebony Boggs was lethargic, needing combined cues to stay on task, pt was also non-verbal. Pt needed minimal assist for all functional mobility & assist to complete all exercises. Even if SNF improves pt's functional level, spouse who is on a quad cane herself, will not be able to manage pt on a long term basis unless other caregivers come into the picture. MSW made aware of this concern    This section established at most recent assessment   PROBLEM LIST (Impairments causing functional limitations):  1. Decreased [place] with Bed Mobility  2. Decreased [place] with Transfers  3. Decreased [place] with Ambulation   4. Decreased [place] with shoulder HEP   INTERVENTIONS PLANNED: (Benefits and precautions of physical therapy have been discussed with the patient.)  1. Bed Mobility Training  2. Transfer Training  3. Gait Training  4. Therapeutic Exercises per MD orders  5. Modalities for Pain     TREATMENT PLAN: Frequency/Duration: twice daily for duration of hospital stay  Rehabilitation Potential For Stated Goals: Good     RECOMMENDED REHABILITATION/EQUIPMENT: (at time of discharge pending progress): Continue Skilled Therapy and SNF.               HISTORY:   History of Present Injury/Illness (Reason for Referral):  S/p Procedure(s) (LRB):  OPEN REDUCTION, INTERNAL FIXATION RIGHT HUMERAL SHAFT FRACTURE (Right)  Past Medical History/Comorbidities:   Mr. Ebony Boggs  has a past medical history of Acoustic neuroma ([facility] [address] (2012), Cholelithiasis (08/20/2015), Colon polyps (12/31/2014), Dementia due to Parkinson's disease without behavioral disturbance ([address] (4/20/2018), DVT (deep venous thrombosis) ([facility] [address] (02/17/2017), Dyslipidemia, Environmental allergies, GERD (gastroesophageal reflux disease), Hiatal hernia, Hypertension, Intractable hiccups, Liver cyst (02/16/2017), Melanoma ([facility] [address] Parkinson disease ([facility] [address] Prostate cancer ([facility] [address] and Tourette syndrome (04/20/2018). He also has no past medical history of Arrhythmia, Asthma, Autoimmune disease ([facility] [address] Chronic pain, Coagulation defects, COPD, Diabetes ([facility] [address] Heart failure ([facility] [address] Malignant hyperthermia due to anesthesia, Other ill-defined conditions(799.89), Pseudocholinesterase deficiency, Psychiatric disorder, Stroke ([facility] [address] Thromboembolus ([facility] [address] Unspecified adverse effect of anesthesia, or Unspecified sleep apnea. Mr. Dawson Gr  has a past surgical history that includes hx prostatectomy (11/2009); hx knee replacement (Left); hx hernia repair (Bilateral); hx tonsillectomy; hx hemorrhoidectomy; hx heent (Right); and hx colonoscopy (12/31/2014). Social History/Living Environment:   Home Environment: Private residence  One/Two Story Residence: One story  Living Alone: No  Support Systems: Spouse/Significant Other/Partner  Patient Expects to be Discharged to[de-identified] Private residence  Current DME Used/Available at Home: None  Prior Level of Function/Work/Activity:  Mostly independent, occasional assist with adl's, but more assist since his fall this week   Number of Personal Factors/Comorbidities that affect the Plan of Care: 1-2: MODERATE COMPLEXITY   EXAMINATION:   Most Recent Physical Functioning:   Gross Assessment: left UE & both LE's 3-/5                       Balance:  Sitting: Intact; Without support  Standing: Impaired; With support(manual) Bed Mobility:  Supine to Sit: Minimum assistance  Sit to Supine: (NT)  Scooting: Minimum assistance       Transfers:  Sit to Stand: Minimum assistance  Stand to Sit: Minimum assistance  Bed to Chair: Minimum assistance  Duration: 30 Minutes(extra time to work through activity noted)  Gait:     Speed/Gisell: Delayed  Step Length: Left shortened;Right shortened  Gait Abnormalities: Decreased step clearance; Path deviations;Trunk sway increased(cross over stepping)  Distance (ft): 80 Feet (ft)  Assistive Device: (gait belt)  Ambulation - Level of Assistance: Minimal assistance  Interventions: Safety awareness training;Verbal cues      Body Structures Involved:  1. Bones  2. Joints  3. Muscles  4. Ligaments Body Functions Affected:  1. Movement Related Activities and Participation Affected:  1. Mobility   Number of elements that affect the Plan of Care: 3: MODERATE COMPLEXITY   CLINICAL PRESENTATION:   Presentation: Stable and uncomplicated: LOW COMPLEXITY   CLINICAL DECISION MAKING:   [facility] AM-PAC 6 Clicks   Basic Mobility Inpatient Short Form  How much difficulty does the patient currently have. .. Unable A Lot A Little None   1. Turning over in bed (including adjusting bedclothes, sheets and blankets)? [] 1   [] 2   [x] 3   [] 4   2. Sitting down on and standing up from a chair with arms ( e.g., wheelchair, bedside commode, etc.)   [] 1   [] 2   [x] 3   [] 4   3. Moving from lying on back to sitting on the side of the bed? [] 1   [] 2   [x] 3   [] 4   How much help from another person does the patient currently need. .. Total A Lot A Little None   4. Moving to and from a bed to a chair (including a wheelchair)? [] 1   [] 2   [x] 3   [] 4   5. Need to walk in hospital room? [] 1   [x] 2   [] 3   [] 4   6. Climbing 3-5 steps with a railing? [] 1   [x] 2   [] 3   [] 4   © 2007, Trustees of [facility], under license to [employer]. All rights reserved      Score:  Initial: 16 Most Recent: X (Date: -- )    Interpretation of Tool:  Represents activities that are increasingly more difficult (i.e. Bed mobility, Transfers, Gait). Medical Necessity:     · Patient is expected to demonstrate progress in   · strength, range of motion, and balance  ·  to   · decrease assistance required with exercises and functional mobility  · .   Reason for Services/Other Comments:  · Patient   · continues to require present interventions due to patient's inability to perform exercises and functional mobility independently  · . Use of outcome tool(s) and clinical judgement create a POC that gives a: Questionable prediction of patient's progress: MODERATE COMPLEXITY            TREATMENT:   (In addition to Assessment/Re-Assessment sessions the following treatments were rendered)   Pre-treatment Symptoms/Complaints:  Pt non-verbal, no signs of distress  Pain: Initial: numeric scale  Pain Intensity 1: 0  Post Session:  0/10     Therapeutic Exercise: ( ):  Exercises per grid below to improve mobility and strength. Required minimal visual, verbal, and manual cues to promote proper body alignment, promote proper body posture, and promote proper body mechanics. Progressed range and repetitions as indicated. Therapeutic Activity: (  30 Minutes(extra time to work through activity noted)15 minutes ):  Therapeutic activities including Bed mobility, Chair transfers, and Ambulation on level ground to improve mobility, strength and balance. Required minimal Safety awareness training;Verbal cues to promote static and dynamic balance in standing.       Date:  4/16 Date:  4/17/21 Date:  4/18/21   ACTIVITY/EXERCISE AM PM AM PM AM PM   Gripping 10aa 12aa 15 AA 15 AA 20 AA 20aa   Wrist Flexion/Extension 10 12a 15 AA 15 AA 20 A 20aa   Wrist Ulnar/Radial Deviation         Pronation/Supination 10aa 12aa 15 AA 15 AA 20 AA 20aa   Elbow Flexion/Extension 10aa 12aa 15 AA 15 AA 20 AA 20aa   Shoulder Flexion/Extension     20 AA/P NA   Shoulder AB/ADduction         Shoulder IR/ER         Pulleys         Pendulums  20 seconds  10 lateral, 10 a/p, 10 CW, 10 CCW-all AA  20p CW & CCW   Shrugs         Isometric:                 Flexion         Extension         ABduction         ADduction         Biceps/Triceps                  B = bilateral; AA = active assistive; A = active; P = passive  Education:  [x]  Home Exercises  [x]  Sling Application   [x]  Movement Precautions   []  Pulleys   []  Use of Ice   []  Other:   Treatment/Session Assessment:    · Response to Treatment:  Patient was not fully engaged in session, pt will need long term physical assist, extremely high fall risk  · Interdisciplinary Collaboration:   o Registered Nurse  o   · After treatment position/precautions:   o Up in chair  o Bed alarm/tab alert on  o Bed/Chair-wheels locked  o Caregiver at bedside  o Call light within reach  o RN notified  o Family at bedside   · Compliance with Program/Exercises: Will assess as treatment progresses. · Recommendations/Intent for next treatment session:  Treatment next visit will focus on increasing Mr. Jaren Munson independence with bed mobility, transfers, gait training, strength/ROM exercises, modalities for pain, and patient education.    Total Treatment Duration:  PT Patient Time In/Time Out  Time In: 0823  Time Out: [place], PT

## 2021-04-19 NOTE — PROGRESS NOTES
TRANSFER - OUT REPORT:    Verbal report given to [facility] LPN on José Manuel Cornell  being transferred to [facility] RN for routine progression of care       Report consisted of patients Situation, Background, Assessment and   Recommendations(SBAR). Information from the following report(s) SBAR, Kardex, OR Summary, Procedure Summary, Intake/Output, MAR, Accordion, Recent Results and Med Rec Status was reviewed with the receiving nurse. Lines:       Opportunity for questions and clarification was provided.       Patient transported with:  Ambulance

## 2021-04-20 NOTE — DISCHARGE SUMMARY
[address] [place]    Name:  Ruthie Rodriguez  MR#:  [id number]  :  1940  ACCOUNT #:  [de-identified]  ADMIT DATE:  04/15/2021  DISCHARGE DATE:  2021    ADMISSION DIAGNOSIS:  Closed comminuted displaced right humeral shaft fracture. DISCHARGE DIAGNOSES:  Closed comminuted displaced right humeral shaft fracture as well as postoperative blood loss anemia. Please see H and P, operative summary and consult for details. HOSPITAL COURSE:  The patient is an 24-year-old gentleman, was admitted on 04/15/2021, underwent an uncomplicated open reduction internal fixation of a displaced comminuted right humeral shaft fracture. Immediately, perioperatively, his hemoglobin was 8.5. On postoperative day #1, hemoglobin was down to 7.5. He was transfused 1 unit of packed red blood cells. His potassium and creatinine were within normal limits. On postoperative day #2, his potassium was 3.4, hemoglobin was 7.3. He was hemodynamically stable. On postoperative day #3, 2021, all labs were stable. The wound was clean, dry and intact. On postoperative day #4, all labs remained stable. He was transferred to rehab given his multiple medical comorbidities including his Parkinson's, his dementia, his inability to care for himself. He will follow up in my office in 1 week. MD AMBER Castellanos/[email or web address]  D:  2021 7:03  T:  2021 5:52  JOB #:  [id number]  CC:   Ramez Mosquera MD

## 2021-07-10 ENCOUNTER — HOSPITAL ENCOUNTER (EMERGENCY)
Age: 81
Discharge: HOME | End: 2021-07-10
Attending: EMERGENCY MEDICINE
Payer: MEDICARE

## 2021-07-10 ENCOUNTER — HOSPITAL ENCOUNTER (EMERGENCY)
Dept: CT IMAGING | Age: 81
Discharge: HOME | End: 2021-07-10
Attending: EMERGENCY MEDICINE
Payer: MEDICARE

## 2021-07-10 VITALS
SYSTOLIC BLOOD PRESSURE: 127 MMHG | HEART RATE: 81 BPM | DIASTOLIC BLOOD PRESSURE: 78 MMHG | RESPIRATION RATE: 16 BRPM | WEIGHT: 150 LBS | OXYGEN SATURATION: 98 % | BODY MASS INDEX: 21.47 KG/M2 | HEIGHT: 70 IN | TEMPERATURE: 99.1 F

## 2021-07-10 DIAGNOSIS — S09.90XA CLOSED HEAD INJURY, INITIAL ENCOUNTER: ICD-10-CM

## 2021-07-10 DIAGNOSIS — R29.6 RECURRENT FALLS WHILE WALKING: Primary | ICD-10-CM

## 2021-07-10 LAB
ALBUMIN SERPL-MCNC: 3.1 G/DL (ref 3.2–4.6)
ALBUMIN/GLOB SERPL: 1 {RATIO} (ref 1.2–3.5)
ALP SERPL-CCNC: 93 U/L (ref 50–136)
ALT SERPL-CCNC: 7 U/L (ref 12–65)
ANION GAP SERPL CALC-SCNC: 3 MMOL/L (ref 7–16)
APPEARANCE UR: CLEAR
AST SERPL-CCNC: 10 U/L (ref 15–37)
BASOPHILS # BLD: 0 K/UL (ref 0–0.2)
BASOPHILS NFR BLD: 1 % (ref 0–2)
BILIRUB SERPL-MCNC: 0.7 MG/DL (ref 0.2–1.1)
BILIRUB UR QL: NEGATIVE
BUN SERPL-MCNC: 24 MG/DL (ref 8–23)
CALCIUM SERPL-MCNC: 8.8 MG/DL (ref 8.3–10.4)
CHLORIDE SERPL-SCNC: 112 MMOL/L (ref 98–107)
CO2 SERPL-SCNC: 30 MMOL/L (ref 21–32)
COLOR UR: YELLOW
CREAT SERPL-MCNC: 0.82 MG/DL (ref 0.8–1.5)
DIFFERENTIAL METHOD BLD: ABNORMAL
EOSINOPHIL # BLD: 0.1 K/UL (ref 0–0.8)
EOSINOPHIL NFR BLD: 2 % (ref 0.5–7.8)
ERYTHROCYTE [DISTWIDTH] IN BLOOD BY AUTOMATED COUNT: 13 % (ref 11.9–14.6)
GLOBULIN SER CALC-MCNC: 3 G/DL (ref 2.3–3.5)
GLUCOSE SERPL-MCNC: 98 MG/DL (ref 65–100)
GLUCOSE UR STRIP.AUTO-MCNC: NEGATIVE MG/DL
HCT VFR BLD AUTO: 37.1 % (ref 41.1–50.3)
HGB BLD-MCNC: 11.5 G/DL (ref 13.6–17.2)
HGB UR QL STRIP: NEGATIVE
IMM GRANULOCYTES # BLD AUTO: 0 K/UL (ref 0–0.5)
IMM GRANULOCYTES NFR BLD AUTO: 0 % (ref 0–5)
KETONES UR QL STRIP.AUTO: NEGATIVE MG/DL
LEUKOCYTE ESTERASE UR QL STRIP.AUTO: NEGATIVE
LYMPHOCYTES # BLD: 1.3 K/UL (ref 0.5–4.6)
LYMPHOCYTES NFR BLD: 29 % (ref 13–44)
MCH RBC QN AUTO: 29.7 PG (ref 26.1–32.9)
MCHC RBC AUTO-ENTMCNC: 31 G/DL (ref 31.4–35)
MCV RBC AUTO: 95.9 FL (ref 79.6–97.8)
MONOCYTES # BLD: 0.3 K/UL (ref 0.1–1.3)
MONOCYTES NFR BLD: 8 % (ref 4–12)
NEUTS SEG # BLD: 2.6 K/UL (ref 1.7–8.2)
NEUTS SEG NFR BLD: 61 % (ref 43–78)
NITRITE UR QL STRIP.AUTO: NEGATIVE
NRBC # BLD: 0 K/UL (ref 0–0.2)
PH UR STRIP: 6.5 [PH] (ref 5–9)
PLATELET # BLD AUTO: 121 K/UL (ref 150–450)
PMV BLD AUTO: 10.1 FL (ref 9.4–12.3)
POTASSIUM SERPL-SCNC: 3.9 MMOL/L (ref 3.5–5.1)
PROT SERPL-MCNC: 6.1 G/DL (ref 6.3–8.2)
PROT UR STRIP-MCNC: NEGATIVE MG/DL
RBC # BLD AUTO: 3.87 M/UL (ref 4.23–5.6)
SODIUM SERPL-SCNC: 145 MMOL/L (ref 138–145)
SP GR UR REFRACTOMETRY: 1.02 (ref 1–1.02)
UROBILINOGEN UR QL STRIP.AUTO: 1 EU/DL (ref 0.2–1)
WBC # BLD AUTO: 4.3 K/UL (ref 4.3–11.1)

## 2021-07-10 PROCEDURE — 72125 CT NECK SPINE W/O DYE: CPT

## 2021-07-10 PROCEDURE — 80053 COMPREHEN METABOLIC PANEL: CPT

## 2021-07-10 PROCEDURE — 81003 URINALYSIS AUTO W/O SCOPE: CPT

## 2021-07-10 PROCEDURE — 74011250636 HC RX REV CODE- 250/636: Performed by: EMERGENCY MEDICINE

## 2021-07-10 PROCEDURE — 96360 HYDRATION IV INFUSION INIT: CPT

## 2021-07-10 PROCEDURE — 99284 EMERGENCY DEPT VISIT MOD MDM: CPT

## 2021-07-10 PROCEDURE — 70450 CT HEAD/BRAIN W/O DYE: CPT

## 2021-07-10 PROCEDURE — 85025 COMPLETE CBC W/AUTO DIFF WBC: CPT

## 2021-07-10 RX ADMIN — SODIUM CHLORIDE 500 ML: 900 INJECTION, SOLUTION INTRAVENOUS at 14:24

## 2021-07-10 NOTE — ED PROVIDER NOTES
54-year-old male with a history of Parkinson's induced dementia presenting after a fall. He was found this morning with injuries to his face and knuckles. Patient cannot really tell you exactly what happened but it did happen sometime in the past 12 hours. Wife reports that he has had 4 falls in the past week. The history is provided by the patient. Fall  The accident occurred 6 to 12 hours ago. The fall occurred while walking. He fell from a height of ground level. He landed on hard floor. The volume of blood lost was minimal. The point of impact was the head. The pain is present in the head. The pain is at a severity of 2/10. The pain is mild. He was ambulatory at the scene. There was no entrapment after the fall. There was no drug use involved in the accident. There was no alcohol use involved in the accident. Pertinent negatives include no visual change, no fever, no numbness, no abdominal pain, no bowel incontinence, no nausea, no vomiting, no hematuria, no headaches, no extremity weakness, no hearing loss, no loss of consciousness, no tingling and no laceration. The risk factors include dementia and being elderly. The symptoms are aggravated by activity. He has tried nothing for the symptoms. The treatment provided moderate relief.         Past Medical History:   Diagnosis Date    Acoustic neuroma ([facility] 2012    Right    Cholelithiasis 08/20/2015    Colon polyps 12/31/2014    Tubular Adenoma x 1 & Hyperplastic Polyp x 2    Dementia due to Parkinson's disease without behavioral disturbance ([address] 4/20/2018    DVT (deep venous thrombosis) ([facility]) 02/17/2017    Left Superficial Femoral, Popliteal, & Peroneal vein     Dyslipidemia     Environmental allergies     GERD (gastroesophageal reflux disease)     Hiatal hernia     Hypertension     Intractable hiccups     Liver cyst 02/16/2017    R Lobe    Melanoma ([address]     Back    Parkinson disease ([address]     Followed by [address] [place] [address] Neurology [address] Dr. Tim Jones    Prostate cancer [facility])     Tourette syndrome 04/20/2018    \"verbalizes as really loud hiccoughs\"       Past Surgical History:   Procedure Laterality Date    HX COLONOSCOPY  12/31/2014    Tubular Adenoma & Hyperplastic Polyps, Diverticulosis, & Internal Hemorrhoids - Recall 2017    HX HEENT Right     Ear    HX HEMORRHOIDECTOMY      HX HERNIA REPAIR Bilateral     HX KNEE REPLACEMENT Left     HX PROSTATECTOMY  11/2009    [address]    HX TONSILLECTOMY           Family History:   Problem Relation Age of Onset    Cancer Father 77        Prostate    Heart Disease Brother     Cancer Brother         Prostate    Melanoma Brother     Breast Cancer Mother     Melanoma Mother     COPD Brother        Social History     Socioeconomic History    Marital status:      Spouse name: Not on file    Number of children: Not on file    Years of education: Not on file    Highest education level: Not on file   Occupational History    Not on file   Tobacco Use    Smoking status: Never Smoker    Smokeless tobacco: Never Used   Substance and Sexual Activity    Alcohol use: No    Drug use: No    Sexual activity: Not on file   Other Topics Concern     Service Not Asked    Blood Transfusions Not Asked    Caffeine Concern Not Asked    Occupational Exposure Not Asked    Hobby Hazards Not Asked    Sleep Concern Not Asked    Stress Concern Not Asked    Weight Concern Not Asked    Special Diet Not Asked    Back Care Not Asked    Exercise Not Asked    Bike Helmet Not Asked    Seat Belt Not Asked    Self-Exams Not Asked   Social History Narrative    Not on file     Social Determinants of Health     Financial Resource Strain:     Difficulty of Paying Living Expenses:    Food Insecurity:     Worried About Running Out of Food in the Last Year:     Ran Out of Food in the Last Year:    Transportation Needs:     Lack of Transportation (Medical):      Lack of Transportation (Non-Medical):    Physical Activity:     Days of Exercise per Week:     Minutes of Exercise per Session:    Stress:     Feeling of Stress :    Social Connections:     Frequency of Communication with Friends and Family:     Frequency of Social Gatherings with Friends and Family:     Attends Sabianism Services:     Active Member of Clubs or Organizations:     Attends Club or Organization Meetings:     Marital Status:    Intimate Partner Violence:     Fear of Current or Ex-Partner:     Emotionally Abused:     Physically Abused:     Sexually Abused: ALLERGIES: Aricept [donepezil] and Onion    Review of Systems   Constitutional: Negative for fever. Gastrointestinal: Negative for abdominal pain, bowel incontinence, nausea and vomiting. Genitourinary: Negative for hematuria. Musculoskeletal: Negative for extremity weakness. Skin: Positive for wound. Neurological: Negative for tingling, loss of consciousness, numbness and headaches. All other systems reviewed and are negative. Vitals:    07/10/21 1031   BP: 123/75   Pulse: 68   Resp: 16   Temp: 98 °F (36.7 °C)   SpO2: 96%   Weight: 68 kg (150 lb)   Height: 5' 10\" (1.778 m)            Physical Exam  Vitals and nursing note reviewed. Constitutional:       Appearance: He is well-developed. HENT:      Head: Normocephalic. Comments: Abrasion over right central forehead. Hearing aid in left ear  Eyes:      Conjunctiva/sclera: Conjunctivae normal.      Pupils: Pupils are equal, round, and reactive to light. Cardiovascular:      Rate and Rhythm: Normal rate and regular rhythm. Heart sounds: Normal heart sounds. Pulmonary:      Effort: Pulmonary effort is normal.      Breath sounds: Normal breath sounds. Abdominal:      General: Bowel sounds are normal.      Palpations: Abdomen is soft. Musculoskeletal:         General: No deformity. Normal range of motion. Cervical back: Normal range of motion and neck supple. Comments: Abrasions on the knuckles of the left hand, abrasion over the left elbow, abrasion over the right hand. Skin:     General: Skin is warm and dry. Findings: No laceration. Neurological:      Mental Status: He is alert. Cranial Nerves: No cranial nerve deficit. Comments: Oriented to self and place    Resting tremor of bilateral upper extremities   Psychiatric:         Behavior: Behavior normal.          MDM  Number of Diagnoses or Management Options  Closed head injury, initial encounter  Recurrent falls while walking  Diagnosis management comments: 58-year-old male presenting for recurrent fall.   Checking basic labs, urine, head CT       Amount and/or Complexity of Data Reviewed  Clinical lab tests: ordered and reviewed (Results for orders placed or performed during the hospital encounter of 07/10/21  -CBC WITH AUTOMATED DIFF       Result                      Value             Ref Range           WBC                         4.3               4.3 - 11.1 K*       RBC                         3.87 (L)          4.23 - 5.6 M*       HGB                         11.5 (L)          13.6 - 17.2 *       HCT                         37.1 (L)          41.1 - 50.3 %       MCV                         95.9              79.6 - 97.8 *       MCH                         29.7              26.1 - 32.9 *       MCHC                        31.0 (L)          31.4 - 35.0 *       RDW                         13.0              11.9 - 14.6 %       PLATELET                    121 (L)           150 - 450 K/*       MPV                         10.1              9.4 - 12.3 FL       ABSOLUTE NRBC               0.00              0.0 - 0.2 K/*       DF                          AUTOMATED                             NEUTROPHILS                 61                43 - 78 %           LYMPHOCYTES                 29                13 - 44 %           MONOCYTES                   8                 4.0 - 12.0 %        EOSINOPHILS 2                 0.5 - 7.8 %         BASOPHILS                   1                 0.0 - 2.0 %         IMMATURE GRANULOCYTES       0                 0.0 - 5.0 %         ABS. NEUTROPHILS            2.6               1.7 - 8.2 K/*       ABS. LYMPHOCYTES            1.3               0.5 - 4.6 K/*       ABS. MONOCYTES              0.3               0.1 - 1.3 K/*       ABS. EOSINOPHILS            0.1               0.0 - 0.8 K/*       ABS. BASOPHILS              0.0               0.0 - 0.2 K/*       ABS. IMM. GRANS.            0.0               0.0 - 0.5 K/*  -METABOLIC PANEL, COMPREHENSIVE       Result                      Value             Ref Range           Sodium                      145               138 - 145 mm*       Potassium                   3.9               3.5 - 5.1 mm*       Chloride                    112 (H)           98 - 107 mmo*       CO2                         30                21 - 32 mmol*       Anion gap                   3 (L)             7 - 16 mmol/L       Glucose                     98                65 - 100 mg/*       BUN                         24 (H)            8 - 23 MG/DL        Creatinine                  0.82              0.8 - 1.5 MG*       GFR est AA                  >60               >60 ml/min/1*       GFR est non-AA              >60               >60 ml/min/1*       Calcium                     8.8               8.3 - 10.4 M*       Bilirubin, total            0.7               0.2 - 1.1 MG*       ALT (SGPT)                  7 (L)             12 - 65 U/L         AST (SGOT)                  10 (L)            15 - 37 U/L         Alk.  phosphatase            93                50 - 136 U/L        Protein, total              6.1 (L)           6.3 - 8.2 g/*       Albumin                     3.1 (L)           3.2 - 4.6 g/*       Globulin                    3.0               2.3 - 3.5 g/*       A-G Ratio                   1.0 (L)           1.2 - 3.5      -URINALYSIS W/ RFLX MICROSCOPIC       Result                      Value             Ref Range           Color                       YELLOW                                Appearance                  CLEAR                                 Specific gravity            1.020             1.001 - 1.02*       pH (UA)                     6.5               5.0 - 9.0           Protein                     Negative          NEG mg/dL           Glucose                     Negative          mg/dL               Ketone                      Negative          NEG mg/dL           Bilirubin                   Negative          NEG                 Blood                       Negative          NEG                 Urobilinogen                1.0               0.2 - 1.0 EU*       Nitrites                    Negative          NEG                 Leukocyte Esterase          Negative          NEG            )  Tests in the radiology section of CPT®: ordered and reviewed (CT HEAD WO CONT    Result Date: 7/10/2021  EXAMINATION: CT HEAD WO CONT, CT SPINE CERV WO CONT 7/10/2021 11:11 AM ACCESSION NUMBER:  [id number], [id number] INDICATION:  fall Pt arrives via EMS from assisted living. Staff states pt walking without walking and fell. Abrasion to forehead, left knuckles, right forearm. Staff denies LOC or anticoags. Pt has hx of dementia COMPARISON: Head CT 4/13/2021, temporal bone CT 9/4/2020 brain MRI 1/3/2019 TECHNIQUE: Contiguous axial computed tomographic images were obtained of the head and cervical spine without intravenous contrast. Multiplanar reconstructions were performed. Radiation dose reduction techniques were used for this study:  Our CT scanners use one or all of the following: Automated exposure control, adjustment of the mA and/or kVp according to patient's size, iterative reconstruction. FINDINGS: HEAD CT: Technically difficult exam due to patient motion.  The ventricles are stable in caliber in comparison to the prior exam and are appropriate for the mild degree of symmetric global brain parenchymal volume loss. There is no midline shift. The basilar cisterns are patent. There is no cerebellar tonsillar ectopia or herniation. Prior bilateral lens replacement. No definite acute intracranial hemorrhage or extra axial collections within the limits of motion degradation. No definite CT evidence of acute ischemic infarction within the limits of motion degradation. Internally fixated proximal right humeral fracture on the topogram. No definite evidence of a calvarial fracture within the limits of motion degradation. CERVICAL SPINE CT: CRANIOCERVICAL ARTICULATION: Normally aligned. VERTEBRAL BODY ALIGNMENT: Grade 1 anterolisthesis of C2 on C3. Slight retrolisthesis of C3 on C4 and C4 on C5. POSTERIOR ELEMENTS: Normally aligned PREVERTEBRAL SOFT TISSUES: Normal in thickness. VERTEBRAL BODY HEIGHTS: Overall preserved. DEGENERATIVE FINDINGS: Severe C5-C6 and C6-C7 intervertebral disc space narrowing. Is a sclerotic focus in the right facet of C7, likely a bone island. There is also likely a bone island in the C7 vertebral body. Multilevel facet arthropathy and uncovertebral joint hypertrophy. There is a 0.7 cm well marginated lucent lesion within the right facet at C2. INCLUDED PORTIONS OF THE SKULL BASE AND MANDIBLE: No fracture within the field-of-view. Trace right mastoid effusion. CERVICAL SPINE ACUTE FINDINGS: No evidence of acute cervical spine fracture. CERVICAL SPINAL CANAL: No high-grade cervical spinal canal stenosis within the limits of noncontrast CT. INCLUDED NECK SOFT TISSUES: Carotid bifurcation atherosclerosis right greater than left. INCLUDED LUNG APICES: Predominantly clear. 1. No evidence of acute intracranial abnormality within the limits of patient motion. If there are persistent or progressive unexplained symptoms, a repeat exam should be obtained when the patient is better able to lie still.  2. No evidence of acute cervical spine fracture, with cervical degenerative features as above. 3. There is a 0.7 cm well marginated lucent lesion in the right C2 facet. There is no definite correlate for this in the imaged portions of the cervical spine on the brain MRI 1/3/2019. While this may be degenerative, an isolated myelomatous lesion or isolated metastasis cannot be definitively excluded. Bone scan would be beneficial to exclude other osseous lesions. VOICE DICTATED BY: Dr. Mcneil Mehran CONT    Result Date: 7/10/2021  EXAMINATION: CT HEAD WO CONT, CT SPINE CERV WO CONT 7/10/2021 11:11 AM ACCESSION NUMBER:  [id number], [id number] INDICATION:  fall Pt arrives via EMS from assisted living. Staff states pt walking without walking and fell. Abrasion to forehead, left knuckles, right forearm. Staff denies LOC or anticoags. Pt has hx of dementia COMPARISON: Head CT 4/13/2021, temporal bone CT 9/4/2020 brain MRI 1/3/2019 TECHNIQUE: Contiguous axial computed tomographic images were obtained of the head and cervical spine without intravenous contrast. Multiplanar reconstructions were performed. Radiation dose reduction techniques were used for this study:  Our CT scanners use one or all of the following: Automated exposure control, adjustment of the mA and/or kVp according to patient's size, iterative reconstruction. FINDINGS: HEAD CT: Technically difficult exam due to patient motion. The ventricles are stable in caliber in comparison to the prior exam and are appropriate for the mild degree of symmetric global brain parenchymal volume loss. There is no midline shift. The basilar cisterns are patent. There is no cerebellar tonsillar ectopia or herniation. Prior bilateral lens replacement. No definite acute intracranial hemorrhage or extra axial collections within the limits of motion degradation. No definite CT evidence of acute ischemic infarction within the limits of motion degradation.  Internally fixated proximal right humeral fracture on the topogram. No definite evidence of a calvarial fracture within the limits of motion degradation. CERVICAL SPINE CT: CRANIOCERVICAL ARTICULATION: Normally aligned. VERTEBRAL BODY ALIGNMENT: Grade 1 anterolisthesis of C2 on C3. Slight retrolisthesis of C3 on C4 and C4 on C5. POSTERIOR ELEMENTS: Normally aligned PREVERTEBRAL SOFT TISSUES: Normal in thickness. VERTEBRAL BODY HEIGHTS: Overall preserved. DEGENERATIVE FINDINGS: Severe C5-C6 and C6-C7 intervertebral disc space narrowing. Is a sclerotic focus in the right facet of C7, likely a bone island. There is also likely a bone island in the C7 vertebral body. Multilevel facet arthropathy and uncovertebral joint hypertrophy. There is a 0.7 cm well marginated lucent lesion within the right facet at C2. INCLUDED PORTIONS OF THE SKULL BASE AND MANDIBLE: No fracture within the field-of-view. Trace right mastoid effusion. CERVICAL SPINE ACUTE FINDINGS: No evidence of acute cervical spine fracture. CERVICAL SPINAL CANAL: No high-grade cervical spinal canal stenosis within the limits of noncontrast CT. INCLUDED NECK SOFT TISSUES: Carotid bifurcation atherosclerosis right greater than left. INCLUDED LUNG APICES: Predominantly clear. 1. No evidence of acute intracranial abnormality within the limits of patient motion. If there are persistent or progressive unexplained symptoms, a repeat exam should be obtained when the patient is better able to lie still. 2. No evidence of acute cervical spine fracture, with cervical degenerative features as above. 3. There is a 0.7 cm well marginated lucent lesion in the right C2 facet. There is no definite correlate for this in the imaged portions of the cervical spine on the brain MRI 1/3/2019. While this may be degenerative, an isolated myelomatous lesion or isolated metastasis cannot be definitively excluded. Bone scan would be beneficial to exclude other osseous lesions.   VOICE DICTATED BY: Dr. Dali Ospina     )  Tests in the medicine section of CPT®: ordered and reviewed  Decide to obtain previous medical records or to obtain history from someone other than the patient: yes  Independent visualization of images, tracings, or specimens: yes    Risk of Complications, Morbidity, and/or Mortality  Presenting problems: high  Diagnostic procedures: high  Management options: moderate  General comments: Patient has remained hemodynamically stable. Injuries appear to be superficial.  Head CT and neck CT are unremarkable. Blood work is unremarkable. Treated with 1/2 L of fluids for fluid resuscitation given have dark the patient's urine was will be discharged thereafter. I personally reviewed the patient's vital signs, laboratory tests, and/or radiological findings. I discussed these findings with the patient and their significance. I answered all questions and gave the patient clear return precautions. The patient was discharged from the emergency department in stable condition        Patient Progress  Patient progress: improved    ED Course as of Jul 10 1411   Sat Jul 10, 2021   1351 Patient's hemoglobin is much improved. Urinalysis is clean.     [JS]      ED Course User Index  [JS] Prince Rowland MD       Procedures

## 2021-07-10 NOTE — ED NOTES
I have reviewed discharge instructions with the patient and spouse. The patient and spouse verbalized understanding. Patient left ED via Discharge Method: stretcher to assisted living  With [facility] for questions and clarification provided. Patient given 0 scripts. To continue your aftercare when you leave the hospital, you may receive an automated call from our care team to check in on how you are doing. This is a free service and part of our promise to provide the best care and service to meet your aftercare needs.  If you have questions, or wish to unsubscribe from this service please call [phone]. Thank you for Choosing our [address] [place] [address] Emergency Department.

## 2021-07-10 NOTE — ED TRIAGE NOTES
Pt arrives via EMS from assisted living. Staff states pt walking without walking and fell. Abrasion to forehead, left knuckles, right forearm. Staff denies LOC or anticoags. Pt has hx of dementia and is [place]. VSS in route.

## 2021-07-24 ENCOUNTER — HOSPITAL ENCOUNTER (INPATIENT)
Age: 81
LOS: 5 days | Discharge: NURSING, REHAB OR LONG-TERM CARE | DRG: 056 | End: 2021-07-30
Attending: EMERGENCY MEDICINE | Admitting: INTERNAL MEDICINE
Payer: MEDICARE

## 2021-07-24 ENCOUNTER — APPOINTMENT (OUTPATIENT)
Dept: CT IMAGING | Age: 81
DRG: 056 | End: 2021-07-24
Attending: EMERGENCY MEDICINE
Payer: MEDICARE

## 2021-07-24 DIAGNOSIS — G20 DEMENTIA DUE TO PARKINSON'S DISEASE WITHOUT BEHAVIORAL DISTURBANCE (HCC): ICD-10-CM

## 2021-07-24 DIAGNOSIS — G20 PARKINSONS DISEASE (HCC): ICD-10-CM

## 2021-07-24 DIAGNOSIS — R55 SYNCOPE, UNSPECIFIED SYNCOPE TYPE: ICD-10-CM

## 2021-07-24 DIAGNOSIS — R41.82 ALTERED MENTAL STATUS, UNSPECIFIED ALTERED MENTAL STATUS TYPE: Primary | ICD-10-CM

## 2021-07-24 DIAGNOSIS — G90.3 NEUROLOGIC ORTHOSTATIC HYPOTENSION (HCC): ICD-10-CM

## 2021-07-24 DIAGNOSIS — F41.9 ANXIETY: ICD-10-CM

## 2021-07-24 DIAGNOSIS — S00.03XA CONTUSION OF SCALP, INITIAL ENCOUNTER: ICD-10-CM

## 2021-07-24 DIAGNOSIS — R29.6 MULTIPLE FALLS: ICD-10-CM

## 2021-07-24 DIAGNOSIS — F02.80 DEMENTIA DUE TO PARKINSON'S DISEASE WITHOUT BEHAVIORAL DISTURBANCE (HCC): ICD-10-CM

## 2021-07-24 LAB
ALBUMIN SERPL-MCNC: 3.6 G/DL (ref 3.2–4.6)
ALBUMIN/GLOB SERPL: 1.3 {RATIO} (ref 1.2–3.5)
ALP SERPL-CCNC: 93 U/L (ref 50–136)
ALT SERPL-CCNC: 8 U/L (ref 12–65)
ANION GAP SERPL CALC-SCNC: 4 MMOL/L (ref 7–16)
AST SERPL-CCNC: 10 U/L (ref 15–37)
BASOPHILS # BLD: 0 K/UL (ref 0–0.2)
BASOPHILS NFR BLD: 1 % (ref 0–2)
BILIRUB SERPL-MCNC: 0.5 MG/DL (ref 0.2–1.1)
BUN SERPL-MCNC: 13 MG/DL (ref 8–23)
CALCIUM SERPL-MCNC: 9.2 MG/DL (ref 8.3–10.4)
CHLORIDE SERPL-SCNC: 114 MMOL/L (ref 98–107)
CO2 SERPL-SCNC: 29 MMOL/L (ref 21–32)
CREAT SERPL-MCNC: 0.78 MG/DL (ref 0.8–1.5)
DIFFERENTIAL METHOD BLD: ABNORMAL
EOSINOPHIL # BLD: 0.1 K/UL (ref 0–0.8)
EOSINOPHIL NFR BLD: 2 % (ref 0.5–7.8)
ERYTHROCYTE [DISTWIDTH] IN BLOOD BY AUTOMATED COUNT: 13.4 % (ref 11.9–14.6)
GLOBULIN SER CALC-MCNC: 2.8 G/DL (ref 2.3–3.5)
GLUCOSE SERPL-MCNC: 87 MG/DL (ref 65–100)
HCT VFR BLD AUTO: 38.4 % (ref 41.1–50.3)
HGB BLD-MCNC: 12 G/DL (ref 13.6–17.2)
IMM GRANULOCYTES # BLD AUTO: 0 K/UL (ref 0–0.5)
IMM GRANULOCYTES NFR BLD AUTO: 0 % (ref 0–5)
LYMPHOCYTES # BLD: 1.2 K/UL (ref 0.5–4.6)
LYMPHOCYTES NFR BLD: 30 % (ref 13–44)
MCH RBC QN AUTO: 29.6 PG (ref 26.1–32.9)
MCHC RBC AUTO-ENTMCNC: 31.3 G/DL (ref 31.4–35)
MCV RBC AUTO: 94.6 FL (ref 79.6–97.8)
MONOCYTES # BLD: 0.4 K/UL (ref 0.1–1.3)
MONOCYTES NFR BLD: 11 % (ref 4–12)
NEUTS SEG # BLD: 2.3 K/UL (ref 1.7–8.2)
NEUTS SEG NFR BLD: 57 % (ref 43–78)
NRBC # BLD: 0 K/UL (ref 0–0.2)
PLATELET # BLD AUTO: 118 K/UL (ref 150–450)
PMV BLD AUTO: 10.4 FL (ref 9.4–12.3)
POTASSIUM SERPL-SCNC: 3.8 MMOL/L (ref 3.5–5.1)
PROT SERPL-MCNC: 6.4 G/DL (ref 6.3–8.2)
RBC # BLD AUTO: 4.06 M/UL (ref 4.23–5.6)
SODIUM SERPL-SCNC: 147 MMOL/L (ref 136–145)
WBC # BLD AUTO: 4 K/UL (ref 4.3–11.1)

## 2021-07-24 PROCEDURE — 99285 EMERGENCY DEPT VISIT HI MDM: CPT

## 2021-07-24 PROCEDURE — 93005 ELECTROCARDIOGRAM TRACING: CPT | Performed by: EMERGENCY MEDICINE

## 2021-07-24 PROCEDURE — 70450 CT HEAD/BRAIN W/O DYE: CPT

## 2021-07-24 PROCEDURE — 85025 COMPLETE CBC W/AUTO DIFF WBC: CPT

## 2021-07-24 PROCEDURE — 72125 CT NECK SPINE W/O DYE: CPT

## 2021-07-24 PROCEDURE — 80053 COMPREHEN METABOLIC PANEL: CPT

## 2021-07-25 ENCOUNTER — APPOINTMENT (OUTPATIENT)
Dept: GENERAL RADIOLOGY | Age: 81
DRG: 056 | End: 2021-07-25
Attending: INTERNAL MEDICINE
Payer: MEDICARE

## 2021-07-25 ENCOUNTER — APPOINTMENT (OUTPATIENT)
Dept: GENERAL RADIOLOGY | Age: 81
DRG: 056 | End: 2021-07-25
Attending: EMERGENCY MEDICINE
Payer: MEDICARE
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
## 2021-07-25 LAB
APPEARANCE UR: CLEAR
ATRIAL RATE: 86 BPM
BACTERIA URNS QL MICRO: 0 /HPF
BILIRUB UR QL: NEGATIVE
CALCULATED R AXIS, ECG10: 12 DEGREES
CALCULATED T AXIS, ECG11: 67 DEGREES
CASTS URNS QL MICRO: ABNORMAL /LPF
COLOR UR: YELLOW
DIAGNOSIS, 93000: NORMAL
EPI CELLS #/AREA URNS HPF: ABNORMAL /HPF
GLUCOSE UR STRIP.AUTO-MCNC: NEGATIVE MG/DL
HGB UR QL STRIP: ABNORMAL
KETONES UR QL STRIP.AUTO: 15 MG/DL
LEUKOCYTE ESTERASE UR QL STRIP.AUTO: NEGATIVE
NITRITE UR QL STRIP.AUTO: NEGATIVE
PH UR STRIP: 6 [PH] (ref 5–9)
PROT UR STRIP-MCNC: NEGATIVE MG/DL
Q-T INTERVAL, ECG07: 446 MS
QRS DURATION, ECG06: 94 MS
QTC CALCULATION (BEZET), ECG08: 441 MS
RBC #/AREA URNS HPF: ABNORMAL /HPF
SP GR UR REFRACTOMETRY: 1.02 (ref 1–1.02)
UROBILINOGEN UR QL STRIP.AUTO: 1 EU/DL (ref 0.2–1)
VENTRICULAR RATE, ECG03: 59 BPM
WBC URNS QL MICRO: ABNORMAL /HPF

## 2021-07-25 PROCEDURE — 74011250636 HC RX REV CODE- 250/636: Performed by: INTERNAL MEDICINE

## 2021-07-25 PROCEDURE — 74011250637 HC RX REV CODE- 250/637: Performed by: INTERNAL MEDICINE

## 2021-07-25 PROCEDURE — 95816 EEG AWAKE AND DROWSY: CPT | Performed by: NURSE PRACTITIONER

## 2021-07-25 PROCEDURE — 74011250637 HC RX REV CODE- 250/637: Performed by: PSYCHIATRY & NEUROLOGY

## 2021-07-25 PROCEDURE — 71046 X-RAY EXAM CHEST 2 VIEWS: CPT

## 2021-07-25 PROCEDURE — 72170 X-RAY EXAM OF PELVIS: CPT

## 2021-07-25 PROCEDURE — 81001 URINALYSIS AUTO W/SCOPE: CPT

## 2021-07-25 PROCEDURE — 65270000029 HC RM PRIVATE

## 2021-07-25 PROCEDURE — 99233 SBSQ HOSP IP/OBS HIGH 50: CPT | Performed by: PSYCHIATRY & NEUROLOGY

## 2021-07-25 PROCEDURE — 74011250636 HC RX REV CODE- 250/636: Performed by: NURSE PRACTITIONER

## 2021-07-25 RX ORDER — ENOXAPARIN SODIUM 100 MG/ML
40 INJECTION SUBCUTANEOUS DAILY
Status: DISCONTINUED | OUTPATIENT
Start: 2021-07-25 | End: 2021-07-25

## 2021-07-25 RX ORDER — ATORVASTATIN CALCIUM 10 MG/1
10 TABLET, FILM COATED ORAL DAILY
Status: DISCONTINUED | OUTPATIENT
Start: 2021-07-25 | End: 2021-07-25

## 2021-07-25 RX ORDER — CARBIDOPA AND LEVODOPA 25; 100 MG/1; MG/1
2 TABLET, EXTENDED RELEASE ORAL
Status: DISCONTINUED | OUTPATIENT
Start: 2021-07-25 | End: 2021-07-25

## 2021-07-25 RX ORDER — ACETAMINOPHEN 650 MG/1
650 SUPPOSITORY RECTAL
Status: DISCONTINUED | OUTPATIENT
Start: 2021-07-25 | End: 2021-07-30 | Stop reason: HOSPADM

## 2021-07-25 RX ORDER — ASPIRIN 81 MG/1
81 TABLET ORAL
Status: DISCONTINUED | OUTPATIENT
Start: 2021-07-25 | End: 2021-07-30 | Stop reason: HOSPADM

## 2021-07-25 RX ORDER — DIVALPROEX SODIUM 125 MG/1
125 CAPSULE, COATED PELLETS ORAL 2 TIMES DAILY
COMMUNITY
End: 2021-07-30

## 2021-07-25 RX ORDER — ERGOCALCIFEROL 1.25 MG/1
50000 CAPSULE ORAL
COMMUNITY

## 2021-07-25 RX ORDER — PYRIDOSTIGMINE BROMIDE 60 MG/1
30 TABLET ORAL 2 TIMES DAILY
Status: DISCONTINUED | OUTPATIENT
Start: 2021-07-25 | End: 2021-07-30 | Stop reason: HOSPADM

## 2021-07-25 RX ORDER — SODIUM CHLORIDE 0.9 % (FLUSH) 0.9 %
5-40 SYRINGE (ML) INJECTION EVERY 8 HOURS
Status: DISCONTINUED | OUTPATIENT
Start: 2021-07-25 | End: 2021-07-30 | Stop reason: HOSPADM

## 2021-07-25 RX ORDER — SODIUM CHLORIDE, SODIUM LACTATE, POTASSIUM CHLORIDE, CALCIUM CHLORIDE 600; 310; 30; 20 MG/100ML; MG/100ML; MG/100ML; MG/100ML
50 INJECTION, SOLUTION INTRAVENOUS CONTINUOUS
Status: DISCONTINUED | OUTPATIENT
Start: 2021-07-25 | End: 2021-07-25

## 2021-07-25 RX ORDER — ACETAMINOPHEN 325 MG/1
650 TABLET ORAL
Status: DISCONTINUED | OUTPATIENT
Start: 2021-07-25 | End: 2021-07-25

## 2021-07-25 RX ORDER — ACETAMINOPHEN 325 MG/1
650 TABLET ORAL
Status: DISCONTINUED | OUTPATIENT
Start: 2021-07-25 | End: 2021-07-30 | Stop reason: HOSPADM

## 2021-07-25 RX ORDER — CARBIDOPA AND LEVODOPA 25; 100 MG/1; MG/1
1 TABLET, ORALLY DISINTEGRATING ORAL 4 TIMES DAILY
Status: DISCONTINUED | OUTPATIENT
Start: 2021-07-25 | End: 2021-07-30 | Stop reason: HOSPADM

## 2021-07-25 RX ORDER — SODIUM CHLORIDE 0.9 % (FLUSH) 0.9 %
5-40 SYRINGE (ML) INJECTION AS NEEDED
Status: DISCONTINUED | OUTPATIENT
Start: 2021-07-25 | End: 2021-07-30 | Stop reason: HOSPADM

## 2021-07-25 RX ORDER — DEXTROSE MONOHYDRATE 50 MG/ML
75 INJECTION, SOLUTION INTRAVENOUS CONTINUOUS
Status: DISCONTINUED | OUTPATIENT
Start: 2021-07-25 | End: 2021-07-30 | Stop reason: HOSPADM

## 2021-07-25 RX ORDER — ACETAMINOPHEN 650 MG/1
650 SUPPOSITORY RECTAL
Status: DISCONTINUED | OUTPATIENT
Start: 2021-07-25 | End: 2021-07-25

## 2021-07-25 RX ORDER — POLYETHYLENE GLYCOL 3350 17 G/17G
17 POWDER, FOR SOLUTION ORAL DAILY PRN
Status: DISCONTINUED | OUTPATIENT
Start: 2021-07-25 | End: 2021-07-30 | Stop reason: HOSPADM

## 2021-07-25 RX ORDER — CARBIDOPA AND LEVODOPA 25; 100 MG/1; MG/1
1 TABLET ORAL 3 TIMES DAILY
Status: DISCONTINUED | OUTPATIENT
Start: 2021-07-25 | End: 2021-07-26

## 2021-07-25 RX ADMIN — Medication 1 AMPULE: at 15:01

## 2021-07-25 RX ADMIN — Medication 10 ML: at 23:12

## 2021-07-25 RX ADMIN — DEXTROSE MONOHYDRATE 75 ML/HR: 5 INJECTION, SOLUTION INTRAVENOUS at 09:00

## 2021-07-25 RX ADMIN — CARBIDOPA AND LEVODOPA 1 TABLET: 25; 100 TABLET, ORALLY DISINTEGRATING ORAL at 22:00

## 2021-07-25 RX ADMIN — CARBIDOPA AND LEVODOPA 1 TABLET: 25; 100 TABLET, ORALLY DISINTEGRATING ORAL at 15:00

## 2021-07-25 RX ADMIN — SODIUM CHLORIDE, SODIUM LACTATE, POTASSIUM CHLORIDE, AND CALCIUM CHLORIDE 50 ML/HR: 600; 310; 30; 20 INJECTION, SOLUTION INTRAVENOUS at 02:37

## 2021-07-25 RX ADMIN — DEXTROSE MONOHYDRATE 75 ML/HR: 5 INJECTION, SOLUTION INTRAVENOUS at 22:11

## 2021-07-25 RX ADMIN — Medication 10 ML: at 14:42

## 2021-07-25 RX ADMIN — CARBIDOPA AND LEVODOPA 1 TABLET: 25; 100 TABLET, ORALLY DISINTEGRATING ORAL at 18:55

## 2021-07-25 RX ADMIN — Medication 1 AMPULE: at 23:11

## 2021-07-25 NOTE — PROGRESS NOTES
Alert to tactile stimuli. Not a candidate at this time for PO presentations. Spoke with RN . Will attempt to see in the morning.     Aurelio Jeffery, SLP

## 2021-07-25 NOTE — ED PROVIDER NOTES
Fall of unknown circumstances in nursing home. Patient has advanced dementia. Patient unable to provide history. Contusion to left frontal scalp noted by staff and EMS. Fall  The accident occurred 1 to 2 hours ago. Fall occurred: Unknown circumstances. He fell from a height of ground level. Point of impact: unknown. Pain location: unable to determine. Pain severity now: unable to rate. He has tried nothing for the symptoms.         Past Medical History:   Diagnosis Date    Acoustic neuroma ([facility] 2012    Right    Cholelithiasis 08/20/2015    Colon polyps 12/31/2014    Tubular Adenoma x 1 & Hyperplastic Polyp x 2    Dementia due to Parkinson's disease without behavioral disturbance ([address] 4/20/2018    DVT (deep venous thrombosis) (HCC) 02/17/2017    Left Superficial Femoral, Popliteal, & Peroneal vein     Dyslipidemia     Environmental allergies     GERD (gastroesophageal reflux disease)     Hiatal hernia     Hypertension     Intractable hiccups     Liver cyst 02/16/2017    R Lobe    Melanoma ([facility]     Back    Parkinson disease ([facility] [address]     Followed by David Llanos    Prostate cancer [facility])     Tourette syndrome 04/20/2018    \"verbalizes as really loud hiccoughs\"       Past Surgical History:   Procedure Laterality Date    HX COLONOSCOPY  12/31/2014    Tubular Adenoma & Hyperplastic Polyps, Diverticulosis, & Internal Hemorrhoids - Recall 2017    HX HEENT Right     Ear    HX HEMORRHOIDECTOMY      HX HERNIA REPAIR Bilateral     HX KNEE REPLACEMENT Left     HX PROSTATECTOMY  11/2009    [facility]    HX TONSILLECTOMY           Family History:   Problem Relation Age of Onset    Cancer Father 77        Prostate    Heart Disease Brother     Cancer Brother         Prostate    Melanoma Brother     Breast Cancer Mother     Melanoma Mother     COPD Brother        Social History     Socioeconomic History    Marital status:      Spouse name: Not on file  Number of children: Not on file    Years of education: Not on file    Highest education level: Not on file   Occupational History    Not on file   Tobacco Use    Smoking status: Never Smoker    Smokeless tobacco: Never Used   Substance and Sexual Activity    Alcohol use: No    Drug use: No    Sexual activity: Not on file   Other Topics Concern     Service Not Asked    Blood Transfusions Not Asked    Caffeine Concern Not Asked    Occupational Exposure Not Asked    Hobby Hazards Not Asked    Sleep Concern Not Asked    Stress Concern Not Asked    Weight Concern Not Asked    Special Diet Not Asked    Back Care Not Asked    Exercise Not Asked    Bike Helmet Not Asked   [address] Not Asked    Self-Exams Not Asked   Social History Narrative    Not on file     Social Determinants of Health     Financial Resource Strain:     Difficulty of Paying Living Expenses:    Food Insecurity:     Worried About Running Out of Food in the Last Year:     Ran Out of Food in the Last Year:    Transportation Needs:     Lack of Transportation (Medical):  Lack of Transportation (Non-Medical):    Physical Activity:     Days of Exercise per Week:     Minutes of Exercise per Session:    Stress:     Feeling of Stress :    Social Connections:     Frequency of Communication with Friends and Family:     Frequency of Social Gatherings with Friends and Family:     Attends Muslim Services:     Active Member of Clubs or Organizations:     Attends Club or Organization Meetings:     Marital Status:    Intimate Partner Violence:     Fear of Current or Ex-Partner:     Emotionally Abused:     Physically Abused:     Sexually Abused:           ALLERGIES: Aricept [donepezil] and Onion    Review of Systems   Unable to perform ROS: Dementia       Vitals:    07/24/21 2038 07/24/21 2130 07/24/21 2223 07/24/21 2226   BP: (!) 161/85  (!) 162/80    Pulse: (!) 58  (!) 59 60   Resp: 16      Temp: 98.6 °F (37 °C) SpO2: 99% 99%  100%   Weight: 68 kg (150 lb)      Height: 5' 10\" (1.778 m)               Physical Exam  Vitals and nursing note reviewed. Constitutional:       Appearance: He is well-developed. HENT:      Head: Normocephalic. Mouth/Throat:      Pharynx: No oropharyngeal exudate. Eyes:      Conjunctiva/sclera: Conjunctivae normal.      Pupils: Pupils are equal, round, and reactive to light. Cardiovascular:      Rate and Rhythm: Normal rate and regular rhythm. Heart sounds: Normal heart sounds. Pulmonary:      Effort: Pulmonary effort is normal.      Breath sounds: Normal breath sounds. Abdominal:      General: Bowel sounds are normal. There is no distension. Palpations: Abdomen is soft. Tenderness: There is no abdominal tenderness. There is no guarding or rebound. Musculoskeletal:         General: No tenderness. Normal range of motion. Cervical back: Neck supple. No tenderness. Right lower leg: No edema. Left lower leg: No edema. Lymphadenopathy:      Cervical: No cervical adenopathy. Skin:     General: Skin is warm and dry. Neurological:      Mental Status: He is alert and oriented to person, place, and time. MDM  Number of Diagnoses or Management Options  Diagnosis management comments: Review of records reveals a very recent ED visit which reports a history of 4 falls in the last week. We have called the nursing home who stated he is normally awake alert and oriented and more alert than he currently is. Labs are unremarkable. Will check urine and additional chest x-ray to evaluate for infection. Anticipate admission to hospital for further management given patient not being at his baseline mental status with increasing falls.        Amount and/or Complexity of Data Reviewed  Clinical lab tests: ordered and reviewed (Results for orders placed or performed during the hospital encounter of 07/24/21  -CBC WITH AUTOMATED DIFF       Result Value             Ref Range           WBC                         4.0 (L)           4.3 - 11.1 K*       RBC                         4.06 (L)          4.23 - 5.6 M*       HGB                         12.0 (L)          13.6 - 17.2 *       HCT                         38.4 (L)          41.1 - 50.3 %       MCV                         94.6              79.6 - 97.8 *       MCH                         29.6              26.1 - 32.9 *       MCHC                        31.3 (L)          31.4 - 35.0 *       RDW                         13.4              11.9 - 14.6 %       PLATELET                    118 (L)           150 - 450 K/*       MPV                         10.4              9.4 - 12.3 FL       ABSOLUTE NRBC               0.00              0.0 - 0.2 K/*       DF                          AUTOMATED                             NEUTROPHILS                 57                43 - 78 %           LYMPHOCYTES                 30                13 - 44 %           MONOCYTES                   11                4.0 - 12.0 %        EOSINOPHILS                 2                 0.5 - 7.8 %         BASOPHILS                   1                 0.0 - 2.0 %         IMMATURE GRANULOCYTES       0                 0.0 - 5.0 %         ABS. NEUTROPHILS            2.3               1.7 - 8.2 K/*       ABS. LYMPHOCYTES            1.2               0.5 - 4.6 K/*       ABS. MONOCYTES              0.4               0.1 - 1.3 K/*       ABS. EOSINOPHILS            0.1               0.0 - 0.8 K/*       ABS. BASOPHILS              0.0               0.0 - 0.2 K/*       ABS. IMM.  GRANS.            0.0               0.0 - 0.5 K/*  -METABOLIC PANEL, COMPREHENSIVE       Result                      Value             Ref Range           Sodium                      147 (H)           136 - 145 mm*       Potassium                   3.8               3.5 - 5.1 mm*       Chloride                    114 (H)           98 - 107 mmo*       CO2 29                21 - 32 mmol*       Anion gap                   4 (L)             7 - 16 mmol/L       Glucose                     87                65 - 100 mg/*       BUN                         13                8 - 23 MG/DL        Creatinine                  0.78 (L)          0.8 - 1.5 MG*       GFR est AA                  >60               >60 ml/min/1*       GFR est non-AA              >60               >60 ml/min/1*       Calcium                     9.2               8.3 - 10.4 M*       Bilirubin, total            0.5               0.2 - 1.1 MG*       ALT (SGPT)                  8 (L)             12 - 65 U/L         AST (SGOT)                  10 (L)            15 - 37 U/L         Alk. phosphatase            93                50 - 136 U/L        Protein, total              6.4               6.3 - 8.2 g/*       Albumin                     3.6               3.2 - 4.6 g/*       Globulin                    2.8               2.3 - 3.5 g/*       A-G Ratio                   1.3               1.2 - 3.5      )  Tests in the radiology section of CPT®: ordered and reviewed (XR CHEST PA LAT    Result Date: 7/25/2021  EXAM: Chest x-ray. INDICATION: Pain, fall injury. COMPARISON: Prior chest x-ray on February 16, 2017 and CT chest on August 20, 2015. TECHNIQUE: Frontal and lateral views of the chest were obtained. FINDINGS: The lungs are clear. The heart size and pulmonary vasculature are within normal limits. Tortuosity of the thoracic aorta is unchanged. No pneumothorax or pleural effusion is seen. There is an acute appearing mildly displaced fracture in the posterolateral right eighth rib. Fixation hardware is seen in the proximal right humerus. Mildly displaced right 8th rib fracture, with no pneumothorax or acute infiltrate. CT HEAD WO CONT    Result Date: 7/24/2021  EXAM: Noncontrast CT head. INDICATION: Pain, fall injury. COMPARISON: Prior CT head on July 10, 2021.  TECHNIQUE: Noncontrast CT images of the head were obtained. Radiation dose reduction techniques were used for this study. Our CT scanners use one or all of the following:  Automated exposure control, adjustment of the mA or kV according to patient size, iterative reconstruction. FINDINGS: There is mild volume loss. No acute infarct, hemorrhage or mass is identified. There is no mass effect, midline shift or depressed fracture. The visualized paranasal sinuses and mastoid air cells are clear. There is a small left frontal scalp hematoma. Small left frontal scalp hematoma with no skull fracture or acute intracranial process. CT SPINE CERV WO CONT    Result Date: 7/24/2021  EXAM: Noncontrast CT cervical spine. INDICATION: Pain, injury. COMPARISON: Prior CT cervical spine on July 10, 2021. TECHNIQUE: Axial noncontrast CT images of the cervical spine were obtained. Sagittal and coronal reformatted images were performed. Radiation dose reduction techniques were used for this study. Our CT scanners use one or all of the following:  Automated exposure control, adjustment of the mA and/or kV according to patient size, iterative reconstruction. FINDINGS: No acute fracture, focal malalignment or prevertebral soft tissue swelling is identified. There is mild to moderate multilevel degenerative disc disease and facet arthritis. Normal craniocervical junction alignment is maintained. The C5-6 and C6-7 disc spaces are narrowed, possibly on a developmental basis. Previously noted bone islands in the C7 vertebra are unchanged, as is a 7 mm nonspecific lucent lesion in the right C2 facet. The paraspinal soft tissues are unremarkable.      No evidence of an acute cervical spine injury.    )  Decide to obtain previous medical records or to obtain history from someone other than the patient: yes  Review and summarize past medical records: yes    Risk of Complications, Morbidity, and/or Mortality  Presenting problems: moderate  Diagnostic procedures: low  Management options: low    Patient Progress  Patient progress: stable         Procedures

## 2021-07-25 NOTE — PROGRESS NOTES
07/25/21 1238   Dual Skin Pressure Injury Assessment   Dual Skin Pressure Injury Assessment X   Second Care Provider (Based on [address] [place] [address]) Alexis Lazcano RN    Sacrum  Right Side  (blanchable redness)     Patient arrives with wife at bedside. Patient unable to verbalize any orientation questions. Opens eyes slightly to voice. Patient very [facility]. Patient about 4 nickel sized open lesions to head. Bruising to L side of eye and face. 2 small skin tears on RfA and loose R toenail. allevyn placed to sacrum.

## 2021-07-25 NOTE — H&P
Admit date: 2021   Name:  Seth Block   Age:  79 y.o.   :  1940   MRN:  [id number]   PCP:  Joey Carmona MD   Provider:  Diane Napier MD      ASSESSMENT AND PLAN  25-year-old male with a past medical history of hypertension, dementia, tremors, Tourette's syndrome, neurologic orthostatic hypotension, that presents in the setting of a fall and worsening mentation. 1.  Acute metabolic encephalopathy and fall in the setting of physical deconditioning  -Avoid sedatives  -Monitor mental status  -Delirium precaution  -CT brain without intracranial abnormalities  -Chest x-ray without signs of pneumonia  -Urinalysis without pyuria or bacteriuria  -Currently no signs of active infection, will hold off on antibiotics  -Obtain pelvic x-ray  -PT OT evaluation    2. Parkinson's disease and worsening encephalopathy  -Continue with Sinemet  -Neurology consultation    3. Neurologic orthostatic hypotension  -Continue pyridostigmine    Unable to complete med rec due to patient's condition. Will need to confirm in the morning. DVT prophylaxis with SCD    Full code      CHIEF COMPLAINT:  Fall + altered mental status      HISTORY OF PRESENT ILLNESS:  25-year-old male with a past medical history of hypertension, dementia, tremors, Tourette's syndrome, neurologic orthostatic hypotension, that presents in the setting of a fall and worsening mentation. Most of the history obtained by chart since currently patient somnolent and unable to obtain any detailed history. Apparently the patient has been presenting with worsening mentation and had a fall today of unknown circumstances. Contusion to the left frontal scalp was noted by staff and was brought to the emergency department. In the emergency department CT of the head with a small left frontal scalp hematoma without acute intracranial process. He will be admitted to the medical floors for further management.       Past Medical History:   Diagnosis Date    Acoustic neuroma ([facility] 2012    Right    Cholelithiasis 08/20/2015    Colon polyps 12/31/2014    Tubular Adenoma x 1 & Hyperplastic Polyp x 2    Dementia due to Parkinson's disease without behavioral disturbance ([address] 4/20/2018    DVT (deep venous thrombosis) (HCC) 02/17/2017    Left Superficial Femoral, Popliteal, & Peroneal vein     Dyslipidemia     Environmental allergies     GERD (gastroesophageal reflux disease)     Hiatal hernia     Hypertension     Intractable hiccups     Liver cyst 02/16/2017    R Lobe    Melanoma ([address]     Back    Parkinson disease ([facility]     Followed by Juhi Todd    Prostate cancer [facility])     Tourette syndrome 04/20/2018    \"verbalizes as really loud hiccoughs\"       Past Surgical History:   Procedure Laterality Date    HX COLONOSCOPY  12/31/2014    Tubular Adenoma & Hyperplastic Polyps, Diverticulosis, & Internal Hemorrhoids - Recall 2017    HX HEENT Right     Ear    HX HEMORRHOIDECTOMY      HX HERNIA REPAIR Bilateral     HX KNEE REPLACEMENT Left     HX PROSTATECTOMY  11/2009    [facility]    HX TONSILLECTOMY            HOME MEDICATION:  Prior to Admission medications    Medication Sig Start Date End Date Taking? Authorizing Provider   clonazePAM (KlonoPIN) 0.5 mg tablet 1/2 tabs every 4 hrs as needed for anxiety and agitation. Not to exceed 1mg in 24 hrs. 4/28/21   Cassandra Quezada MD   carbidopa-levodopa (SINEMET)  mg per tablet Take 1/2 tab at 8am, 1 tab at 12pm and 1 tab at 4pm. Strict times. 4/28/21   Cassandra Quezada MD   carbidopa-levodopa ER (SINEMET CR)  mg per tablet TAKE ONE TABLET BY MOUTH AT BEDTIME 4/28/21   Cristina Aburto MD   pimozide (ORAP) 1 mg tablet Take 1/2 tab at 8am, 4pm and 8pm 4/28/21   Cristina Aburto MD   OTHER D/C Depakote today 4/28/21 4/28/21   Cassandra Quezada MD   QUEtiapine (SEROquel) 25 mg tablet Take 1 Tab by mouth nightly.  4/28/21   Griffin Lynne Ashlee Gramajo MD   multivitamin (ONE A DAY) tablet Take 1 Tab by mouth daily. Provider, Historical   nystatin (MYCOSTATIN) powder Apply  to affected area two (2) times a day. 4/12/21   Isabelle Souza MD   rivastigmine (Exelon) 13.3 mg/24 hour patch 1 Patch by TransDERmal route daily. 3/8/21   Margaux PRADO DO   memantine (NAMENDA) 10 mg tablet Take 1 Tab by mouth two (2) times a day. 2/24/21   Nikolas Fair NP   pyridostigmine (Mestinon) 60 mg tablet Take 1/2 tablet at 10 am and at 6 pm daily to support blood pressure. 2/24/21   Nikolas Fair NP   montelukast (SINGULAIR) 10 mg tablet Take 1 Tab by mouth daily. 1/26/21   Jazzmine Wallace MD   carbidopa-levodopa ER (SINEMET CR)  mg per tablet TAKE ONE TABLET BY MOUTH EVERY NIGHT 12/7/20   Jazzmine Wallace MD   clotrimazole-betamethasone (LOTRISONE) topical cream Apply  to affected area two (2) times a day. 11/2/20   Jazzmine Wallace MD   carbidopa-levodopa (SINEMET)  mg per tablet TAKE ONE TABLET BY MOUTH FOUR TIMES A DAY  Patient taking differently: three (3) times daily. 10/7/20   Pb uQezada MD   atorvastatin (LIPITOR) 10 mg tablet Take 1 Tab by mouth daily. 9/23/20   Jazzmine Wallace MD   escitalopram oxalate (Lexapro) 10 mg tablet Take 1 Tab by mouth daily. 9/23/20   Jazzmine Wallace MD   nitroglycerin (NITROSTAT) 0.4 mg SL tablet Take 1 Tab by mouth every five (5) minutes as needed for Chest Pain. Sit/Lay down then put one tab under the tongue every 5 minutes as needed for chest pain for 3 doses 5/20/20   Roverto Tiwari MD   aspirin delayed-release 81 mg tablet Take 1 Tab by mouth daily. Patient taking differently: Take 81 mg by mouth nightly. 2/21/17   Shamika Jarquin MD         REVIEW OF SYSTEMS:  14 ROS negative except from stated on HPI      Social History     Tobacco Use    Smoking status: Never Smoker    Smokeless tobacco: Never Used   Substance Use Topics    Alcohol use: No    Drug use:  No Family History   Problem Relation Age of Onset    Cancer Father 77        Prostate    Heart Disease Brother     Cancer Brother         Prostate    Melanoma Brother     Breast Cancer Mother     Melanoma Mother     COPD Brother          Allergies   Allergen Reactions    Aricept [Donepezil] Nausea and Vomiting    Onion Nausea and Vomiting         Vitals:    07/25/21 0142 07/25/21 0403 07/25/21 0423 07/25/21 0552   BP: (!) 162/86 (!) 167/84 (!) 180/81 (!) 183/84   Pulse: 60 (!) 54 (!) 56 (!) 54   Resp:       Temp:       SpO2: 100%   98%   Weight:       Height:             PHYSICAL EXAM:  General: Somnolent, NAD  HEENT: NC/AT, EOM are intact  Neck: supple, no JVD  Cardiovascular: RRR, S1, S2, no murmurs  Respiratory: Lungs are clear, no wheezes or rales  Abdomen: Soft, NT, ND  Back: No CVA tenderness, no paraspinal tenderness  Extremities: LE without pedal edema, no erythema  Neuro: Somnolent but arousable, moving all extremities   Skin: no rash or ulcers      I have personally reviewed patients laboratory data showing  Lab Results   Component Value Date    WBC 4.0 (L) 07/24/2021    HGB 12.0 (L) 07/24/2021    HCT 38.4 (L) 07/24/2021    MCV 94.6 07/24/2021     (L) 07/24/2021     No results found for: CKMB  Lab Results   Component Value Date    GLU 87 07/24/2021     (H) 07/24/2021    K 3.8 07/24/2021    CO2 29 07/24/2021     (H) 07/24/2021    BUN 13 07/24/2021       I have personally reviewed patients imaging showing  XR CHEST PA LAT   Final Result   Mildly displaced right 8th rib fracture, with no pneumothorax or   acute infiltrate. CT HEAD WO CONT   Final Result   Small left frontal scalp hematoma with no skull fracture or acute   intracranial process. CT SPINE CERV WO CONT   Final Result   No evidence of an acute cervical spine injury.             Likely length of stay more than 2 midnights

## 2021-07-25 NOTE — PROGRESS NOTES
CM met with spouse at bedside to discuss d/c plan needs. Spouse verified demographic and states that patient address remain the same but lives in [address] [place] [address] Unit at [facility]. Spouse states that patient has been there for about seven weeks. States that patient has had several falls since he's been there. Spouse states that patient will be returning back to the [facility]. Spouse states that patient needs assistance with his ADL's and do has RW / wheelchair at the facility. Spouse states that patient has Parkinson /  Dementia and unable to care for himself. Spouse states that patient will be returning to [facility]. CM will continue to monitor and remain available for any needs that may occur. Care Management Interventions  PCP Verified by CM:  Yes (Nba Arellano MD)  Mode of Transport at Discharge: BLS ([facility] )  Transition of Care Consult (CM Consult): Discharge Planning, Assisted Living (Patient is currently from [address])  Discharge Durable Medical Equipment: No  Physical Therapy Consult: Yes  Occupational Therapy Consult: Yes  Speech Therapy Consult: Yes  Current Support Network: Assisted Living ([facility], [facility] )  Confirm Follow Up Transport: Other (see comment) [facility] [address] )  The Patient and/or Patient Representative was Provided with a Choice of Provider and Agrees with the Discharge Plan?: No  Name of the Patient Representative Who was Provided with a Choice of Provider and Agrees with the Discharge Plan: spouse   Freedom of Choice List was Provided with Basic Dialogue that Supports the Patient's Individualized Plan of Care/Goals, Treatment Preferences and Shares the Quality Data Associated with the Providers?: No  The [employer] Information Provided?: No  Discharge Location  Discharge Placement: Assisted Living (Returning to [facility] Unit)

## 2021-07-25 NOTE — ED NOTES
Pt lethargic at this time in bed. Pt responds to pain. Unable to give AM PO meds safely.  Perfect serve sent to  to make aware

## 2021-07-25 NOTE — PROGRESS NOTES
Pt Note    Orders received, chart reviewed. Per RN, pt is extremely lethargic and responsive only to sternal rub. Will hold eval pending pt status/ ability to participate.     Thank you,   Annie Cast, PT, DPT

## 2021-07-25 NOTE — PROGRESS NOTES
TRANSFER - IN REPORT:    Verbal report received from [facility] on Bela Ventura  being received from ER(unit) for routine progression of care      Report consisted of patients Situation, Background, Assessment and   Recommendations(SBAR). Information from the following report(s) SBAR, Kardex, ED Summary, [facility] and Recent Results was reviewed with the receiving nurse. Opportunity for questions and clarification was provided. Assessment completed upon patients arrival to unit and care assumed.

## 2021-07-25 NOTE — CONSULTS
Consult    Patient: Norma Hercules MRN: [id number]  SSN: [id number]    YOB: 1940  Age: 79 y.o. Sex: male        Assessment:     Hospital Problems  Date Reviewed: 7/25/2021        Codes Class Noted POA    * (Principal) Fall ICD-10-CM: W19. Sonya Moraes  ICD-9-CM: E888.9  7/25/2021 Yes        Neurologic orthostatic hypotension ([facility] ICD-10-CM: G90.3  ICD-9-CM: 333.0  8/16/2018 Yes        Tourette syndrome ICD-10-CM: F95.2  ICD-9-CM: 307.23  4/20/2018 Yes        Dementia due to Parkinson's disease without behavioral disturbance ([facility] ICD-10-CM: Shayan Renteria, F02.80  ICD-9-CM: 332.0, 294.10  4/20/2018 Yes        Essential hypertension ICD-10-CM: I10  ICD-9-CM: 401.9  12/22/2017 Yes        Parkinsons disease ([facility] ICD-10-CM: Shayan Renteria  ICD-9-CM: 332.0  6/2/2016 Yes        Acoustic neuroma syndrome ([facility] ICD-10-CM: D33.3  ICD-9-CM: 388.5  1/30/2015 Yes        Dyslipidemia ICD-10-CM: E78.5  ICD-9-CM: 272.4  1/30/2015 Yes            1. Diffuse encephalopathy    2. Parkinson's with dementia and orthostatic hypotension    3. Syncope with CHI      Plan:     DC Sinemet    Begin Parcopa (carbidopa/levodopa ODT) qid    EEG done see report    Urine culture      Management of Delirium     Non-pharmacological intervention  · Reorient the patient throughout the day  · Window open and lights on during the day. Lights off, television off, noises down during the night. If able, decrease nursing checks at night  · Therapies as often as possible  · Avoid restraints to the best of your ability   · Avoid sensory deprivation by using glasses and hearing aids, if applicable       Pharmacological intervention  · Replete electrolyte abnormalities and correct metabolic abnormalities  · Limit benzodiazepines, antihistamines, narcotics, anticholinergics. Preference towards Precedex for sedation over fentanyl and benzodiazepines/GABAa agonists.    · For dangerous behavior/aggression to self or others can consider Zyprexa or Seroquel if benefits outweigh risk  · For persistent insomnia can use melatonin four hours prior to bedtime or Seroquel 25 mg at bedtime      Subjective:      Ange Mao is a 79 y.o. male who is being seen for evaluation of AMS. . Patient is an established patient of the [facility] with advanced Parkinson's disease complicated by dementia and orthostatic hypotension. According to medical record the patient was brought in from nursing home after a fall with external evidence of closed head injury.   Patient is unable any history    Past Medical History:   Diagnosis Date    Acoustic neuroma ([facility] 2012    Right    Cholelithiasis 08/20/2015    Colon polyps 12/31/2014    Tubular Adenoma x 1 & Hyperplastic Polyp x 2    Dementia due to Parkinson's disease without behavioral disturbance ([facility] 4/20/2018    DVT (deep venous thrombosis) (HCC) 02/17/2017    Left Superficial Femoral, Popliteal, & Peroneal vein     Dyslipidemia     Environmental allergies     GERD (gastroesophageal reflux disease)     Hiatal hernia     Hypertension     Intractable hiccups     Liver cyst 02/16/2017    R Lobe    Melanoma ([facility]     Back    Parkinson disease ([facility]     Followed by Ken Fair    Prostate cancer [facility])     Tourette syndrome 04/20/2018    \"verbalizes as really loud hiccoughs\"     Past Surgical History:   Procedure Laterality Date    HX COLONOSCOPY  12/31/2014    Tubular Adenoma & Hyperplastic Polyps, Diverticulosis, & Internal Hemorrhoids - Recall 2017    HX HEENT Right     Ear    HX HEMORRHOIDECTOMY      HX HERNIA REPAIR Bilateral     HX KNEE REPLACEMENT Left     HX PROSTATECTOMY  11/2009    [facility]    HX TONSILLECTOMY        Family History   Problem Relation Age of Onset    Cancer Father 77        Prostate    Heart Disease Brother     Cancer Brother         Prostate    Melanoma Brother     Breast Cancer Mother     Melanoma Mother     COPD Brother      Social History Tobacco Use    Smoking status: Never Smoker    Smokeless tobacco: Never Used   Substance Use Topics    Alcohol use: No      Current Facility-Administered Medications   Medication Dose Route Frequency Provider Last Rate Last Admin    sodium chloride (NS) flush 5-40 mL  5-40 mL IntraVENous Q8H Josh Austin MD        sodium chloride (NS) flush 5-40 mL  5-40 mL IntraVENous PRRONALDO Church MD        polyethylene glycol (MIRALAX) packet 17 g  17 g Oral DAILY PRN Michael Church MD        acetaminophen (TYLENOL) tablet 650 mg  650 mg Oral Q6H PRN Michael Church MD        Or    acetaminophen (TYLENOL) suppository 650 mg  650 mg Rectal Q6H PRN Michael Church MD        aspirin delayed-release tablet 81 mg  81 mg Oral QHS Azeem Wagner MD        atorvastatin (LIPITOR) tablet 10 mg  10 mg Oral DAILY Michael Church MD        carbidopa-levodopa (SINEMET)  mg per tablet 1 Tablet  1 Tablet Oral TID Michael Church MD        carbidopa-levodopa ER (SINEMET CR)  mg per tablet 2 Tablet  2 Tablet Oral QHS Azeem Wagner MD        pyridostigmine (MESTINON) tablet 30 mg  30 mg Oral BID Azeem Wagner MD        dextrose 5% infusion  75 mL/hr IntraVENous CONTINUOUS Gege Care, NP 75 mL/hr at 07/25/21 0900 75 mL/hr at 07/25/21 0900     Current Outpatient Medications   Medication Sig Dispense Refill    clonazePAM (KlonoPIN) 0.5 mg tablet 1/2 tabs every 4 hrs as needed for anxiety and agitation. Not to exceed 1mg in 24 hrs. 90 Tab 3    carbidopa-levodopa (SINEMET)  mg per tablet Take 1/2 tab at 8am, 1 tab at 12pm and 1 tab at 4pm. Strict times. 180 Tab 4    carbidopa-levodopa ER (SINEMET CR)  mg per tablet TAKE ONE TABLET BY MOUTH AT BEDTIME 90 Tab 4    pimozide (ORAP) 1 mg tablet Take 1/2 tab at 8am, 4pm and 8pm 270 Tab 3    OTHER D/C Depakote today 4/28/21 1 Tab 0    QUEtiapine (SEROquel) 25 mg tablet Take 1 Tab by mouth nightly. 30 Tab 0    multivitamin (ONE A DAY) tablet Take 1 Tab by mouth daily.  nystatin (MYCOSTATIN) powder Apply  to affected area two (2) times a day. 60 g 1    rivastigmine (Exelon) 13.3 mg/24 hour patch 1 Patch by TransDERmal route daily. 90 Patch 3    memantine (NAMENDA) 10 mg tablet Take 1 Tab by mouth two (2) times a day. 180 Tab 3    pyridostigmine (Mestinon) 60 mg tablet Take 1/2 tablet at 10 am and at 6 pm daily to support blood pressure. 30 Tab 3    montelukast (SINGULAIR) 10 mg tablet Take 1 Tab by mouth daily. 90 Tab 3    carbidopa-levodopa ER (SINEMET CR)  mg per tablet TAKE ONE TABLET BY MOUTH EVERY NIGHT 90 Tab 0    clotrimazole-betamethasone (LOTRISONE) topical cream Apply  to affected area two (2) times a day. 15 g 0    carbidopa-levodopa (SINEMET)  mg per tablet TAKE ONE TABLET BY MOUTH FOUR TIMES A DAY (Patient taking differently: three (3) times daily.) 360 Tab 3    atorvastatin (LIPITOR) 10 mg tablet Take 1 Tab by mouth daily. 90 Tab 3    escitalopram oxalate (Lexapro) 10 mg tablet Take 1 Tab by mouth daily. 90 Tab 3    nitroglycerin (NITROSTAT) 0.4 mg SL tablet Take 1 Tab by mouth every five (5) minutes as needed for Chest Pain. Sit/Lay down then put one tab under the tongue every 5 minutes as needed for chest pain for 3 doses 1 Bottle 0    aspirin delayed-release 81 mg tablet Take 1 Tab by mouth daily.  (Patient taking differently: Take 81 mg by mouth nightly.) 30 Tab 0        Allergies   Allergen Reactions    Aricept [Donepezil] Nausea and Vomiting    Onion Nausea and Vomiting       Review of Systems:   Review of Systems - History obtained from chart review as the patient is unable to provide any history due to his medical condition        Objective:     Vitals:    07/25/21 0708 07/25/21 0752 07/25/21 0823 07/25/21 0852   BP:  (!) 144/69 (!) 143/75 (!) 152/79   Pulse: (!) 57 (!) 52 (!) 53 (!) 54   Resp:       Temp:       SpO2: 97% 99% 100% 100%   Weight:       Height: Physical Exam:      General: Thin elderly male with contusion over the left brow, appears stated age    Eyes: no proptosis or exophthalmos; conjunctivae clear, sclerae non-icteric    Chest: clear to auscultation    Cardiac: normal S1 S2; no murmurs gallop or rubs    Neurological:    MSE: Obtunded.,   Pupils 2-3 mm reactive. Oculocephalic reflex intact. Withdraws to pain in 4 extremities      Recent Results (from the past 24 hour(s))   CBC WITH AUTOMATED DIFF    Collection Time: 07/24/21  8:44 PM   Result Value Ref Range    WBC 4.0 (L) 4.3 - 11.1 K/uL    RBC 4.06 (L) 4.23 - 5.6 M/uL    HGB 12.0 (L) 13.6 - 17.2 g/dL    HCT 38.4 (L) 41.1 - 50.3 %    MCV 94.6 79.6 - 97.8 FL    MCH 29.6 26.1 - 32.9 PG    MCHC 31.3 (L) 31.4 - 35.0 g/dL    RDW 13.4 11.9 - 14.6 %    PLATELET 807 (L) 075 - 450 K/uL    MPV 10.4 9.4 - 12.3 FL    ABSOLUTE NRBC 0.00 0.0 - 0.2 K/uL    DF AUTOMATED      NEUTROPHILS 57 43 - 78 %    LYMPHOCYTES 30 13 - 44 %    MONOCYTES 11 4.0 - 12.0 %    EOSINOPHILS 2 0.5 - 7.8 %    BASOPHILS 1 0.0 - 2.0 %    IMMATURE GRANULOCYTES 0 0.0 - 5.0 %    ABS. NEUTROPHILS 2.3 1.7 - 8.2 K/UL    ABS. LYMPHOCYTES 1.2 0.5 - 4.6 K/UL    ABS. MONOCYTES 0.4 0.1 - 1.3 K/UL    ABS. EOSINOPHILS 0.1 0.0 - 0.8 K/UL    ABS. BASOPHILS 0.0 0.0 - 0.2 K/UL    ABS. IMM. GRANS. 0.0 0.0 - 0.5 K/UL   METABOLIC PANEL, COMPREHENSIVE    Collection Time: 07/24/21  8:44 PM   Result Value Ref Range    Sodium 147 (H) 136 - 145 mmol/L    Potassium 3.8 3.5 - 5.1 mmol/L    Chloride 114 (H) 98 - 107 mmol/L    CO2 29 21 - 32 mmol/L    Anion gap 4 (L) 7 - 16 mmol/L    Glucose 87 65 - 100 mg/dL    BUN 13 8 - 23 MG/DL    Creatinine 0.78 (L) 0.8 - 1.5 MG/DL    GFR est AA >60 >60 ml/min/1.73m2    GFR est non-AA >60 >60 ml/min/1.73m2    Calcium 9.2 8.3 - 10.4 MG/DL    Bilirubin, total 0.5 0.2 - 1.1 MG/DL    ALT (SGPT) 8 (L) 12 - 65 U/L    AST (SGOT) 10 (L) 15 - 37 U/L    Alk.  phosphatase 93 50 - 136 U/L    Protein, total 6.4 6.3 - 8.2 g/dL Albumin 3.6 3.2 - 4.6 g/dL    Globulin 2.8 2.3 - 3.5 g/dL    A-G Ratio 1.3 1.2 - 3.5     EKG, 12 LEAD, INITIAL    Collection Time: 07/24/21  8:45 PM   Result Value Ref Range    Ventricular Rate 59 BPM    Atrial Rate 86 BPM    QRS Duration 94 ms    Q-T Interval 446 ms    QTC Calculation (Bezet) 441 ms    Calculated R Axis 12 degrees    Calculated T Axis 67 degrees    Diagnosis       Poor data quality likely sinus rhythm   Poor R wave progression  Otherwise normal ECG  Confirmed by Prudence Dials ([id number]) on 7/25/2021 8:15:59 AM     URINALYSIS W/ RFLX MICROSCOPIC    Collection Time: 07/25/21  1:42 AM   Result Value Ref Range    Color YELLOW      Appearance CLEAR      Specific gravity 1.020 1.001 - 1.023      pH (UA) 6.0 5.0 - 9.0      Protein Negative NEG mg/dL    Glucose Negative mg/dL    Ketone 15 (A) NEG mg/dL    Bilirubin Negative NEG      Blood TRACE (A) NEG      Urobilinogen 1.0 0.2 - 1.0 EU/dL    Nitrites Negative NEG      Leukocyte Esterase Negative NEG      WBC 0-3 0 /hpf    RBC 10-20 0 /hpf    Epithelial cells 0-3 0 /hpf    Bacteria 0 0 /hpf    Casts 0-3 0 /lpf       Lab Results   Component Value Date/Time    Cholesterol, total 141 09/16/2020 10:13 AM    HDL Cholesterol 62 09/16/2020 10:13 AM    LDL, calculated 68 09/16/2020 10:13 AM    LDL, calculated 61 06/13/2019 10:59 AM    VLDL, calculated 11 09/16/2020 10:13 AM    VLDL, calculated 10 06/13/2019 10:59 AM    Triglyceride 47 09/16/2020 10:13 AM        Lab Results   Component Value Date/Time    Hemoglobin A1c <3.5 (L) 04/15/2021 09:07 AM        CT Results (most recent):  Results from Hospital Encounter encounter on 07/24/21    CT SPINE CERV WO CONT    Narrative  EXAM: Noncontrast CT cervical spine. INDICATION: Pain, injury. COMPARISON: Prior CT cervical spine on July 10, 2021. TECHNIQUE: Axial noncontrast CT images of the cervical spine were obtained. Sagittal and coronal reformatted images were performed.   Radiation dose  reduction techniques were used for this study. Our CT scanners use one or all  of the following:  Automated exposure control, adjustment of the mA and/or kV  according to patient size, iterative reconstruction. FINDINGS: No acute fracture, focal malalignment or prevertebral soft tissue  swelling is identified. There is mild to moderate multilevel degenerative disc  disease and facet arthritis. Normal craniocervical junction alignment is  maintained. The C5-6 and C6-7 disc spaces are narrowed, possibly on a  developmental basis. Previously noted bone islands in the C7 vertebra are  unchanged, as is a 7 mm nonspecific lucent lesion in the right C2 facet. The  paraspinal soft tissues are unremarkable. Impression  No evidence of an acute cervical spine injury. Results for orders placed or performed during the hospital encounter of 07/24/21   EKG, 12 LEAD, INITIAL   Result Value Ref Range    Ventricular Rate 59 BPM    Atrial Rate 86 BPM    QRS Duration 94 ms    Q-T Interval 446 ms    QTC Calculation (Bezet) 441 ms    Calculated R Axis 12 degrees    Calculated T Axis 67 degrees    Diagnosis       Poor data quality likely sinus rhythm   Poor R wave progression  Otherwise normal ECG  Confirmed by Cata Orozco ([id number]) on 7/25/2021 8:15:59 AM          MRI Results (most recent):  Results from [place] encounter on 01/03/19    MRI BRAIN W WO CONT    Narrative  MRI BRAIN and INTERNAL AUDITORY CANALS. HISTORY: Left-sided vestibular schwannoma. COMPARISON: June 2012. TECHNIQUE: Routine whole brain axial T1, axial T2 and FLAIR, sagittal T1,  high-resolution thin-section axial T2, axial T1, axial T1 fat-saturated which  was followed by 15cc intravenous gadolinium, after which high-resolution  thin-section fat-saturated axial and coronal T1-weighted images through the  internal auditory canals. FINDINGS:  There is an enhancing 3 x 7 mm nodule in the internal auditory canal  on the right.  This is low T2 and mildly increased T1 signal. There is fairly  bright uniform enhancement. No significant change from prior exam.    Whole brain images demonstrate: A few nonspecific periventricular and centrum  semiovale FLAIR and T2 white matter hyperintensities are present; these do not  enhance with contrast. No enhancing nodules or masses. Diffusion images do not  demonstrate any areas of restricted diffusion  No midline shift, mass or mass  effect. Gradient echo images are unremarkable  No evidence of acute hemorrhage. The lateral ventricles are normal size  The pituitary, parasellar and midline  structures are unremarkable on the sagittal T1 images  There are normal T2  flow-voids in the major vessels   Posterior fossa structures are unremarkable  The basal ganglia are symmetric  Orbits are grossly normal  Paranasal sinuses  are clear    Impression  IMPRESSION: Stable right vestibular schwannoma, 3 x 7 mm, unchanged from June 2012. Minimal chronic small vessel disease. No new abnormality. US Results (most recent):  Results from [place] encounter on 02/16/17    US [facility]    Narrative  RIGHT UPPER QUADRANT ULTRASOUND. HISTORY: Large hemorrhage versus abscess right lobe of liver on recent CT scan. COMPARISON: None. Recent CT is reviewed. FINDINGS: There is a 7.5 x 8.0 cm mass in the right lobe of the liver. This is  quite echogenic with a nearly anechoic echogenic fluid likely air anteriorly. No  color flow within or surrounding the mass. Otherwise there are small simple appearing anechoic cysts in the liver. Right  kidney is unremarkable without hydronephrosis, 11.2 cm in length. There are  multiple small gallstones. Biliary tree is not dilated. Common bile duct  measures 5 to 6 mm. Impression  IMPRESSION: A 7.5 x 8 cm echogenic mass right lobe of liver without flow,  possibly a hematoma. Abscess should be considered based on clinical findings.   There are multiple small gallstones without biliary tree obstruction. Results from [place] encounter on 07/24/21    CT SPINE CERV WO CONT    Narrative  EXAM: Noncontrast CT cervical spine. INDICATION: Pain, injury. COMPARISON: Prior CT cervical spine on July 10, 2021. TECHNIQUE: Axial noncontrast CT images of the cervical spine were obtained. Sagittal and coronal reformatted images were performed. Radiation dose  reduction techniques were used for this study. Our CT scanners use one or all  of the following:  Automated exposure control, adjustment of the mA and/or kV  according to patient size, iterative reconstruction. FINDINGS: No acute fracture, focal malalignment or prevertebral soft tissue  swelling is identified. There is mild to moderate multilevel degenerative disc  disease and facet arthritis. Normal craniocervical junction alignment is  maintained. The C5-6 and C6-7 disc spaces are narrowed, possibly on a  developmental basis. Previously noted bone islands in the C7 vertebra are  unchanged, as is a 7 mm nonspecific lucent lesion in the right C2 facet. The  paraspinal soft tissues are unremarkable. Impression  No evidence of an acute cervical spine injury. Most recent MRI  Results from [place] encounter on 01/03/19    MRI BRAIN W WO CONT    Narrative  MRI BRAIN and INTERNAL AUDITORY CANALS. HISTORY: Left-sided vestibular schwannoma. COMPARISON: June 2012. TECHNIQUE: Routine whole brain axial T1, axial T2 and FLAIR, sagittal T1,  high-resolution thin-section axial T2, axial T1, axial T1 fat-saturated which  was followed by 15cc intravenous gadolinium, after which high-resolution  thin-section fat-saturated axial and coronal T1-weighted images through the  internal auditory canals. FINDINGS:  There is an enhancing 3 x 7 mm nodule in the internal auditory canal  on the right. This is low T2 and mildly increased T1 signal. There is fairly  bright uniform enhancement.  No significant change from prior exam.    Whole brain images demonstrate: A few nonspecific periventricular and centrum  semiovale FLAIR and T2 white matter hyperintensities are present; these do not  enhance with contrast. No enhancing nodules or masses. Diffusion images do not  demonstrate any areas of restricted diffusion  No midline shift, mass or mass  effect. Gradient echo images are unremarkable  No evidence of acute hemorrhage. The lateral ventricles are normal size  The pituitary, parasellar and midline  structures are unremarkable on the sagittal T1 images  There are normal T2  flow-voids in the major vessels   Posterior fossa structures are unremarkable  The basal ganglia are symmetric  Orbits are grossly normal  Paranasal sinuses  are clear    Impression  IMPRESSION: Stable right vestibular schwannoma, 3 x 7 mm, unchanged from June 2012. Minimal chronic small vessel disease. No new abnormality.           Signed By: Alexei Castillo MD     July 25, 2021

## 2021-07-25 NOTE — PROGRESS NOTES
Hospitalist Progress Note     Name:  Aarti Broussard  Age:81 y.o. Sex:male   :  1940    MRN:  [id number]   Admit Date:  2021    Presenting Complaint: Fall    Initial Admission Diagnosis: Fall [W19. [facility] Course/Interval history:     53-year-old male with a PMH of hypertension, dementia, tremors, Tourette's syndrome, neurologic orthostatic hypotension, that presents in the setting of a fall and worsening mentation. Most of the history obtained by chart since currently patient somnolent and unable to obtain any detailed history. Apparently the patient has been presenting with worsening mentation and had a fall today of unknown circumstances. Contusion to the left frontal scalp was noted by staff and was brought to the emergency department. In the emergency department CT of the head with a small left frontal scalp hematoma without acute intracranial process. He resides at the [facility]. Subjective (21):    Remains somnolent, awakens to vigorous tactile stimuli. Seen by neurology. Assessment & Plan     1. Acute metabolic encephalopathy and fall in the setting of physical deconditioning  -Avoid sedatives  -Monitor mental status  -Delirium precaution  -CT brain without intracranial abnormalities  -Chest x-ray without signs of pneumonia  -Urinalysis without pyuria or bacteriuria  -Currently no signs of active infection, will hold off on antibiotics  -Obtain pelvic x-ray  -PT OT evaluation     2.  Parkinson's disease and worsening encephalopathy   EEG done showing continuous sleep. Neurology following with recs:  Sinemet stopped and Tino Eves started    \"Management of Delirium      Non-pharmacological intervention  · Reorient the patient throughout the day  · Window open and lights on during the day. Lights off, television off, noises down during the night.  If able, decrease nursing checks at night  · Therapies as often as possible  · Avoid restraints to the best of your ability   · Avoid sensory deprivation by using glasses and hearing aids, if applicable        Pharmacological intervention  · Replete electrolyte abnormalities and correct metabolic abnormalities  · Limit benzodiazepines, antihistamines, narcotics, anticholinergics. Preference towards Precedex for sedation over fentanyl and benzodiazepines/GABAa agonists. · For dangerous behavior/aggression to self or others can consider Zyprexa or Seroquel if benefits outweigh risk  · For persistent insomnia can use melatonin four hours prior to bedtime or Seroquel 25 mg at bedtime\"     3.  Neurologic orthostatic hypotension  -Continue pyridostigmine    4. Hypernatremia:  147  IVF changed to D5 at 75 ml/h  Trend    5. 8th rib fx:  Stable at present      Dispo/Discharge Planning:  TBD    Diet:  DIET NPO  DVT PPx: SCDs  Code status: Full Code    Objective:     Patient Vitals for the past 24 hrs:   Temp Pulse Resp BP SpO2   07/25/21 1242 97.9 °F (36.6 °C) (!) 55 16 125/80 100 %   07/25/21 1117  (!) 58  (!) 161/77 97 %   07/25/21 1046  (!) 54  (!) 144/86 100 %   07/25/21 0852  (!) 54  (!) 152/79 100 %   07/25/21 0823  (!) 53  (!) 143/75 100 %   07/25/21 0752  (!) 52  (!) 144/69 99 %   07/25/21 0708  (!) 57   97 %   07/25/21 0652  (!) 58  (!) 163/73 97 %   07/25/21 0552  (!) 54  (!) 183/84 98 %   07/25/21 0423  (!) 56  (!) 180/81    07/25/21 0403  (!) 54  (!) 167/84    07/25/21 0142  60  (!) 162/86 100 %   07/24/21 2226  60   100 %   07/24/21 2223  (!) 59  (!) 162/80    07/24/21 2130     99 %   07/24/21 2038 98.6 °F (37 °C) (!) 58 16 (!) 161/85 99 %     Oxygen Therapy  O2 Sat (%): 100 % (07/25/21 1242)  Pulse via Oximetry: 59 beats per minute (07/25/21 1117)  O2 Device: None (Room air) (07/24/21 2130)    Body mass index is 21.52 kg/m².     Physical Exam:   General: Somnolent, NAD  HEENT: NC/AT, EOM are intact, ecchymosis left eye, hematoma left forehead  Neck: supple, no JVD  Cardiovascular: RRR, S1, S2, no murmurs  Respiratory: Lungs are clear, no wheezes or rales  Abdomen: Soft, NT, ND  Back: No CVA tenderness, no paraspinal tenderness  Extremities: LE without pedal edema, no erythema  Neuro: Somnolent but arousable, moving all extremities   Skin: no rash or ulcers  Data Review:  I have reviewed all labs, meds, and studies from the last 24 hours:    Labs:    Recent Results (from the past 24 hour(s))   CBC WITH AUTOMATED DIFF    Collection Time: 07/24/21  8:44 PM   Result Value Ref Range    WBC 4.0 (L) 4.3 - 11.1 K/uL    RBC 4.06 (L) 4.23 - 5.6 M/uL    HGB 12.0 (L) 13.6 - 17.2 g/dL    HCT 38.4 (L) 41.1 - 50.3 %    MCV 94.6 79.6 - 97.8 FL    MCH 29.6 26.1 - 32.9 PG    MCHC 31.3 (L) 31.4 - 35.0 g/dL    RDW 13.4 11.9 - 14.6 %    PLATELET 854 (L) 681 - 450 K/uL    MPV 10.4 9.4 - 12.3 FL    ABSOLUTE NRBC 0.00 0.0 - 0.2 K/uL    DF AUTOMATED      NEUTROPHILS 57 43 - 78 %    LYMPHOCYTES 30 13 - 44 %    MONOCYTES 11 4.0 - 12.0 %    EOSINOPHILS 2 0.5 - 7.8 %    BASOPHILS 1 0.0 - 2.0 %    IMMATURE GRANULOCYTES 0 0.0 - 5.0 %    ABS. NEUTROPHILS 2.3 1.7 - 8.2 K/UL    ABS. LYMPHOCYTES 1.2 0.5 - 4.6 K/UL    ABS. MONOCYTES 0.4 0.1 - 1.3 K/UL    ABS. EOSINOPHILS 0.1 0.0 - 0.8 K/UL    ABS. BASOPHILS 0.0 0.0 - 0.2 K/UL    ABS. IMM. GRANS. 0.0 0.0 - 0.5 K/UL   METABOLIC PANEL, COMPREHENSIVE    Collection Time: 07/24/21  8:44 PM   Result Value Ref Range    Sodium 147 (H) 136 - 145 mmol/L    Potassium 3.8 3.5 - 5.1 mmol/L    Chloride 114 (H) 98 - 107 mmol/L    CO2 29 21 - 32 mmol/L    Anion gap 4 (L) 7 - 16 mmol/L    Glucose 87 65 - 100 mg/dL    BUN 13 8 - 23 MG/DL    Creatinine 0.78 (L) 0.8 - 1.5 MG/DL    GFR est AA >60 >60 ml/min/1.73m2    GFR est non-AA >60 >60 ml/min/1.73m2    Calcium 9.2 8.3 - 10.4 MG/DL    Bilirubin, total 0.5 0.2 - 1.1 MG/DL    ALT (SGPT) 8 (L) 12 - 65 U/L    AST (SGOT) 10 (L) 15 - 37 U/L    Alk.  phosphatase 93 50 - 136 U/L    Protein, total 6.4 6.3 - 8.2 g/dL    Albumin 3.6 3.2 - 4.6 g/dL    Globulin 2.8 2.3 - 3.5 g/dL    A-G Ratio 1.3 1.2 - 3.5     EKG, 12 LEAD, INITIAL    Collection Time: 07/24/21  8:45 PM   Result Value Ref Range    Ventricular Rate 59 BPM    Atrial Rate 86 BPM    QRS Duration 94 ms    Q-T Interval 446 ms    QTC Calculation (Bezet) 441 ms    Calculated R Axis 12 degrees    Calculated T Axis 67 degrees    Diagnosis       Poor data quality likely sinus rhythm   Poor R wave progression  Otherwise normal ECG  Confirmed by Cata Orozco ([id number]) on 7/25/2021 8:15:59 AM     URINALYSIS W/ RFLX MICROSCOPIC    Collection Time: 07/25/21  1:42 AM   Result Value Ref Range    Color YELLOW      Appearance CLEAR      Specific gravity 1.020 1.001 - 1.023      pH (UA) 6.0 5.0 - 9.0      Protein Negative NEG mg/dL    Glucose Negative mg/dL    Ketone 15 (A) NEG mg/dL    Bilirubin Negative NEG      Blood TRACE (A) NEG      Urobilinogen 1.0 0.2 - 1.0 EU/dL    Nitrites Negative NEG      Leukocyte Esterase Negative NEG      WBC 0-3 0 /hpf    RBC 10-20 0 /hpf    Epithelial cells 0-3 0 /hpf    Bacteria 0 0 /hpf    Casts 0-3 0 /lpf       All Micro Results     None          EKG Results     Procedure 720 Value Units Date/Time    EKG, 12 LEAD, INITIAL [[id number]] Collected: 07/24/21 2045    Order Status: Completed Updated: 07/25/21 0816     Ventricular Rate 59 BPM      Atrial Rate 86 BPM      QRS Duration 94 ms      Q-T Interval 446 ms      QTC Calculation (Bezet) 441 ms      Calculated R Axis 12 degrees      Calculated T Axis 67 degrees      Diagnosis --     Poor data quality likely sinus rhythm   Poor R wave progression  Otherwise normal ECG  Confirmed by Cata Orozco ([id number]) on 7/25/2021 8:15:59 AM            Other Studies:  XR CHEST PA LAT    Result Date: 7/25/2021  EXAM: Chest x-ray. INDICATION: Pain, fall injury. COMPARISON: Prior chest x-ray on February 16, 2017 and CT chest on August 20, 2015. TECHNIQUE: Frontal and lateral views of the chest were obtained. FINDINGS: The lungs are clear.  The heart size and pulmonary vasculature are within normal limits. Tortuosity of the thoracic aorta is unchanged. No pneumothorax or pleural effusion is seen. There is an acute appearing mildly displaced fracture in the posterolateral right eighth rib. Fixation hardware is seen in the proximal right humerus. Mildly displaced right 8th rib fracture, with no pneumothorax or acute infiltrate. XR PELV AP ONLY    Result Date: 7/25/2021  AP PELVIS  7/25/2021 7:44 AM INDICATION: Fall, pain. COMPARISON: None available at this hospital PACS FINDINGS: Single AP pelvis view is submitted. Bone density appropriate. There are surgical clips at the midline lower pelvis, probably related to a prostatectomy. There is no fracture, lesion, or acute joint abnormality. Bony pelvic ring intact. No advanced arthropathy changes. No focal finding in the soft tissues. NO ACUTE BONY OR JOINT FINDINGS. CT HEAD WO CONT    Result Date: 7/24/2021  EXAM: Noncontrast CT head. INDICATION: Pain, fall injury. COMPARISON: Prior CT head on July 10, 2021. TECHNIQUE: Noncontrast CT images of the head were obtained. Radiation dose reduction techniques were used for this study. Our CT scanners use one or all of the following:  Automated exposure control, adjustment of the mA or kV according to patient size, iterative reconstruction. FINDINGS: There is mild volume loss. No acute infarct, hemorrhage or mass is identified. There is no mass effect, midline shift or depressed fracture. The visualized paranasal sinuses and mastoid air cells are clear. There is a small left frontal scalp hematoma. Small left frontal scalp hematoma with no skull fracture or acute intracranial process. CT SPINE CERV WO CONT    Result Date: 7/24/2021  EXAM: Noncontrast CT cervical spine. INDICATION: Pain, injury. COMPARISON: Prior CT cervical spine on July 10, 2021. TECHNIQUE: Axial noncontrast CT images of the cervical spine were obtained.  Sagittal and coronal reformatted images were performed. Radiation dose reduction techniques were used for this study. Our CT scanners use one or all of the following:  Automated exposure control, adjustment of the mA and/or kV according to patient size, iterative reconstruction. FINDINGS: No acute fracture, focal malalignment or prevertebral soft tissue swelling is identified. There is mild to moderate multilevel degenerative disc disease and facet arthritis. Normal craniocervical junction alignment is maintained. The C5-6 and C6-7 disc spaces are narrowed, possibly on a developmental basis. Previously noted bone islands in the C7 vertebra are unchanged, as is a 7 mm nonspecific lucent lesion in the right C2 facet. The paraspinal soft tissues are unremarkable. No evidence of an acute cervical spine injury. Current Meds:   Current Facility-Administered Medications   Medication Dose Route Frequency    sodium chloride (NS) flush 5-40 mL  5-40 mL IntraVENous Q8H    sodium chloride (NS) flush 5-40 mL  5-40 mL IntraVENous PRN    polyethylene glycol (MIRALAX) packet 17 g  17 g Oral DAILY PRN    acetaminophen (TYLENOL) tablet 650 mg  650 mg Oral Q6H PRN    Or    acetaminophen (TYLENOL) suppository 650 mg  650 mg Rectal Q6H PRN    aspirin delayed-release tablet 81 mg  81 mg Oral QHS    carbidopa-levodopa (SINEMET)  mg per tablet 1 Tablet  1 Tablet Oral TID    pyridostigmine (MESTINON) tablet 30 mg  30 mg Oral BID    dextrose 5% infusion  75 mL/hr IntraVENous CONTINUOUS    carbidopa-levodopa (PARCOPA)  mg rapid dissolve tablet 1 Tablet  1 Tablet Oral QID    alcohol 62% (NOZIN) nasal  1 Ampule  1 Ampule Topical Q12H       Problem List:  Hospital Problems as of 7/25/2021 Date Reviewed: 7/25/2021        Codes Class Noted - Resolved POA    * (Principal) Fall ICD-10-CM: W19. Anthony Cruz  ICD-9-CM: E888.9  7/25/2021 - Present Yes        Neurologic orthostatic hypotension ([facility] [address] ICD-10-CM: G90.3  ICD-9-CM: 333.0  8/16/2018 - Present Yes        Tourette syndrome ICD-10-CM: F95.2  ICD-9-CM: 307.23  4/20/2018 - Present Yes        Dementia due to Parkinson's disease without behavioral disturbance ([facility] ICD-10-CM: G20, F02.80  ICD-9-CM: 332.0, 294.10  4/20/2018 - Present Yes        Essential hypertension ICD-10-CM: I10  ICD-9-CM: 401.9  12/22/2017 - Present Yes        Parkinsons disease ([facility] ICD-10-CM: Mono Merle  ICD-9-CM: 332.0  6/2/2016 - Present Yes        Acoustic neuroma syndrome ([facility] ICD-10-CM: D33.3  ICD-9-CM: 388.5  1/30/2015 - Present Yes        Dyslipidemia ICD-10-CM: E78.5  ICD-9-CM: 272.4  1/30/2015 - Present Yes                 Signed By: Marco Sosa NP   [employer] [facility]    July 25, 2021  5:15 PM

## 2021-07-25 NOTE — PROCEDURES
[facility] Neurology   Routine Electroencephalogram Report      DATE: July 25, 2021 time Start: 1009 time End: 1030    EEG Number:     Indication: Altered mental status    Medications:   Current Facility-Administered Medications   Medication Dose Route Frequency Provider Last Rate Last Admin    sodium chloride (NS) flush 5-40 mL  5-40 mL IntraVENous Q8H Josh Rust MD        sodium chloride (NS) flush 5-40 mL  5-40 mL IntraVENous PRN Duane Abebe MD        polyethylene glycol (MIRALAX) packet 17 g  17 g Oral DAILY PRN Duane Abebe MD        acetaminophen (TYLENOL) tablet 650 mg  650 mg Oral Q6H PRN Duane Abebe MD        Or    acetaminophen (TYLENOL) suppository 650 mg  650 mg Rectal Q6H PRN Duane Abebe MD        aspirin delayed-release tablet 81 mg  81 mg Oral QHS Courtney Carrasco MD        carbidopa-levodopa (SINEMET)  mg per tablet 1 Tablet  1 Tablet Oral TID Duane Abebe MD        pyridostigmine (MESTINON) tablet 30 mg  30 mg Oral BID Courtney Carrasco MD        dextrose 5% infusion  75 mL/hr IntraVENous CONTINUOUS Morgan Stroud NP 75 mL/hr at 07/25/21 0900 75 mL/hr at 07/25/21 0900    carbidopa-levodopa (PARCOPA)  mg rapid dissolve tablet 1 Tablet  1 Tablet Oral QID Zo Hermosillo MD           Technique: The EEG was recorded on a 32 channel [employer] digital EEG machine. A full electrode headset was applied in accordance with the International 10-20 System of Electrode Placement. All impedances are less than 5000 Ohms. Time locked digital video of the patient was recorded and reviewed as needed for clinical correlation     State of Consciousness: asleep       Description:  The EEG discloses continuous mixed frequency 3-5 Hz 30-50 µV slowing with superimposed final 14 Hz sleep spindles. Occasional vertex sharp waves are noted. No focal slowing or epileptiform activity is seen. Reactivity is poor.     Activation Procedures:  Hyperventilation: Not performed  Photic Stimulation: Did not alter the record      Interpretation: This EEG demonstrates continuous sleep. No pathological or asymmetrical EEG patterns are present.

## 2021-07-25 NOTE — ED TRIAGE NOTES
Arrives with face mask in place. Arrives from the [facility] via [facility] s/p fall. Unwitnessed fall. Found lying in floor. Bruising and swelling above left eye and outer corner of eye. No blood thinners noted on MAR from facility. Facility reports hx dementia and is at baseline mentation post fall.  Pt denies all complaints on arrival.

## 2021-07-26 ENCOUNTER — APPOINTMENT (OUTPATIENT)
Dept: GENERAL RADIOLOGY | Age: 81
DRG: 056 | End: 2021-07-26
Attending: INTERNAL MEDICINE
Payer: MEDICARE

## 2021-07-26 LAB
ANION GAP SERPL CALC-SCNC: 2 MMOL/L (ref 7–16)
BASOPHILS # BLD: 0 K/UL (ref 0–0.2)
BASOPHILS NFR BLD: 1 % (ref 0–2)
BUN SERPL-MCNC: 11 MG/DL (ref 8–23)
CALCIUM SERPL-MCNC: 9 MG/DL (ref 8.3–10.4)
CHLORIDE SERPL-SCNC: 110 MMOL/L (ref 98–107)
CO2 SERPL-SCNC: 31 MMOL/L (ref 21–32)
CREAT SERPL-MCNC: 0.74 MG/DL (ref 0.8–1.5)
DIFFERENTIAL METHOD BLD: ABNORMAL
EOSINOPHIL # BLD: 0.1 K/UL (ref 0–0.8)
EOSINOPHIL NFR BLD: 3 % (ref 0.5–7.8)
ERYTHROCYTE [DISTWIDTH] IN BLOOD BY AUTOMATED COUNT: 13.4 % (ref 11.9–14.6)
GLUCOSE SERPL-MCNC: 99 MG/DL (ref 65–100)
HCT VFR BLD AUTO: 35.6 % (ref 41.1–50.3)
HGB BLD-MCNC: 11.1 G/DL (ref 13.6–17.2)
IMM GRANULOCYTES # BLD AUTO: 0 K/UL (ref 0–0.5)
IMM GRANULOCYTES NFR BLD AUTO: 0 % (ref 0–5)
LYMPHOCYTES # BLD: 1.5 K/UL (ref 0.5–4.6)
LYMPHOCYTES NFR BLD: 39 % (ref 13–44)
MCH RBC QN AUTO: 29.5 PG (ref 26.1–32.9)
MCHC RBC AUTO-ENTMCNC: 31.2 G/DL (ref 31.4–35)
MCV RBC AUTO: 94.7 FL (ref 79.6–97.8)
MONOCYTES # BLD: 0.4 K/UL (ref 0.1–1.3)
MONOCYTES NFR BLD: 11 % (ref 4–12)
NEUTS SEG # BLD: 1.7 K/UL (ref 1.7–8.2)
NEUTS SEG NFR BLD: 46 % (ref 43–78)
NRBC # BLD: 0 K/UL (ref 0–0.2)
PLATELET # BLD AUTO: 101 K/UL (ref 150–450)
PMV BLD AUTO: 9.7 FL (ref 9.4–12.3)
POTASSIUM SERPL-SCNC: 4 MMOL/L (ref 3.5–5.1)
RBC # BLD AUTO: 3.76 M/UL (ref 4.23–5.6)
SODIUM SERPL-SCNC: 143 MMOL/L (ref 136–145)
WBC # BLD AUTO: 3.7 K/UL (ref 4.3–11.1)

## 2021-07-26 PROCEDURE — 74011250637 HC RX REV CODE- 250/637: Performed by: INTERNAL MEDICINE

## 2021-07-26 PROCEDURE — 97112 NEUROMUSCULAR REEDUCATION: CPT

## 2021-07-26 PROCEDURE — 36415 COLL VENOUS BLD VENIPUNCTURE: CPT

## 2021-07-26 PROCEDURE — 97166 OT EVAL MOD COMPLEX 45 MIN: CPT

## 2021-07-26 PROCEDURE — 99232 SBSQ HOSP IP/OBS MODERATE 35: CPT | Performed by: PSYCHIATRY & NEUROLOGY

## 2021-07-26 PROCEDURE — 65270000029 HC RM PRIVATE

## 2021-07-26 PROCEDURE — 80048 BASIC METABOLIC PNL TOTAL CA: CPT

## 2021-07-26 PROCEDURE — 97535 SELF CARE MNGMENT TRAINING: CPT

## 2021-07-26 PROCEDURE — 92611 MOTION FLUOROSCOPY/SWALLOW: CPT

## 2021-07-26 PROCEDURE — 74230 X-RAY XM SWLNG FUNCJ C+: CPT

## 2021-07-26 PROCEDURE — 74011000250 HC RX REV CODE- 250: Performed by: INTERNAL MEDICINE

## 2021-07-26 PROCEDURE — 74011250637 HC RX REV CODE- 250/637: Performed by: PSYCHIATRY & NEUROLOGY

## 2021-07-26 PROCEDURE — 97162 PT EVAL MOD COMPLEX 30 MIN: CPT | Performed by: PHYSICAL THERAPIST

## 2021-07-26 PROCEDURE — 74011250636 HC RX REV CODE- 250/636: Performed by: NURSE PRACTITIONER

## 2021-07-26 PROCEDURE — 97116 GAIT TRAINING THERAPY: CPT | Performed by: PHYSICAL THERAPIST

## 2021-07-26 PROCEDURE — 85025 COMPLETE CBC W/AUTO DIFF WBC: CPT

## 2021-07-26 PROCEDURE — 92610 EVALUATE SWALLOWING FUNCTION: CPT

## 2021-07-26 PROCEDURE — 97530 THERAPEUTIC ACTIVITIES: CPT | Performed by: PHYSICAL THERAPIST

## 2021-07-26 RX ADMIN — CARBIDOPA AND LEVODOPA 1 TABLET: 25; 100 TABLET, ORALLY DISINTEGRATING ORAL at 22:00

## 2021-07-26 RX ADMIN — PYRIDOSTIGMINE BROMIDE 30 MG: 60 TABLET ORAL at 18:00

## 2021-07-26 RX ADMIN — CARBIDOPA AND LEVODOPA 1 TABLET: 25; 100 TABLET, ORALLY DISINTEGRATING ORAL at 18:00

## 2021-07-26 RX ADMIN — BARIUM SULFATE 15 ML: 400 PASTE ORAL at 10:09

## 2021-07-26 RX ADMIN — ASPIRIN 81 MG: 81 TABLET ORAL at 21:13

## 2021-07-26 RX ADMIN — Medication 10 ML: at 13:30

## 2021-07-26 RX ADMIN — DEXTROSE MONOHYDRATE 75 ML/HR: 5 INJECTION, SOLUTION INTRAVENOUS at 18:03

## 2021-07-26 RX ADMIN — BARIUM SULFATE 45 ML: 980 POWDER, FOR SUSPENSION ORAL at 10:08

## 2021-07-26 RX ADMIN — Medication 10 ML: at 05:20

## 2021-07-26 RX ADMIN — CARBIDOPA AND LEVODOPA 1 TABLET: 25; 100 TABLET, ORALLY DISINTEGRATING ORAL at 12:00

## 2021-07-26 RX ADMIN — BARIUM SULFATE 15 ML: 400 SUSPENSION ORAL at 10:09

## 2021-07-26 RX ADMIN — CARBIDOPA AND LEVODOPA 1 TABLET: 25; 100 TABLET, ORALLY DISINTEGRATING ORAL at 09:00

## 2021-07-26 RX ADMIN — Medication 1 AMPULE: at 09:05

## 2021-07-26 RX ADMIN — Medication 1 AMPULE: at 21:14

## 2021-07-26 RX ADMIN — CARBIDOPA AND LEVODOPA 1 TABLET: 25; 100 TABLET ORAL at 12:00

## 2021-07-26 RX ADMIN — Medication 10 ML: at 21:15

## 2021-07-26 NOTE — PROGRESS NOTES
CM made contact with Margo at [phone] at  [facility] to discuss patient care at the [address] [place] [address]. Chris Vazquez states that patient can return but will need a rapid COVID test before returning. States that if patient has been started on any new medication will need a prescription. States that if patient had any lab work / CT scan they would like a copy. Margo request patient diet order as well. States that family or ambulance service can transport patient to the facility. CM will make contact Margo or RN before before patient return to facility. CM will continue to monitor and remain available for any needs that may occur. Care Management Interventions  PCP Verified by CM:  Yes (Cynthia Gunter MD)  Mode of Transport at Discharge: BLS ([facility] )  Transition of Care Consult (CM Consult): Discharge Planning, Assisted Living (Patient is currently from [address])  Discharge Durable Medical Equipment: No  Physical Therapy Consult: Yes  Occupational Therapy Consult: Yes  Speech Therapy Consult: Yes  Current Support Network: Assisted Living ([facility], [address] )  Confirm Follow Up Transport: Other (see comment) [facility] )  The Patient and/or Patient Representative was Provided with a Choice of Provider and Agrees with the Discharge Plan?: No  Name of the Patient Representative Who was Provided with a Choice of Provider and Agrees with the Discharge Plan: spouse   Freedom of Choice List was Provided with Basic Dialogue that Supports the Patient's Individualized Plan of Care/Goals, Treatment Preferences and Shares the Quality Data Associated with the Providers?: No   Resource Information Provided?: No  Discharge Location  Discharge Placement: Assisted Living (Returning to [facility])

## 2021-07-26 NOTE — PROGRESS NOTES
ACUTE OT GOALS:  (Developed with and agreed upon by patient and/or caregiver.)  1. Patient will complete lower body bathing and dressing with minimal assistance and adaptive equipment as needed. 2. Patient will complete toileting with minimal assistance. 3. Patient will tolerate 30 minutes of OT treatment with 2-3 rest breaks to increase activity tolerance for ADLs. 4. Patient will complete functional transfers with CGA and adaptive equipment as needed. 5. Patient will complete functional mobility for household distances with CGA and good safety awareness. 6. Patient will complete therapeutic exercises for 10 minutes to improve strength and amplitude of movement for ADL. Timeframe: 7 visits       OCCUPATIONAL THERAPY ASSESSMENT: Initial Assessment and Daily Note OT Treatment Day Nona Mooney is a 79 y.o. male   PRIMARY DIAGNOSIS: Fall  Fall [place] Scrape. XXXA]       Reason for Referral:    ICD-10: Treatment Diagnosis: Generalized Muscle Weakness (M62.81)  Other lack of cordination (R27.8)  [place] / Product Type: Medicare /   ASSESSMENT:     REHAB RECOMMENDATIONS:   Recommendation to date pending progress:  Setting:   back to [facility] care facility with therapy   Equipment:    To Be Determined     PRIOR LEVEL OF FUNCTION:  (Prior to Hospitalization)  INITIAL/CURRENT LEVEL OF FUNCTION:  (Based on today's evaluation)   Bathing:   Unknown  Dressing:   Unknown  Feeding/Grooming:   Unknown  Toileting:   Unknown  Functional Mobility:   Unknown Bathing:   Maximal Assistance  Dressing:   Moderate Assistance  Feeding/Grooming:   Moderate Assistance  Toileting:   Maximal Assistance  Functional Mobility:   Minimal Assistance x 2     ASSESSMENT:  Mr. Lovely Bowen presents to the hospital from the [facility] after sustaining an unwitnessed fall. Pt was [facility] at [facility] and has been at this facility in the memory care unit for ~7 weeks.  Per chart pt has had multiple falls. Patient is hard of hearing but hears better out of his L ear. Wife is at bedside but she is also a poor historian. Pt was oriented to person, \"hospital\", and the month with cueing. Pt's hx is significant for Parkinson's disease with orthostatic hypotension. Pt was able to follow simple commands for bed mobility with minimal assistance x 2. Pt denies any pain or dizziness edge of bed but per chart does have 8th rib fx. Pt has poor use of B hands due to MF, RF, and SF flexed into the palm. Pt was able to assist some with donning socks needing moderate assistance and additional time with tremor present. Pt able to stand multiple times with minimal assistance x 2 and transfer to the chair. Transport arrived to take patient to x-ray with pt demonstrating good ability to complete functional mobility to the transfer bed in the hallway. Pt is currently functioning below baseline and will benefit from OT services to address stated goals and plan of care. SUBJECTIVE:   Mr. Aly Carty states, \"I'm doing fine. \"    SOCIAL HISTORY/LIVING ENVIRONMENT: Pt at the [facility] and is in the memory care unit.  Pt using RW and WC; per chart patient was getting assistance with ADL   Home Environment: Skilled nursing facility  One/Two Story Residence: One story  Living Alone: No  Support Systems: Assisted living, Skilled nursing facility, Spouse/Significant Other/Partner    OBJECTIVE:     PAIN: VITAL SIGNS: LINES/DRAINS:   Pre Treatment: Pain Screen  Pain Scale 1: Numeric (0 - 10)  Pain Intensity 1: 0  Post Treatment: 0   IV and Purewick  O2 Device: None (Room air)     GROSS EVALUATION:  B UE Within Functional Limits Abnormal/ Functional Abnormal/ Non-Functional (see comments) Not Tested Comments:   AROM [] [x] [] [] Limited in the digits (resting in flexion)   PROM [] [x] [] []    Strength [] [x] [] [] B UE   Balance [] [x] [] [] Fair + sitting; fair to fair- standing   Posture [] [x] [] []     Sensation [] [] [] [x]    Coordination [] [x] [] []    Tone [] [] [] [x]    Edema [] [] [] [x]    Activity Tolerance [] [x] [] []     [] [] [] []      COGNITION/  PERCEPTION: Intact Impaired   (see comments) Comments:   Orientation [] [x]    Vision [x] []    Hearing [] [x]    Judgment/ Insight [] [x]    Attention [x] []    Memory [] [x]    Command Following [x] []    Emotional Regulation [x] []     [] []      ACTIVITIES OF DAILY LIVING: I Mod I S SBA CGA Min Mod Max Total NT Comments   BASIC ADLs:              Bathing/ Showering [] [] [] [] [] [] [] [] [] [x]    Toileting [] [] [] [] [] [] [] [] [] [x]    Dressing [] [] [] [] [] [] [x] [] [] [] Donning socks    Feeding [] [] [] [] [] [] [] [] [] [x]    Grooming [] [] [] [] [] [] [] [] [] [x]    Personal Device Care [] [] [] [] [] [] [] [] [] [x]    Functional Mobility [] [] [] [] [] [x] [] [] [] [] X 2 RW    I=Independent, Mod I=Modified Independent, S=Supervision, SBA=Standby Assistance, CGA=Contact Guard Assistance,   Min=Minimal Assistance, Mod=Moderate Assistance, Max=Maximal Assistance, Total=Total Assistance, NT=Not Tested    MOBILITY: I Mod I S SBA CGA Min Mod Max Total  NT x2 Comments:   Supine to sit [] [] [] [] [] [x] [] [] [] [] [x]    Sit to supine [] [] [] [] [] [] [] [] [] [x] []    Sit to stand [] [] [] [] [] [x] [] [] [] [] [x]    Bed to chair [] [] [] [] [] [x] [] [] [] [] [x]    I=Independent, Mod I=Modified Independent, S=Supervision, SBA=Standby Assistance, CGA=Contact Guard Assistance,   Min=Minimal Assistance, Mod=Moderate Assistance, Max=Maximal Assistance, Total=Total Assistance, NT=Not Tested    [address] [facility] [address] [id number] AM-PAC 6 Clicks   Daily Activity Inpatient Short Form        How much help from another person does the patient currently need. .. Total A Lot A Little None   1. Putting on and taking off regular lower body clothing? [] 1   [x] 2   [] 3   [] 4   2. Bathing (including washing, rinsing, drying)? [] 1   [x] 2   [] 3   [] 4   3.   Toileting, which includes using toilet, bedpan or urinal?   [] 1   [x] 2   [] 3   [] 4   4. Putting on and taking off regular upper body clothing? [] 1   [x] 2   [] 3   [] 4   5. Taking care of personal grooming such as brushing teeth? [] 1   [x] 2   [] 3   [] 4   6. Eating meals? [] 1   [x] 2   [] 3   [] 4   © 2007, Trustees of [address] [place] [address] [address] [id number], under license to [email or web address]. All rights reserved     Score:  Initial: 12 Most Recent: X (Date: -- )   Interpretation of Tool:  Represents activities that are increasingly more difficult (i.e. Bed mobility, Transfers, Gait). PLAN:   FREQUENCY/DURATION: OT Plan of Care: 3 times/week for duration of hospital stay or until stated goals are met, whichever comes first.    PROBLEM LIST:   (Skilled intervention is medically necessary to address:)  1. Decreased ADL/Functional Activities  2. Decreased Activity Tolerance  3. Decreased AROM/PROM  4. Decreased Balance  5. Decreased Cognition  6. Decreased Coordination  7. Decreased Gait Ability  8. Decreased Strength  9. Decreased Transfer Abilities  10. Increased Pain   INTERVENTIONS PLANNED:   (Benefits and precautions of occupational therapy have been discussed with the patient.)  1. Self Care Training  2. Therapeutic Activity  3. Therapeutic Exercise/HEP  4. Neuromuscular Re-education  5. Education     TREATMENT:     EVALUATION: Low Complexity : (Untimed Charge)    TREATMENT:   ($$ Self Care/Home Management: 8-22 mins$$ Neuromuscular Re-Education: 8-22 mins   )  Co-Treatment PT/OT necessary due to patient's decreased overall endurance/tolerance levels, as well as need for high level skilled assistance to complete functional transfers/mobility and functional tasks  Self Care (8 Minutes): Self care including Lower Body Dressing to increase independence and decrease level of assistance required.   Neuromuscular Re-education (15 Minutes): Neuromuscular Re-education included Balance Training, Coordination training, Postural training, Sitting balance training and Standing balance training to improve Balance, Coordination and Postural Control.     TREATMENT GRID:  N/A    AFTER TREATMENT POSITION/PRECAUTIONS:  Bed and up with transport     INTERDISCIPLINARY COLLABORATION:  RN/PCT, PT/PTA and OT/RUBI    TOTAL TREATMENT DURATION:  OT Patient Time In/Time Out  Time In: 0915  Time Out: [address], OT

## 2021-07-26 NOTE — PROGRESS NOTES
LTG: Patient will tolerate least restrictive diet without overt signs or symptoms of airway compromise. STG: Patient will participate in modified barium swallow study as clinically indicated. MET 7/26  STG: Patient will consume puree consistency diet and mildly thick liquids (nectar) without overt s/sx airway compromise. STG: Patient will completed laryngeal strengthening exercises with 80% accuracy given mod verbal cues. SPEECH LANGUAGE PATHOLOGY: MODIFIED BARIUM SWALLOW STUDY  Initial Assessment    NAME/AGE/GENDER: Asuncion Chandler is a 79 y.o. male  DATE: 7/26/2021  PRIMARY DIAGNOSIS: Fall [W19. XXXA]      ICD-10: Treatment Diagnosis: Oropharyngeal dysphagia (R13.12)  INTERDISCIPLINARY COLLABORATION: Radiologist, Dr. Rey Javier  PRECAUTIONS/ALLERGIES: Aricept [donepezil] and Onion     RECOMMENDATIONS/PLAN   DIET: With known aspiration risk:   Pureed   Mildly Thick Liquids (Nectar)    MEDICATIONS: Crushed in puree     COMPENSATORY STRATEGIES/MODIFICATIONS INCLUDING:  · Fully awake/alert  · Upright for all PO  · 1:1 assistance with all PO  · Small bites and sips  · Double swallow  · Remain upright for 20-30 min after any PO  · Slow rate of PO intake     OTHER RECOMMENDATIONS (including follow up treatment recommendations):   · Treatment to improve/facilitate oral/pharyngeal skills   · Training in laryngeal strengthening and coordination exercises  · Training in use of compensatory safe swallowing strategies/feeding guidelines  · Patient/family education     RECOMMENDATIONS for CONTINUED SPEECH THERAPY:   YES: Anticipate need for ongoing speech therapy during this hospitalization and at next level of care. FREQUENCY/DURATION: Continue to follow patient 3 times a week for duration of hospital stay to address above goals.   Next treatment session will address diet tolerance and laryngeal strengthening exercises       ASSESSMENT   Mr. Lea Null presents with moderate oropharyngeal dysphagia characterized by reduced pharyngeal constriction and decreased epiglottic inversion, which resulted in mod-max diffuse pharyngeal residue post swallow with liquids and pudding trials. Silent penetration to cords from pyriform residuals post swallow with thin by straw. Shallow nonclearing penetration during and after the swallow from residuals with mildly thick by cup/straw. Patient remains at increased risk for aspiration with all po intake due to significant pharyngeal residue post swallow with all presented consistencies; however, able to adequately protect airway with mildly thick liquids and puree trials. Chewable trials deferred due to edentulous state. Recommended diet with known aspiration risk: puree and mildly thick liquids by straw (patient unable to hold cup to control sip size) . Meds crushed in puree. Needs 1:1 assistance with meals to ensure use of safe swallowing precautions listed above. Will continue to follow for diet tolerance and laryngeal strengthening exercises. SUBJECTIVE   Patient alert upright in stretcher. Following 1 step commands, but extremely hard of hearing requiring repetition. History of Present Injury/Illness: Mr. Shasha Slater  has a past medical history of Acoustic neuroma ([address] (2012), Cholelithiasis (08/20/2015), Colon polyps (12/31/2014), Dementia due to Parkinson's disease without behavioral disturbance ([address] (4/20/2018), DVT (deep venous thrombosis) ([address] (02/17/2017), Dyslipidemia, Environmental allergies, GERD (gastroesophageal reflux disease), Hiatal hernia, Hypertension, Intractable hiccups, Liver cyst (02/16/2017), Melanoma ([address] Parkinson disease ([address] Prostate cancer ([address] and Tourette syndrome (04/20/2018).  He also has no past medical history of Arrhythmia, Asthma, Autoimmune disease ([address] Chronic pain, Coagulation defects, COPD, Diabetes ([address] Heart failure ([address] Malignant hyperthermia due to anesthesia, Other ill-defined conditions(799.89), Pseudocholinesterase deficiency, Psychiatric disorder, Stroke ([facility] [address] Thromboembolus ([facility] [address] Unspecified adverse effect of anesthesia, or Unspecified sleep apnea. . He also  has a past surgical history that includes hx prostatectomy (11/2009); hx knee replacement (Left); hx hernia repair (Bilateral); hx tonsillectomy; hx hemorrhoidectomy; hx heent (Right); and hx colonoscopy (12/31/2014). Pain:  Pain Intensity 1: 0    Current dietary status prior to evaluation today:  NPO    Previous Modified Barium Swallow studies: N/a    Current Medications:   No current facility-administered medications on file prior to encounter. Current Outpatient Medications on File Prior to Encounter   Medication Sig Dispense Refill    divalproex (Depakote Sprinkles) 125 mg capsule Take 125 mg by mouth two (2) times a day.  ergocalciferol (ERGOCALCIFEROL) 1,250 mcg (50,000 unit) capsule Take 50,000 Units by mouth every seven (7) days.  multivitamin (ONE A DAY) tablet Take 1 Tab by mouth daily.  rivastigmine (Exelon) 13.3 mg/24 hour patch 1 Patch by TransDERmal route daily. 90 Patch 3    memantine (NAMENDA) 10 mg tablet Take 1 Tab by mouth two (2) times a day. 180 Tab 3    pyridostigmine (Mestinon) 60 mg tablet Take 1/2 tablet at 10 am and at 6 pm daily to support blood pressure. 30 Tab 3    montelukast (SINGULAIR) 10 mg tablet Take 1 Tab by mouth daily. 90 Tab 3    clotrimazole-betamethasone (LOTRISONE) topical cream Apply  to affected area two (2) times a day. 15 g 0    escitalopram oxalate (Lexapro) 10 mg tablet Take 1 Tab by mouth daily. 90 Tab 3    aspirin delayed-release 81 mg tablet Take 1 Tab by mouth daily. (Patient taking differently: Take 81 mg by mouth nightly.) 30 Tab 0    clonazePAM (KlonoPIN) 0.5 mg tablet 1/2 tabs every 4 hrs as needed for anxiety and agitation. Not to exceed 1mg in 24 hrs. 90 Tab 3    carbidopa-levodopa (SINEMET)  mg per tablet Take 1/2 tab at 8am, 1 tab at 12pm and 1 tab at 4pm. Strict times.  80 Tab 4    carbidopa-levodopa ER (SINEMET CR)  mg per tablet TAKE ONE TABLET BY MOUTH AT BEDTIME (Patient not taking: Reported on 7/25/2021) 90 Tab 4    pimozide (ORAP) 1 mg tablet Take 1/2 tab at 8am, 4pm and 8pm 270 Tab 3    OTHER D/C Depakote today 4/28/21 1 Tab 0    QUEtiapine (SEROquel) 25 mg tablet Take 1 Tab by mouth nightly. (Patient not taking: Reported on 7/25/2021) 30 Tab 0    nystatin (MYCOSTATIN) powder Apply  to affected area two (2) times a day. (Patient not taking: Reported on 7/25/2021) 60 g 1    carbidopa-levodopa ER (SINEMET CR)  mg per tablet TAKE ONE TABLET BY MOUTH EVERY NIGHT 90 Tab 0    carbidopa-levodopa (SINEMET)  mg per tablet TAKE ONE TABLET BY MOUTH FOUR TIMES A DAY (Patient not taking: Reported on 7/25/2021) 360 Tab 3    atorvastatin (LIPITOR) 10 mg tablet Take 1 Tab by mouth daily. 90 Tab 3    nitroglycerin (NITROSTAT) 0.4 mg SL tablet Take 1 Tab by mouth every five (5) minutes as needed for Chest Pain. Sit/Lay down then put one tab under the tongue every 5 minutes as needed for chest pain for 3 doses 1 Bottle 0       OBJECTIVE   Orientation:    Person    Oral Assessment:  Labial: No impairment  Lingual: No impairment  Dentition: Edentulous  Oral Hygiene: Adequate  Vocal Quality: WFL    Modified barium swallow study was performed in the radiology suite using c-arm with Mr. Campbell Mohs in the lateral plane upright 90° in a stretcher. To evaluate his swallow function, barium coated liquid and food was administered in the form of thin liquids (by spoon, cup sip and straw sip), mildly-thick liquids (by spoon, cup sip and straw sip) and pudding. Oral phase of swallow:    slow oral transit    Pharyngeal phase of swallow:    Swallows of thin were triggered at the vallecula and posterior epiglottis. No aspiration/penetration with thin by teaspoon or single cup sips. Patient unable to hold cup; therefore, only small single sips presented.  After initial swallow with thin by straw, patient with pyriform residue which spilled to laryngeal vestibule then to cords without cough response. Min-mild vallecular and pyriform residue post swallow with thin liquids trials.  Swallows of mildly thick (nectar) were triggered at the vallecula. Mod-max diffuse pharyngeal residue post swallow with all mildly thick trials. No aspiration/penetration when presented by teaspoon. Shallow nonclearing penetration during the swallow with mildly thick by cup. No aspiration/penetration during the swallow with mildly thick by straw; however, nonclearing penetration of pharyngeal residuals during spontaneous subsequent swallow. Double swallow only minimally reduced residuals.  Swallows of pudding pooled in the vallecula for 3-4 seconds. No aspiration/penetration. Mod-max diffuse pharyngeal residue post swallow, which was reduced with spontaneous 2nd swallow. Pharyngeal characteristics:   adequate retraction of base of tongue   adequate hyolaryngeal elevation/excursion   decreased epiglottic inversion   decreased constriction of posterior pharyngeal wall   decreased laryngeal closure  Attempted strategies:    none  Effective strategies:    none  Aspiration/Penetration Scale: 3 (Penetration/visible residue. Contrast remains above the folds/cords, but is not cleared.)    Cervical esophageal phase of swallow:    adequate and timely clearance of all boluses through cervical esophagus  **Distal esophagus not assessed due to limitations of MBS study. Assessment/Reassessment only, no treatment provided today.       Tool Used: Dysphagia Outcome and Severity Scale (LORAINE)    Comments   Normal Diet With no strategies or extra time needed   Functional Swallow May have mild oral or pharyngeal delay   Mild Dysphagia Which may require one diet consistency restricted    Mild-Moderate Dysphagia With 1-2 diet consistencies restricted   Moderate Dysphagia With 2 or more diet consistencies restricted Moderately Severe Dysphagia With partial PO strategies (trials with ST only)   Severe Dysphagia With inability to tolerate any PO safely      Score:  Initial: 3 Most Recent: x (Date:7/26/2021)   Interpretation of Tool: The Dysphagia Outcome and Severity Scale (LORAINE) is a simple, easy-to-use, 7-point scale developed to systematically rate the functional severity of dysphagia based on objective assessment and make recommendations for diet level, independence level, and type of nutrition. [place] / [place] / Product Type: Medicare /     Education:  · Recommendations discussed with patient, patient's wife, RN and hospitalist.    Safety:   After treatment position/precautions:  · Patient waiting in radiology holding bay for transport back to room.     Total Treatment Duration:  Time In: 1000   Time Out: [address]

## 2021-07-26 NOTE — PROGRESS NOTES
Comprehensive Nutrition Assessment    Type and Reason for Visit: Initial, Positive nutrition screen  Best Practice Alert for Malnutrition Screening Tool: Recently Lost Weight Without Trying: Yes, If Yes, How Much Weight Loss: >33 lbs, Eating Poorly Due to Decreased Appetite: No    Nutrition Recommendations/Plan:    Continue current diet per SLP. Discussed with SLP, Sirisha Glaser. RN or PCT to be at bedside to monitor PO intake and assist pt with meals. No weighted utensils in house per PT.    Start Nepro shakes (nectar thick) TID with meals to provide 420 kcal and 19 gm PRO per bottle.  Start magic cup BID with meals to provide 290 kcal and 9 gm PRO per cup   Continue oral nutrition supplements at discharge     Malnutrition Assessment:  Malnutrition Status: Severe malnutrition  Context: Chronic illness  Findings of clinical characteristics of malnutrition:   Energy Intake:   (presumed <75% of PO intake >1 month - unable to quantify)  Weight Loss:  Mild weight loss (specify amount and time period) (16 lb, 9.6% wt loss x 8 mos; 6 lb, 3.8% wt loss x 3 mos)     Body Fat Loss:  7 - Severe body fat loss, Buccal region, Orbital   Muscle Mass Loss:  7 - Severe muscle mass loss, Temples (temporalis)  Fluid Accumulation:  Unable to assess,     Strength:  Not performed     Nutrition Assessment:   Nutrition History: Pt unable to give nutrition history d/t current mentation. Wife at bedside provides nutrition history. Pt's wife states that she believes the pt has lost ~100 lbs over ~8 months (UBW approximately 250 lbs). Pt's wife states that he eats predominately with his \"pincher fingers\" and uses a weighted adaptive utensil to feed himself. She states that the pt still requires assisstance with meals and meal time is a very lengthy process. Pt's wife states that he has been at his half-way for ~12 weeks and she believes that they prefer to send him \"finger foods\" (sandwiches) vs assist with feeding him.  Pt's wife states that when he was home he was drinking ~1 nutrition drink/day. She states that he has overall started eating less at each meal.       Nutrition Background: Pt admitted with acute encephalopathy. S/P fall. PMH notable for dementia, parkinsons, HTN. Daily Update:  Pt seen reclined in bed - sleeping. Pt's wife at bedside. Nutrition POC discussed with pt's wife alongside SLP. Nutrition Related Findings:   Pt thin appearing. Findings indicative of muscle wasting and fat loss. NPO per SLP 7/25. MBS 7/26 with recommendations for pureed/nectar thickened liquids per SLP. Current Nutrition Therapies:  ADULT DIET Dysphagia - Pureed; Mildly Thick (Nectar)    Current Intake:   Average Meal Intake: Unable to assess (NPO prior to diet advancement for lunch today) Average Supplement Intake: None ordered      Anthropometric Measures:  Height: 5' 10\" (177.8 cm)  Current Body Wt: 68 kg (150 lb) (7/24), Weight source: Not specified  BMI: 21.5, Underweight (BMI less than 22) age over 72  Admission Body Weight: 150 lb  Ideal Body Weight (lbs) (Calculated): 166 lbs (75 kg), 90.4 %  Usual Body Wt:  (wt ranges from 160-166 lbs x 1 yr), Edema: No data recorded     WT / BMI 7/24/2021 7/10/2021 4/13/2021 4/13/2021 4/12/2021   WEIGHT 150 lb 150 lb 150 lb 150 lb 149 lb 6.4 oz     WT / BMI 4/2/2021 2/24/2021 2/12/2021 11/19/2020 9/23/2020   WEIGHT 156 lb 157 lb 155 lb 166 lb 164 lb     WT / BMI 8/4/2020 7/15/2020   WEIGHT 162 lb 9.6 oz 163 lb     Per weights listed in EMR, UBW ranges from 160-166 lbs over the pats ~1 year. Potential for a 16 lb, 9.6% weight loss over ~8 months and a recent 6 lb, 3.8% weight loss over ~3 months. Weight loss is not significant. Weight loss can not be verified as weight source is unknown.      Estimated Daily Nutrient Needs:  Energy (kcal/day): 6985-9273 (Kcal/kg (25-30), Weight Used: Current (68 kg))  Protein (g/day):  (20% kcal) Weight Used: (Current)  Fluid (ml/day):   (1 ml/kcal (or per MD))    Nutrition Diagnosis:   · Severe malnutrition, In context of chronic illness related to cognitive or neurological impairment, inadequate protein-energy intake as evidenced by weight loss, severe loss of subcutaneous fat, severe muscle loss (parkinsons, dementia)    · Predicted inadequate energy intake related to cognitive or neurological impairment, biting/chewing (masticatory) difficulty, swallowing difficulty as evidenced by  (modified diet s/p MBS, lethargy, parkinsons, dementia)    Nutrition Interventions:   Food and/or Nutrient Delivery: Continue current diet, Start oral nutrition supplement     Coordination of Nutrition Care: Continue to monitor while inpatient  Plan of Care discussed with Fozia Calderon, SLP, and PT    Goals: Active Goal: Meet >50% of estimated needs at time of nutrition follow up.      Nutrition Monitoring and Evaluation:      Food/Nutrient Intake Outcomes: Food and nutrient intake, Supplement intake  Physical Signs/Symptoms Outcomes: Chewing or swallowing, Meal time behavior    Discharge Planning:    Continue oral nutrition supplement    Rubin [address], [address], [address] [place], [facility] [address]

## 2021-07-26 NOTE — PROGRESS NOTES
LTG: Patient will tolerate least restrictive diet without overt signs or symptoms of airway compromise. STG: Patient will participate in modified barium swallow study as clinically indicated. SPEECH LANGUAGE PATHOLOGY: DYSPHAGIA- Initial Assessment    NAME/AGE/GENDER: Reva Ferrari is a 79 y.o. male  DATE: 7/26/2021  PRIMARY DIAGNOSIS: Fall [W19. XXXA]      ICD-10: Treatment Diagnosis: R13.12 Dysphagia, Oropharyngeal Phase    RECOMMENDATIONS   DIET:    NPO    MEDICATIONS: Except Parkinson's meds - disolvable per pharmacy     ASPIRATION PRECAUTIONS  · Oral care every 3 hours     COMPENSATORY STRATEGIES/MODIFICATIONS  · None     RECOMMENDATIONS for CONTINUED SPEECH THERAPY:   YES: Anticipate need for ongoing speech therapy during this hospitalization and at next level of care. ASSESSMENT   Patient presents with concerns for oropharyngeal dysphagia. Limited trials provided as patient with multiple swallows upon palpation and reports of pharyngeal stasis with thin liquids and puree trials. Recommend NPO except Parkinson's meds. Per pharmacy meds not crushable, but available in dissolvable form. Modified barium swallow study later this AM to objectively assess oropharyngeal swallow function. EDUCATION:  · Recommendations discussed with Patient, Family, MD and RN  CONTINUATION OF SKILLED SERVICES/MEDICAL NECESSITY:   Patient is expected to demonstrate progress in  swallow strength, swallow timeliness, swallow function, diet tolerance and swallow safety in order to  improve swallow safety, work toward diet advancement and decrease aspiration risk.  Patient continues to require skilled intervention due to dysphagia. REHABILITATION POTENTIAL FOR STATED GOALS: Excellent    PLAN    FREQUENCY/DURATION: Continue to follow patient 3 times a week for duration of hospital stay to address above goals.     - Recommendations for next treatment session: Next treatment session will address Modified Barium Swallow Study    SUBJECTIVE   Patient alert upright in bed for assessment. Pleasantly confused, but following 1 step commands. Wife at bedside. Oxygen Device: room air  Pain: Pain Scale 1: Adult Nonverbal Pain Scale  Pain Intensity 1: 0    History of Present Injury/Illness: Mr. Lea Null  has a past medical history of Acoustic neuroma ([facility] [address] (2012), Cholelithiasis (08/20/2015), Colon polyps (12/31/2014), Dementia due to Parkinson's disease without behavioral disturbance ([address] (4/20/2018), DVT (deep venous thrombosis) ([address] (02/17/2017), Dyslipidemia, Environmental allergies, GERD (gastroesophageal reflux disease), Hiatal hernia, Hypertension, Intractable hiccups, Liver cyst (02/16/2017), Melanoma ([address] Parkinson disease ([address] Prostate cancer ([address] and Tourette syndrome (04/20/2018). He also has no past medical history of Arrhythmia, Asthma, Autoimmune disease ([address] Chronic pain, Coagulation defects, COPD, Diabetes ([address] Heart failure ([address] Malignant hyperthermia due to anesthesia, Other ill-defined conditions(799.89), Pseudocholinesterase deficiency, Psychiatric disorder, Stroke ([address] Thromboembolus ([address] Unspecified adverse effect of anesthesia, or Unspecified sleep apnea. . He also  has a past surgical history that includes hx prostatectomy (11/2009); hx knee replacement (Left); hx hernia repair (Bilateral); hx tonsillectomy; hx hemorrhoidectomy; hx heent (Right); and hx colonoscopy (12/31/2014). PRECAUTIONS/ALLERGIES: Aricept [donepezil] and Onion     Problem List:  (Impairments causing functional limitations):  1.  Oropharyngeal dysphagia    Previous Dysphagia: NONE REPORTED NPO due to mentation  Diet Prior to Evaluation: NPO    Orientation:  Person    Cognitive-Linguistic Screening:   Alertness  o Alert   Speech Production:   o Fully intelligible   Expressive Language:  o Fluent speech and Able to effectively communicate wants and needs   Receptive Language:  o Answers yes/no questions appropriately and follows 1 step commands   Cognition:   o Confused. Thinks he's at his house. Poor historian. Prior Level of Function: Baseline dementia. Lives at memory care unit  Recommendations: Given results of screening, patient appears to be functioning at baseline. No acute assessment of speech, language, or cognition warranted. OBJECTIVE   Oral Motor:   · Labial: No impairment  · Dentition: Edentulous  · Oral Hygiene: Adequate  · Lingual: No impairment    Dysphagia evaluation:   Patient consumed trials of thin by teaspoon/straw and puree. Trials thin by cup deferred as patient is unable to hold cup. No overt s/sx airway compromise with any po trials. Timely swallow upon palpation; however, multiple swallows with patient reporting pharyngeal stasis. Tool Used: Dysphagia Outcome and Severity Scale (LORAINE)    Score Comments   Normal Diet  [] 7 With no strategies or extra time needed   Functional Swallow  [] 6 May have mild oral or pharyngeal delay   Mild Dysphagia  [] 5 Which may require one diet consistency restricted    Mild-Moderate Dysphagia  [] 4 With 1-2 diet consistencies restricted   Moderate Dysphagia  [] 3 With 2 or more diet consistencies restricted   Moderate-Severe Dysphagia  [x] 2 With partial PO strategies (trials with ST only)   Severe Dysphagia  [] 1 With inability to tolerate any PO safely      Score:  Initial: 2 Most Recent: x (Date 07/26/21 )   Interpretation of Tool: The Dysphagia Outcome and Severity Scale (LORAINE) is a simple, easy-to-use, 7-point scale developed to systematically rate the functional severity of dysphagia based on objective assessment and make recommendations for diet level, independence level, and type of nutrition. Current Medications:   No current facility-administered medications on file prior to encounter.      Current Outpatient Medications on File Prior to Encounter   Medication Sig Dispense Refill    divalproex (Depakote Sprinkles) 125 mg capsule Take 125 mg by mouth two (2) times a day.  ergocalciferol (ERGOCALCIFEROL) 1,250 mcg (50,000 unit) capsule Take 50,000 Units by mouth every seven (7) days.  multivitamin (ONE A DAY) tablet Take 1 Tab by mouth daily.  rivastigmine (Exelon) 13.3 mg/24 hour patch 1 Patch by TransDERmal route daily. 90 Patch 3    memantine (NAMENDA) 10 mg tablet Take 1 Tab by mouth two (2) times a day. 180 Tab 3    pyridostigmine (Mestinon) 60 mg tablet Take 1/2 tablet at 10 am and at 6 pm daily to support blood pressure. 30 Tab 3    montelukast (SINGULAIR) 10 mg tablet Take 1 Tab by mouth daily. 90 Tab 3    clotrimazole-betamethasone (LOTRISONE) topical cream Apply  to affected area two (2) times a day. 15 g 0    escitalopram oxalate (Lexapro) 10 mg tablet Take 1 Tab by mouth daily. 90 Tab 3    aspirin delayed-release 81 mg tablet Take 1 Tab by mouth daily. (Patient taking differently: Take 81 mg by mouth nightly.) 30 Tab 0    clonazePAM (KlonoPIN) 0.5 mg tablet 1/2 tabs every 4 hrs as needed for anxiety and agitation. Not to exceed 1mg in 24 hrs. 90 Tab 3    carbidopa-levodopa (SINEMET)  mg per tablet Take 1/2 tab at 8am, 1 tab at 12pm and 1 tab at 4pm. Strict times. 180 Tab 4    carbidopa-levodopa ER (SINEMET CR)  mg per tablet TAKE ONE TABLET BY MOUTH AT BEDTIME (Patient not taking: Reported on 7/25/2021) 90 Tab 4    pimozide (ORAP) 1 mg tablet Take 1/2 tab at 8am, 4pm and 8pm 270 Tab 3    OTHER D/C Depakote today 4/28/21 1 Tab 0    QUEtiapine (SEROquel) 25 mg tablet Take 1 Tab by mouth nightly. (Patient not taking: Reported on 7/25/2021) 30 Tab 0    nystatin (MYCOSTATIN) powder Apply  to affected area two (2) times a day.  (Patient not taking: Reported on 7/25/2021) 60 g 1    carbidopa-levodopa ER (SINEMET CR)  mg per tablet TAKE ONE TABLET BY MOUTH EVERY NIGHT 90 Tab 0    carbidopa-levodopa (SINEMET)  mg per tablet TAKE ONE TABLET BY MOUTH FOUR TIMES A DAY (Patient not taking: Reported on 7/25/2021) 360 Tab 3    atorvastatin (LIPITOR) 10 mg tablet Take 1 Tab by mouth daily. 90 Tab 3    nitroglycerin (NITROSTAT) 0.4 mg SL tablet Take 1 Tab by mouth every five (5) minutes as needed for Chest Pain.  Sit/Lay down then put one tab under the tongue every 5 minutes as needed for chest pain for 3 doses 1 Bottle 0        INTERDISCIPLINARY COLLABORATION: RN and MD    After treatment position/precautions:  · Upright in bed  · Wife at bedside  · RN notified    Total Treatment Duration:   Time In: 0848  Time Out: [address] [facility], [address] [facility]

## 2021-07-26 NOTE — PROGRESS NOTES
Hourly rounding completely during shift. All needs met at this time. Bed L/L with call bell in reach, bed alarm on. Report will be given to oncoming RN.

## 2021-07-26 NOTE — PROGRESS NOTES
Consult    Patient: Kristian Johnson MRN: [id number]     YOB: 1940  Age: 79 y.o. Sex: male      Subjective:      Kristian Johnson is a 79 y.o. male who is being seen for altered mental status. Patient is an established patient with Neurology with advanced Parkinson's disease, Dementia, and orthostatic hypotension. The patient arrived to the ER from a nursing home after a fall with external evidence of closed head injury. Patient is alert and orientated to person and place today.     Past Medical History:   Diagnosis Date    Acoustic neuroma ([facility] 2012    Right    Cholelithiasis 08/20/2015    Colon polyps 12/31/2014    Tubular Adenoma x 1 & Hyperplastic Polyp x 2    Dementia due to Parkinson's disease without behavioral disturbance ([facility] 4/20/2018    DVT (deep venous thrombosis) (HCC) 02/17/2017    Left Superficial Femoral, Popliteal, & Peroneal vein     Dyslipidemia     Environmental allergies     GERD (gastroesophageal reflux disease)     Hiatal hernia     Hypertension     Intractable hiccups     Liver cyst 02/16/2017    R Lobe    Melanoma ([facility]     Back    Parkinson disease ([facility]     Followed by [facility] De [facility]    Prostate cancer [facility])     Tourette syndrome 04/20/2018    \"verbalizes as really loud hiccoughs\"     Past Surgical History:   Procedure Laterality Date    HX COLONOSCOPY  12/31/2014    Tubular Adenoma & Hyperplastic Polyps, Diverticulosis, & Internal Hemorrhoids - Recall 2017    HX HEENT Right     Ear    HX HEMORRHOIDECTOMY      HX HERNIA REPAIR Bilateral     HX KNEE REPLACEMENT Left     HX PROSTATECTOMY  11/2009    [facility]    HX TONSILLECTOMY        Family History   Problem Relation Age of Onset    Cancer Father 77        Prostate    Heart Disease Brother     Cancer Brother         Prostate    Melanoma Brother     Breast Cancer Mother     Melanoma Mother     COPD Brother      Social History     Tobacco Use    Smoking status: Never Smoker    Smokeless tobacco: Never Used   Substance Use Topics    Alcohol use: No      Current Facility-Administered Medications   Medication Dose Route Frequency Provider Last Rate Last Admin    sodium chloride (NS) flush 5-40 mL  5-40 mL IntraVENous Q8H Moreno Geeta De La Torre MD   10 mL at 07/26/21 0520    sodium chloride (NS) flush 5-40 mL  5-40 mL IntraVENous PRN Robin Benjamin MD        polyethylene glycol (MIRALAX) packet 17 g  17 g Oral DAILY PRN Robin Benjamin MD        acetaminophen (TYLENOL) tablet 650 mg  650 mg Oral Q6H PRN Robin Benjamin MD        Or    acetaminophen (TYLENOL) suppository 650 mg  650 mg Rectal Q6H PRN Robin Benjamin MD        aspirin delayed-release tablet 81 mg  81 mg Oral QHS Lara Prasad MD        carbidopa-levodopa (SINEMET)  mg per tablet 1 Tablet  1 Tablet Oral TID Robin Benjamin MD   1 Tablet at 07/26/21 1200    pyridostigmine (MESTINON) tablet 30 mg  30 mg Oral BID Robin Benjamin MD        dextrose 5% infusion  75 mL/hr IntraVENous CONTINUOUS Rachel Anne NP 75 mL/hr at 07/25/21 2211 75 mL/hr at 07/25/21 2211    carbidopa-levodopa (PARCOPA)  mg rapid dissolve tablet 1 Tablet  1 Tablet Oral QID Joseph Roach MD   1 Tablet at 07/26/21 1200    alcohol 62% (NOZIN) nasal  1 Ampule  1 Ampule Topical Q12H Marla Kwon MD   1 Ampule at 07/26/21 1048        Allergies   Allergen Reactions    Aricept [Donepezil] Nausea and Vomiting    Onion Nausea and Vomiting       Review of Systems:  CONSTITUTIONAL:  Negative  HEENT:  Eyes:  Negative Ears, Nose, Throat:  Negative  SKIN:  Negative  CARDIOVASCULAR:  Negative  RESPIRATORY:  Negative  GASTROINTESTINAL:  Negative  GENITOURINARY:  Negative  NEUROLOGICAL:  Reports confusion.   MUSCULOSKELETAL:  Negative  HEMATOLOGIC:  Negative  LYMPHATICS:  Negative   ENDOCRINOLOGIC:  Negative        Objective:     Vitals:    07/26/21 0742 07/26/21 1116 07/26/21 1211 07/26/21 1314   BP: 126/72  102/70 118/78   Pulse: (!) 54  61 60   Resp: 16  16    Temp: 97.6 °F (36.4 °C)  97.5 °F (36.4 °C)    SpO2: 100%  97% 98%   Weight:       Height:  5' 10\" (1.778 m)          Physical Exam:  General - elderly male that appears stated age in no apparent distress. Pleasant and conversent. HEENT - Normocephalic, atraumatic. Conjunctiva, tympanic membranes, and oropharynx are clear. Neck - Supple without masses   Cardiovascular - Regular rate and rhythm. Extremities - Peripheral pulses intact. No edema and no rashes. Neurological examination - Comprehension, attention, memory and reasoning are intact but is intermittently confused. Language and speech are normal.   On cranial nerve examination:  (II, III, IV, VI) Pupils are equal, round, and reactive to light.   (V, VII) Face is symmetrical. Sensation intact to light touch. (VIII) Hearing intact. (IX, X) Palate and uvula elevate symmetrically. (XI) Patient able to shrug shoulders   (XII)Tongue is midline. Motor examination - Muscle tone and bulk are decreased. Strength is 4/5 on all extremities. Sensation is intact to light touch. Lab Results   Component Value Date/Time    Cholesterol, total 141 09/16/2020 10:13 AM    HDL Cholesterol 62 09/16/2020 10:13 AM    LDL, calculated 68 09/16/2020 10:13 AM    LDL, calculated 61 06/13/2019 10:59 AM    VLDL, calculated 11 09/16/2020 10:13 AM    VLDL, calculated 10 06/13/2019 10:59 AM    Triglyceride 47 09/16/2020 10:13 AM        Lab Results   Component Value Date/Time    Hemoglobin A1c <3.5 (L) 04/15/2021 09:07 AM        CT Results (most recent):  Results from Hospital Encounter encounter on 07/24/21    CT SPINE CERV WO CONT    Narrative  EXAM: Noncontrast CT cervical spine. INDICATION: Pain, injury. COMPARISON: Prior CT cervical spine on July 10, 2021. TECHNIQUE: Axial noncontrast CT images of the cervical spine were obtained.   Sagittal and coronal reformatted images were performed. Radiation dose  reduction techniques were used for this study. Our CT scanners use one or all  of the following:  Automated exposure control, adjustment of the mA and/or kV  according to patient size, iterative reconstruction. FINDINGS: No acute fracture, focal malalignment or prevertebral soft tissue  swelling is identified. There is mild to moderate multilevel degenerative disc  disease and facet arthritis. Normal craniocervical junction alignment is  maintained. The C5-6 and C6-7 disc spaces are narrowed, possibly on a  developmental basis. Previously noted bone islands in the C7 vertebra are  unchanged, as is a 7 mm nonspecific lucent lesion in the right C2 facet. The  paraspinal soft tissues are unremarkable. Impression  No evidence of an acute cervical spine injury. Results for orders placed or performed during the hospital encounter of 07/24/21   EKG, 12 LEAD, INITIAL   Result Value Ref Range    Ventricular Rate 59 BPM    Atrial Rate 86 BPM    QRS Duration 94 ms    Q-T Interval 446 ms    QTC Calculation (Bezet) 441 ms    Calculated R Axis 12 degrees    Calculated T Axis 67 degrees    Diagnosis       Poor data quality likely sinus rhythm   Poor R wave progression  Otherwise normal ECG  Confirmed by Nicole Iyer ([id number]) on 7/25/2021 8:15:59 AM          MRI Results (most recent):   Results from [place] encounter on 01/03/19    MRI BRAIN W WO CONT    Narrative  MRI BRAIN and INTERNAL AUDITORY CANALS. HISTORY: Left-sided vestibular schwannoma. COMPARISON: June 2012. TECHNIQUE: Routine whole brain axial T1, axial T2 and FLAIR, sagittal T1,  high-resolution thin-section axial T2, axial T1, axial T1 fat-saturated which  was followed by 15cc intravenous gadolinium, after which high-resolution  thin-section fat-saturated axial and coronal T1-weighted images through the  internal auditory canals.     FINDINGS:  There is an enhancing 3 x 7 mm nodule in the internal auditory canal  on the right. This is low T2 and mildly increased T1 signal. There is fairly  bright uniform enhancement. No significant change from prior exam.    Whole brain images demonstrate: A few nonspecific periventricular and centrum  semiovale FLAIR and T2 white matter hyperintensities are present; these do not  enhance with contrast. No enhancing nodules or masses. Diffusion images do not  demonstrate any areas of restricted diffusion  No midline shift, mass or mass  effect. Gradient echo images are unremarkable  No evidence of acute hemorrhage. The lateral ventricles are normal size  The pituitary, parasellar and midline  structures are unremarkable on the sagittal T1 images  There are normal T2  flow-voids in the major vessels   Posterior fossa structures are unremarkable  The basal ganglia are symmetric  Orbits are grossly normal  Paranasal sinuses  are clear    Impression  IMPRESSION: Stable right vestibular schwannoma, 3 x 7 mm, unchanged from June 2012. Minimal chronic small vessel disease. No new abnormality. Most recent CTA:  Results from [place] encounter on 07/24/21    CT SPINE CERV WO CONT    Narrative  EXAM: Noncontrast CT cervical spine. INDICATION: Pain, injury. COMPARISON: Prior CT cervical spine on July 10, 2021. TECHNIQUE: Axial noncontrast CT images of the cervical spine were obtained. Sagittal and coronal reformatted images were performed. Radiation dose  reduction techniques were used for this study. Our CT scanners use one or all  of the following:  Automated exposure control, adjustment of the mA and/or kV  according to patient size, iterative reconstruction. FINDINGS: No acute fracture, focal malalignment or prevertebral soft tissue  swelling is identified. There is mild to moderate multilevel degenerative disc  disease and facet arthritis. Normal craniocervical junction alignment is  maintained.  The C5-6 and C6-7 disc spaces are narrowed, possibly on a  developmental basis. Previously noted bone islands in the C7 vertebra are  unchanged, as is a 7 mm nonspecific lucent lesion in the right C2 facet. The  paraspinal soft tissues are unremarkable. Impression  No evidence of an acute cervical spine injury. Most recent MRA:  Results from [place] encounter on 01/03/19    MRI BRAIN W WO CONT    Narrative  MRI BRAIN and INTERNAL AUDITORY CANALS. HISTORY: Left-sided vestibular schwannoma. COMPARISON: June 2012. TECHNIQUE: Routine whole brain axial T1, axial T2 and FLAIR, sagittal T1,  high-resolution thin-section axial T2, axial T1, axial T1 fat-saturated which  was followed by 15cc intravenous gadolinium, after which high-resolution  thin-section fat-saturated axial and coronal T1-weighted images through the  internal auditory canals. FINDINGS:  There is an enhancing 3 x 7 mm nodule in the internal auditory canal  on the right. This is low T2 and mildly increased T1 signal. There is fairly  bright uniform enhancement. No significant change from prior exam.    Whole brain images demonstrate: A few nonspecific periventricular and centrum  semiovale FLAIR and T2 white matter hyperintensities are present; these do not  enhance with contrast. No enhancing nodules or masses. Diffusion images do not  demonstrate any areas of restricted diffusion  No midline shift, mass or mass  effect. Gradient echo images are unremarkable  No evidence of acute hemorrhage. The lateral ventricles are normal size  The pituitary, parasellar and midline  structures are unremarkable on the sagittal T1 images  There are normal T2  flow-voids in the major vessels   Posterior fossa structures are unremarkable  The basal ganglia are symmetric  Orbits are grossly normal  Paranasal sinuses  are clear    Impression  IMPRESSION: Stable right vestibular schwannoma, 3 x 7 mm, unchanged from June 2012. Minimal chronic small vessel disease. No new abnormality.       Most recent Echo:  No results found for this visit on 07/24/21. Assessment:     Patient is being seen for altered mental status after a fall at nursing home. EEG showed continuous sleep. No pathological or asymmetrical EEG patterns are present. Plan:     Continue Parcopa (Carbidopa/levodopa ODT) qid. Continue Mestinon for OH. STOP Sinemet. Urine culture no growth to date  EEG completed  Management of Delirium-   Non-pharmacological intervention  · Reorient the patient throughout the day  · Window open and lights on during the day. Lights off, television off, noises down during the night. If able, decrease nursing checks at night  · Therapies as often as possible  · Avoid restraints to the best of your ability   · Avoid sensory deprivation by using glasses and hearing aids, if applicable        Pharmacological intervention  · Replete electrolyte abnormalities and correct metabolic abnormalities  · Limit benzodiazepines, antihistamines, narcotics, anticholinergics. Preference towards Precedex for sedation over fentanyl and benzodiazepines/GABAa agonists. · For dangerous behavior/aggression to self or others can consider Zyprexa or Seroquel if benefits outweigh risk  · For persistent insomnia can use melatonin four hours prior to bedtime or Seroquel 25 mg at bedtime            Signed By: Yeny Ortiz     July 26, 2021    Attending note    Patient seen and examined. Severe end-stage Parkinson's disease. Complications include dementia and orthostasis with autonomic dysfunction. Patient started on Parcopa however Sinemet was not discontinued this is actually is doubling the dosage of levodopa carbidopa and therefore is revised and the situation is discussed with nursing directly.     Strongly recommended no discontinuation of medications for orthostatic blood pressure variability based upon supine numbers alone this ignores the underlying basis of the disorder and additionally once we are able to mobilize the patient will inhibit same    Prognosis here is guarded based upon severity and progression of disease

## 2021-07-26 NOTE — INTERDISCIPLINARY ROUNDS
Interdisciplinary Rounds completed with Nursing, Case Management, Dietician, Pharmacy,  Physician and PT/OT present. Plan of care reviewed and updated.     Consults include PT/OT/ST, Neurology      Pending MRI    Expected discharge date: TBD     Location: [facility]

## 2021-07-26 NOTE — PROGRESS NOTES
ACUTE PHYSICAL THERAPY GOALS:  (Developed with and agreed upon by patient and/or caregiver. )    LTG:  (1.)Mr. Damari Horton will move from supine to sit and sit to supine , scoot up and down and roll side to side in bed with MINIMAL ASSIST within 7 treatment day(s). (2.)Mr. Damari Horton will transfer from bed to chair and chair to bed with MINIMAL ASSIST using the least restrictive device within 7 treatment day(s). (3.)Mr. Damari Horton will ambulate with MINIMAL ASSIST for 50 feet with the least restrictive device within 7 treatment day(s). (4.)Mr. Damari Horton will tolerate at least 23 min of dynamic standing activity to assist standing ADLs with the least restrictive device within 7 treatment days. ________________________________________________________________________________________________        PHYSICAL THERAPY ASSESSMENT: Initial Assessment, Daily Note and AM PT Treatment Day # 1      Sirisha Rogers is a 79 y.o. male   PRIMARY DIAGNOSIS: Fall  Fall [W19. XXXA]       Reason for Referral:    ICD-10: Treatment Diagnosis: Generalized Muscle Weakness (M62.81)  Other lack of cordination (R27.8)  Difficulty in walking, Not elsewhere classified (R26.2)  Other abnormalities of gait and mobility (R26.89)  Dizziness and Giddiness (R42)  Unspecified Lack of Coordination (R27.9)  [place] / [place] / Product Type: Medicare /     ASSESSMENT:     REHAB RECOMMENDATIONS:   Recommendation to date pending progress:  [facility]  Equipment:    None     PRIOR LEVEL OF FUNCTION:  (Prior to Hospitalization) INITIAL/CURRENT LEVEL OF FUNCTION:  (Most Recently Demonstrated)   Bed Mobility:   Unknown  Sit to Stand:   Unknown  Transfers:   Moderate Assistance  Gait/Mobility:   Moderate Assistance Bed Mobility:   Minimal Assistance x 2  Sit to Stand:   Minimal Assistance x 2  Transfers:   Minimal Assistance x 2  Gait/Mobility:   Minimal Assistance x 2     ASSESSMENT:  Mr. Johnathon Clarke presents in supine, mildly lethargic, very [place], A&Ox2, wife present. Upon entering, Pnt is agreeable to PT treatment. he reports no pain in his ribs at rest. Pnt performed supine > sit with min Ax2 and extra time, sitting EOB with fair sitting balance control. Sit > stand with min Ax2 using RW. Gait x 6', 15' ft with min Ax2, cues for step length and posture. Gait is noted to be very slow and flexed. Stand > sit with min Ax2, followed by positioning for comfort. At end of session pt supine on transport gurney with all needs within reach, alarm activated for safety, RN notified. Overall, pt presents as functioning below his baseline, with deficits in mobility including transfers, gait, balance, and activity tolerance. Pt will continue to benefit from skilled therapy services to address stated deficits to promote return to highest level of function, independence, and safety. Will continue to follow. SUBJECTIVE:   Mr. Johnathon Clarke states, \"I don't hear too good. \"    SOCIAL HISTORY/LIVING ENVIRONMENT: from memory care facility, uses w/c and RW, numerous falls at night per wife  Home Environment: Skilled nursing facility  One/Two Story Residence: One story  Living Alone: No  Support Systems: Assisted living, [facility], Spouse/Significant Other/Partner  OBJECTIVE:     PAIN: VITAL SIGNS: LINES/DRAINS:   Pre Treatment:  0  Post Treatment: 0   Hemovac and male external condom  O2 Device: None (Room air)     GROSS EVALUATION:   Within Functional Limits Abnormal/ Functional Abnormal/ Non-Functional (see comments) Not Tested Comments:   AROM [] [x] [] []    PROM [] [x] [] []    Strength [] [x] [] [] Decreased hip strength   Balance [] [x] [] []    Posture [] [x] [] [] kyphotic   Sensation [] [x] [] []    Coordination [x] [] [] []    Tone [] [x] [] []    Edema [] [x] [] []    Activity Tolerance [] [] [x] [] Very deconditioned     [] [] [] [] COGNITION/  PERCEPTION: Intact Impaired   (see comments) Comments:   Orientation [] [x] x2   Vision [x] []    Hearing [] [x] 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, 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   Command Following [x] []    Safety Awareness [x] []     [] []      MOBILITY: I Mod I S SBA CGA Min Mod Max Total  NT x2 Comments:   Bed Mobility    Rolling [] [] [] [] [] [x] [] [] [] [] [x]    Supine to Sit [] [] [] [] [] [x] [] [] [] [] [x]    Scooting [] [] [] [] [] [x] [] [] [] [] [x]    Sit to Supine [] [] [] [] [] [x] [] [] [] [] [x]    Transfers    Sit to Stand [] [] [] [] [] [x] [] [] [] [] [x]    Bed to Chair [] [] [] [] [] [x] [] [] [] [] [x]    Stand to Sit [] [] [] [] [] [x] [] [] [] [] [x]    I=Independent, Mod I=Modified Independent, S=Supervision, SBA=Standby Assistance, CGA=Contact Guard Assistance,   Min=Minimal Assistance, Mod=Moderate Assistance, Max=Maximal Assistance, Total=Total Assistance, NT=Not Tested  GAIT: I Mod I S SBA CGA Min Mod Max Total  NT x2 Comments:   Level of Assistance [] [] [] [] [] [x] [] [] [] [] [x]    Distance X6', x 15'    DME Rolling Walker and Gait Belt    Gait Quality Slow, shuffled    Weightbearing Status N/A     I=Independent, Mod I=Modified Independent, S=Supervision, SBA=Standby Assistance, CGA=Contact Guard Assistance,   Min=Minimal Assistance, Mod=Moderate Assistance, Max=Maximal Assistance, Total=Total Assistance, NT=Not Tested    [facility] [address] [facility] Mobility Inpatient Short Form       How much difficulty does the patient currently have. .. Unable A Lot A Little None   1. Turning over in bed (including adjusting bedclothes, sheets and blankets)? [] 1   [] 2   [x] 3   [] 4   2. Sitting down on and standing up from a chair with arms ( e.g., wheelchair, bedside commode, etc.)   [] 1   [] 2   [x] 3   [] 4   3. Moving from lying on back to sitting on the side of the bed? [] 1   [] 2   [x] 3   [] 4   How much help from another person does the patient currently need. .. Total A Lot A Little None   4. Moving to and from a bed to a chair (including a wheelchair)? [] 1   [x] 2   [] 3   [] 4   5. Need to walk in hospital room? [] 1   [x] 2   [] 3   [] 4   6. Climbing 3-5 steps with a railing? [] 1   [x] 2   [] 3   [] 4   © , Trustees of [address] [place] [address], under license to [employer]. All rights reserved     Score:  Initial: 15 Most Recent: X (Date: -- )    Interpretation of Tool:  Represents activities that are increasingly more difficult (i.e. Bed mobility, Transfers, Gait). PLAN:   FREQUENCY/DURATION: PT Plan of Care: 3 times/week for duration of hospital stay or until stated goals are met, whichever comes first.    PROBLEM LIST:   (Skilled intervention is medically necessary to address:)  1. Decreased ADL/Functional Activities  2. Decreased Activity Tolerance  3. Decreased AROM/PROM  4. Decreased Balance  5. Decreased Cognition  6. Decreased Coordination  7. Decreased Gait Ability  8. Decreased Strength  9. Decreased Transfer Abilities   INTERVENTIONS PLANNED:   (Benefits and precautions of physical therapy have been discussed with the patient.)  1. Therapeutic Activity  2. Therapeutic Exercise/HEP  3. Neuromuscular Re-education  4. Gait Training  5. Manual Therapy  6. Education     TREATMENT:     EVALUATION: Moderate Complexity : (Untimed Charge)    TREATMENT:   ($$ Gait Trainin-22 mins  $$ Therapeutic Activity: 8-22 mins    )  Co-Treatment PT/OT necessary due to patient's decreased overall endurance/tolerance levels, as well as need for high level skilled assistance to complete functional transfers/mobility and functional tasks  Therapeutic Activity (13 Minutes): Therapeutic activity included Rolling, Supine to Sit, Sit to Supine, Scooting, Lateral Scooting, Transfer Training, Sitting balance  and Standing balance to improve functional Mobility, Strength, ROM and Activity tolerance.   Gait Training (10 Minutes): Gait training for x6, x 15 feet utilizing Rolling Walker and [employer]. Patient required Manual, Tactile, Verbal and Visual cueing to improve Dynamic Standing Balance and Gait Mechanics.      TREATMENT GRID:    N/A    AFTER TREATMENT POSITION/PRECAUTIONS:  [facility]    INTERDISCIPLINARY COLLABORATION:  RN/PCT, PT/PTA and OT/RUBI     TOTAL TREATMENT DURATION:  PT Patient Time In/Time Out  Time In: 0915  Time Out: [address] [facility]

## 2021-07-26 NOTE — PROGRESS NOTES
Problem: Pressure Injury - Risk of  Goal: *Prevention of pressure injury  Description: Document Deepak Scale and appropriate interventions in the flowsheet. Outcome: Progressing Towards Goal  Note: Pressure Injury Interventions:  Sensory Interventions: Assess changes in LOC, Check visual cues for pain, Minimize linen layers    Moisture Interventions: Absorbent underpads, Internal/External urinary devices    Activity Interventions: Assess need for specialty bed, Increase time out of bed, PT/OT evaluation    Mobility Interventions: Pressure redistribution bed/mattress (bed type)    Nutrition Interventions: Document food/fluid/supplement intake    Friction and Shear Interventions: HOB 30 degrees or less                Problem: Falls - Risk of  Goal: *Absence of Falls  Description: Document Vasquez Fall Risk and appropriate interventions in the flowsheet.   Outcome: Progressing Towards Goal  Note: Fall Risk Interventions:  Mobility Interventions: Bed/chair exit alarm, Communicate number of staff needed for ambulation/transfer              Elimination Interventions: Patient to call for help with toileting needs, Bed/chair exit alarm    History of Falls Interventions: Bed/chair exit alarm         Problem: Patient Education: Go to Patient Education Activity  Goal: Patient/Family Education  Outcome: Progressing Towards Goal

## 2021-07-26 NOTE — PROGRESS NOTES
[facility] Hospitalist Progress Note     Name: Sirisha Rogers   Age: 79 y.o. Sex: male  : 1940    MRN:     [id number]    Admit Date:  2021    Reason for Admission:  Fall [W19. 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 CXR without any signs of pneumonia. Without any pyuria or bacteremia.  -Avoid sedatives  -Monitor mental status  -Delirium precaution  -No abx indicated given no signs of infection     Parkinson's disease and worsening encephalopathy   EEG done showing continuous sleep. Neurology following with recs:  - Sinemet stopped and Parcopa started     Neurologic orthostatic hypotension  -Continue pyridostigmine     Hypernatremia:  Resolved 143    8th rib fx:  Stable at present    Diet:  DIET ADULT ORAL NUTRITION SUPPLEMENT  DIET ADULT ORAL NUTRITION SUPPLEMENT  DIET ADULT  DIET ADULT ORAL NUTRITION SUPPLEMENT  DVT PPx: SCDs  Code: Full Code    Dispo / Discharge Planning: pending clinical course   PT recommending HHT at [facility]      Hospital Course/Subjective:     Please refer to the admission H&P for details of presentation. In summary, Sirisha Rogers is a 79 y.o. male with medical history significant for hypertension, dementia, tremors, Tourette's syndrome, neurologic orthostatic hypotension, that presents in the setting of a fall and worsening mentation. Subjective/24 hr Events (21) : Patient is seen and examined at bedside. No acute events reported overnight by nursing staff. Awake and alert. Oriented x1-2. Patient denies fever, chills, chest pains, shortness of breath, n/v, abdominal pain. Review of Systems: 10 point review of systems is otherwise negative with the exception of the elements mentioned above.     Objective:     Patient Vitals for the past 24 hrs:   Temp Pulse Resp BP SpO2   21 1314  60  118/78 98 %   21 1211 97.5 °F (36.4 °C) 61 16 102/70 97 %   07/26/21 0742 97.6 °F (36.4 °C) (!) 54 16 126/72 100 %   07/26/21 0254 97.4 °F (36.3 °C) (!) 52 16 123/72 98 %   07/26/21 0017 97.7 °F (36.5 °C) (!) 52 16 123/73 96 %   07/25/21 1816 97.9 °F (36.6 °C) (!) 53 16 (!) 154/86 98 %     Oxygen Therapy  O2 Sat (%): 98 % (07/26/21 1314)  Pulse via Oximetry: 59 beats per minute (07/25/21 1117)  O2 Device: None (Room air) (07/26/21 1110)    Body mass index is 21.52 kg/m². Physical Exam:   General:     alert, awake, no acute distress. HENT:   normocephalic, atraumatic. Oropharynx clear with moist mucous membranes  Eyes:   anicteric sclerae, normal conjunctiva, EOMI  Neck:    supple, non-tender. Trachea midline. Lungs:   CTAB, no wheezing, rhonchi, rales  Cardiac:   RRR, Normal S1 and S2. Abdomen:   Soft, non distended, nontender, +BS  Extremities:   No edema , pedal pulses present  Skin:   Warn, dry, normal turgor and texture  Neuro:  AAOx2.    Psychiatric:  No anxiety, calm, cooperative    Data Review:  I have reviewed all labs, meds, and studies from the last 24 hours:    Labs:    Recent Results (from the past 24 hour(s))   METABOLIC PANEL, BASIC    Collection Time: 07/26/21  4:44 AM   Result Value Ref Range    Sodium 143 136 - 145 mmol/L    Potassium 4.0 3.5 - 5.1 mmol/L    Chloride 110 (H) 98 - 107 mmol/L    CO2 31 21 - 32 mmol/L    Anion gap 2 (L) 7 - 16 mmol/L    Glucose 99 65 - 100 mg/dL    BUN 11 8 - 23 MG/DL    Creatinine 0.74 (L) 0.8 - 1.5 MG/DL    GFR est AA >60 >60 ml/min/1.73m2    GFR est non-AA >60 >60 ml/min/1.73m2    Calcium 9.0 8.3 - 10.4 MG/DL   CBC WITH AUTOMATED DIFF    Collection Time: 07/26/21  4:44 AM   Result Value Ref Range    WBC 3.7 (L) 4.3 - 11.1 K/uL    RBC 3.76 (L) 4.23 - 5.6 M/uL    HGB 11.1 (L) 13.6 - 17.2 g/dL    HCT 35.6 (L) 41.1 - 50.3 %    MCV 94.7 79.6 - 97.8 FL    MCH 29.5 26.1 - 32.9 PG    MCHC 31.2 (L) 31.4 - 35.0 g/dL    RDW 13.4 11.9 - 14.6 %    PLATELET 661 (L) 375 - 450 K/uL    MPV 9.7 9.4 - 12.3 FL    ABSOLUTE NRBC 0.00 0.0 - 0.2 K/uL    DF AUTOMATED      NEUTROPHILS 46 43 - 78 %    LYMPHOCYTES 39 13 - 44 %    MONOCYTES 11 4.0 - 12.0 %    EOSINOPHILS 3 0.5 - 7.8 %    BASOPHILS 1 0.0 - 2.0 %    IMMATURE GRANULOCYTES 0 0.0 - 5.0 %    ABS. NEUTROPHILS 1.7 1.7 - 8.2 K/UL    ABS. LYMPHOCYTES 1.5 0.5 - 4.6 K/UL    ABS. MONOCYTES 0.4 0.1 - 1.3 K/UL    ABS. EOSINOPHILS 0.1 0.0 - 0.8 K/UL    ABS. BASOPHILS 0.0 0.0 - 0.2 K/UL    ABS. IMM. GRANS. 0.0 0.0 - 0.5 K/UL       All Micro Results     None          EKG Results     Procedure 720 Value Units Date/Time    EKG, 12 LEAD, INITIAL [[id number]] Collected: 07/24/21 2045    Order Status: Completed Updated: 07/25/21 0816     Ventricular Rate 59 BPM      Atrial Rate 86 BPM      QRS Duration 94 ms      Q-T Interval 446 ms      QTC Calculation (Bezet) 441 ms      Calculated R Axis 12 degrees      Calculated T Axis 67 degrees      Diagnosis --     Poor data quality likely sinus rhythm   Poor R wave progression  Otherwise normal ECG  Confirmed by Mone Spivey ([id number]) on 7/25/2021 8:15:59 AM            Other Studies:  XR CHEST PA LAT    Result Date: 7/25/2021  EXAM: Chest x-ray. INDICATION: Pain, fall injury. COMPARISON: Prior chest x-ray on February 16, 2017 and CT chest on August 20, 2015. TECHNIQUE: Frontal and lateral views of the chest were obtained. FINDINGS: The lungs are clear. The heart size and pulmonary vasculature are within normal limits. Tortuosity of the thoracic aorta is unchanged. No pneumothorax or pleural effusion is seen. There is an acute appearing mildly displaced fracture in the posterolateral right eighth rib. Fixation hardware is seen in the proximal right humerus. Mildly displaced right 8th rib fracture, with no pneumothorax or acute infiltrate. XR PELV AP ONLY    Result Date: 7/25/2021  AP PELVIS  7/25/2021 7:44 AM INDICATION: Fall, pain.  COMPARISON: None available at this hospital PACS FINDINGS: Single AP pelvis view is submitted. Bone density appropriate. There are surgical clips at the midline lower pelvis, probably related to a prostatectomy. There is no fracture, lesion, or acute joint abnormality. Bony pelvic ring intact. No advanced arthropathy changes. No focal finding in the soft tissues. NO ACUTE BONY OR JOINT FINDINGS. CT HEAD WO CONT    Result Date: 7/24/2021  EXAM: Noncontrast CT head. INDICATION: Pain, fall injury. COMPARISON: Prior CT head on July 10, 2021. TECHNIQUE: Noncontrast CT images of the head were obtained. Radiation dose reduction techniques were used for this study. Our CT scanners use one or all of the following:  Automated exposure control, adjustment of the mA or kV according to patient size, iterative reconstruction. FINDINGS: There is mild volume loss. No acute infarct, hemorrhage or mass is identified. There is no mass effect, midline shift or depressed fracture. The visualized paranasal sinuses and mastoid air cells are clear. There is a small left frontal scalp hematoma. Small left frontal scalp hematoma with no skull fracture or acute intracranial process. CT SPINE CERV WO CONT    Result Date: 7/24/2021  EXAM: Noncontrast CT cervical spine. INDICATION: Pain, injury. COMPARISON: Prior CT cervical spine on July 10, 2021. TECHNIQUE: Axial noncontrast CT images of the cervical spine were obtained. Sagittal and coronal reformatted images were performed. Radiation dose reduction techniques were used for this study. Our CT scanners use one or all of the following:  Automated exposure control, adjustment of the mA and/or kV according to patient size, iterative reconstruction. FINDINGS: No acute fracture, focal malalignment or prevertebral soft tissue swelling is identified. There is mild to moderate multilevel degenerative disc disease and facet arthritis. Normal craniocervical junction alignment is maintained.  The C5-6 and C6-7 disc spaces are narrowed, possibly on a developmental basis. Previously noted bone islands in the C7 vertebra are unchanged, as is a 7 mm nonspecific lucent lesion in the right C2 facet. The paraspinal soft tissues are unremarkable. No evidence of an acute cervical spine injury. Current Meds:   Current Facility-Administered Medications   Medication Dose Route Frequency    sodium chloride (NS) flush 5-40 mL  5-40 mL IntraVENous Q8H    sodium chloride (NS) flush 5-40 mL  5-40 mL IntraVENous PRN    polyethylene glycol (MIRALAX) packet 17 g  17 g Oral DAILY PRN    acetaminophen (TYLENOL) tablet 650 mg  650 mg Oral Q6H PRN    Or    acetaminophen (TYLENOL) suppository 650 mg  650 mg Rectal Q6H PRN    aspirin delayed-release tablet 81 mg  81 mg Oral QHS    carbidopa-levodopa (SINEMET)  mg per tablet 1 Tablet  1 Tablet Oral TID    pyridostigmine (MESTINON) tablet 30 mg  30 mg Oral BID    dextrose 5% infusion  75 mL/hr IntraVENous CONTINUOUS    carbidopa-levodopa (PARCOPA)  mg rapid dissolve tablet 1 Tablet  1 Tablet Oral QID    alcohol 62% (NOZIN) nasal  1 Ampule  1 Ampule Topical Q12H       Problem List:  Hospital Problems as of 7/26/2021 Date Reviewed: 7/25/2021        Codes Class Noted - Resolved POA    * (Principal) Fall ICD-10-CM: W19. Resendez Maid  ICD-9-CM: E888.9  7/25/2021 - Present Yes        Neurologic orthostatic hypotension (HCC) ICD-10-CM: G90.3  ICD-9-CM: 333.0  8/16/2018 - Present Yes        Tourette syndrome ICD-10-CM: F95.2  ICD-9-CM: 307.23  4/20/2018 - Present Yes        Dementia due to Parkinson's disease without behavioral disturbance ([facility] ICD-10-CM: Winston Stephens, F02.80  ICD-9-CM: 332.0, 294.10  4/20/2018 - Present Yes        Essential hypertension ICD-10-CM: I10  ICD-9-CM: 401.9  12/22/2017 - Present Yes        Parkinsons disease ([facility] ICD-10-CM: Winston Stephens  ICD-9-CM: 332.0  6/2/2016 - Present Yes        Acoustic neuroma syndrome ([facility] ICD-10-CM: D33.3  ICD-9-CM: 388.5  1/30/2015 - Present Yes        Dyslipidemia ICD-10-CM: E78.5  ICD-9-CM: 272.4  1/30/2015 - Present Yes               Part of this note was written by using a voice dictation software and the note has been proof read but may still contain some grammatical/other typographical errors.     Signed By: Ruthie Falcon MD   [facility]    July 26, 2021  3:22 PM

## 2021-07-27 ENCOUNTER — APPOINTMENT (OUTPATIENT)
Dept: GENERAL RADIOLOGY | Age: 81
DRG: 056 | End: 2021-07-27
Attending: INTERNAL MEDICINE
Payer: MEDICARE

## 2021-07-27 PROBLEM — E43 SEVERE PROTEIN-CALORIE MALNUTRITION (HCC): Status: ACTIVE | Noted: 2021-07-27

## 2021-07-27 PROBLEM — R13.12 OROPHARYNGEAL DYSPHAGIA: Status: ACTIVE | Noted: 2021-07-27

## 2021-07-27 PROBLEM — S22.39XA FRACTURE OF ONE RIB: Status: ACTIVE | Noted: 2021-07-27

## 2021-07-27 LAB
ANION GAP SERPL CALC-SCNC: 4 MMOL/L (ref 7–16)
APPEARANCE UR: CLEAR
BACTERIA URNS QL MICRO: ABNORMAL /HPF
BILIRUB UR QL: NEGATIVE
BUN SERPL-MCNC: 18 MG/DL (ref 8–23)
CALCIUM SERPL-MCNC: 8.8 MG/DL (ref 8.3–10.4)
CHLORIDE SERPL-SCNC: 109 MMOL/L (ref 98–107)
CO2 SERPL-SCNC: 28 MMOL/L (ref 21–32)
COLOR UR: YELLOW
CREAT SERPL-MCNC: 0.83 MG/DL (ref 0.8–1.5)
CRYSTALS URNS QL MICRO: ABNORMAL /LPF
EPI CELLS #/AREA URNS HPF: ABNORMAL /HPF
ERYTHROCYTE [DISTWIDTH] IN BLOOD BY AUTOMATED COUNT: 13.7 % (ref 11.9–14.6)
GLUCOSE SERPL-MCNC: 90 MG/DL (ref 65–100)
GLUCOSE UR STRIP.AUTO-MCNC: NEGATIVE MG/DL
HCT VFR BLD AUTO: 32.5 % (ref 41.1–50.3)
HGB BLD-MCNC: 10.5 G/DL (ref 13.6–17.2)
HGB UR QL STRIP: NEGATIVE
KETONES UR QL STRIP.AUTO: NEGATIVE MG/DL
LEUKOCYTE ESTERASE UR QL STRIP.AUTO: ABNORMAL
MCH RBC QN AUTO: 30 PG (ref 26.1–32.9)
MCHC RBC AUTO-ENTMCNC: 32.3 G/DL (ref 31.4–35)
MCV RBC AUTO: 92.9 FL (ref 79.6–97.8)
NITRITE UR QL STRIP.AUTO: NEGATIVE
NRBC # BLD: 0 K/UL (ref 0–0.2)
OTHER OBSERVATIONS,UCOM: ABNORMAL
PH UR STRIP: 6.5 [PH] (ref 5–9)
PLATELET # BLD AUTO: 98 K/UL (ref 150–450)
PMV BLD AUTO: 10 FL (ref 9.4–12.3)
POTASSIUM SERPL-SCNC: 3.6 MMOL/L (ref 3.5–5.1)
PROT UR STRIP-MCNC: NEGATIVE MG/DL
RBC # BLD AUTO: 3.5 M/UL (ref 4.23–5.6)
RBC #/AREA URNS HPF: ABNORMAL /HPF
SODIUM SERPL-SCNC: 141 MMOL/L (ref 138–145)
SP GR UR REFRACTOMETRY: 1.01 (ref 1–1.02)
UROBILINOGEN UR QL STRIP.AUTO: 2 EU/DL (ref 0.2–1)
WBC # BLD AUTO: 5.7 K/UL (ref 4.3–11.1)
WBC URNS QL MICRO: ABNORMAL /HPF

## 2021-07-27 PROCEDURE — 74011250637 HC RX REV CODE- 250/637: Performed by: INTERNAL MEDICINE

## 2021-07-27 PROCEDURE — 81001 URINALYSIS AUTO W/SCOPE: CPT

## 2021-07-27 PROCEDURE — 97116 GAIT TRAINING THERAPY: CPT

## 2021-07-27 PROCEDURE — 97530 THERAPEUTIC ACTIVITIES: CPT

## 2021-07-27 PROCEDURE — 74011250636 HC RX REV CODE- 250/636: Performed by: NURSE PRACTITIONER

## 2021-07-27 PROCEDURE — 80048 BASIC METABOLIC PNL TOTAL CA: CPT

## 2021-07-27 PROCEDURE — 74011250637 HC RX REV CODE- 250/637: Performed by: PSYCHIATRY & NEUROLOGY

## 2021-07-27 PROCEDURE — 74011000302 HC RX REV CODE- 302: Performed by: INTERNAL MEDICINE

## 2021-07-27 PROCEDURE — 99233 SBSQ HOSP IP/OBS HIGH 50: CPT | Performed by: PSYCHIATRY & NEUROLOGY

## 2021-07-27 PROCEDURE — 85027 COMPLETE CBC AUTOMATED: CPT

## 2021-07-27 PROCEDURE — 92526 ORAL FUNCTION THERAPY: CPT

## 2021-07-27 PROCEDURE — 71045 X-RAY EXAM CHEST 1 VIEW: CPT

## 2021-07-27 PROCEDURE — 65270000029 HC RM PRIVATE

## 2021-07-27 PROCEDURE — 97535 SELF CARE MNGMENT TRAINING: CPT

## 2021-07-27 PROCEDURE — 86580 TB INTRADERMAL TEST: CPT | Performed by: INTERNAL MEDICINE

## 2021-07-27 PROCEDURE — 36415 COLL VENOUS BLD VENIPUNCTURE: CPT

## 2021-07-27 PROCEDURE — 97112 NEUROMUSCULAR REEDUCATION: CPT

## 2021-07-27 RX ADMIN — Medication 1 AMPULE: at 08:01

## 2021-07-27 RX ADMIN — CARBIDOPA AND LEVODOPA 1 TABLET: 25; 100 TABLET, ORALLY DISINTEGRATING ORAL at 21:16

## 2021-07-27 RX ADMIN — Medication 10 ML: at 21:17

## 2021-07-27 RX ADMIN — DEXTROSE MONOHYDRATE 75 ML/HR: 5 INJECTION, SOLUTION INTRAVENOUS at 07:55

## 2021-07-27 RX ADMIN — Medication 1 AMPULE: at 21:16

## 2021-07-27 RX ADMIN — PYRIDOSTIGMINE BROMIDE 30 MG: 60 TABLET ORAL at 16:29

## 2021-07-27 RX ADMIN — TUBERCULIN PURIFIED PROTEIN DERIVATIVE 5 UNITS: 5 INJECTION, SOLUTION INTRADERMAL at 10:41

## 2021-07-27 RX ADMIN — PYRIDOSTIGMINE BROMIDE 30 MG: 60 TABLET ORAL at 08:00

## 2021-07-27 RX ADMIN — ASPIRIN 81 MG: 81 TABLET ORAL at 21:16

## 2021-07-27 RX ADMIN — Medication 10 ML: at 13:51

## 2021-07-27 RX ADMIN — CARBIDOPA AND LEVODOPA 1 TABLET: 25; 100 TABLET, ORALLY DISINTEGRATING ORAL at 09:00

## 2021-07-27 RX ADMIN — DEXTROSE MONOHYDRATE 75 ML/HR: 5 INJECTION, SOLUTION INTRAVENOUS at 05:32

## 2021-07-27 RX ADMIN — CARBIDOPA AND LEVODOPA 1 TABLET: 25; 100 TABLET, ORALLY DISINTEGRATING ORAL at 13:00

## 2021-07-27 RX ADMIN — DEXTROSE MONOHYDRATE 75 ML/HR: 5 INJECTION, SOLUTION INTRAVENOUS at 21:17

## 2021-07-27 RX ADMIN — Medication 10 ML: at 05:32

## 2021-07-27 RX ADMIN — CARBIDOPA AND LEVODOPA 1 TABLET: 25; 100 TABLET, ORALLY DISINTEGRATING ORAL at 16:28

## 2021-07-27 NOTE — PROGRESS NOTES
Neurology Consult    Patient: Ange Mao MRN: [id number]     YOB: 1940  Age: 79 y.o. Sex: male      Subjective:      Ange Mao is a 79 y.o. male who is being seen for altered mental status. Patient is an established patient with Neurology with advanced Parkinson's disease, Dementia, and orthostatic hypotension. The patient arrived to the ER from a nursing home after a fall with external evidence of closed head injury. Patient is alert and orientated to person and place today.     Past Medical History:   Diagnosis Date    Acoustic neuroma ([facility] 2012    Right    Cholelithiasis 08/20/2015    Colon polyps 12/31/2014    Tubular Adenoma x 1 & Hyperplastic Polyp x 2    Dementia due to Parkinson's disease without behavioral disturbance ([facility] 4/20/2018    DVT (deep venous thrombosis) (HCC) 02/17/2017    Left Superficial Femoral, Popliteal, & Peroneal vein     Dyslipidemia     Environmental allergies     GERD (gastroesophageal reflux disease)     Hiatal hernia     Hypertension     Intractable hiccups     Liver cyst 02/16/2017    R Lobe    Melanoma ([facility]     Back    Parkinson disease ([facility]     Followed by Ken Fair    Prostate cancer [facility])     Tourette syndrome 04/20/2018    \"verbalizes as really loud hiccoughs\"     Past Surgical History:   Procedure Laterality Date    HX COLONOSCOPY  12/31/2014    Tubular Adenoma & Hyperplastic Polyps, Diverticulosis, & Internal Hemorrhoids - Recall 2017    HX HEENT Right     Ear    HX HEMORRHOIDECTOMY      HX HERNIA REPAIR Bilateral     HX KNEE REPLACEMENT Left     HX PROSTATECTOMY  11/2009    Kacie Frye DT    HX TONSILLECTOMY        Family History   Problem Relation Age of Onset    Cancer Father 77        Prostate    Heart Disease Brother     Cancer Brother         Prostate    Melanoma Brother     Breast Cancer Mother     Melanoma Mother     COPD Brother      Social History     Tobacco Use    Smoking status: Never Smoker    Smokeless tobacco: Never Used   Substance Use Topics    Alcohol use: No      Current Facility-Administered Medications   Medication Dose Route Frequency Provider Last Rate Last Admin    tuberculin injection 5 Units  5 Units IntraDERMal Patt Bond MD   5 Units at 07/27/21 1041    sodium chloride (NS) flush 5-40 mL  5-40 mL IntraVENous Q8H Moreno Danelle Valencia MD   10 mL at 07/27/21 0532    sodium chloride (NS) flush 5-40 mL  5-40 mL IntraVENous PRN Alisha Watson MD        polyethylene glycol (MIRALAX) packet 17 g  17 g Oral DAILY PRN Alisha Watson MD        acetaminophen (TYLENOL) tablet 650 mg  650 mg Oral Q6H PRN Alisha Watson MD        Or    acetaminophen (TYLENOL) suppository 650 mg  650 mg Rectal Q6H PRN Alisha Watson MD        aspirin delayed-release tablet 81 mg  81 mg Oral QHS Wang Marie MD   81 mg at 07/26/21 2113    pyridostigmine (MESTINON) tablet 30 mg  30 mg Oral BID Alisha Watson MD   30 mg at 07/27/21 0800    dextrose 5% infusion  75 mL/hr IntraVENous CONTINUOUS Marzena Scott NP 75 mL/hr at 07/27/21 0755 75 mL/hr at 07/27/21 0755    carbidopa-levodopa (PARCOPA)  mg rapid dissolve tablet 1 Tablet  1 Tablet Oral QID Marija Talavera MD   1 Tablet at 07/27/21 0900    alcohol 62% (NOZIN) nasal  1 Ampule  1 Ampule Topical Q12H Jacqueline Hayward MD   1 Ampule at 07/27/21 0801        Allergies   Allergen Reactions    Aricept [Donepezil] Nausea and Vomiting    Onion Nausea and Vomiting       Review of Systems:  CONSTITUTIONAL:  Negative  HEENT:  Eyes:  Negative Ears, Nose, Throat:  Negative  SKIN:  Negative  CARDIOVASCULAR:  Negative  RESPIRATORY:  Negative  GASTROINTESTINAL:  Negative  GENITOURINARY:  Negative  NEUROLOGICAL:  Negative  MUSCULOSKELETAL:  Negative  HEMATOLOGIC:  Negative  LYMPHATICS:  Negative   ENDOCRINOLOGIC:  Negative        Objective:     Vitals:    07/26/21 2332 07/27/21 9881 07/27/21 0702 07/27/21 1111   BP: 108/73 115/63 101/60 109/63   Pulse: 88 74 64 66   Resp: 17 16 16 16   Temp: (!) 100.6 °F (38.1 °C) 99 °F (37.2 °C) 98.3 °F (36.8 °C) 98.3 °F (36.8 °C)   SpO2: 94% 96% 98% 98%   Weight:       Height:            Physical Exam:  General -  elderly male that appears stated age in no apparent distress. Pleasant   HEENT - Normocephalic, atraumatic. Conjunctiva are clear. Neck - Supple without masses  Cardiovascular - Regular rate and rhythm. Extremities - Peripheral pulses intact. No edema and no rashes. Neurological examination - Comprehension, attention, memory and reasoning are intact but is intermittently confused. Language and speech are normal.   On cranial nerve examination:  (II, III, IV, VI) Pupils are equal, round, and reactive to light.   (V, VII) Face is symmetrical. Sensation intact to light touch. (VIII) Hearing intact. (IX, X) Palate and uvula elevate symmetrically. (XI) Patient able to shrug shoulders   (XII)Tongue is midline. Motor examination - Muscle tone and bulk are decreased. Strength is 4/5 on all extremities. Sensation is intact to light touch. Lab Results   Component Value Date/Time    Cholesterol, total 141 09/16/2020 10:13 AM    HDL Cholesterol 62 09/16/2020 10:13 AM    LDL, calculated 68 09/16/2020 10:13 AM    LDL, calculated 61 06/13/2019 10:59 AM    VLDL, calculated 11 09/16/2020 10:13 AM    VLDL, calculated 10 06/13/2019 10:59 AM    Triglyceride 47 09/16/2020 10:13 AM        Lab Results   Component Value Date/Time    Hemoglobin A1c <3.5 (L) 04/15/2021 09:07 AM        CT Results (most recent):  Results from Hospital Encounter encounter on 07/24/21    CT SPINE CERV WO CONT    Narrative  EXAM: Noncontrast CT cervical spine. INDICATION: Pain, injury. COMPARISON: Prior CT cervical spine on July 10, 2021. TECHNIQUE: Axial noncontrast CT images of the cervical spine were obtained.   Sagittal and coronal reformatted images were performed. Radiation dose  reduction techniques were used for this study. Our CT scanners use one or all  of the following:  Automated exposure control, adjustment of the mA and/or kV  according to patient size, iterative reconstruction. FINDINGS: No acute fracture, focal malalignment or prevertebral soft tissue  swelling is identified. There is mild to moderate multilevel degenerative disc  disease and facet arthritis. Normal craniocervical junction alignment is  maintained. The C5-6 and C6-7 disc spaces are narrowed, possibly on a  developmental basis. Previously noted bone islands in the C7 vertebra are  unchanged, as is a 7 mm nonspecific lucent lesion in the right C2 facet. The  paraspinal soft tissues are unremarkable. Impression  No evidence of an acute cervical spine injury. Results for orders placed or performed during the hospital encounter of 07/24/21   EKG, 12 LEAD, INITIAL   Result Value Ref Range    Ventricular Rate 59 BPM    Atrial Rate 86 BPM    QRS Duration 94 ms    Q-T Interval 446 ms    QTC Calculation (Bezet) 441 ms    Calculated R Axis 12 degrees    Calculated T Axis 67 degrees    Diagnosis       Poor data quality likely sinus rhythm   Poor R wave progression  Otherwise normal ECG  Confirmed by Alida Finch ([id number]) on 7/25/2021 8:15:59 AM          MRI Results (most recent):   Results from [place] encounter on 01/03/19    MRI BRAIN W WO CONT    Narrative  MRI BRAIN and INTERNAL AUDITORY CANALS. HISTORY: Left-sided vestibular schwannoma. COMPARISON: June 2012. TECHNIQUE: Routine whole brain axial T1, axial T2 and FLAIR, sagittal T1,  high-resolution thin-section axial T2, axial T1, axial T1 fat-saturated which  was followed by 15cc intravenous gadolinium, after which high-resolution  thin-section fat-saturated axial and coronal T1-weighted images through the  internal auditory canals.     FINDINGS:  There is an enhancing 3 x 7 mm nodule in the internal auditory canal  on the right. This is low T2 and mildly increased T1 signal. There is fairly  bright uniform enhancement. No significant change from prior exam.    Whole brain images demonstrate: A few nonspecific periventricular and centrum  semiovale FLAIR and T2 white matter hyperintensities are present; these do not  enhance with contrast. No enhancing nodules or masses. Diffusion images do not  demonstrate any areas of restricted diffusion  No midline shift, mass or mass  effect. Gradient echo images are unremarkable  No evidence of acute hemorrhage. The lateral ventricles are normal size  The pituitary, parasellar and midline  structures are unremarkable on the sagittal T1 images  There are normal T2  flow-voids in the major vessels   Posterior fossa structures are unremarkable  The basal ganglia are symmetric  Orbits are grossly normal  Paranasal sinuses  are clear    Impression  IMPRESSION: Stable right vestibular schwannoma, 3 x 7 mm, unchanged from June 2012. Minimal chronic small vessel disease. No new abnormality. Most recent CTA:  Results from [place] encounter on 07/24/21    CT SPINE CERV WO CONT    Narrative  EXAM: Noncontrast CT cervical spine. INDICATION: Pain, injury. COMPARISON: Prior CT cervical spine on July 10, 2021. TECHNIQUE: Axial noncontrast CT images of the cervical spine were obtained. Sagittal and coronal reformatted images were performed. Radiation dose  reduction techniques were used for this study. Our CT scanners use one or all  of the following:  Automated exposure control, adjustment of the mA and/or kV  according to patient size, iterative reconstruction. FINDINGS: No acute fracture, focal malalignment or prevertebral soft tissue  swelling is identified. There is mild to moderate multilevel degenerative disc  disease and facet arthritis. Normal craniocervical junction alignment is  maintained.  The C5-6 and C6-7 disc spaces are narrowed, possibly on a  developmental basis. Previously noted bone islands in the C7 vertebra are  unchanged, as is a 7 mm nonspecific lucent lesion in the right C2 facet. The  paraspinal soft tissues are unremarkable. Impression  No evidence of an acute cervical spine injury. Most recent MRA:  Results from [facility] encounter on 01/03/19    MRI BRAIN W WO CONT    Narrative  MRI BRAIN and INTERNAL AUDITORY CANALS. HISTORY: Left-sided vestibular schwannoma. COMPARISON: June 2012. TECHNIQUE: Routine whole brain axial T1, axial T2 and FLAIR, sagittal T1,  high-resolution thin-section axial T2, axial T1, axial T1 fat-saturated which  was followed by 15cc intravenous gadolinium, after which high-resolution  thin-section fat-saturated axial and coronal T1-weighted images through the  internal auditory canals. FINDINGS:  There is an enhancing 3 x 7 mm nodule in the internal auditory canal  on the right. This is low T2 and mildly increased T1 signal. There is fairly  bright uniform enhancement. No significant change from prior exam.    Whole brain images demonstrate: A few nonspecific periventricular and centrum  semiovale FLAIR and T2 white matter hyperintensities are present; these do not  enhance with contrast. No enhancing nodules or masses. Diffusion images do not  demonstrate any areas of restricted diffusion  No midline shift, mass or mass  effect. Gradient echo images are unremarkable  No evidence of acute hemorrhage. The lateral ventricles are normal size  The pituitary, parasellar and midline  structures are unremarkable on the sagittal T1 images  There are normal T2  flow-voids in the major vessels   Posterior fossa structures are unremarkable  The basal ganglia are symmetric  Orbits are grossly normal  Paranasal sinuses  are clear    Impression  IMPRESSION: Stable right vestibular schwannoma, 3 x 7 mm, unchanged from June 2012. Minimal chronic small vessel disease. No new abnormality.       Most recent Echo:  No results found for this visit on 07/24/21. Assessment:     Patient is being seen for altered mental status after a fall at nursing home. EEG showed continuous sleep. No pathological or asymmetrical EEG patterns are present. Patient on Parcopa QID now instead of Sinemet. Patient tolerating well. Primary Nurse mentioned possible discharge today. Plan:     Recommend consult to Dr. Jeana Wallace for Rehab recommendations. Continue Parcopa (Carbidopa/levodopa ODT) qid. Continue Mestinon for OH. Management of Delirium-   Non-pharmacological intervention  · Reorient the patient throughout the day  · Window open and lights on during the day. Lights off, television off, noises down during the night. If able, decrease nursing checks at night  · Therapies as often as possible  · Avoid restraints to the best of your ability   · Avoid sensory deprivation by using glasses and hearing aids, if applicable        Pharmacological intervention  · Replete electrolyte abnormalities and correct metabolic abnormalities  · Limit benzodiazepines, antihistamines, narcotics, anticholinergics. Preference towards Precedex for sedation over fentanyl and benzodiazepines/GABAa agonists. · For dangerous behavior/aggression to self or others can consider Zyprexa or Seroquel if benefits outweigh risk  · For persistent insomnia can use melatonin four hours prior to bedtime or Seroquel 25 mg at bedtime                Signed By: Young Melgar     July 27, 2021    Patient seen and examined. Agree with above  Mentation is gradually improving    No additional suggestions I can see no focal abnormalities on exam does have typical frontal lobe features which have obvious impact with reference to safety and balance.   Concur with need for ongoing skilled care

## 2021-07-27 NOTE — PROGRESS NOTES
ACUTE PHYSICAL THERAPY GOALS:  (Developed with and agreed upon by patient and/or caregiver. )  LTG:  (1.)Mr. Theodore Mahan will move from supine to sit and sit to supine , scoot up and down and roll side to side in bed with MINIMAL ASSIST within 7 treatment day(s). GOAL MET 7/27/2021    (2.)Mr. Theodore Mahan will transfer from bed to chair and chair to bed with MINIMAL ASSIST using the least restrictive device within 7 treatment day(s). (3.)Mr. Theodore Mahan will ambulate with MINIMAL ASSIST for 50 feet with the least restrictive device within 7 treatment day(s). (4.)Mr. Theodore Mahan will tolerate at least 23 min of dynamic standing activity to assist standing ADLs with the least restrictive device within 7 treatment days    PHYSICAL THERAPY: Daily Note and PM Treatment Day # 2    Debbie Klein is a 79 y.o. male   PRIMARY DIAGNOSIS: Fall  Fall [W19. XXXA]         ASSESSMENT:     REHAB RECOMMENDATIONS: CURRENT LEVEL OF FUNCTION:  (Most Recently Demonstrated)   Recommendation to date pending progress:  Setting:   Short-term Rehab  Equipment:    To Be Determined Bed Mobility:   Minimal Assistance  Sit to Stand:   Minimal Assistance  Transfers:   Minimal Assistance  Gait/Mobility:   Minimal Assistance x 2     ASSESSMENT:  Mr. Theodore Mahan is sleeping and willing to participate. He sat up with minimal assist and did not want to use the walker. He used it to begin with but struggled to hold it and did not stay in it with turns. We then got rid of the walker and just went HHA. May try rollator next visit for ease of turns. He is moving fairly well but continues to be a fall risk with his advanced parkinsons, shuffled gait and impulsivity. SUBJECTIVE:   Mr. Theodore Mahan states, \"It would feel good to just stay here. \"    SOCIAL HISTORY/ LIVING ENVIRONMENT:   Home Environment: Skilled nursing facility  One/Two Story Residence: One story  Living Alone: No  Support Systems: Assisted living, Skilled nursing facility, Spouse/Significant Other/Partner  OBJECTIVE:     PAIN: VITAL SIGNS: LINES/DRAINS:   Pre Treatment: Pain Screen  Pain Scale 1: FLACC  Pain Intensity 1: 0  Post Treatment: 0   IV and Purewick  O2 Device: None (Room air)     MOBILITY: I Mod I S SBA CGA Min Mod Max Total  NT x2 Comments:   Bed Mobility    Rolling [] [] [] [] [] [] [] [] [] [] []    Supine to Sit [] [] [] [] [] [x] [] [] [] [] []    Scooting [] [] [] [x] [] [] [] [] [] [] []    Sit to Supine [] [] [] [] [] [x] [] [] [] [] []    Transfers    Sit to Stand [] [] [] [] [] [x] [] [] [] [] []    Bed to Chair [] [] [] [] [] [] [] [] [] [] []    Stand to Sit [] [] [] [] [] [x] [] [] [] [] []    I=Independent, Mod I=Modified Independent, S=Supervision, SBA=Standby Assistance, CGA=Contact Guard Assistance,   Min=Minimal Assistance, Mod=Moderate Assistance, Max=Maximal Assistance, Total=Total Assistance, NT=Not Tested    BALANCE: Good Fair+ Fair Fair- Poor NT Comments   Sitting Static [x] [] [] [] [] []    Sitting Dynamic [x] [] [] [] [] []              Standing Static [] [] [x] [x] [] []    Standing Dynamic [] [] [] [x] [] []      GAIT: I Mod I S SBA CGA Min Mod Max Total  NT x2 Comments:   Level of Assistance [] [] [] [] [] [x] [x] [] [] [] []    Distance 250    DME Rolling Walker and HHA    Gait Quality Shuffled, bent knees    Weightbearing  Status N/A     I=Independent, Mod I=Modified Independent, S=Supervision, SBA=Standby Assistance, CGA=Contact Guard Assistance,   Min=Minimal Assistance, Mod=Moderate Assistance, Max=Maximal Assistance, Total=Total Assistance, NT=Not Tested    PLAN:   FREQUENCY/DURATION: PT Plan of Care: 3 times/week for duration of hospital stay or until stated goals are met, whichever comes first.  TREATMENT:     TREATMENT:   ($$ Gait Trainin-22 mins  $$ Therapeutic Activity: 8-22 mins    )  Therapeutic Activity (10 Minutes):  Therapeutic activity included Supine to Sit, Sit to Supine, Scooting and Transfer Training to improve functional Mobility, Strength and Activity tolerance. Gait Training (15 Minutes): Gait training for 250 feet utilizing [address] [facility] and [address] [facility]. Patient required Manual and Verbal cueing to improve Assistive Device Utilization and Gait Mechanics.      TREATMENT GRID:  N/A    AFTER TREATMENT POSITION/PRECAUTIONS:  Bed, Needs within reach, RN notified and Visitors at bedside    INTERDISCIPLINARY COLLABORATION:  RN/PCT and PT/PTA    TOTAL TREATMENT DURATION:  PT Patient Time In/Time Out  Time In: 1545  Time Out: 1 [facility]

## 2021-07-27 NOTE — PROGRESS NOTES
[facility] Hospitalist Progress Note     Name: Doreene Harada   Age: 79 y.o. Sex: male  : 1940    MRN:     [id number]    Admit Date:  2021    Reason for Admission:  Fall [W19. 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 CXR without any signs of pneumonia. Without any pyuria or bacteremia.  -Avoid sedatives  -Monitor mental status  -Delirium precaution  -No abx indicated given no signs of infection     Febrile episode  Temp of 100.6 overnight on   - CXR and UA   - monitor off abx for now    Oropharyngeal dysphagia   - SLP following s/p MBS  - on Pureed diet    Parkinson's disease and worsening encephalopathy   EEG done showing continuous sleep. Neurology following with recs:  - Sinemet stopped and Parcopa started     Neurologic orthostatic hypotension: Continue pyridostigmine    8th rib fx: Stable at present    Protein Calorie Malnutrition  Thin elderly male with dementia. Temporal wasting, visible clavicle and rib cage. - PO nutrition supplement ordered. Diet:  DIET ADULT  DIET ADULT ORAL NUTRITION SUPPLEMENT  DIET ADULT ORAL NUTRITION SUPPLEMENT  DVT PPx: SCDs  Code: Full Code    Dispo / Discharge Planninhrs if remains afebrile and septic workup is neg. STR @ [address]     MDM:  Febrile episode: new, require workup. CXR, UA ordered. Acute metabolic encephalopathy: stable. I have reviewed and ordered clinical lab tests and independently visualized images, tracing. I have discussed work-up findings with staff  Due to patient's current comorbid conditions as well as presenting problems, patient is at moderate risk for complications/further decompensation during the hospitalization. Hospital Course/Subjective:     Please refer to the admission H&P for details of presentation.       In summary, Doreene Harada is a 79 y.o. male with medical history significant for hypertension, dementia, tremors, Tourette's syndrome, neurologic orthostatic hypotension, that presents in the setting of a fall and worsening mentation. CVA without any narrowing abnormality. CXR without any signs of pneumonia. Without any pyuria or bacteremia. EEG shows continuous sleep. Neurology consulted and recommended stopping Sinemet and starting on Parcopa. Mental status has improved and he is AAOx2. Subjective/24 hr Events (07/27/21) : Patient is seen and examined at bedside. No acute events reported overnight by nursing staff. Awake and alert. Oriented x2. Patient denies fever, chills, chest pains, shortness of breath, n/v, abdominal pain. Temp of 100.6F overnight but patient without any complaints of chills or sweats. Review of Systems: 10 point review of systems is otherwise negative with the exception of the elements mentioned above. Objective:     Patient Vitals for the past 24 hrs:   Temp Pulse Resp BP SpO2   07/27/21 1111 98.3 °F (36.8 °C) 66 16 109/63 98 %   07/27/21 0702 98.3 °F (36.8 °C) 64 16 101/60 98 %   07/27/21 0337 99 °F (37.2 °C) 74 16 115/63 96 %   07/26/21 2332 (!) 100.6 °F (38.1 °C) 88 17 108/73 94 %   07/26/21 1819 98.1 °F (36.7 °C) 65 16 (!) 113/44    07/26/21 1547 98.2 °F (36.8 °C) 70 16 121/72 99 %   07/26/21 1314  60  118/78 98 %   07/26/21 1211 97.5 °F (36.4 °C) 61 16 102/70 97 %     Oxygen Therapy  O2 Sat (%): 98 % (07/27/21 1111)  Pulse via Oximetry: 59 beats per minute (07/25/21 1117)  O2 Device: None (Room air) (07/27/21 0337)    Body mass index is 21.52 kg/m². Physical Exam:   General:     alert, awake, no acute distress. Thin eldely male. Temporal wasting. HENT:   normocephalic, atraumatic. Eyes:   anicteric sclerae, normal conjunctiva, EOMI  Neck:    supple, non-tender. Trachea midline. Lungs:   CTAB, no wheezing, rhonchi, rales  Cardiac:   RRR, Normal S1 and S2.    Abdomen:   Soft, non distended, nontender, +BS  Extremities:   No edema , pedal pulses present  Skin:   Warn, dry, normal turgor and texture  Neuro:  AAOx2. Psychiatric:  No anxiety, calm, cooperative    Data Review:  I have reviewed all labs, meds, and studies from the last 24 hours:    Labs:    Recent Results (from the past 24 hour(s))   CBC W/O DIFF    Collection Time: 07/27/21  6:33 AM   Result Value Ref Range    WBC 5.7 4.3 - 11.1 K/uL    RBC 3.50 (L) 4.23 - 5.6 M/uL    HGB 10.5 (L) 13.6 - 17.2 g/dL    HCT 32.5 (L) 41.1 - 50.3 %    MCV 92.9 79.6 - 97.8 FL    MCH 30.0 26.1 - 32.9 PG    MCHC 32.3 31.4 - 35.0 g/dL    RDW 13.7 11.9 - 14.6 %    PLATELET 98 (L) 282 - 450 K/uL    MPV 10.0 9.4 - 12.3 FL    ABSOLUTE NRBC 0.00 0.0 - 0.2 K/uL   METABOLIC PANEL, BASIC    Collection Time: 07/27/21  6:33 AM   Result Value Ref Range    Sodium 141 138 - 145 mmol/L    Potassium 3.6 3.5 - 5.1 mmol/L    Chloride 109 (H) 98 - 107 mmol/L    CO2 28 21 - 32 mmol/L    Anion gap 4 (L) 7 - 16 mmol/L    Glucose 90 65 - 100 mg/dL    BUN 18 8 - 23 MG/DL    Creatinine 0.83 0.8 - 1.5 MG/DL    GFR est AA >60 >60 ml/min/1.73m2    GFR est non-AA >60 >60 ml/min/1.73m2    Calcium 8.8 8.3 - 10.4 MG/DL       All Micro Results     None          EKG Results     Procedure 720 Value Units Date/Time    EKG, 12 LEAD, INITIAL [[id number]] Collected: 07/24/21 2045    Order Status: Completed Updated: 07/25/21 0816     Ventricular Rate 59 BPM      Atrial Rate 86 BPM      QRS Duration 94 ms      Q-T Interval 446 ms      QTC Calculation (Bezet) 441 ms      Calculated R Axis 12 degrees      Calculated T Axis 67 degrees      Diagnosis --     Poor data quality likely sinus rhythm   Poor R wave progression  Otherwise normal ECG  Confirmed by Alpesh Chaney ([id number]) on 7/25/2021 8:15:59 AM            Other Studies:  XR CHEST PA LAT    Result Date: 7/25/2021  EXAM: Chest x-ray. INDICATION: Pain, fall injury. COMPARISON: Prior chest x-ray on February 16, 2017 and CT chest on August 20, 2015.  TECHNIQUE: Frontal and lateral views of the chest were obtained. FINDINGS: The lungs are clear. The heart size and pulmonary vasculature are within normal limits. Tortuosity of the thoracic aorta is unchanged. No pneumothorax or pleural effusion is seen. There is an acute appearing mildly displaced fracture in the posterolateral right eighth rib. Fixation hardware is seen in the proximal right humerus. Mildly displaced right 8th rib fracture, with no pneumothorax or acute infiltrate. XR PELV AP ONLY    Result Date: 7/25/2021  AP PELVIS  7/25/2021 7:44 AM INDICATION: Fall, pain. COMPARISON: None available at this hospital PACS FINDINGS: Single AP pelvis view is submitted. Bone density appropriate. There are surgical clips at the midline lower pelvis, probably related to a prostatectomy. There is no fracture, lesion, or acute joint abnormality. Bony pelvic ring intact. No advanced arthropathy changes. No focal finding in the soft tissues. NO ACUTE BONY OR JOINT FINDINGS. CT HEAD WO CONT    Result Date: 7/24/2021  EXAM: Noncontrast CT head. INDICATION: Pain, fall injury. COMPARISON: Prior CT head on July 10, 2021. TECHNIQUE: Noncontrast CT images of the head were obtained. Radiation dose reduction techniques were used for this study. Our CT scanners use one or all of the following:  Automated exposure control, adjustment of the mA or kV according to patient size, iterative reconstruction. FINDINGS: There is mild volume loss. No acute infarct, hemorrhage or mass is identified. There is no mass effect, midline shift or depressed fracture. The visualized paranasal sinuses and mastoid air cells are clear. There is a small left frontal scalp hematoma. Small left frontal scalp hematoma with no skull fracture or acute intracranial process. CT SPINE CERV WO CONT    Result Date: 7/24/2021  EXAM: Noncontrast CT cervical spine. INDICATION: Pain, injury. COMPARISON: Prior CT cervical spine on July 10, 2021.  TECHNIQUE: Axial noncontrast CT images of the cervical spine were obtained. Sagittal and coronal reformatted images were performed. Radiation dose reduction techniques were used for this study. Our CT scanners use one or all of the following:  Automated exposure control, adjustment of the mA and/or kV according to patient size, iterative reconstruction. FINDINGS: No acute fracture, focal malalignment or prevertebral soft tissue swelling is identified. There is mild to moderate multilevel degenerative disc disease and facet arthritis. Normal craniocervical junction alignment is maintained. The C5-6 and C6-7 disc spaces are narrowed, possibly on a developmental basis. Previously noted bone islands in the C7 vertebra are unchanged, as is a 7 mm nonspecific lucent lesion in the right C2 facet. The paraspinal soft tissues are unremarkable. No evidence of an acute cervical spine injury.          Current Meds:   Current Facility-Administered Medications   Medication Dose Route Frequency    tuberculin injection 5 Units  5 Units IntraDERMal ONCE    sodium chloride (NS) flush 5-40 mL  5-40 mL IntraVENous Q8H    sodium chloride (NS) flush 5-40 mL  5-40 mL IntraVENous PRN    polyethylene glycol (MIRALAX) packet 17 g  17 g Oral DAILY PRN    acetaminophen (TYLENOL) tablet 650 mg  650 mg Oral Q6H PRN    Or    acetaminophen (TYLENOL) suppository 650 mg  650 mg Rectal Q6H PRN    aspirin delayed-release tablet 81 mg  81 mg Oral QHS    pyridostigmine (MESTINON) tablet 30 mg  30 mg Oral BID    dextrose 5% infusion  75 mL/hr IntraVENous CONTINUOUS    carbidopa-levodopa (PARCOPA)  mg rapid dissolve tablet 1 Tablet  1 Tablet Oral QID    alcohol 62% (NOZIN) nasal  1 Ampule  1 Ampule Topical Q12H       Problem List:  Hospital Problems as of 7/27/2021 Date Reviewed: 7/25/2021        Codes Class Noted - Resolved POA    Severe protein-calorie malnutrition ([address] ICD-10-CM: H75  ICD-9-CM: 262  7/27/2021 - Present Yes        Oropharyngeal dysphagia ICD-10-CM: R13.12  ICD-9-CM: 787.22  7/27/2021 - Present Yes        Fracture of one rib ICD-10-CM: S22.39XA  ICD-9-CM: 807.01  7/27/2021 - Present Unknown        * (Principal) Fall ICD-10-CM: W19. Mony Arguello  ICD-9-CM: E888.9  7/25/2021 - Present Yes        Neurologic orthostatic hypotension ([facility]) ICD-10-CM: G90.3  ICD-9-CM: 333.0  8/16/2018 - Present Yes        Tourette syndrome ICD-10-CM: F95.2  ICD-9-CM: 307.23  4/20/2018 - Present Yes        Dementia due to Parkinson's disease without behavioral disturbance ([facility] ICD-10-CM: Renee Edwards, F02.80  ICD-9-CM: 332.0, 294.10  4/20/2018 - Present Yes        Essential hypertension ICD-10-CM: I10  ICD-9-CM: 401.9  12/22/2017 - Present Yes        Parkinsons disease ([facility] ICD-10-CM: Renee Edwards  ICD-9-CM: 332.0  6/2/2016 - Present Yes        Acoustic neuroma syndrome ([facility] ICD-10-CM: D33.3  ICD-9-CM: 388.5  1/30/2015 - Present Yes        Dyslipidemia ICD-10-CM: E78.5  ICD-9-CM: 272.4  1/30/2015 - Present Yes        Malignant neoplasm of prostate ([facility] ICD-10-CM: C61  ICD-9-CM: 403  1/7/2010 - Present                Part of this note was written by using a voice dictation software and the note has been proof read but may still contain some grammatical/other typographical errors.     Signed By: Yessy Jett MD   [employer] [facility]    July 27, 2021  3:22 PM

## 2021-07-27 NOTE — PROGRESS NOTES
CM was notified by primary nurse Aliza Sorenson, patient's daughter would like the patient to obtain STR at discharge. CM was receptive to this information. CM followed up with daughter Odette Calderon [phone] this day, regarding  update. Daughter requested CM send referral to [facility]. Referral sent. Carolyn with [facility] notified. PPD/COVID test ordered for REHAB. CM continues to follow.

## 2021-07-27 NOTE — PROGRESS NOTES
ACUTE OT GOALS:  (Developed with and agreed upon by patient and/or caregiver.)  1. Patient will complete lower body bathing and dressing with minimal assistance and adaptive equipment as needed. 2. Patient will complete toileting with minimal assistance. 3. Patient will tolerate 30 minutes of OT treatment with 2-3 rest breaks to increase activity tolerance for ADLs. 4. Patient will complete functional transfers with CGA and adaptive equipment as needed. 5. Patient will complete functional mobility for household distances with CGA and good safety awareness. 6. Patient will complete therapeutic exercises for 10 minutes to improve strength and amplitude of movement for ADL.    Timeframe: 7 visits     OCCUPATIONAL THERAPY: Daily Note OT Treatment Day # 2    Jacinto Coma is a 79 y.o. male   PRIMARY DIAGNOSIS: Fall  Fall [W19. XXXA]       [place] / [place] / Product Type: Medicare /   ASSESSMENT:     REHAB RECOMMENDATIONS: CURRENT LEVEL OF FUNCTION:  (Most Recently Demonstrated)   Recommendation to date pending progress:  Setting:   back to [facility] care facility  Equipment:    To Be Determined Bathing:   mod- max A   washing feet   Dressing:   mod-max A donning socks  Feeding/Grooming:   Not tested  Toileting:   Not tested  Functional Mobility:   min-mod x 2      ASSESSMENT:  Mr. Indira Abebe presents resting in bed with wife at bedside. Pt has hearing aids with only one in his L ear. Pt was sleeping but easily aroused. Pt is cooperative with verbal/tactile cues and really never complains during session. Pt moving well with bed mobility. Pt stood and demonstrates forward flexed posture. Pt unaware that he was no longer hooked to suction and voided on the floor which prompted patient to work on bathing feet and donning new socks. Pt demonstrated good effort but still needs mod-max A with LB tasks due to tremor and limited use of B hands.  Pt completed functional mobility in the room and out into the hallway with facilitation for balance and posture. Pt demonstrates shuffling steps and lacks full extension at the knees (hx of Parkinson's). Pt required more like moderate assistance x 2 with functional mobility and balance once fatigued. Pt positioned back in supine with alarm on. Pt able to tolerate more activity today. Pt to continue per plan of care. SUBJECTIVE:   Mr. Ayaka Manley states, \"I can do it. \"    SOCIAL HISTORY/LIVING ENVIRONMENT: Home Environment: Skilled nursing facility  One/Two Story Residence: One story  Living Alone: No  Support Systems: Assisted living, Skilled nursing facility, Spouse/Significant Other/Partner    OBJECTIVE:     PAIN: VITAL SIGNS: LINES/DRAINS:   Pre Treatment: Pain Screen  Pain Scale 1: FLACC  Pain Intensity 1: 0  Post Treatment: 0   IV  O2 Device: None (Room air)     ACTIVITIES OF DAILY LIVING: I Mod I S SBA CGA Min Mod Max Total NT Comments   BASIC ADLs:              Bathing/ Showering [] [] [] [] [] [] [x] [x] [] []    Toileting [] [] [] [] [] [] [] [] [] [x]    Dressing [] [] [] [] [] [] [x] [x] [] []    Feeding [] [] [] [] [] [] [] [] [] [x]    Grooming [] [] [] [] [] [] [] [] [] [x]    Personal Device Care [] [] [] [] [] [] [] [] [] [x]    Functional Mobility [] [] [] [] [] [x] [x] [] [] [] X 2   I=Independent, Mod I=Modified Independent, S=Supervision, SBA=Standby Assistance, CGA=Contact Guard Assistance,   Min=Minimal Assistance, Mod=Moderate Assistance, Max=Maximal Assistance, Total=Total Assistance, NT=Not Tested    MOBILITY: I Mod I S SBA CGA Min Mod Max Total  NT x2 Comments:   Supine to sit [] [] [] [] [] [x] [] [] [] [] []    Sit to supine [] [] [] [] [] [x] [] [] [] [] []    Sit to stand [] [] [] [] [] [x] [] [] [] [] []    Bed to chair [] [] [] [] [] [x] [] [] [] [] [x]    I=Independent, Mod I=Modified Independent, S=Supervision, SBA=Standby Assistance, CGA=Contact Guard Assistance,   Min=Minimal Assistance, Mod=Moderate Assistance, Max=Maximal Assistance, Total=Total Assistance, NT=Not Tested    BALANCE: Good Fair+ Fair Fair- Poor NT Comments   Sitting Static [] [x] [] [] [] []    Sitting Dynamic [] [] [x] [] [] []              Standing Static [] [] [x] [] [] []    Standing Dynamic [] [] [] [x] [] []      PLAN:   FREQUENCY/DURATION: OT Plan of Care: 3 times/week for duration of hospital stay or until stated goals are met, whichever comes first.    TREATMENT:   TREATMENT:   ($$ Self Care/Home Management: 8-22 mins$$ Neuromuscular Re-Education: 8-22 mins   )  Co-Treatment PT/OT necessary due to patient's decreased overall endurance/tolerance levels, as well as need for high level skilled assistance to complete functional transfers/mobility and functional tasks  Self Care (15 Minutes): Self care including Lower Body Bathing and Lower Body Dressing to increase independence and decrease level of assistance required. Neuromuscular Re-education (10 Minutes): Neuromuscular Re-education included Balance Training, Coordination training, Postural training, Sitting balance training and Standing balance training to improve Balance, Coordination and Postural Control.     TREATMENT GRID:  N/A    AFTER TREATMENT POSITION/PRECAUTIONS:  Alarm Activated, Bed, Needs within reach, RN notified and Visitors at bedside    INTERDISCIPLINARY COLLABORATION:  RN/PCT, PT/PTA and OT/RUBI    TOTAL TREATMENT DURATION:  OT Patient Time In/Time Out  Time In: 1545  Time Out: 238 [facility], OT

## 2021-07-27 NOTE — PROGRESS NOTES
Daughter wants patient moved to a skilled unit area when returning back to [address] [place] [address]

## 2021-07-27 NOTE — PROGRESS NOTES
LTG: Patient will tolerate least restrictive diet without overt signs or symptoms of airway compromise. STG: Patient will participate in modified barium swallow study as clinically indicated.  MET 7/26  STG: Patient will consume puree consistency diet and mildly thick liquids (nectar) without overt s/sx airway compromise. STG: Patient will completed laryngeal strengthening exercises with 80% accuracy given mod verbal cues. SPEECH LANGUAGE PATHOLOGY: DYSPHAGIA- Daily Note 1    NAME/AGE/GENDER: Aaron Salcedo is a 79 y.o. male  DATE: 7/27/2021  PRIMARY DIAGNOSIS: Fall [W19. XXXA]      ICD-10: Treatment Diagnosis: R13.12 Dysphagia, Oropharyngeal Phase    RECOMMENDATIONS   DIET: With known aspiration risk:   Pureed   Mildly Thick Liquids (Nectar)    MEDICATIONS: Crushed in puree or applesauce     ASPIRATION PRECAUTIONS  · Slow rate of intake  · Small bites/sips  · Upright at 90 degrees during meal     COMPENSATORY STRATEGIES/MODIFICATIONS  · Fully awake/alert  · Upright for all PO  · 1:1 assistance with all PO  · Double swallow  · Remain upright for 20-30 min after any PO  · Slow rate of PO intake     EDUCATION:  · Recommendations discussed with Nursing  · Patient     CONTINUATION OF SKILLED SERVICES/MEDICAL NECESSITY:   Patient is expected to demonstrate progress in  swallow strength, swallow timeliness, swallow function, diet tolerance and swallow safety in order to  improve swallow safety, work toward diet advancement and decrease aspiration risk.  Patient continues to require skilled intervention due to dysphagia. RECOMMENDATIONS for CONTINUED SPEECH THERAPY:   YES: Anticipate need for ongoing speech therapy during this hospitalization and at next level of care. ASSESSMENT   Patient presents with moderate oropharyngeal dysphagia per modified barium swallow study completed 7/27. Consuming prescribed diet without overt s/sx airway compromise.  Unable to functionally complete effortful swallows for laryngeal strengthening stating, \"I don't know what you mean\" when told to swallow hard. Will continue to follow for diet tolerance and po trials for potential diet advancement (once dentures are brought in by family). Will also continue to attempt laryngeal strengthening exercises for improving swallow function; however, do not anticipate patient will be able to functionally participate. COMPLIANCE WITH PROGRAM/EXERCISES: Will assess as treatment progresses  REHABILITATION POTENTIAL FOR STATED GOALS: Excellent    PLAN    FREQUENCY/DURATION: Continue to follow patient 3 times a week for duration of hospital stay to address above goals. - Recommendations for next treatment session: Next treatment session will address diet tolerance and po trials    SUBJECTIVE   Patient alert upright in bed upon arrival. Consuming breakfast meal. Significant tremor in upper extremities when attempting to self feed. Placed left hearing aid; however, patient continued appear to have difficulty hearing. Problem List:  (Impairments causing functional limitations):  1. Oropharyngeal dysphagia    Orientation:   Person    Pain: Pain Scale 1: Numeric (0 - 10)  Pain Intensity 1: 0    OBJECTIVE   Patient seen for diet tolerance. Required hand of hand assistance with feeding due to tremor in upper extremities. Patient consumed ~ 6 oz nectar by straw and~ 8 oz puree consistency breakfast meal items without overt s/sx airway compromise. Continues to have multiple swallows upon palpation. Adequate oral prep and clearance. Instructed patient to swallow hard 2 times with puree trials; however, patient would not respond or reply, \"I don't know what you mean. \"     INTERDISCIPLINARY COLLABORATION: Registered Nurse  PRECAUTIONS/ALLERGIES: Aricept [donepezil] and Onion     Tool Used: Dysphagia Outcome and Severity Scale (LORAINE)    Score Comments   Normal Diet  [] 7 With no strategies or extra time needed   Functional Swallow  [] 6 May have mild oral or pharyngeal delay   Mild Dysphagia  [] 5 Which may require one diet consistency restricted    Mild-Moderate Dysphagia  [] 4 With 1-2 diet consistencies restricted   Moderate Dysphagia  [x] 3 With 2 or more diet consistencies restricted   Moderate-Severe Dysphagia  [] 2 With partial PO strategies (trials with ST only)   Severe Dysphagia  [] 1 With inability to tolerate any PO safely      Score:  Initial:3 Most Recent: 3 (Date 07/27/21 )   Interpretation of Tool: The Dysphagia Outcome and Severity Scale (LORAINE) is a simple, easy-to-use, 7-point scale developed to systematically rate the functional severity of dysphagia based on objective assessment and make recommendations for diet level, independence level, and type of nutrition.      After treatment position/precautions:  · Upright in bed  · Call light within reach    Total Treatment Duration:   Time In: 0850  Time Out: [address], [id number] [facility]

## 2021-07-28 PROBLEM — N30.00 ACUTE CYSTITIS WITHOUT HEMATURIA: Status: ACTIVE | Noted: 2021-07-28

## 2021-07-28 LAB
ANION GAP SERPL CALC-SCNC: 2 MMOL/L (ref 7–16)
BUN SERPL-MCNC: 16 MG/DL (ref 8–23)
CALCIUM SERPL-MCNC: 9 MG/DL (ref 8.3–10.4)
CHLORIDE SERPL-SCNC: 112 MMOL/L (ref 98–107)
CO2 SERPL-SCNC: 31 MMOL/L (ref 21–32)
CREAT SERPL-MCNC: 0.77 MG/DL (ref 0.8–1.5)
ERYTHROCYTE [DISTWIDTH] IN BLOOD BY AUTOMATED COUNT: 13.6 % (ref 11.9–14.6)
GLUCOSE SERPL-MCNC: 88 MG/DL (ref 65–100)
HCT VFR BLD AUTO: 35.1 % (ref 41.1–50.3)
HGB BLD-MCNC: 11.1 G/DL (ref 13.6–17.2)
MCH RBC QN AUTO: 29.8 PG (ref 26.1–32.9)
MCHC RBC AUTO-ENTMCNC: 31.6 G/DL (ref 31.4–35)
MCV RBC AUTO: 94.1 FL (ref 79.6–97.8)
MM INDURATION POC: 0 MM (ref 0–5)
NRBC # BLD: 0 K/UL (ref 0–0.2)
PLATELET # BLD AUTO: 91 K/UL (ref 150–450)
PMV BLD AUTO: 10.2 FL (ref 9.4–12.3)
POTASSIUM SERPL-SCNC: 3.9 MMOL/L (ref 3.5–5.1)
PPD POC: NEGATIVE NEGATIVE
RBC # BLD AUTO: 3.73 M/UL (ref 4.23–5.6)
SODIUM SERPL-SCNC: 145 MMOL/L (ref 136–145)
WBC # BLD AUTO: 3.4 K/UL (ref 4.3–11.1)

## 2021-07-28 PROCEDURE — 74011250637 HC RX REV CODE- 250/637: Performed by: INTERNAL MEDICINE

## 2021-07-28 PROCEDURE — 36415 COLL VENOUS BLD VENIPUNCTURE: CPT

## 2021-07-28 PROCEDURE — 74011250636 HC RX REV CODE- 250/636: Performed by: NURSE PRACTITIONER

## 2021-07-28 PROCEDURE — 65270000029 HC RM PRIVATE

## 2021-07-28 PROCEDURE — 97530 THERAPEUTIC ACTIVITIES: CPT

## 2021-07-28 PROCEDURE — 74011250637 HC RX REV CODE- 250/637: Performed by: PSYCHIATRY & NEUROLOGY

## 2021-07-28 PROCEDURE — 74011250637 HC RX REV CODE- 250/637: Performed by: HOSPITALIST

## 2021-07-28 PROCEDURE — 92526 ORAL FUNCTION THERAPY: CPT

## 2021-07-28 PROCEDURE — 85027 COMPLETE CBC AUTOMATED: CPT

## 2021-07-28 PROCEDURE — 80048 BASIC METABOLIC PNL TOTAL CA: CPT

## 2021-07-28 RX ORDER — CIPROFLOXACIN 500 MG/1
500 TABLET ORAL EVERY 12 HOURS
Status: DISCONTINUED | OUTPATIENT
Start: 2021-07-28 | End: 2021-07-30 | Stop reason: HOSPADM

## 2021-07-28 RX ORDER — SAME BUTANEDISULFONATE/BETAINE 400-600 MG
500 POWDER IN PACKET (EA) ORAL 2 TIMES DAILY
Status: DISCONTINUED | OUTPATIENT
Start: 2021-07-28 | End: 2021-07-30 | Stop reason: HOSPADM

## 2021-07-28 RX ADMIN — CARBIDOPA AND LEVODOPA 1 TABLET: 25; 100 TABLET, ORALLY DISINTEGRATING ORAL at 18:00

## 2021-07-28 RX ADMIN — CARBIDOPA AND LEVODOPA 1 TABLET: 25; 100 TABLET, ORALLY DISINTEGRATING ORAL at 09:00

## 2021-07-28 RX ADMIN — Medication 1 AMPULE: at 10:19

## 2021-07-28 RX ADMIN — CIPROFLOXACIN 500 MG: 500 TABLET, FILM COATED ORAL at 10:19

## 2021-07-28 RX ADMIN — RDII 250 MG CAPSULE 500 MG: at 10:19

## 2021-07-28 RX ADMIN — DEXTROSE MONOHYDRATE 75 ML/HR: 5 INJECTION, SOLUTION INTRAVENOUS at 10:16

## 2021-07-28 RX ADMIN — ASPIRIN 81 MG: 81 TABLET ORAL at 21:35

## 2021-07-28 RX ADMIN — Medication 10 ML: at 05:52

## 2021-07-28 RX ADMIN — PYRIDOSTIGMINE BROMIDE 30 MG: 60 TABLET ORAL at 10:19

## 2021-07-28 RX ADMIN — Medication 10 ML: at 21:35

## 2021-07-28 RX ADMIN — DEXTROSE MONOHYDRATE 75 ML/HR: 5 INJECTION, SOLUTION INTRAVENOUS at 21:36

## 2021-07-28 RX ADMIN — Medication 1 AMPULE: at 21:34

## 2021-07-28 RX ADMIN — CARBIDOPA AND LEVODOPA 1 TABLET: 25; 100 TABLET, ORALLY DISINTEGRATING ORAL at 22:00

## 2021-07-28 RX ADMIN — Medication 10 ML: at 14:05

## 2021-07-28 RX ADMIN — CARBIDOPA AND LEVODOPA 1 TABLET: 25; 100 TABLET, ORALLY DISINTEGRATING ORAL at 13:00

## 2021-07-28 RX ADMIN — CIPROFLOXACIN 500 MG: 500 TABLET, FILM COATED ORAL at 21:35

## 2021-07-28 RX ADMIN — PYRIDOSTIGMINE BROMIDE 30 MG: 60 TABLET ORAL at 17:14

## 2021-07-28 RX ADMIN — RDII 250 MG CAPSULE 500 MG: at 17:14

## 2021-07-28 NOTE — PROGRESS NOTES
CM received a call from daughter Samira Atwood) requesting a referral be made to [facility]. Daughter states it's close to home for them. Referral completed. CM will continue to monitor.

## 2021-07-28 NOTE — PROGRESS NOTES
ACUTE PHYSICAL THERAPY GOALS:  (Developed with and agreed upon by patient and/or caregiver. )  LTG:  (1.)Mr. Leana Grant will move from supine to sit and sit to supine , scoot up and down and roll side to side in bed with MINIMAL ASSIST within 7 treatment day(s). GOAL MET 7/27/2021    (2.)Mr. Leana Grant will transfer from bed to chair and chair to bed with MINIMAL ASSIST using the least restrictive device within 7 treatment day(s). GOAL MET 7/28/2021    (3.)Mr. Leana Grant will ambulate with MINIMAL ASSIST for 50 feet with the least restrictive device within 7 treatment day(s). GOAL MET 7/28/2021    (4.)Mr. Leana Grant will tolerate at least 23 min of dynamic standing activity to assist standing ADLs with the least restrictive device within 7 treatment days    PHYSICAL THERAPY: Daily Note and PM Treatment Day # 3    Jayson Wills is a 79 y.o. male   PRIMARY DIAGNOSIS: Fall  Fall [employer]. XXXA]         ASSESSMENT:     REHAB RECOMMENDATIONS: CURRENT LEVEL OF FUNCTION:  (Most Recently Demonstrated)   Recommendation to date pending progress:  Setting:   Short-term Rehab  Equipment:    To Be Determined Bed Mobility:   Modified Independent  Sit to Stand:   Minimal Assistance  Transfers:   Minimal Assistance  Gait/Mobility:   Minimal Assistance x 2     ASSESSMENT:  Mr. Leana Grant is supine and willing to participate. He sat up without assist and needed to use the bathroom. Assisted him into the bathroom with minimal HHA. He was able to walk the whole floor with the rollator today. He did quite well with the rollator turning better and staying a decent distance from it. He continues to be a moderate fall risk but is moving well. He was not very fatigued with the distance and tolerated well. SUBJECTIVE:   Mr. Leana Grant states, \"I can walk. \"    SOCIAL HISTORY/ LIVING ENVIRONMENT:   Home Environment: Skilled nursing facility  One/Two Story Residence: One story  Living Alone: No  Support Systems: Assisted living, Skilled nursing facility, Spouse/Significant Other/Partner  OBJECTIVE:     PAIN: VITAL SIGNS: LINES/DRAINS:   Pre Treatment: Pain Screen  Pain Scale 1: FLACC  Pain Intensity 1: 0  Post Treatment: 0   Purewick  O2 Device: None (Room air)     MOBILITY: I Mod I S SBA CGA Min Mod Max Total  NT x2 Comments:   Bed Mobility    Rolling [] [] [] [] [] [] [] [] [] [] []    Supine to Sit [] [] [] [] [] [x] [] [] [] [] []    Scooting [] [] [] [x] [] [] [] [] [] [] []    Sit to Supine [] [] [] [] [] [x] [] [] [] [] []    Transfers    Sit to Stand [] [] [] [] [] [x] [] [] [] [] []    Bed to Chair [] [] [] [] [] [] [] [] [] [] []    Stand to Sit [] [] [] [] [] [x] [] [] [] [] []    I=Independent, Mod I=Modified Independent, S=Supervision, SBA=Standby Assistance, CGA=Contact Guard Assistance,   Min=Minimal Assistance, Mod=Moderate Assistance, Max=Maximal Assistance, Total=Total Assistance, NT=Not Tested    BALANCE: Good Fair+ Fair Fair- Poor NT Comments   Sitting Static [x] [] [] [] [] []    Sitting Dynamic [x] [] [] [] [] []              Standing Static [] [] [x] [x] [] []    Standing Dynamic [] [] [] [x] [] []      GAIT: I Mod I S SBA CGA Min Mod Max Total  NT x2 Comments:   Level of Assistance [] [] [] [] [] [x] [] [] [] [] []    Distance 250    DME rollator and HHA    Gait Quality Shuffled, bent knees    Weightbearing  Status N/A     I=Independent, Mod I=Modified Independent, S=Supervision, SBA=Standby Assistance, CGA=Contact Guard Assistance,   Min=Minimal Assistance, Mod=Moderate Assistance, Max=Maximal Assistance, Total=Total Assistance, NT=Not Tested    PLAN:   FREQUENCY/DURATION: PT Plan of Care: 3 times/week for duration of hospital stay or until stated goals are met, whichever comes first.  TREATMENT:     TREATMENT:   ($$ Therapeutic Activity: 23-37 mins    )  Therapeutic Activity (25 Minutes):  Therapeutic activity included Supine to Sit, Scooting, Transfer Training and Ambulation on level ground to improve functional Mobility, Strength and Activity tolerance.     TREATMENT GRID:  N/A    AFTER TREATMENT POSITION/PRECAUTIONS:  Chair, Needs within reach, RN notified and Visitors at bedside    INTERDISCIPLINARY COLLABORATION:  RN/PCT and PT/PTA    TOTAL TREATMENT DURATION:  PT Patient Time In/Time Out  Time In: 1415  Time Out: [address], [facility]

## 2021-07-28 NOTE — PROGRESS NOTES
LTG: Patient will tolerate least restrictive diet without overt signs or symptoms of airway compromise. STG: Patient will participate in modified barium swallow study as clinically indicated.  MET 7/26  STG: Patient will consume  minced/moist diet and mildly thick liquids (nectar) without overt s/sx airway compromise. Advanced 7/28  STG: Patient will completed laryngeal strengthening exercises with 80% accuracy given mod verbal cues. SPEECH LANGUAGE PATHOLOGY: DYSPHAGIA- Daily Note 2    NAME/AGE/GENDER: Teodoro Waggoner is a 79 y.o. male  DATE: 7/28/2021  PRIMARY DIAGNOSIS: Fall [W19. XXXA]      ICD-10: Treatment Diagnosis: R13.12 Dysphagia, Oropharyngeal Phase    RECOMMENDATIONS   DIET: With known aspiration risk:   Minced and Moist   Mildly Thick Liquids (Nectar)    MEDICATIONS: Crushed in puree or applesauce     ASPIRATION PRECAUTIONS  · Slow rate of intake  · Small bites/sips  · Upright at 90 degrees during meal     COMPENSATORY STRATEGIES/MODIFICATIONS  · Fully awake/alert  · Upright for all PO  · 1:1 assistance with all PO  · Double swallow  · Remain upright for 20-30 min after any PO  · Slow rate of PO intake     EDUCATION:  · Recommendations discussed with Nursing  · Patient     CONTINUATION OF SKILLED SERVICES/MEDICAL NECESSITY:   Patient is expected to demonstrate progress in  swallow strength, swallow timeliness, swallow function, diet tolerance and swallow safety in order to  improve swallow safety, work toward diet advancement and decrease aspiration risk.  Patient continues to require skilled intervention due to dysphagia. RECOMMENDATIONS for CONTINUED SPEECH THERAPY:   YES: Anticipate need for ongoing speech therapy during this hospitalization and at next level of care. ASSESSMENT   Patient presents with moderate oropharyngeal dysphagia per modified barium swallow study completed 7/27.  Consuming prescribed puree diet/mildly thick liquids and trials of minced/moist consistency without overt s/sx airway compromise. Now wearing dentures and demonstrated functional oral prep with minced/moist trials. Continues to have difficulty understanding directions for completing effortful swallows to improve pharyngeal constriction and laryngeal closure. Will continue to follow for diet tolerance. Will also continue to attempt laryngeal strengthening exercises for improving swallow function; however, do not anticipate patient will be able to functionally participate. COMPLIANCE WITH PROGRAM/EXERCISES: Will assess as treatment progresses  REHABILITATION POTENTIAL FOR STATED GOALS: Excellent    PLAN    FREQUENCY/DURATION: Continue to follow patient 3 times a week for duration of hospital stay to address above goals. - Recommendations for next treatment session: Next treatment session will address diet tolerance and po trials    SUBJECTIVE   Patient alert upright in bed upon arrival. Hearing aid in left ear; however, patient continues to report he is unable to hear me. Problem List:  (Impairments causing functional limitations):  1. Oropharyngeal dysphagia    Orientation:   Person    Pain: Pain Scale 1: Adult Nonverbal Pain Scale  Pain Intensity 1: 0    OBJECTIVE   Patient seen for diet tolerance. Required assistance with feeding due to tremor in upper extremities. Consumed mildly thick liquids by straw, puree, and minced/moist trials without overt s/sx airway compromise. Only min-mildly prolonged oral prep with puree and minced and moist trials. Adequate oral clearance post swallow. Continues to have multiple swallows upon palpation. Encouraged patient to [facility] hard\"  X2 with each bite; however, patient initially appeared to be following commands, but when cued to continue exercise patient not responding. Reports intermittently that he cannot understand what I am saying.      INTERDISCIPLINARY COLLABORATION: Registered Nurse  PRECAUTIONS/ALLERGIES: Aricept [donepezil] and Onion     Tool Used: Dysphagia Outcome and Severity Scale (LORAINE)    Score Comments   Normal Diet  [] 7 With no strategies or extra time needed   Functional Swallow  [] 6 May have mild oral or pharyngeal delay   Mild Dysphagia  [] 5 Which may require one diet consistency restricted    Mild-Moderate Dysphagia  [] 4 With 1-2 diet consistencies restricted   Moderate Dysphagia  [x] 3 With 2 or more diet consistencies restricted   Moderate-Severe Dysphagia  [] 2 With partial PO strategies (trials with ST only)   Severe Dysphagia  [] 1 With inability to tolerate any PO safely      Score:  Initial:3 Most Recent: 4 (Date 07/28/21 )   Interpretation of Tool: The Dysphagia Outcome and Severity Scale (LORAINE) is a simple, easy-to-use, 7-point scale developed to systematically rate the functional severity of dysphagia based on objective assessment and make recommendations for diet level, independence level, and type of nutrition.      After treatment position/precautions:  · Upright in bed  · RN notified  · Call light within reach    Total Treatment Duration:   Time In: 0900  Time Out: [address] [facility] [address], [address] [facility]

## 2021-07-28 NOTE — PROGRESS NOTES
CM made contact with patient daughter Neri Gonzales) and informed her that [facility] has stated that [facility] would be a better fit for patient because of locked units. Daughter states she will make contact with Hannah Carty at [facility] and get back with CM with a finally decision. CM will continue to monitor.

## 2021-07-28 NOTE — PROGRESS NOTES
Problem: Pressure Injury - Risk of  Goal: *Prevention of pressure injury  Description: Document Deepak Scale and appropriate interventions in the flowsheet.   Outcome: Progressing Towards Goal  Note: Pressure Injury Interventions:  Sensory Interventions: Assess changes in LOC, Assess need for specialty bed    Moisture Interventions: Absorbent underpads, Apply protective barrier, creams and emollients    Activity Interventions: Assess need for specialty bed    Mobility Interventions: Assess need for specialty bed, Pressure redistribution bed/mattress (bed type), PT/OT evaluation    Nutrition Interventions: Document food/fluid/supplement intake, Discuss nutritional consult with provider    Friction and Shear Interventions: Apply protective barrier, creams and emollients, Feet elevated on foot rest

## 2021-07-28 NOTE — PROGRESS NOTES
[facility] Hospitalist Progress Note     Name: Pearletha Gaucher   Age: 79 y.o. Sex: male  : 1940    MRN:     [id number]    Admit Date:  2021    Reason for Admission:  Fall [W19. 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 CXR without any signs of pneumonia. Without any pyuria or bacteremia.  -Avoid sedatives  -Monitor mental status  -Delirium precaution  :  Patient mentation is at baseline however in view of her underlying UTI as suggested on UA patient will be started on oral antibiotic from today.     Febrile episode  Acute cystitis without hematuria  Temp of 100.6 overnight on   - CXR and UA   - monitor off abx for now  : He was suggestive of UTI. Started on ciprofloxacin 500 mg p.o. twice daily for 5 days    Oropharyngeal dysphagia   - SLP following s/p MBS  - on Pureed diet  :  Oropharyngeal dysphagia due to progressive Parkinson's disease. Speech therapy has recommended dysphagia diet minced and moist with mildly thick (nectar consistency)    Parkinson's disease and worsening encephalopathy   EEG done showing continuous sleep. Neurology following with recs:  - Sinemet stopped and Parcopa started     Neurologic orthostatic hypotension: Continue pyridostigmine    8th rib fx: Stable at present    Protein Calorie Malnutrition  Thin elderly male with dementia. Temporal wasting, visible clavicle and rib cage. - PO nutrition supplement ordered. Diet:  DIET ADULT  DIET ADULT ORAL NUTRITION SUPPLEMENT  DIET ADULT ORAL NUTRITION SUPPLEMENT  DVT PPx: SCDs  Code: Full Code    Dispo / Discharge Planninhrs if remains afebrile and septic workup is neg. STR @ [facility]    MDM:  Acute cystitis without hematuria: new, started antibiotics needs to be monitored for at least 24 to 48 hours for clinical monitoring. Acute metabolic encephalopathy: stable.      I have reviewed and ordered clinical lab tests and independently visualized images, tracing. I have discussed work-up findings with staff  Due to patient's current comorbid conditions as well as presenting problems, patient is at moderate risk for complications/further decompensation during the hospitalization. Hospital Course/Subjective:     Please refer to the admission H&P for details of presentation. In summary, Roxann Oakley is a 79 y.o. male with medical history significant for hypertension, dementia, tremors, Tourette's syndrome, neurologic orthostatic hypotension, that presents in the setting of a fall and worsening mentation. CVA without any narrowing abnormality. CXR without any signs of pneumonia. Without any pyuria or bacteremia. EEG shows continuous sleep. Neurology consulted and recommended stopping Sinemet and starting on Parcopa. Mental status has improved and he is AAOx2. Subjective/24 hr Events (07/28/21) : Patient is seen and examined at bedside. No acute events reported overnight by nursing staff. Awake and alert. Oriented x2. Patient denies fever, chills, chest pains, shortness of breath, n/v, abdominal pain. Temp of 100.6F overnight but patient without any complaints of chills or sweats. Review of Systems: 10 point review of systems is otherwise negative with the exception of the elements mentioned above.     Objective:     Patient Vitals for the past 24 hrs:   Temp Pulse Resp BP SpO2   07/28/21 0725 98.1 °F (36.7 °C) (!) 57 18 (!) 147/66 100 %   07/28/21 0508 97.7 °F (36.5 °C) (!) 58 16 117/70 98 %   07/27/21 2345 97.5 °F (36.4 °C) 63 16 103/64 97 %   07/27/21 1929 97.6 °F (36.4 °C) 66 16 115/67 100 %   07/27/21 1520 97.8 °F (36.6 °C) 67 16 (!) 114/42 98 %   07/27/21 1111 98.3 °F (36.8 °C) 66 16 109/63 98 %     Oxygen Therapy  O2 Sat (%): 100 % (07/28/21 0725)  Pulse via Oximetry: 59 beats per minute (07/25/21 1117)  O2 Device: None (Room air) (07/27/21 0337)    Body mass index is 21.06 kg/m². Physical Exam:   General:     Alert, awake, elderly, NAD, temporal muscle wasting, on room air  HENT:   normocephalic, atraumatic. Eyes:   anicteric sclerae, normal conjunctiva, EOMI  Neck:    supple, non-tender. Trachea midline. Lungs:   CTAB, no wheezing, rhonchi, rales  Cardiac:   RRR, Normal S1 and S2. Abdomen:   Soft, non distended, nontender, +BS  Extremities:   No edema , pedal pulses present  Skin:   Warn, dry, normal turgor and texture  Neuro:  AAOx2.    Psychiatric:  No anxiety, calm, cooperative    Data Review:  I have reviewed all labs, meds, and studies from the last 24 hours:    Labs:    Recent Results (from the past 24 hour(s))   URINALYSIS W/ RFLX MICROSCOPIC    Collection Time: 07/27/21  2:13 PM   Result Value Ref Range    Color YELLOW      Appearance CLEAR      Specific gravity 1.006 1.001 - 1.023      pH (UA) 6.5 5.0 - 9.0      Protein Negative NEG mg/dL    Glucose Negative mg/dL    Ketone Negative NEG mg/dL    Bilirubin Negative NEG      Blood Negative NEG      Urobilinogen 2.0 (H) 0.2 - 1.0 EU/dL    Nitrites Negative NEG      Leukocyte Esterase TRACE (A) NEG      WBC 0-3 0 /hpf    RBC 0-3 0 /hpf    Epithelial cells 0-3 0 /hpf    Bacteria 1+ (H) 0 /hpf    Crystals, urine CA OXALATE 0 /LPF    Other observations RESULTS VERIFIED MANUALLY         All Micro Results     None          EKG Results     Procedure 720 Value Units Date/Time    EKG, 12 LEAD, INITIAL [[id number]] Collected: 07/24/21 2045    Order Status: Completed Updated: 07/25/21 0816     Ventricular Rate 59 BPM      Atrial Rate 86 BPM      QRS Duration 94 ms      Q-T Interval 446 ms      QTC Calculation (Bezet) 441 ms      Calculated R Axis 12 degrees      Calculated T Axis 67 degrees      Diagnosis --     Poor data quality likely sinus rhythm   Poor R wave progression  Otherwise normal ECG  Confirmed by Nina Watkins ([id number]) on 7/25/2021 8:15:59 AM            Other Studies:  XR CHEST PA LAT    Result Date: 7/25/2021  EXAM: Chest x-ray. INDICATION: Pain, fall injury. COMPARISON: Prior chest x-ray on February 16, 2017 and CT chest on August 20, 2015. TECHNIQUE: Frontal and lateral views of the chest were obtained. FINDINGS: The lungs are clear. The heart size and pulmonary vasculature are within normal limits. Tortuosity of the thoracic aorta is unchanged. No pneumothorax or pleural effusion is seen. There is an acute appearing mildly displaced fracture in the posterolateral right eighth rib. Fixation hardware is seen in the proximal right humerus. Mildly displaced right 8th rib fracture, with no pneumothorax or acute infiltrate. XR PELV AP ONLY    Result Date: 7/25/2021  AP PELVIS  7/25/2021 7:44 AM INDICATION: Fall, pain. COMPARISON: None available at this hospital PACS FINDINGS: Single AP pelvis view is submitted. Bone density appropriate. There are surgical clips at the midline lower pelvis, probably related to a prostatectomy. There is no fracture, lesion, or acute joint abnormality. Bony pelvic ring intact. No advanced arthropathy changes. No focal finding in the soft tissues. NO ACUTE BONY OR JOINT FINDINGS. CT HEAD WO CONT    Result Date: 7/24/2021  EXAM: Noncontrast CT head. INDICATION: Pain, fall injury. COMPARISON: Prior CT head on July 10, 2021. TECHNIQUE: Noncontrast CT images of the head were obtained. Radiation dose reduction techniques were used for this study. Our CT scanners use one or all of the following:  Automated exposure control, adjustment of the mA or kV according to patient size, iterative reconstruction. FINDINGS: There is mild volume loss. No acute infarct, hemorrhage or mass is identified. There is no mass effect, midline shift or depressed fracture. The visualized paranasal sinuses and mastoid air cells are clear. There is a small left frontal scalp hematoma. Small left frontal scalp hematoma with no skull fracture or acute intracranial process.     CT SPINE CERV WO CONT    Result Date: 7/24/2021  EXAM: Noncontrast CT cervical spine. INDICATION: Pain, injury. COMPARISON: Prior CT cervical spine on July 10, 2021. TECHNIQUE: Axial noncontrast CT images of the cervical spine were obtained. Sagittal and coronal reformatted images were performed. Radiation dose reduction techniques were used for this study. Our CT scanners use one or all of the following:  Automated exposure control, adjustment of the mA and/or kV according to patient size, iterative reconstruction. FINDINGS: No acute fracture, focal malalignment or prevertebral soft tissue swelling is identified. There is mild to moderate multilevel degenerative disc disease and facet arthritis. Normal craniocervical junction alignment is maintained. The C5-6 and C6-7 disc spaces are narrowed, possibly on a developmental basis. Previously noted bone islands in the C7 vertebra are unchanged, as is a 7 mm nonspecific lucent lesion in the right C2 facet. The paraspinal soft tissues are unremarkable. No evidence of an acute cervical spine injury.          Current Meds:   Current Facility-Administered Medications   Medication Dose Route Frequency    ciprofloxacin HCl (CIPRO) tablet 500 mg  500 mg Oral Q12H    Saccharomyces boulardii (FLORASTOR) capsule 500 mg  500 mg Oral BID    tuberculin injection 5 Units  5 Units IntraDERMal ONCE    sodium chloride (NS) flush 5-40 mL  5-40 mL IntraVENous Q8H    sodium chloride (NS) flush 5-40 mL  5-40 mL IntraVENous PRN    polyethylene glycol (MIRALAX) packet 17 g  17 g Oral DAILY PRN    acetaminophen (TYLENOL) tablet 650 mg  650 mg Oral Q6H PRN    Or    acetaminophen (TYLENOL) suppository 650 mg  650 mg Rectal Q6H PRN    aspirin delayed-release tablet 81 mg  81 mg Oral QHS    pyridostigmine (MESTINON) tablet 30 mg  30 mg Oral BID    dextrose 5% infusion  75 mL/hr IntraVENous CONTINUOUS    carbidopa-levodopa (PARCOPA)  mg rapid dissolve tablet 1 Tablet  1 Tablet Oral QID    alcohol 62% (NOZIN) nasal  1 Ampule  1 Ampule Topical Q12H       Problem List:  Hospital Problems as of 7/28/2021 Date Reviewed: 7/25/2021        Codes Class Noted - Resolved POA    Acute cystitis without hematuria ICD-10-CM: N30.00  ICD-9-CM: 595.0  7/28/2021 - Present Unknown        Severe protein-calorie malnutrition ([facility] ICD-10-CM: E43  ICD-9-CM: 262  7/27/2021 - Present Yes        Oropharyngeal dysphagia ICD-10-CM: R13.12  ICD-9-CM: 787.22  7/27/2021 - Present Yes        Fracture of one rib ICD-10-CM: S22.39XA  ICD-9-CM: 807.01  7/27/2021 - Present Unknown        * (Principal) Fall ICD-10-CM: W19. Arelis   ICD-9-CM: E888.9  7/25/2021 - Present Yes        Neurologic orthostatic hypotension ([facility]) ICD-10-CM: G90.3  ICD-9-CM: 333.0  8/16/2018 - Present Yes        Tourette syndrome ICD-10-CM: F95.2  ICD-9-CM: 307.23  4/20/2018 - Present Yes        Dementia due to Parkinson's disease without behavioral disturbance ([facility] ICD-10-CM: Norma Lazcano, F02.80  ICD-9-CM: 332.0, 294.10  4/20/2018 - Present Yes        Essential hypertension ICD-10-CM: I10  ICD-9-CM: 401.9  12/22/2017 - Present Yes        Parkinsons disease ([facility] ICD-10-CM: Norma Lazcano  ICD-9-CM: 332.0  6/2/2016 - Present Yes        Acoustic neuroma syndrome ([facility] ICD-10-CM: D33.3  ICD-9-CM: 388.5  1/30/2015 - Present Yes        Dyslipidemia ICD-10-CM: E78.5  ICD-9-CM: 272.4  1/30/2015 - Present Yes        Malignant neoplasm of prostate ([facility] ICD-10-CM: C61  ICD-9-CM: 962  1/7/2010 - Present                Part of this note was written by using a voice dictation software and the note has been proof read but may still contain some grammatical/other typographical errors.     Signed By: Martin Linton MD   [facility] Hospitalist Service    July 28, 2021  3:22 PM

## 2021-07-29 LAB
ANION GAP SERPL CALC-SCNC: 2 MMOL/L (ref 7–16)
BUN SERPL-MCNC: 25 MG/DL (ref 8–23)
CALCIUM SERPL-MCNC: 9 MG/DL (ref 8.3–10.4)
CHLORIDE SERPL-SCNC: 111 MMOL/L (ref 98–107)
CO2 SERPL-SCNC: 30 MMOL/L (ref 21–32)
CREAT SERPL-MCNC: 0.86 MG/DL (ref 0.8–1.5)
ERYTHROCYTE [DISTWIDTH] IN BLOOD BY AUTOMATED COUNT: 13.5 % (ref 11.9–14.6)
GLUCOSE SERPL-MCNC: 86 MG/DL (ref 65–100)
HCT VFR BLD AUTO: 33 % (ref 41.1–50.3)
HGB BLD-MCNC: 10.3 G/DL (ref 13.6–17.2)
MCH RBC QN AUTO: 29.8 PG (ref 26.1–32.9)
MCHC RBC AUTO-ENTMCNC: 31.2 G/DL (ref 31.4–35)
MCV RBC AUTO: 95.4 FL (ref 79.6–97.8)
MM INDURATION POC: 0 MM (ref 0–5)
NRBC # BLD: 0 K/UL (ref 0–0.2)
PLATELET # BLD AUTO: 105 K/UL (ref 150–450)
PMV BLD AUTO: 10.1 FL (ref 9.4–12.3)
POTASSIUM SERPL-SCNC: 3.9 MMOL/L (ref 3.5–5.1)
PPD POC: NEGATIVE NEGATIVE
RBC # BLD AUTO: 3.46 M/UL (ref 4.23–5.6)
SODIUM SERPL-SCNC: 143 MMOL/L (ref 138–145)
WBC # BLD AUTO: 4.3 K/UL (ref 4.3–11.1)

## 2021-07-29 PROCEDURE — 74011250637 HC RX REV CODE- 250/637: Performed by: HOSPITALIST

## 2021-07-29 PROCEDURE — 74011250637 HC RX REV CODE- 250/637: Performed by: PSYCHIATRY & NEUROLOGY

## 2021-07-29 PROCEDURE — 80048 BASIC METABOLIC PNL TOTAL CA: CPT

## 2021-07-29 PROCEDURE — 77030040829 HC CATH EXT URINE MDII -B

## 2021-07-29 PROCEDURE — 74011250637 HC RX REV CODE- 250/637: Performed by: INTERNAL MEDICINE

## 2021-07-29 PROCEDURE — 92526 ORAL FUNCTION THERAPY: CPT

## 2021-07-29 PROCEDURE — 65270000029 HC RM PRIVATE

## 2021-07-29 PROCEDURE — 36415 COLL VENOUS BLD VENIPUNCTURE: CPT

## 2021-07-29 PROCEDURE — 85027 COMPLETE CBC AUTOMATED: CPT

## 2021-07-29 PROCEDURE — 97530 THERAPEUTIC ACTIVITIES: CPT

## 2021-07-29 PROCEDURE — 99232 SBSQ HOSP IP/OBS MODERATE 35: CPT | Performed by: PSYCHIATRY & NEUROLOGY

## 2021-07-29 PROCEDURE — 97535 SELF CARE MNGMENT TRAINING: CPT

## 2021-07-29 RX ORDER — SAME BUTANEDISULFONATE/BETAINE 400-600 MG
500 POWDER IN PACKET (EA) ORAL 2 TIMES DAILY
Qty: 28 CAPSULE | Refills: 0 | Status: SHIPPED
Start: 2021-07-29 | End: 2021-07-30

## 2021-07-29 RX ORDER — CIPROFLOXACIN 500 MG/1
500 TABLET ORAL EVERY 12 HOURS
Qty: 12 TABLET | Refills: 0 | Status: SHIPPED
Start: 2021-07-29 | End: 2021-07-30

## 2021-07-29 RX ORDER — CLONAZEPAM 0.5 MG/1
TABLET ORAL
Qty: 15 TABLET | Refills: 0 | Status: SHIPPED | OUTPATIENT
Start: 2021-07-29 | End: 2021-07-30 | Stop reason: SDUPTHER

## 2021-07-29 RX ADMIN — Medication 10 ML: at 13:26

## 2021-07-29 RX ADMIN — CARBIDOPA AND LEVODOPA 1 TABLET: 25; 100 TABLET, ORALLY DISINTEGRATING ORAL at 13:20

## 2021-07-29 RX ADMIN — PYRIDOSTIGMINE BROMIDE 30 MG: 60 TABLET ORAL at 17:37

## 2021-07-29 RX ADMIN — CIPROFLOXACIN 500 MG: 500 TABLET, FILM COATED ORAL at 08:01

## 2021-07-29 RX ADMIN — CIPROFLOXACIN 500 MG: 500 TABLET, FILM COATED ORAL at 21:15

## 2021-07-29 RX ADMIN — Medication 10 ML: at 05:27

## 2021-07-29 RX ADMIN — Medication 1 AMPULE: at 08:00

## 2021-07-29 RX ADMIN — Medication 1 AMPULE: at 21:10

## 2021-07-29 RX ADMIN — RDII 250 MG CAPSULE 500 MG: at 08:00

## 2021-07-29 RX ADMIN — CARBIDOPA AND LEVODOPA 1 TABLET: 25; 100 TABLET, ORALLY DISINTEGRATING ORAL at 17:39

## 2021-07-29 RX ADMIN — PYRIDOSTIGMINE BROMIDE 30 MG: 60 TABLET ORAL at 08:01

## 2021-07-29 RX ADMIN — ASPIRIN 81 MG: 81 TABLET ORAL at 21:15

## 2021-07-29 RX ADMIN — Medication 10 ML: at 21:11

## 2021-07-29 RX ADMIN — CARBIDOPA AND LEVODOPA 1 TABLET: 25; 100 TABLET, ORALLY DISINTEGRATING ORAL at 08:02

## 2021-07-29 RX ADMIN — RDII 250 MG CAPSULE 500 MG: at 17:37

## 2021-07-29 RX ADMIN — CARBIDOPA AND LEVODOPA 1 TABLET: 25; 100 TABLET, ORALLY DISINTEGRATING ORAL at 22:00

## 2021-07-29 NOTE — PROGRESS NOTES
Hourly rounds completed this shift. Patient alert and pleasant throughout shift. Patient made good effort to eat all meals this shift. No new complaints. Bed low and locked. Bed alarm on.

## 2021-07-29 NOTE — PROGRESS NOTES
LTG: Patient will tolerate least restrictive diet without overt signs or symptoms of airway compromise. STG: Patient will participate in modified barium swallow study as clinically indicated.  MET 7/26  STG: Patient will consume  minced/moist diet and mildly thick liquids (nectar) without overt s/sx airway compromise. Advanced 7/28  STG: Patient will completed laryngeal strengthening exercises with 80% accuracy given mod verbal cues. SPEECH LANGUAGE PATHOLOGY: DYSPHAGIA- Daily Note 3    NAME/AGE/GENDER: Hadley Michelle is a 79 y.o. male  DATE: 7/29/2021  PRIMARY DIAGNOSIS: Fall [W19. XXXA]      ICD-10: Treatment Diagnosis: R13.12 Dysphagia, Oropharyngeal Phase    RECOMMENDATIONS   DIET: With known aspiration risk:   Minced and Moist   Mildly Thick Liquids (Nectar)    MEDICATIONS: Crushed in puree or applesauce     ASPIRATION PRECAUTIONS  · Slow rate of intake  · Small bites/sips  · Upright at 90 degrees during meal     COMPENSATORY STRATEGIES/MODIFICATIONS  · Fully awake/alert  · Upright for all PO  · 1:1 assistance with all PO  · Double swallow  · Remain upright for 20-30 min after any PO  · Slow rate of PO intake     EDUCATION:  · Recommendations discussed with Nursing  · Patient     CONTINUATION OF SKILLED SERVICES/MEDICAL NECESSITY:   Patient is expected to demonstrate progress in  swallow strength, swallow timeliness, swallow function, diet tolerance and swallow safety in order to  improve swallow safety, work toward diet advancement and decrease aspiration risk.  Patient continues to require skilled intervention due to dysphagia. RECOMMENDATIONS for CONTINUED SPEECH THERAPY:   YES: Anticipate need for ongoing speech therapy during this hospitalization and at next level of care. ASSESSMENT   Patient presents with moderate oropharyngeal dysphagia per modified barium swallow study completed 7/27. Improved accuracy with completion of laryngeal strengthening exercises this date.  Completed effortful swallows and CTAR with min-mod verbal/visual cues. No overt s/sx airway compromise with mildly thick liquids and puree trials utilized during therapeutic exercises. Will continue to follow for diet tolerance and laryngeal strengthening exercises to maximize swallow function. COMPLIANCE WITH PROGRAM/EXERCISES: Will assess as treatment progresses  REHABILITATION POTENTIAL FOR STATED GOALS: Excellent    PLAN    FREQUENCY/DURATION: Continue to follow patient 3 times a week for duration of hospital stay to address above goals. - Recommendations for next treatment session: Next treatment session will address diet tolerance and po trials    SUBJECTIVE   Patient alert upright in bed upon arrival. Just finished breakfast meal. Left hearing aid in place. Increased command following today. Problem List:  (Impairments causing functional limitations):  1. Oropharyngeal dysphagia    Orientation:   Person    Pain: Pain Scale 1: Numeric (0 - 10)  Pain Intensity 1: 0    OBJECTIVE   Patient seen for laryngeal exercises.      Patient completed the following therapeutic exercises to maximize swallow function:    Effortful swallows: 2 sets of 10 reps to increased pharyngeal constriction and laryngeal closure- completed with mildly thick liquids and puree to increase resistance  CTAR: 2 sets of 5 reps to increase laryngeal closure    INTERDISCIPLINARY COLLABORATION: Registered Nurse  PRECAUTIONS/ALLERGIES: Aricept [donepezil] and Onion     Tool Used: Dysphagia Outcome and Severity Scale (LORAINE)    Score Comments   Normal Diet  [] 7 With no strategies or extra time needed   Functional Swallow  [] 6 May have mild oral or pharyngeal delay   Mild Dysphagia  [] 5 Which may require one diet consistency restricted    Mild-Moderate Dysphagia  [] 4 With 1-2 diet consistencies restricted   Moderate Dysphagia  [x] 3 With 2 or more diet consistencies restricted   Moderate-Severe Dysphagia  [] 2 With partial PO strategies (trials with ST only)   Severe Dysphagia  [] 1 With inability to tolerate any PO safely      Score:  Initial:3 Most Recent: 4(Date 07/29/21 )   Interpretation of Tool: The Dysphagia Outcome and Severity Scale (LORAINE) is a simple, easy-to-use, 7-point scale developed to systematically rate the functional severity of dysphagia based on objective assessment and make recommendations for diet level, independence level, and type of nutrition.      After treatment position/precautions:  · Upright in bed  · Call light within reach    Total Treatment Duration:   Time In: 0842  Time Out: [facility], [address] [facility], [place], operated by [facility]

## 2021-07-29 NOTE — PROGRESS NOTES
Spiritual Care Visit. Initial visit. Patient was visited by [facility]. Spoke and prayed with pt's wife; Entered by Junior Thompson, Staff .  Adrianna, .B.

## 2021-07-29 NOTE — PROGRESS NOTES
Comprehensive Nutrition Assessment    Type and Reason for Visit: Reassess  Best Practice Alert for Malnutrition Screening Tool: Recently Lost Weight Without Trying: Yes, If Yes, How Much Weight Loss: >33 lbs, Eating Poorly Due to Decreased Appetite: No    Nutrition Recommendations/Plan:    Continue current diet per SLP.  Continue Nepro shakes (nectar thick) TID with meals to provide 420 kcal and 19 gm PRO per bottle.  Continue magic cup BID with meals to provide 290 kcal and 9 gm PRO per cup   Continue oral nutrition supplements at discharge     Malnutrition Assessment:  Malnutrition Status: Severe malnutrition  Context: Chronic illness  Findings of clinical characteristics of malnutrition:   Energy Intake:   (presumed <75% of PO intake >1 month - unable to quantify)  Weight Loss:  Mild weight loss (specify amount and time period) (16 lb, 9.6% wt loss x 8 mos; 6 lb, 3.8% wt loss x 3 mos)     Body Fat Loss:  7 - Severe body fat loss, Buccal region, Orbital   Muscle Mass Loss:  7 - Severe muscle mass loss, Temples (temporalis)  Fluid Accumulation:  Unable to assess,     Strength:  Not performed     Nutrition Assessment:   Nutrition History: Pt unable to give nutrition history d/t current mentation. Wife at bedside provides nutrition history. Pt's wife states that she believes the pt has lost ~100 lbs over ~8 months (UBW approximately 250 lbs). Pt's wife states that he eats predominately with his \"pincher fingers\" and uses a weighted adaptive utensil to feed himself. She states that the pt still requires assisstance with meals and meal time is a very lengthy process. Pt's wife states that he has been at his [facility] for ~12 weeks and she believes that they prefer to send him \"finger foods\" (sandwiches) vs assist with feeding him. Pt's wife states that when he was home he was drinking ~1 nutrition drink/day.  She states that he has overall started eating less at each meal.       Nutrition Background: Pt admitted with acute encephalopathy. S/P fall. PMH notable for dementia, parkinsons, HTN. Daily Update:  Pt seen reclined in bed. Pt's wife at bedside. Pt's wife states that pt has a great appetite and ate majority of meals yesterday. PCT at bedside providing care confirms that the pt ate 100% of breakfast this AM.   Discharge pending rehab bed placement. Nutrition Related Findings:   Pt thin appearing. Findings indicative of muscle wasting and fat loss. NPO per SLP 7/25. MBS 7/26 with recommendations for pureed/nectar thickened liquids per SLP.        Current Nutrition Therapies:  ADULT ORAL NUTRITION SUPPLEMENT Breakfast, Lunch, Dinner; Renal Supplement  ADULT ORAL NUTRITION SUPPLEMENT Lunch, Dinner; Frozen Supplement  ADULT DIET Dysphagia - Minced & Moist; Mildly Thick (Nectar)    Current Intake:   Average Meal Intake: % Average Supplement Intake: Unable to assess      Anthropometric Measures:  Height: 5' 10\" (177.8 cm)  Current Body Wt: 66.3 kg (146 lb 2.6 oz) (7/29), Weight source: Bed scale  BMI: 21, Underweight (BMI less than 22) age over 72  Admission Body Weight: 150 lb  Ideal Body Weight (lbs) (Calculated): 166 lbs (75 kg), 90.4 %  Usual Body Wt:  (wt ranges from 160-166 lbs x 1 yr), Edema: No data recorded     Estimated Daily Nutrient Needs:  Energy (kcal/day): 1078-0946 (Kcal/kg (25-30), Weight Used: Current (68 kg))  Protein (g/day):  (20% kcal) Weight Used: (Current)  Fluid (ml/day):   (1 ml/kcal (or per MD))    Nutrition Diagnosis:   · Severe malnutrition, In context of chronic illness related to cognitive or neurological impairment, inadequate protein-energy intake as evidenced by weight loss, severe loss of subcutaneous fat, severe muscle loss (parkinsons, dementia)    · Predicted inadequate energy intake related to cognitive or neurological impairment, biting/chewing (masticatory) difficulty, swallowing difficulty as evidenced by  (modified diet s/p MBS, lethargy, parkinsons, dementia)    Nutrition Interventions:   Food and/or Nutrient Delivery: Continue current diet, Continue oral nutrition supplement     Coordination of Nutrition Care: Continue to monitor while inpatient  Plan of Care discussed @ IDT rounds, PCT. Goals:   Previous Goal Met: Goal(s) achieved  Active Goal: Continue to meet >50% of estimated needs at time of nutrition follow up.      Nutrition Monitoring and Evaluation:      Food/Nutrient Intake Outcomes: Food and nutrient intake, Supplement intake  Physical Signs/Symptoms Outcomes: Chewing or swallowing, Meal time behavior    Discharge Planning:    Continue oral nutrition supplement    Rubin Yates [address], [address], [address] [place], [facility] [address]

## 2021-07-29 NOTE — PROGRESS NOTES
WILMER received a call from daughter Benjie Ra) and she has requested a referral made to (1) [facility] (2) [facility] (3) [facility] (4) [facility]. Referrals has been completed. WILMER will continue to monitor.

## 2021-07-29 NOTE — PROGRESS NOTES
ACUTE OT GOALS:  (Developed with and agreed upon by patient and/or caregiver.)  1. Patient will complete lower body bathing and dressing with minimal assistance and adaptive equipment as needed. 2. Patient will complete toileting with minimal assistance. 3. Patient will tolerate 30 minutes of OT treatment with 2-3 rest breaks to increase activity tolerance for ADLs. 4. Patient will complete functional transfers with CGA and adaptive equipment as needed. 5. Patient will complete functional mobility for household distances with CGA and good safety awareness. 6. Patient will complete therapeutic exercises for 10 minutes to improve strength and amplitude of movement for ADL.       OCCUPATIONAL THERAPY: Daily Note OT Treatment Day # 3    Teodoro Waggoner is a 79 y.o. male   PRIMARY DIAGNOSIS: Fall  Fall [W19. XXXA]       [place] / [place] / Product Type: Medicare /   ASSESSMENT:     REHAB RECOMMENDATIONS: CURRENT LEVEL OF FUNCTION:  (Most Recently Demonstrated)   Recommendation to date pending progress:  Setting:   Memory Care  Equipment:    To Be Determined Bathing:   Moderate Assistance  Dressing:   Moderate Assistance  Feeding/Grooming:   Moderate Assistance  Toileting:   Not tested  Functional Mobility:   Minimal Assistance     ASSESSMENT:  Mr. Sergio Gonzalez presents supine in bed upon arrival. Pt demonstrated bathing at sink with Mod A to reach LB during bathing and dressing. Pt had a LOB to R side when washing face with eyes closed. Pt was educated to keep eyes open when standing. Pt stood for UB ADLs and sat for LB ADLs. Pt was placed back in bed with needs within reach and wife at bedside. Good effort today. Continue POC. SUBJECTIVE:   Mr. Sergio Gonzalez states, \"I'm a little dizzy. \"    SOCIAL HISTORY/LIVING ENVIRONMENT:  Home Environment: Skilled nursing facility  One/Two Story Residence: One story  Living Alone: No  Support Systems: Assisted living, Skilled nursing facility, Spouse/Significant Other/Partner    OBJECTIVE:     PAIN: VITAL SIGNS: LINES/DRAINS:   Pre Treatment: Pain Screen  Pain Scale 1: Numeric (0 - 10)  Pain Intensity 1: 0  Post Treatment: same   Purewick  O2 Device: None (Room air)     ACTIVITIES OF DAILY LIVING: I Mod I S SBA CGA Min Mod Max Total NT Comments   BASIC ADLs:              Bathing/ Showering [] [] [] [] [] [] [x] [] [] [] For LB bathing   Toileting [] [] [] [] [] [] [] [] [] [x]    Dressing [] [] [] [] [] [] [x] [] [] [] For LB dressing   Feeding [] [] [] [] [] [] [] [] [] [x]    Grooming [] [] [] [] [] [] [x] [] [] [] D/t tremors   Personal Device Care [] [] [] [] [] [] [] [] [] []    Functional Mobility [] [] [] [] [] [x] [] [] [] [] 1 LOB to R side during ADLs   I=Independent, Mod I=Modified Independent, S=Supervision, SBA=Standby Assistance, CGA=Contact Guard Assistance,   Min=Minimal Assistance, Mod=Moderate Assistance, Max=Maximal Assistance, Total=Total Assistance, NT=Not Tested    MOBILITY: I Mod I S SBA CGA Min Mod Max Total  NT x2 Comments:   Supine to sit [] [] [] [] [] [x] [] [] [] [] []    Sit to supine [] [] [] [] [] [x] [] [] [] [] []    Sit to stand [] [] [] [] [] [x] [] [] [] [] []    Bed to chair [] [] [] [] [] [] [] [] [] [x] []    I=Independent, Mod I=Modified Independent, S=Supervision, SBA=Standby Assistance, CGA=Contact Guard Assistance,   Min=Minimal Assistance, Mod=Moderate Assistance, Max=Maximal Assistance, Total=Total Assistance, NT=Not Tested    BALANCE: Good Fair+ Fair Fair- Poor NT Comments   Sitting Static [] [x] [] [] [] []    Sitting Dynamic [] [x] [] [] [] []              Standing Static [] [x] [] [] [] []    Standing Dynamic [] [x] [] [] [] [] 1 LOB to R side during ADLs     PLAN:   FREQUENCY/DURATION: OT Plan of Care: 3 times/week for duration of hospital stay or until stated goals are met, whichever comes first.    TREATMENT:   TREATMENT:   ($$ Self Care/Home Management: 23-37 mins$$ Therapeutic Activity: 8-22 mins )  Therapeutic Activity (10 Minutes): Therapeutic activity included Rolling, Supine to Sit, Sit to Supine, Ambulation on level ground, Sitting balance  and Standing balance to improve functional Mobility, Strength and Activity tolerance. Self Care (28 Minutes): Self care including Upper Body Bathing, Lower Body Bathing, Upper Body Dressing, Lower Body Dressing and Grooming to increase independence and decrease level of assistance required.     TREATMENT GRID:  N/A    AFTER TREATMENT POSITION/PRECAUTIONS:  Bed, Needs within reach, RN notified and Visitors at bedside    INTERDISCIPLINARY COLLABORATION:  RN/PCT and OT/RUBI    TOTAL TREATMENT DURATION:  OT Patient Time In/Time Out  Time In: [address] [facility]  Time Out: [address] [place], [place]

## 2021-07-29 NOTE — PROGRESS NOTES
[facility] Hospitalist Progress Note     Name: Aarti Broussard   Age: 79 y.o. Sex: male  : 1940    MRN:     [id number]    Admit Date:  2021    Reason for Admission:  Fall [W19. 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 CXR without any signs of pneumonia. Without any pyuria or bacteremia.  -Avoid sedatives  -Monitor mental status  -Delirium precaution  :  Patient mentation is at baseline however in view of her underlying UTI as suggested on UA patient will be started on oral antibiotic from today. : pt is doing well, mentation is at baseline. No changes overnight.     Febrile episode  Acute cystitis without hematuria  Temp of 100.6 overnight on   - CXR and UA   - monitor off abx for now  : He was suggestive of UTI. Started on ciprofloxacin 500 mg p.o. twice daily for 5 days  : plan to continue Cipro 500 mg po bid, D2  improving    Oropharyngeal dysphagia   - SLP following s/p MBS  - on Pureed diet  :  Oropharyngeal dysphagia due to progressive Parkinson's disease. Speech therapy has recommended dysphagia diet minced and moist with mildly thick (nectar consistency)  : no change, pt continues to tolerate his diet well. Parkinson's disease and worsening encephalopathy   EEG done showing continuous sleep. Neurology following with recs:  - Sinemet stopped and Parcopa started     Neurologic orthostatic hypotension: Continue pyridostigmine    8th rib fx: Stable at present    Protein Calorie Malnutrition  Thin elderly male with dementia. Temporal wasting, visible clavicle and rib cage. - PO nutrition supplement ordered. Diet:  DIET ADULT ORAL NUTRITION SUPPLEMENT  DIET ADULT ORAL NUTRITION SUPPLEMENT  DIET ADULT  DVT PPx: SCDs  Code: Full Code    Dispo / Discharge Planning: pt is waiting for placement.  He is medically stable, doesn't have a rehab bed yet.    MDM:  Acute cystitis without hematuria: new, improving with antibiotics. Acute metabolic encephalopathy: stable. I have reviewed and ordered clinical lab tests and independently visualized images, tracing. I have discussed work-up findings with staff  Due to patient's current comorbid conditions as well as presenting problems, patient is at moderate risk for complications/further decompensation during the hospitalization. Hospital Course/Subjective:     Please refer to the admission H&P for details of presentation. In summary, Sirisha [place] is a 79 y.o. male with medical history significant for hypertension, dementia, tremors, Tourette's syndrome, neurologic orthostatic hypotension, that presents in the setting of a fall and worsening mentation. CVA without any narrowing abnormality. CXR without any signs of pneumonia. Without any pyuria or bacteremia. EEG shows continuous sleep. Neurology consulted and recommended stopping Sinemet and starting on Parcopa. Mental status has improved and he is AAOx2. Subjective/24 hr Events (07/29/21) :  Pt seen at bedside, reports feeling better. He is alert/awake, calm and pleasant. Denies nausea/vomiting, abdominal pain, fever, diarrhea. Review of Systems: 10 point review of systems is otherwise negative with the exception of the elements mentioned above.     Objective:     Patient Vitals for the past 24 hrs:   Temp Pulse Resp BP SpO2   07/29/21 1025 97.6 °F (36.4 °C) 65 18 122/71 99 %   07/29/21 0717 97.9 °F (36.6 °C) 63 15 124/74 99 %   07/29/21 0347 98.5 °F (36.9 °C) 75 16 111/69 96 %   07/28/21 2354 98.5 °F (36.9 °C) 73 18 108/67 97 %   07/28/21 1916 98.5 °F (36.9 °C) 80 17 116/70 98 %   07/28/21 1403 98.7 °F (37.1 °C) 65 18 127/67 97 %   07/28/21 1133 97.9 °F (36.6 °C) 68 19 133/70 99 %     Oxygen Therapy  O2 Sat (%): 99 % (07/29/21 1025)  Pulse via Oximetry: 59 beats per minute (07/25/21 1117)  O2 Device: None (Room air) (07/29/21 4486)    Body mass index is 20.96 kg/m². Physical Exam:   General:     Alert, awake, elderly, NAD, temporal muscle wasting, on room air  HENT:   normocephalic, atraumatic. Eyes:   anicteric sclerae, normal conjunctiva, EOMI  Neck:    supple, non-tender. Trachea midline. Lungs:   CTAB, no wheezing, rhonchi, rales  Cardiac:   RRR, Normal S1 and S2. Abdomen:   Soft, non distended, nontender, +BS  Extremities:   No edema , pedal pulses present  Skin:   Warn, dry, normal turgor and texture  Neuro:  gcs 15, moving all 4 extremities spontaneously, following commands.   Psychiatric:  AOx2, flat affect    Data Review:  I have reviewed all labs, meds, and studies from the last 24 hours:    Labs:    Recent Results (from the past 24 hour(s))   CBC W/O DIFF    Collection Time: 07/29/21  5:12 AM   Result Value Ref Range    WBC 4.3 4.3 - 11.1 K/uL    RBC 3.46 (L) 4.23 - 5.6 M/uL    HGB 10.3 (L) 13.6 - 17.2 g/dL    HCT 33.0 (L) 41.1 - 50.3 %    MCV 95.4 79.6 - 97.8 FL    MCH 29.8 26.1 - 32.9 PG    MCHC 31.2 (L) 31.4 - 35.0 g/dL    RDW 13.5 11.9 - 14.6 %    PLATELET 271 (L) 845 - 450 K/uL    MPV 10.1 9.4 - 12.3 FL    ABSOLUTE NRBC 0.00 0.0 - 0.2 K/uL   METABOLIC PANEL, BASIC    Collection Time: 07/29/21  5:12 AM   Result Value Ref Range    Sodium 143 138 - 145 mmol/L    Potassium 3.9 3.5 - 5.1 mmol/L    Chloride 111 (H) 98 - 107 mmol/L    CO2 30 21 - 32 mmol/L    Anion gap 2 (L) 7 - 16 mmol/L    Glucose 86 65 - 100 mg/dL    BUN 25 (H) 8 - 23 MG/DL    Creatinine 0.86 0.8 - 1.5 MG/DL    GFR est AA >60 >60 ml/min/1.73m2    GFR est non-AA >60 >60 ml/min/1.73m2    Calcium 9.0 8.3 - 10.4 MG/DL       All Micro Results     None          EKG Results     Procedure 720 Value Units Date/Time    EKG, 12 LEAD, INITIAL [[id number]] Collected: 07/24/21 2045    Order Status: Completed Updated: 07/25/21 0816     Ventricular Rate 59 BPM      Atrial Rate 86 BPM      QRS Duration 94 ms      Q-T Interval 446 ms      QTC Calculation (Bezet) 441 ms      Calculated R Axis 12 degrees      Calculated T Axis 67 degrees      Diagnosis --     Poor data quality likely sinus rhythm   Poor R wave progression  Otherwise normal ECG  Confirmed by Mona Sosa ([id number]) on 7/25/2021 8:15:59 AM            Other Studies:  XR CHEST PA LAT    Result Date: 7/25/2021  EXAM: Chest x-ray. INDICATION: Pain, fall injury. COMPARISON: Prior chest x-ray on February 16, 2017 and CT chest on August 20, 2015. TECHNIQUE: Frontal and lateral views of the chest were obtained. FINDINGS: The lungs are clear. The heart size and pulmonary vasculature are within normal limits. Tortuosity of the thoracic aorta is unchanged. No pneumothorax or pleural effusion is seen. There is an acute appearing mildly displaced fracture in the posterolateral right eighth rib. Fixation hardware is seen in the proximal right humerus. Mildly displaced right 8th rib fracture, with no pneumothorax or acute infiltrate. XR PELV AP ONLY    Result Date: 7/25/2021  AP PELVIS  7/25/2021 7:44 AM INDICATION: Fall, pain. COMPARISON: None available at this hospital PACS FINDINGS: Single AP pelvis view is submitted. Bone density appropriate. There are surgical clips at the midline lower pelvis, probably related to a prostatectomy. There is no fracture, lesion, or acute joint abnormality. Bony pelvic ring intact. No advanced arthropathy changes. No focal finding in the soft tissues. NO ACUTE BONY OR JOINT FINDINGS. CT HEAD WO CONT    Result Date: 7/24/2021  EXAM: Noncontrast CT head. INDICATION: Pain, fall injury. COMPARISON: Prior CT head on July 10, 2021. TECHNIQUE: Noncontrast CT images of the head were obtained. Radiation dose reduction techniques were used for this study. Our CT scanners use one or all of the following:  Automated exposure control, adjustment of the mA or kV according to patient size, iterative reconstruction. FINDINGS: There is mild volume loss.  No acute infarct, hemorrhage or mass is identified. There is no mass effect, midline shift or depressed fracture. The visualized paranasal sinuses and mastoid air cells are clear. There is a small left frontal scalp hematoma. Small left frontal scalp hematoma with no skull fracture or acute intracranial process. CT SPINE CERV WO CONT    Result Date: 7/24/2021  EXAM: Noncontrast CT cervical spine. INDICATION: Pain, injury. COMPARISON: Prior CT cervical spine on July 10, 2021. TECHNIQUE: Axial noncontrast CT images of the cervical spine were obtained. Sagittal and coronal reformatted images were performed. Radiation dose reduction techniques were used for this study. Our CT scanners use one or all of the following:  Automated exposure control, adjustment of the mA and/or kV according to patient size, iterative reconstruction. FINDINGS: No acute fracture, focal malalignment or prevertebral soft tissue swelling is identified. There is mild to moderate multilevel degenerative disc disease and facet arthritis. Normal craniocervical junction alignment is maintained. The C5-6 and C6-7 disc spaces are narrowed, possibly on a developmental basis. Previously noted bone islands in the C7 vertebra are unchanged, as is a 7 mm nonspecific lucent lesion in the right C2 facet. The paraspinal soft tissues are unremarkable. No evidence of an acute cervical spine injury.          Current Meds:   Current Facility-Administered Medications   Medication Dose Route Frequency    ciprofloxacin HCl (CIPRO) tablet 500 mg  500 mg Oral Q12H    Saccharomyces boulardii (FLORASTOR) capsule 500 mg  500 mg Oral BID    sodium chloride (NS) flush 5-40 mL  5-40 mL IntraVENous Q8H    sodium chloride (NS) flush 5-40 mL  5-40 mL IntraVENous PRN    polyethylene glycol (MIRALAX) packet 17 g  17 g Oral DAILY PRN    acetaminophen (TYLENOL) tablet 650 mg  650 mg Oral Q6H PRN    Or    acetaminophen (TYLENOL) suppository 650 mg  650 mg Rectal Q6H PRN    aspirin delayed-release tablet 81 mg  81 mg Oral QHS    pyridostigmine (MESTINON) tablet 30 mg  30 mg Oral BID    dextrose 5% infusion  75 mL/hr IntraVENous CONTINUOUS    carbidopa-levodopa (PARCOPA)  mg rapid dissolve tablet 1 Tablet  1 Tablet Oral QID    alcohol 62% (NOZIN) nasal  1 Ampule  1 Ampule Topical Q12H       Problem List:  Hospital Problems as of 7/29/2021 Date Reviewed: 7/25/2021        Codes Class Noted - Resolved POA    Acute cystitis without hematuria ICD-10-CM: N30.00  ICD-9-CM: 595.0  7/28/2021 - Present Unknown        Severe protein-calorie malnutrition ([facility] ICD-10-CM: E43  ICD-9-CM: 713  7/27/2021 - Present Yes        Oropharyngeal dysphagia ICD-10-CM: R13.12  ICD-9-CM: 787.22  7/27/2021 - Present Yes        Fracture of one rib ICD-10-CM: S22.39XA  ICD-9-CM: 807.01  7/27/2021 - Present Unknown        * (Principal) Fall ICD-10-CM: W19. Luiz Pavon  ICD-9-CM: E888.9  7/25/2021 - Present Yes        Neurologic orthostatic hypotension (HCC) ICD-10-CM: G90.3  ICD-9-CM: 333.0  8/16/2018 - Present Yes        Tourette syndrome ICD-10-CM: F95.2  ICD-9-CM: 307.23  4/20/2018 - Present Yes        Dementia due to Parkinson's disease without behavioral disturbance ([facility] ICD-10-CM: Meadow Ivonnesasurekha, F02.80  ICD-9-CM: 332.0, 294.10  4/20/2018 - Present Yes        Essential hypertension ICD-10-CM: I10  ICD-9-CM: 401.9  12/22/2017 - Present Yes        Parkinsons disease ([facility] ICD-10-CM: Meadow Ivonnesar  ICD-9-CM: 332.0  6/2/2016 - Present Yes        Acoustic neuroma syndrome ([facility] ICD-10-CM: D33.3  ICD-9-CM: 388.5  1/30/2015 - Present Yes        Dyslipidemia ICD-10-CM: E78.5  ICD-9-CM: 272.4  1/30/2015 - Present Yes        Malignant neoplasm of prostate ([facility] ICD-10-CM: C61  ICD-9-CM: 340  1/7/2010 - Present                Part of this note was written by using a voice dictation software and the note has been proof read but may still contain some grammatical/other typographical errors.     Signed By: Ansley Harrison MD   [facility] Hospitalist Service    July 29, 2021  3:22 PM

## 2021-07-30 VITALS
BODY MASS INDEX: 21.11 KG/M2 | SYSTOLIC BLOOD PRESSURE: 120 MMHG | DIASTOLIC BLOOD PRESSURE: 79 MMHG | RESPIRATION RATE: 18 BRPM | OXYGEN SATURATION: 96 % | HEART RATE: 70 BPM | WEIGHT: 147.5 LBS | TEMPERATURE: 98.3 F | HEIGHT: 70 IN

## 2021-07-30 LAB
ANION GAP SERPL CALC-SCNC: 2 MMOL/L (ref 7–16)
BUN SERPL-MCNC: 22 MG/DL (ref 8–23)
CALCIUM SERPL-MCNC: 8.9 MG/DL (ref 8.3–10.4)
CHLORIDE SERPL-SCNC: 112 MMOL/L (ref 98–107)
CO2 SERPL-SCNC: 30 MMOL/L (ref 21–32)
COVID-19 RAPID TEST, COVR: NOT DETECTED
CREAT SERPL-MCNC: 0.84 MG/DL (ref 0.8–1.5)
ERYTHROCYTE [DISTWIDTH] IN BLOOD BY AUTOMATED COUNT: 13.7 % (ref 11.9–14.6)
GLUCOSE SERPL-MCNC: 92 MG/DL (ref 65–100)
HCT VFR BLD AUTO: 33 % (ref 41.1–50.3)
HGB BLD-MCNC: 10.5 G/DL (ref 13.6–17.2)
MCH RBC QN AUTO: 30.3 PG (ref 26.1–32.9)
MCHC RBC AUTO-ENTMCNC: 31.8 G/DL (ref 31.4–35)
MCV RBC AUTO: 95.1 FL (ref 79.6–97.8)
NRBC # BLD: 0 K/UL (ref 0–0.2)
PLATELET # BLD AUTO: 113 K/UL (ref 150–450)
PMV BLD AUTO: 9.9 FL (ref 9.4–12.3)
POTASSIUM SERPL-SCNC: 4 MMOL/L (ref 3.5–5.1)
RBC # BLD AUTO: 3.47 M/UL (ref 4.23–5.6)
SODIUM SERPL-SCNC: 144 MMOL/L (ref 138–145)
SOURCE, COVRS: NORMAL
WBC # BLD AUTO: 3.4 K/UL (ref 4.3–11.1)

## 2021-07-30 PROCEDURE — 74011250637 HC RX REV CODE- 250/637: Performed by: PSYCHIATRY & NEUROLOGY

## 2021-07-30 PROCEDURE — 36415 COLL VENOUS BLD VENIPUNCTURE: CPT

## 2021-07-30 PROCEDURE — 74011250637 HC RX REV CODE- 250/637: Performed by: INTERNAL MEDICINE

## 2021-07-30 PROCEDURE — 85027 COMPLETE CBC AUTOMATED: CPT

## 2021-07-30 PROCEDURE — U0005 INFEC AGEN DETEC AMPLI PROBE: HCPCS

## 2021-07-30 PROCEDURE — 80048 BASIC METABOLIC PNL TOTAL CA: CPT

## 2021-07-30 PROCEDURE — 74011250636 HC RX REV CODE- 250/636: Performed by: NURSE PRACTITIONER

## 2021-07-30 PROCEDURE — 87635 SARS-COV-2 COVID-19 AMP PRB: CPT

## 2021-07-30 PROCEDURE — 74011250637 HC RX REV CODE- 250/637: Performed by: HOSPITALIST

## 2021-07-30 RX ORDER — SAME BUTANEDISULFONATE/BETAINE 400-600 MG
500 POWDER IN PACKET (EA) ORAL 2 TIMES DAILY
Qty: 28 CAPSULE | Refills: 0 | Status: SHIPPED | OUTPATIENT
Start: 2021-07-30 | End: 2021-08-06

## 2021-07-30 RX ORDER — CLONAZEPAM 0.5 MG/1
TABLET ORAL
Qty: 15 TABLET | Refills: 0 | Status: SHIPPED | OUTPATIENT
Start: 2021-07-30

## 2021-07-30 RX ORDER — CIPROFLOXACIN 500 MG/1
500 TABLET ORAL EVERY 12 HOURS
Qty: 6 TABLET | Refills: 0 | Status: SHIPPED | OUTPATIENT
Start: 2021-07-30 | End: 2021-08-02

## 2021-07-30 RX ADMIN — CARBIDOPA AND LEVODOPA 1 TABLET: 25; 100 TABLET, ORALLY DISINTEGRATING ORAL at 09:00

## 2021-07-30 RX ADMIN — Medication 10 ML: at 05:07

## 2021-07-30 RX ADMIN — CARBIDOPA AND LEVODOPA 1 TABLET: 25; 100 TABLET, ORALLY DISINTEGRATING ORAL at 13:00

## 2021-07-30 RX ADMIN — DEXTROSE MONOHYDRATE 75 ML/HR: 5 INJECTION, SOLUTION INTRAVENOUS at 10:30

## 2021-07-30 RX ADMIN — Medication 1 AMPULE: at 09:00

## 2021-07-30 RX ADMIN — CIPROFLOXACIN 500 MG: 500 TABLET, FILM COATED ORAL at 09:00

## 2021-07-30 RX ADMIN — RDII 250 MG CAPSULE 500 MG: at 10:27

## 2021-07-30 RX ADMIN — PYRIDOSTIGMINE BROMIDE 30 MG: 60 TABLET ORAL at 09:00

## 2021-07-30 NOTE — DISCHARGE SUMMARY
Hospitalist Discharge Summary     Patient ID:  Doreene Harada  [id number]  44 y.o.  1940  Admit date: 7/24/2021  8:46 PM  Discharge date and time: 7/30/2021  Attending: Patricia Ferguson MD  PCP:  Abbi Cheng MD  Treatment Team: Attending Provider: Patricia Ferguson MD; Consulting Provider: Marija Talavera MD; Hospitalist: Jacqueline Hayward MD; Utilization Review: Bertha Pryor RN; Care Manager: Sia Guo Primary Nurse: Simon Ponce RN    Principal Diagnosis Fall   Principal Problem:    Fall (7/25/2021)    Active Problems:    Acoustic neuroma syndrome ([address] (1/30/2015)      Dyslipidemia (1/30/2015)      Parkinsons disease ([address] (6/2/2016)      Essential hypertension (12/22/2017)      Tourette syndrome (4/20/2018)      Dementia due to Parkinson's disease without behavioral disturbance ([address] (4/20/2018)      Neurologic orthostatic hypotension ([address] (8/16/2018)      Severe protein-calorie malnutrition ([address] (7/27/2021)      Oropharyngeal dysphagia (7/27/2021)      Fracture of one rib (7/27/2021)      Acute cystitis without hematuria (7/28/2021)       Doreene Harada is a 79 y.o. male with medical history significant for hypertension, dementia, tremors, Tourette's syndrome, neurologic orthostatic hypotension, that presents in the setting of a fall and worsening mentation. CVA without any narrowing abnormality.  CXR without any signs of pneumonia.  Without any pyuria or bacteremia. EEG shows continuous sleep. Neurology consulted and recommended stopping Sinemet and starting on Parcopa. Mental status has improved and he is AAOx2. Hospital Course:  Acute metabolic encephalopathy and fall in the setting of physical deconditioning  CVA without any narrowing abnormality. CXR without any signs of pneumonia.   Without any pyuria or bacteremia.  -Avoid sedatives  -Monitor mental status  -Delirium precaution  7/28:  Patient mentation is at baseline however in view of her underlying UTI as suggested on UA patient will be started on oral antibiotic from today. 7/30: pt is doing well, mentation is at baseline. No changes overnight.     Febrile episode  Acute cystitis without hematuria  Temp of 100.6 overnight on 7/26  - CXR and UA   - monitor off abx for now  7/28: He was suggestive of UTI. Started on ciprofloxacin 500 mg p.o. twice daily for 5 days  7/30: plan to continue Cipro 500 mg po bid, D3  Improving. Continue for 2 more days     Oropharyngeal dysphagia   - SLP following s/p MBS  - on Pureed diet  7/28:  Oropharyngeal dysphagia due to progressive Parkinson's disease. Speech therapy has recommended dysphagia diet minced and moist with mildly thick (nectar consistency)  7/30: no change, pt continues to tolerate his diet well.     Parkinson's disease and worsening encephalopathy  7/25 EEG done showing continuous sleep. Neurology following with recs:  - Sinemet stopped and Parcopa started     Neurologic orthostatic hypotension: Continue pyridostigmine     8th rib fx: Stable at present     Protein Calorie Malnutrition  Thin elderly male with dementia. Temporal wasting, visible clavicle and rib cage. - PO nutrition supplement ordered. 7/30:  Continue ensure with meals in STR    Significant Diagnostic Studies:   MODIFIED BARIUM SWALLOW.     HISTORY: Dysphagia. Feeding difficulties.     TECHNIQUE: Fluoroscopic guidance was provided for the speech pathologist.  Multiple consistencies of barium were given.     FINDINGS: Laryngeal penetration. No tracheal aspiration. Please see speech  pathologist report for further details.     Fluoroscopy time: 1.3 minutes. Static images: None.     IMPRESSION  Laryngeal penetration without tracheal aspiration.   Please see  speech pathologist report for further details.       Labs: Results:       Chemistry Recent Labs     07/29/21  0512 07/28/21  0624   GLU 86 88    145   K 3.9 3.9   * 112*   CO2 30 31   BUN 25* 16   CREA 0.86 0.77* CA 9.0 9.0   AGAP 2* 2*      CBC w/Diff Recent Labs     07/29/21  0512 07/28/21  0624   WBC 4.3 3.4*   RBC 3.46* 3.73*   HGB 10.3* 11.1*   HCT 33.0* 35.1*   * 91*      Cardiac Enzymes No results for input(s): CPK, CKND1, SHAY in the last 72 hours. No lab exists for component: CKRMB, TROIP   Coagulation No results for input(s): PTP, INR, APTT, INREXT in the last 72 hours. Lipid Panel Lab Results   Component Value Date/Time    Cholesterol, total 141 09/16/2020 10:13 AM    HDL Cholesterol 62 09/16/2020 10:13 AM    LDL, calculated 68 09/16/2020 10:13 AM    LDL, calculated 61 06/13/2019 10:59 AM    VLDL, calculated 11 09/16/2020 10:13 AM    VLDL, calculated 10 06/13/2019 10:59 AM    Triglyceride 47 09/16/2020 10:13 AM      BNP No results for input(s): BNPP in the last 72 hours. Liver Enzymes No results for input(s): TP, ALB, TBIL, AP in the last 72 hours. No lab exists for component: SGOT, GPT, DBIL   Thyroid Studies Lab Results   Component Value Date/Time    TSH 2.340 06/13/2019 10:59 AM            Discharge Exam:  Visit Vitals  /72 (BP 1 Location: Right upper arm, BP Patient Position: At rest)   Pulse (!) 56   Temp 97.8 °F (36.6 °C)   Resp 20   Ht 5' 10\" (1.778 m)   Wt 66.9 kg (147 lb 8 oz)   SpO2 99%   BMI 21.16 kg/m²     General:                     Alert, awake, elderly, NAD, temporal muscle wasting  HENT:                         head NCAT, PERRLA  Eyes:                           anicteric sclerae, normal conjunctiva, EOMI  Neck:                          supple, non-tender. Trachea midline. Lungs:                        CTAB, no wheezing, rhonchi, rales  Cardiac:                      RRR, Normal S1 and S2.    Abdomen:                  Soft, non distended, nontender, +BS  Extremities:               No edema , pedal pulses present  Skin:                           Warn, dry, normal turgor and texture  Neuro:                        gcs 15, moving all 4 extremities spontaneously, following commands. Psychiatric:                AOx2, flat affect    Disposition: SNF  Discharge Condition: stable  Patient Instructions:   Current Discharge Medication List      START taking these medications    Details   ciprofloxacin HCl (CIPRO) 500 mg tablet Take 1 Tablet by mouth every twelve (12) hours for 3 days. Qty: 6 Tablet, Refills: 0  Start date: 7/30/2021, End date: 8/2/2021      Saccharomyces boulardii (FLORASTOR) 250 mg capsule Take 2 Capsules by mouth two (2) times a day for 7 days. Qty: 28 Capsule, Refills: 0  Start date: 7/30/2021, End date: 8/6/2021         CONTINUE these medications which have CHANGED    Details   clonazePAM (KlonoPIN) 0.5 mg tablet 1/2 tabs every 4 hrs as needed for anxiety and agitation. Not to exceed 1mg in 24 hrs. Qty: 15 Tablet, Refills: 0  Start date: 7/30/2021    Associated Diagnoses: Anxiety         CONTINUE these medications which have NOT CHANGED    Details   ergocalciferol (ERGOCALCIFEROL) 1,250 mcg (50,000 unit) capsule Take 50,000 Units by mouth every seven (7) days. multivitamin (ONE A DAY) tablet Take 1 Tab by mouth daily. rivastigmine (Exelon) 13.3 mg/24 hour patch 1 Patch by TransDERmal route daily. Qty: 90 Patch, Refills: 3    Associated Diagnoses: Dementia due to Parkinson's disease without behavioral disturbance (HCC)      memantine (NAMENDA) 10 mg tablet Take 1 Tab by mouth two (2) times a day. Qty: 180 Tab, Refills: 3    Associated Diagnoses: Dementia due to Parkinson's disease without behavioral disturbance (HCC)      pyridostigmine (Mestinon) 60 mg tablet Take 1/2 tablet at 10 am and at 6 pm daily to support blood pressure. Qty: 30 Tab, Refills: 3    Associated Diagnoses: Neurologic orthostatic hypotension (HCC)      montelukast (SINGULAIR) 10 mg tablet Take 1 Tab by mouth daily. Qty: 90 Tab, Refills: 3      clotrimazole-betamethasone (LOTRISONE) topical cream Apply  to affected area two (2) times a day.   Qty: 15 g, Refills: 0    Associated Diagnoses: Dermatitis      escitalopram oxalate (Lexapro) 10 mg tablet Take 1 Tab by mouth daily. Qty: 90 Tab, Refills: 3    Associated Diagnoses: Parkinsons disease (HCC)      aspirin delayed-release 81 mg tablet Take 1 Tab by mouth daily. Qty: 30 Tab, Refills: 0      pimozide (ORAP) 1 mg tablet Take 1/2 tab at 8am, 4pm and 8pm  Qty: 270 Tab, Refills: 3    Associated Diagnoses: Parkinsons disease (HCC)      carbidopa-levodopa (SINEMET)  mg per tablet TAKE ONE TABLET BY MOUTH FOUR TIMES A DAY  Qty: 360 Tab, Refills: 3    Associated Diagnoses: Parkinsons disease (HCC)      atorvastatin (LIPITOR) 10 mg tablet Take 1 Tab by mouth daily. Qty: 90 Tab, Refills: 3    Associated Diagnoses: Dyslipidemia      nitroglycerin (NITROSTAT) 0.4 mg SL tablet Take 1 Tab by mouth every five (5) minutes as needed for Chest Pain. Sit/Lay down then put one tab under the tongue every 5 minutes as needed for chest pain for 3 doses  Qty: 1 Bottle, Refills: 0         STOP taking these medications       divalproex (Depakote Sprinkles) 125 mg capsule Comments:   Reason for Stopping:         carbidopa-levodopa (SINEMET)  mg per tablet Comments:   Reason for Stopping:         carbidopa-levodopa ER (SINEMET CR)  mg per tablet Comments:   Reason for Stopping:         OTHER Comments:   Reason for Stopping:         QUEtiapine (SEROquel) 25 mg tablet Comments:   Reason for Stopping:         nystatin (MYCOSTATIN) powder Comments:   Reason for Stopping:         carbidopa-levodopa ER (SINEMET CR)  mg per tablet Comments:   Reason for Stopping:               Activity: PT/OT Eval and Treat  Diet: DYSPHAGIA DIET: Minced and moist, mildly thick  Wound Care: None needed    Follow-up  ·   Follow up with physician in STR  · Follow up with Neurology in 2-3 weeks as outpatient.   Time spent to discharge patient 35 minutes  Signed:  Hale Phalen, MD  7/30/2021  7:16 AM

## 2021-07-30 NOTE — PROGRESS NOTES
Problem: Pressure Injury - Risk of  Goal: *Prevention of pressure injury  Description: Document Deepak Scale and appropriate interventions in the flowsheet. Outcome: Progressing Towards Goal  Note: Pressure Injury Interventions:  Sensory Interventions: Float heels    Moisture Interventions: Absorbent underpads    Activity Interventions: Increase time out of bed    Mobility Interventions: HOB 30 degrees or less    Nutrition Interventions: Document food/fluid/supplement intake    Friction and Shear Interventions: Lift sheet                Problem: Patient Education: Go to Patient Education Activity  Goal: Patient/Family Education  Outcome: Progressing Towards Goal     Problem: Falls - Risk of  Goal: *Absence of Falls  Description: Document Vasquez Fall Risk and appropriate interventions in the flowsheet.   Outcome: Progressing Towards Goal  Note: Fall Risk Interventions:  Mobility Interventions: Communicate number of staff needed for ambulation/transfer    Mentation Interventions: Bed/chair exit alarm    Medication Interventions: Bed/chair exit alarm    Elimination Interventions: Bed/chair exit alarm    History of Falls Interventions: Bed/chair exit alarm         Problem: Patient Education: Go to Patient Education Activity  Goal: Patient/Family Education  Outcome: Progressing Towards Goal     Problem: Patient Education: Go to Patient Education Activity  Goal: Patient/Family Education  Outcome: Progressing Towards Goal     Problem: Patient Education: Go to Patient Education Activity  Goal: Patient/Family Education  Outcome: Progressing Towards Goal     Problem: Patient Education: Go to Patient Education Activity  Goal: Patient/Family Education  Outcome: Progressing Towards Goal

## 2021-07-30 NOTE — PROGRESS NOTES
Patient to be discharged to [facility] for STR today. He will go to room 226; primary RN to call report to [phone]. Transport arranged with [facility] for next available time of 1330; primary RN notified. Prescriptions for Klonopin and Cipro placed in packet. CM spoke with patient's daughter, Ledy Barrera, by phone; notified her of the above; she is in agreement with discharge plan and is on the way to facility to sign paperwork. CM will remain available should new needs arise. Care Management Interventions  PCP Verified by CM: Yes  Mode of Transport at Discharge: [facility] Transport Time of Discharge: [address] (CM Consult): SNF  Partner SNF: No  Reason Why Partner SNF Not Chosen: Friend/family recommendation  Discharge Durable Medical Equipment: No  Physical Therapy Consult: Yes  Occupational Therapy Consult: Yes  Speech Therapy Consult: Yes  Current Support Network: Own Home  Confirm Follow Up Transport: Family  The Plan for Transition of Care is Related to the Following Treatment Goals : Return to previous level of function.   The Patient and/or Patient Representative was Provided with a Choice of Provider and Agrees with the Discharge Plan?: Yes  Name of the Patient Representative Who was Provided with a Choice of Provider and Agrees with the Discharge Plan: spouse   Freedom of Choice List was Provided with Basic Dialogue that Supports the Patient's Individualized Plan of Care/Goals, Treatment Preferences and Shares the Quality Data Associated with the Providers?: Yes  [employer] Information Provided?: No  Discharge Location  Discharge Placement: Skilled nursing facility ([facility])

## 2021-07-30 NOTE — PROGRESS NOTES
Pt has been discharged to [facility]. Hourly rounds completed. PIVs removed with no bleeding. Report called to Lobito with [facility]. Opportunity for questions provided. All needs met at this time.

## 2021-07-30 NOTE — PROGRESS NOTES
Hourly rounds completed, all needs met. Bed locked low call light within reach, will continue to monitor care and will give report to oncoming RN.   Date 07/29/21 0700 - 07/30/21 0659 07/30/21 0700 - 07/31/21 0659   Shift 8785-0291 4962-2077 24 Hour Total 0444-9467 9256-5736 24 Hour Total   INTAKE   P.O. 354  354        P.O. 354  354      Shift Total(mL/kg) 354(5.3)  354(5.3)      OUTPUT   Urine(mL/kg/hr) 950(1.2) 850 1800        Urine Output (mL) (External Urinary Catheter 07/25/21)       Shift Total(mL/kg) 950(14.3) 850(12.7) 1800(26.9)      NET -[phone]      Weight (kg) 66.3 66.9 66.9 66.9 66.9 66.9

## 2021-07-30 NOTE — PROGRESS NOTES
Progress Note    Patient: Maggie Nguyen MRN: [id number]  SSN: [id number]    YOB: 1940  Age: 79 y.o. Sex: male      Admit Date: 7/24/2021    LOS: 5 days     Subjective:     Patient seen. He is more awake sitting up in bed and is attempting to eat and it is evident at the present time that he has severe action and rest tremor in the right upper extremity    He remains relatively nonverbal in terms of specific clinical complaints reflective of his Parkinson dementia    Objective:     Vitals:    07/29/21 1956 07/29/21 2317 07/30/21 0452 07/30/21 0706   BP: 122/74 106/73 131/79 130/72   Pulse: 70 72 61 (!) 56   Resp: 18 18 20 20   Temp: 97.8 °F (36.6 °C) 98.5 °F (36.9 °C) 98.4 °F (36.9 °C) 97.8 °F (36.6 °C)   SpO2: 95% 96% 98% 99%   Weight:   147 lb 8 oz (66.9 kg)    Height:            Intake and Output:  Current Shift: No intake/output data recorded. Last three shifts: 07/28 1901 - 07/30 0700  In: 354 [P.O.:354]  Out: 2100 [Urine:2100]    Physical Exam:   He is alert he is oriented to the fact he is in the hospital he cannot tell us who the current president is.   He also is confused on the year  The patient is masked from a facial expression standpoint  Motor examination demonstrates the presence of action and rest tremor in the right upper extremity  No attempt to walk the patient currently    Lab/Data Review:  Recent Results (from the past 24 hour(s))   PLEASE READ & DOCUMENT PPD TEST IN 48 HRS    Collection Time: 07/29/21 10:51 AM   Result Value Ref Range    PPD Negative Negative    mm Induration 0 0 - 5 mm   CBC W/O DIFF    Collection Time: 07/30/21  6:47 AM   Result Value Ref Range    WBC 3.4 (L) 4.3 - 11.1 K/uL    RBC 3.47 (L) 4.23 - 5.6 M/uL    HGB 10.5 (L) 13.6 - 17.2 g/dL    HCT 33.0 (L) 41.1 - 50.3 %    MCV 95.1 79.6 - 97.8 FL    MCH 30.3 26.1 - 32.9 PG    MCHC 31.8 31.4 - 35.0 g/dL    RDW 13.7 11.9 - 14.6 %    PLATELET 694 (L) 479 - 450 K/uL    MPV 9.9 9.4 - 12.3 FL    ABSOLUTE NRBC 0. 00 0.0 - 0.2 K/uL   METABOLIC PANEL, BASIC    Collection Time: 07/30/21  6:47 AM   Result Value Ref Range    Sodium 144 138 - 145 mmol/L    Potassium 4.0 3.5 - 5.1 mmol/L    Chloride 112 (H) 98 - 107 mmol/L    CO2 30 21 - 32 mmol/L    Anion gap 2 (L) 7 - 16 mmol/L    Glucose 92 65 - 100 mg/dL    BUN 22 8 - 23 MG/DL    Creatinine 0.84 0.8 - 1.5 MG/DL    GFR est AA >60 >60 ml/min/1.73m2    GFR est non-AA >60 >60 ml/min/1.73m2    Calcium 8.9 8.3 - 10.4 MG/DL          Assessment:     Principal Problem:    Fall (7/25/2021)    Active Problems:    Acoustic neuroma syndrome ([address] (1/30/2015)      Dyslipidemia (1/30/2015)      Parkinsons disease ([address] (6/2/2016)      Essential hypertension (12/22/2017)      Tourette syndrome (4/20/2018)      Dementia due to Parkinson's disease without behavioral disturbance ([address] (4/20/2018)      Neurologic orthostatic hypotension (HCC) (8/16/2018)      Severe protein-calorie malnutrition ([address] (7/27/2021)      Oropharyngeal dysphagia (7/27/2021)      Fracture of one rib (7/27/2021)      Acute cystitis without hematuria (7/28/2021)        Plan:     Currently the patient pickers underdosed and his right arm tremor certainly is relatively dysfunctional with reference to his eating    We are limited in terms of additional dopaminergic therapy with regards to his orthostatic hypotension we are also limited with regards to his dementia in terms of utilizing other agents such as dopamine agonists  Trial on an MAO B inhibitor will be attemptedunfortunately neither selegiline nor rasagiline is available on the hospital pharmacy so we are somewhat inhibited in terms of exactly what we can do    Signed By: Charla Flores MD     July 30, 2021

## 2021-07-31 LAB
SARS-COV-2, COV2: NOT DETECTED
SPECIMEN SOURCE, FCOV2M: NORMAL

## 2021-11-09 ENCOUNTER — HOSPITAL ENCOUNTER (INPATIENT)
Age: 81
LOS: 2 days | Discharge: HOME | DRG: 871 | End: 2021-11-11
Attending: STUDENT IN AN ORGANIZED HEALTH CARE EDUCATION/TRAINING PROGRAM | Admitting: INTERNAL MEDICINE
Payer: MEDICARE

## 2021-11-09 ENCOUNTER — APPOINTMENT (OUTPATIENT)
Dept: GENERAL RADIOLOGY | Age: 81
DRG: 871 | End: 2021-11-09
Attending: STUDENT IN AN ORGANIZED HEALTH CARE EDUCATION/TRAINING PROGRAM
Payer: MEDICARE

## 2021-11-09 DIAGNOSIS — A41.9 SEPSIS, DUE TO UNSPECIFIED ORGANISM, UNSPECIFIED WHETHER ACUTE ORGAN DYSFUNCTION PRESENT (HCC): Primary | ICD-10-CM

## 2021-11-09 DIAGNOSIS — R09.02 HYPOXIA: ICD-10-CM

## 2021-11-09 DIAGNOSIS — J18.9 PNEUMONIA OF BOTH LOWER LOBES DUE TO INFECTIOUS ORGANISM: ICD-10-CM

## 2021-11-09 PROBLEM — E87.0 DEHYDRATION WITH HYPERNATREMIA: Status: ACTIVE | Noted: 2021-11-09

## 2021-11-09 PROBLEM — A41.50 SEPSIS, GRAM NEGATIVE (HCC): Status: ACTIVE | Noted: 2021-11-09

## 2021-11-09 PROBLEM — G20 DEMENTIA DUE TO PARKINSON'S DISEASE WITH BEHAVIORAL DISTURBANCE (HCC): Chronic | Status: ACTIVE | Noted: 2018-04-20

## 2021-11-09 PROBLEM — G90.3 NEUROLOGIC ORTHOSTATIC HYPOTENSION (HCC): Chronic | Status: ACTIVE | Noted: 2018-08-16

## 2021-11-09 PROBLEM — I10 ESSENTIAL HYPERTENSION: Chronic | Status: ACTIVE | Noted: 2017-12-22

## 2021-11-09 PROBLEM — J96.01 ACUTE RESPIRATORY FAILURE WITH HYPOXIA (HCC): Status: ACTIVE | Noted: 2021-11-09

## 2021-11-09 PROBLEM — F02.818 DEMENTIA DUE TO PARKINSON'S DISEASE WITH BEHAVIORAL DISTURBANCE (HCC): Chronic | Status: ACTIVE | Noted: 2018-04-20

## 2021-11-09 PROBLEM — D64.9 ANEMIA: Chronic | Status: ACTIVE | Noted: 2021-11-09

## 2021-11-09 PROBLEM — E43 SEVERE PROTEIN-CALORIE MALNUTRITION (HCC): Chronic | Status: ACTIVE | Noted: 2021-07-27

## 2021-11-09 PROBLEM — E86.0 DEHYDRATION WITH HYPERNATREMIA: Status: ACTIVE | Noted: 2021-11-09

## 2021-11-09 PROBLEM — F02.818 DEMENTIA DUE TO PARKINSON'S DISEASE WITH BEHAVIORAL DISTURBANCE (HCC): Status: ACTIVE | Noted: 2018-04-20

## 2021-11-09 PROBLEM — J69.0 ASPIRATION PNEUMONIA (HCC): Status: ACTIVE | Noted: 2021-11-09

## 2021-11-09 PROBLEM — N17.9 AKI (ACUTE KIDNEY INJURY) (HCC): Status: ACTIVE | Noted: 2021-11-09

## 2021-11-09 LAB
ALBUMIN SERPL-MCNC: 2.1 G/DL (ref 3.2–4.6)
ALBUMIN/GLOB SERPL: 0.6 {RATIO} (ref 1.2–3.5)
ALP SERPL-CCNC: 51 U/L (ref 50–136)
ALT SERPL-CCNC: 14 U/L (ref 12–65)
ANION GAP SERPL CALC-SCNC: 4 MMOL/L (ref 7–16)
AST SERPL-CCNC: 44 U/L (ref 15–37)
ATRIAL RATE: 97 BPM
BASOPHILS # BLD: 0 K/UL (ref 0–0.2)
BASOPHILS NFR BLD: 0 % (ref 0–2)
BILIRUB SERPL-MCNC: 0.6 MG/DL (ref 0.2–1.1)
BUN SERPL-MCNC: 56 MG/DL (ref 8–23)
CALCIUM SERPL-MCNC: 8.8 MG/DL (ref 8.3–10.4)
CALCULATED P AXIS, ECG09: 18 DEGREES
CALCULATED R AXIS, ECG10: -16 DEGREES
CALCULATED T AXIS, ECG11: 69 DEGREES
CHLORIDE SERPL-SCNC: 121 MMOL/L (ref 98–107)
CO2 SERPL-SCNC: 27 MMOL/L (ref 21–32)
COVID-19 RAPID TEST, COVR: NOT DETECTED
CREAT SERPL-MCNC: 1.62 MG/DL (ref 0.8–1.5)
DIAGNOSIS, 93000: NORMAL
DIFFERENTIAL METHOD BLD: ABNORMAL
EOSINOPHIL # BLD: 0 K/UL (ref 0–0.8)
EOSINOPHIL NFR BLD: 0 % (ref 0.5–7.8)
ERYTHROCYTE [DISTWIDTH] IN BLOOD BY AUTOMATED COUNT: 14.1 % (ref 11.9–14.6)
GLOBULIN SER CALC-MCNC: 3.5 G/DL (ref 2.3–3.5)
GLUCOSE SERPL-MCNC: 133 MG/DL (ref 65–100)
HCT VFR BLD AUTO: 37.2 % (ref 41.1–50.3)
HGB BLD-MCNC: 11.2 G/DL (ref 13.6–17.2)
IMM GRANULOCYTES # BLD AUTO: 0.3 K/UL (ref 0–0.5)
IMM GRANULOCYTES NFR BLD AUTO: 8 % (ref 0–5)
LACTATE SERPL-SCNC: 1.2 MMOL/L (ref 0.4–2)
LYMPHOCYTES # BLD: 0.5 K/UL (ref 0.5–4.6)
LYMPHOCYTES NFR BLD: 15 % (ref 13–44)
MCH RBC QN AUTO: 30.1 PG (ref 26.1–32.9)
MCHC RBC AUTO-ENTMCNC: 30.1 G/DL (ref 31.4–35)
MCV RBC AUTO: 100 FL (ref 79.6–97.8)
MONOCYTES # BLD: 0.4 K/UL (ref 0.1–1.3)
MONOCYTES NFR BLD: 10 % (ref 4–12)
NEUTS SEG # BLD: 2.4 K/UL (ref 1.7–8.2)
NEUTS SEG NFR BLD: 67 % (ref 43–78)
NRBC # BLD: 0 K/UL (ref 0–0.2)
P-R INTERVAL, ECG05: 142 MS
PLATELET # BLD AUTO: 78 K/UL (ref 150–450)
PLATELET COMMENTS,PCOM: ABNORMAL
PMV BLD AUTO: 10.8 FL (ref 9.4–12.3)
POTASSIUM SERPL-SCNC: 4 MMOL/L (ref 3.5–5.1)
PROT SERPL-MCNC: 5.6 G/DL (ref 6.3–8.2)
Q-T INTERVAL, ECG07: 340 MS
QRS DURATION, ECG06: 96 MS
QTC CALCULATION (BEZET), ECG08: 429 MS
RBC # BLD AUTO: 3.72 M/UL (ref 4.23–5.6)
RBC MORPH BLD: ABNORMAL
SODIUM SERPL-SCNC: 152 MMOL/L (ref 136–145)
SOURCE, COVRS: NORMAL
VENTRICULAR RATE, ECG03: 96 BPM
WBC # BLD AUTO: 3.6 K/UL (ref 4.3–11.1)
WBC MORPH BLD: ABNORMAL

## 2021-11-09 PROCEDURE — 74011000258 HC RX REV CODE- 258: Performed by: STUDENT IN AN ORGANIZED HEALTH CARE EDUCATION/TRAINING PROGRAM

## 2021-11-09 PROCEDURE — 36415 COLL VENOUS BLD VENIPUNCTURE: CPT

## 2021-11-09 PROCEDURE — 96361 HYDRATE IV INFUSION ADD-ON: CPT

## 2021-11-09 PROCEDURE — 93005 ELECTROCARDIOGRAM TRACING: CPT | Performed by: STUDENT IN AN ORGANIZED HEALTH CARE EDUCATION/TRAINING PROGRAM

## 2021-11-09 PROCEDURE — 99285 EMERGENCY DEPT VISIT HI MDM: CPT

## 2021-11-09 PROCEDURE — 65270000029 HC RM PRIVATE

## 2021-11-09 PROCEDURE — 83605 ASSAY OF LACTIC ACID: CPT

## 2021-11-09 PROCEDURE — 80053 COMPREHEN METABOLIC PANEL: CPT

## 2021-11-09 PROCEDURE — 74011250636 HC RX REV CODE- 250/636: Performed by: STUDENT IN AN ORGANIZED HEALTH CARE EDUCATION/TRAINING PROGRAM

## 2021-11-09 PROCEDURE — 96365 THER/PROPH/DIAG IV INF INIT: CPT

## 2021-11-09 PROCEDURE — 74011250636 HC RX REV CODE- 250/636: Performed by: INTERNAL MEDICINE

## 2021-11-09 PROCEDURE — 96375 TX/PRO/DX INJ NEW DRUG ADDON: CPT

## 2021-11-09 PROCEDURE — 85025 COMPLETE CBC W/AUTO DIFF WBC: CPT

## 2021-11-09 PROCEDURE — 87635 SARS-COV-2 COVID-19 AMP PRB: CPT

## 2021-11-09 PROCEDURE — 74011250637 HC RX REV CODE- 250/637: Performed by: STUDENT IN AN ORGANIZED HEALTH CARE EDUCATION/TRAINING PROGRAM

## 2021-11-09 PROCEDURE — 87040 BLOOD CULTURE FOR BACTERIA: CPT

## 2021-11-09 PROCEDURE — 71045 X-RAY EXAM CHEST 1 VIEW: CPT

## 2021-11-09 RX ORDER — VANCOMYCIN/0.9 % SOD CHLORIDE 1.5G/250ML
1500 PLASTIC BAG, INJECTION (ML) INTRAVENOUS
Status: COMPLETED | OUTPATIENT
Start: 2021-11-09 | End: 2021-11-09

## 2021-11-09 RX ORDER — SODIUM CHLORIDE 0.9 % (FLUSH) 0.9 %
5-10 SYRINGE (ML) INJECTION AS NEEDED
Status: DISCONTINUED | OUTPATIENT
Start: 2021-11-09 | End: 2021-11-11 | Stop reason: HOSPADM

## 2021-11-09 RX ORDER — SODIUM CHLORIDE 0.9 % (FLUSH) 0.9 %
5-10 SYRINGE (ML) INJECTION EVERY 8 HOURS
Status: DISCONTINUED | OUTPATIENT
Start: 2021-11-09 | End: 2021-11-11 | Stop reason: HOSPADM

## 2021-11-09 RX ORDER — LORAZEPAM 2 MG/ML
1 INJECTION INTRAMUSCULAR
Status: DISCONTINUED | OUTPATIENT
Start: 2021-11-09 | End: 2021-11-11 | Stop reason: HOSPADM

## 2021-11-09 RX ORDER — DEXTROSE MONOHYDRATE 50 MG/ML
125 INJECTION, SOLUTION INTRAVENOUS CONTINUOUS
Status: DISCONTINUED | OUTPATIENT
Start: 2021-11-09 | End: 2021-11-11 | Stop reason: HOSPADM

## 2021-11-09 RX ORDER — ACETAMINOPHEN 650 MG/1
975 SUPPOSITORY RECTAL
Status: COMPLETED | OUTPATIENT
Start: 2021-11-09 | End: 2021-11-09

## 2021-11-09 RX ADMIN — Medication 10 ML: at 22:55

## 2021-11-09 RX ADMIN — DEXTROSE MONOHYDRATE 125 ML/HR: 5 INJECTION, SOLUTION INTRAVENOUS at 23:37

## 2021-11-09 RX ADMIN — SODIUM CHLORIDE 1000 ML: 900 INJECTION, SOLUTION INTRAVENOUS at 21:00

## 2021-11-09 RX ADMIN — VANCOMYCIN HYDROCHLORIDE 1500 MG: 10 INJECTION, POWDER, LYOPHILIZED, FOR SOLUTION INTRAVENOUS at 21:09

## 2021-11-09 RX ADMIN — PIPERACILLIN SODIUM AND TAZOBACTAM SODIUM 4.5 G: 4; .5 INJECTION, POWDER, LYOPHILIZED, FOR SOLUTION INTRAVENOUS at 20:59

## 2021-11-09 RX ADMIN — SODIUM CHLORIDE 1000 ML: 900 INJECTION, SOLUTION INTRAVENOUS at 22:04

## 2021-11-09 RX ADMIN — ACETAMINOPHEN 975 MG: 650 SUPPOSITORY RECTAL at 20:56

## 2021-11-10 LAB — LACTATE SERPL-SCNC: 0.9 MMOL/L (ref 0.4–2)

## 2021-11-10 PROCEDURE — 94640 AIRWAY INHALATION TREATMENT: CPT

## 2021-11-10 PROCEDURE — 74011000258 HC RX REV CODE- 258: Performed by: STUDENT IN AN ORGANIZED HEALTH CARE EDUCATION/TRAINING PROGRAM

## 2021-11-10 PROCEDURE — 94762 N-INVAS EAR/PLS OXIMTRY CONT: CPT

## 2021-11-10 PROCEDURE — 94664 DEMO&/EVAL PT USE INHALER: CPT

## 2021-11-10 PROCEDURE — 65270000029 HC RM PRIVATE

## 2021-11-10 PROCEDURE — 74011000250 HC RX REV CODE- 250: Performed by: INTERNAL MEDICINE

## 2021-11-10 PROCEDURE — 74011250637 HC RX REV CODE- 250/637: Performed by: INTERNAL MEDICINE

## 2021-11-10 PROCEDURE — 74011250636 HC RX REV CODE- 250/636: Performed by: INTERNAL MEDICINE

## 2021-11-10 PROCEDURE — 92610 EVALUATE SWALLOWING FUNCTION: CPT

## 2021-11-10 PROCEDURE — 77010033711 HC HIGH FLOW OXYGEN

## 2021-11-10 PROCEDURE — 74011000258 HC RX REV CODE- 258: Performed by: INTERNAL MEDICINE

## 2021-11-10 RX ORDER — SODIUM CHLORIDE 0.9 % (FLUSH) 0.9 %
5-40 SYRINGE (ML) INJECTION AS NEEDED
Status: DISCONTINUED | OUTPATIENT
Start: 2021-11-10 | End: 2021-11-11 | Stop reason: HOSPADM

## 2021-11-10 RX ORDER — MAG HYDROX/ALUMINUM HYD/SIMETH 200-200-20
15 SUSPENSION, ORAL (FINAL DOSE FORM) ORAL
Status: DISCONTINUED | OUTPATIENT
Start: 2021-11-10 | End: 2021-11-11 | Stop reason: HOSPADM

## 2021-11-10 RX ORDER — ASPIRIN 81 MG/1
81 TABLET ORAL DAILY
Status: DISCONTINUED | OUTPATIENT
Start: 2021-11-10 | End: 2021-11-11 | Stop reason: HOSPADM

## 2021-11-10 RX ORDER — FAMOTIDINE 20 MG/1
20 TABLET, FILM COATED ORAL
Status: DISCONTINUED | OUTPATIENT
Start: 2021-11-10 | End: 2021-11-11 | Stop reason: HOSPADM

## 2021-11-10 RX ORDER — ESCITALOPRAM OXALATE 10 MG/1
10 TABLET ORAL DAILY
Status: DISCONTINUED | OUTPATIENT
Start: 2021-11-10 | End: 2021-11-11 | Stop reason: HOSPADM

## 2021-11-10 RX ORDER — ATORVASTATIN CALCIUM 10 MG/1
10 TABLET, FILM COATED ORAL
Status: DISCONTINUED | OUTPATIENT
Start: 2021-11-10 | End: 2021-11-11

## 2021-11-10 RX ORDER — ONDANSETRON 2 MG/ML
4 INJECTION INTRAMUSCULAR; INTRAVENOUS
Status: DISCONTINUED | OUTPATIENT
Start: 2021-11-10 | End: 2021-11-11 | Stop reason: HOSPADM

## 2021-11-10 RX ORDER — MONTELUKAST SODIUM 10 MG/1
10 TABLET ORAL DAILY
Status: DISCONTINUED | OUTPATIENT
Start: 2021-11-10 | End: 2021-11-11 | Stop reason: HOSPADM

## 2021-11-10 RX ORDER — SODIUM CHLORIDE 0.9 % (FLUSH) 0.9 %
5-40 SYRINGE (ML) INJECTION EVERY 8 HOURS
Status: DISCONTINUED | OUTPATIENT
Start: 2021-11-10 | End: 2021-11-11 | Stop reason: HOSPADM

## 2021-11-10 RX ORDER — ACETAMINOPHEN 650 MG/1
650 SUPPOSITORY RECTAL
Status: DISCONTINUED | OUTPATIENT
Start: 2021-11-10 | End: 2021-11-11 | Stop reason: HOSPADM

## 2021-11-10 RX ORDER — MEMANTINE HYDROCHLORIDE 5 MG/1
10 TABLET ORAL 2 TIMES DAILY
Status: DISCONTINUED | OUTPATIENT
Start: 2021-11-10 | End: 2021-11-11 | Stop reason: HOSPADM

## 2021-11-10 RX ORDER — SODIUM CHLORIDE FOR INHALATION 3 %
4 VIAL, NEBULIZER (ML) INHALATION
Status: DISCONTINUED | OUTPATIENT
Start: 2021-11-10 | End: 2021-11-11 | Stop reason: HOSPADM

## 2021-11-10 RX ORDER — ONDANSETRON 4 MG/1
4 TABLET, ORALLY DISINTEGRATING ORAL
Status: DISCONTINUED | OUTPATIENT
Start: 2021-11-10 | End: 2021-11-11 | Stop reason: HOSPADM

## 2021-11-10 RX ORDER — ALBUTEROL SULFATE 0.83 MG/ML
2.5 SOLUTION RESPIRATORY (INHALATION)
Status: DISCONTINUED | OUTPATIENT
Start: 2021-11-10 | End: 2021-11-11 | Stop reason: HOSPADM

## 2021-11-10 RX ORDER — ENOXAPARIN SODIUM 100 MG/ML
40 INJECTION SUBCUTANEOUS DAILY
Status: DISCONTINUED | OUTPATIENT
Start: 2021-11-10 | End: 2021-11-11 | Stop reason: HOSPADM

## 2021-11-10 RX ORDER — RIVASTIGMINE 13.3 MG/24H
1 PATCH, EXTENDED RELEASE TRANSDERMAL DAILY
Status: DISCONTINUED | OUTPATIENT
Start: 2021-11-10 | End: 2021-11-11 | Stop reason: HOSPADM

## 2021-11-10 RX ORDER — PYRIDOSTIGMINE BROMIDE 60 MG/1
30 TABLET ORAL 2 TIMES DAILY
Status: DISCONTINUED | OUTPATIENT
Start: 2021-11-10 | End: 2021-11-11 | Stop reason: HOSPADM

## 2021-11-10 RX ORDER — QUETIAPINE FUMARATE 25 MG/1
25 TABLET, FILM COATED ORAL
Status: DISCONTINUED | OUTPATIENT
Start: 2021-11-10 | End: 2021-11-11 | Stop reason: HOSPADM

## 2021-11-10 RX ORDER — GUAIFENESIN 600 MG/1
1200 TABLET, EXTENDED RELEASE ORAL 2 TIMES DAILY
Status: DISCONTINUED | OUTPATIENT
Start: 2021-11-10 | End: 2021-11-11 | Stop reason: HOSPADM

## 2021-11-10 RX ORDER — CARBIDOPA AND LEVODOPA 25; 250 MG/1; MG/1
1 TABLET ORAL 4 TIMES DAILY
Status: DISCONTINUED | OUTPATIENT
Start: 2021-11-10 | End: 2021-11-11 | Stop reason: HOSPADM

## 2021-11-10 RX ORDER — GUAIFENESIN 600 MG/1
1200 TABLET, EXTENDED RELEASE ORAL 2 TIMES DAILY
Status: DISCONTINUED | OUTPATIENT
Start: 2021-11-10 | End: 2021-11-10 | Stop reason: SDUPTHER

## 2021-11-10 RX ORDER — FACIAL-BODY WIPES
10 EACH TOPICAL DAILY PRN
Status: DISCONTINUED | OUTPATIENT
Start: 2021-11-10 | End: 2021-11-11 | Stop reason: HOSPADM

## 2021-11-10 RX ORDER — ACETAMINOPHEN 325 MG/1
650 TABLET ORAL
Status: DISCONTINUED | OUTPATIENT
Start: 2021-11-10 | End: 2021-11-11 | Stop reason: HOSPADM

## 2021-11-10 RX ORDER — POLYETHYLENE GLYCOL 3350 17 G/17G
17 POWDER, FOR SOLUTION ORAL DAILY PRN
Status: DISCONTINUED | OUTPATIENT
Start: 2021-11-10 | End: 2021-11-11 | Stop reason: HOSPADM

## 2021-11-10 RX ADMIN — DEXTROSE MONOHYDRATE 125 ML/HR: 5 INJECTION, SOLUTION INTRAVENOUS at 07:27

## 2021-11-10 RX ADMIN — Medication 10 ML: at 21:43

## 2021-11-10 RX ADMIN — PIPERACILLIN SODIUM AND TAZOBACTAM SODIUM 4.5 G: 4; .5 INJECTION, POWDER, LYOPHILIZED, FOR SOLUTION INTRAVENOUS at 09:02

## 2021-11-10 RX ADMIN — Medication 10 ML: at 05:16

## 2021-11-10 RX ADMIN — PIPERACILLIN SODIUM AND TAZOBACTAM SODIUM 4.5 G: 4; .5 INJECTION, POWDER, LYOPHILIZED, FOR SOLUTION INTRAVENOUS at 17:05

## 2021-11-10 RX ADMIN — SODIUM CHLORIDE SOLN NEBU 3% 4 ML: 3 NEBU SOLN at 13:33

## 2021-11-10 RX ADMIN — DEXTROSE MONOHYDRATE 125 ML/HR: 5 INJECTION, SOLUTION INTRAVENOUS at 15:32

## 2021-11-10 RX ADMIN — DEXTROSE MONOHYDRATE 125 ML/HR: 5 INJECTION, SOLUTION INTRAVENOUS at 23:49

## 2021-11-10 RX ADMIN — SODIUM CHLORIDE 1 ML: 900 INJECTION, SOLUTION INTRAVENOUS at 00:21

## 2021-11-10 RX ADMIN — SODIUM CHLORIDE SOLN NEBU 3% 4 ML: 3 NEBU SOLN at 20:00

## 2021-11-10 RX ADMIN — PIPERACILLIN SODIUM AND TAZOBACTAM SODIUM 4.5 G: 4; .5 INJECTION, POWDER, LYOPHILIZED, FOR SOLUTION INTRAVENOUS at 23:49

## 2021-11-10 RX ADMIN — Medication 10 ML: at 09:05

## 2021-11-10 RX ADMIN — SODIUM CHLORIDE SOLN NEBU 3% 4 ML: 3 NEBU SOLN at 07:26

## 2021-11-10 RX ADMIN — ENOXAPARIN SODIUM 40 MG: 100 INJECTION SUBCUTANEOUS at 09:08

## 2021-11-10 NOTE — ASSESSMENT & PLAN NOTE
zosyn  -SLP consult;  for now, copied diet from last SLP note  -mucinex  -hypertonic nebs  -albuterol nebs  -daily CBC

## 2021-11-10 NOTE — PROGRESS NOTES
Spoke with Dr. Birdia Fleischer via perfect serve regarding family stating [place] is asking for something to drink\"  MD aware or speech recommendation for NPO. Family is leaning towards hospice at facility. MD ok with pt having sips., HTL and spoon given to family for pt.

## 2021-11-10 NOTE — PROGRESS NOTES
Pt resting in bed with eyes closed. Pt will open eyes when spoken to. Pt on 2 L HF NC at this time. Call light in reach, family at bedside. Will monitor.

## 2021-11-10 NOTE — PROGRESS NOTES
When nurse enters room speaks with pt he responds \"hey\" and then goes back to sleep. Will monitor.

## 2021-11-10 NOTE — PROGRESS NOTES
Care Management Interventions  PCP Verified by CM: Yes  Support Systems: Spouse/Significant Other  Confirm Follow Up Transport: Other (see comment)  Freedom of Choice List was Provided with Basic Dialogue that Supports the Patient's Individualized Plan of Care/Goals, Treatment Preferences and Shares the Quality Data Associated with the Providers?: Yes  [place] Resource Information Provided?: No  Discharge Location  Discharge Placement: Unable to determine at this time  CM called patient's spouse to discuss discharge planning. Patient has been at [facility] for about 8-10 weeks in long term care. Family will be visiting patient later and will meet with CM at that time.

## 2021-11-10 NOTE — ED PROVIDER NOTES
69-year-old male patient from local [facility] presents via EMS with concern for sepsis related to pneumonia and hypoxia. Patient began declining over the past 2 to 3 days per wife's report. He has been coughing, congested and febrile. Pneumonia was diagnosed at his facility in the left lung and Zosyn was started thereafter. Patient last received Zosyn earlier today. EMS reports tachycardia, hypoxia at 84% on room air, normalized with nonrebreather in route. They also reported fever as well. Patient received 400 cc of fluid prior to arrival.  Wife at bedside states patient is fully vaccinated against COVID-19. Patient suffers from Alzheimer's dementia, severe hearing impairment and is unable to provide any meaningful response to questioning or information regarding his current symptoms or past medical history.            Past Medical History:   Diagnosis Date    Acoustic neuroma ([facility] [address] 2012    Right    Cholelithiasis 08/20/2015    Colon polyps 12/31/2014    Tubular Adenoma x 1 & Hyperplastic Polyp x 2    Dementia due to Parkinson's disease without behavioral disturbance ([address] 4/20/2018    DVT (deep venous thrombosis) (HCC) 02/17/2017    Left Superficial Femoral, Popliteal, & Peroneal vein     Dyslipidemia     Environmental allergies     GERD (gastroesophageal reflux disease)     Hiatal hernia     Hypertension     Intractable hiccups     Liver cyst 02/16/2017    R Lobe    Melanoma ([address]     Back    Parkinson disease ([address]     Followed by Suraj Coronado    Prostate cancer [facility])     Tourette syndrome 04/20/2018    \"verbalizes as really loud hiccoughs\"       Past Surgical History:   Procedure Laterality Date    HX COLONOSCOPY  12/31/2014    Tubular Adenoma & Hyperplastic Polyps, Diverticulosis, & Internal Hemorrhoids - Recall 2017    HX HEENT Right     Ear    HX HEMORRHOIDECTOMY      HX HERNIA REPAIR Bilateral     HX KNEE REPLACEMENT Left     HX PROSTATECTOMY  11/2009    [facility]    HX TONSILLECTOMY           Family History:   Problem Relation Age of Onset    Cancer Father 77        Prostate    Heart Disease Brother     Cancer Brother         Prostate    Melanoma Brother     Breast Cancer Mother     Melanoma Mother     COPD Brother        Social History     Socioeconomic History    Marital status:      Spouse name: Not on file    Number of children: Not on file    Years of education: Not on file    Highest education level: Not on file   Occupational History    Not on file   Tobacco Use    Smoking status: Never Smoker    Smokeless tobacco: Never Used   Substance and Sexual Activity    Alcohol use: No    Drug use: No    Sexual activity: Not on file   Other Topics Concern     Service Not Asked    Blood Transfusions Not Asked    Caffeine Concern Not Asked    Occupational Exposure Not Asked    Hobby Hazards Not Asked    Sleep Concern Not Asked    Stress Concern Not Asked    Weight Concern Not Asked    Special Diet Not Asked    Back Care Not Asked    Exercise Not Asked    Bike Helmet Not Asked   [address] Not Asked    Self-Exams Not Asked   Social History Narrative    Not on file     Social Determinants of Health     Financial Resource Strain:     Difficulty of Paying Living Expenses: Not on file   Food Insecurity:     Worried About Running Out of Food in the Last Year: Not on file    Kassandra of Food in the Last Year: Not on file   Transportation Needs:     Lack of Transportation (Medical): Not on file    Lack of Transportation (Non-Medical):  Not on file   Physical Activity:     Days of Exercise per Week: Not on file    Minutes of Exercise per Session: Not on file   Stress:     Feeling of Stress : Not on file   Social Connections:     Frequency of Communication with Friends and Family: Not on file    Frequency of Social Gatherings with Friends and Family: Not on file    Attends Druze Services: Not on file    Active Member of Clubs or Organizations: Not on file    Attends Club or Organization Meetings: Not on file    Marital Status: Not on file   Intimate Partner Violence:     Fear of Current or Ex-Partner: Not on file    Emotionally Abused: Not on file    Physically Abused: Not on file    Sexually Abused: Not on file   Housing Stability:     Unable to Pay for Housing in the Last Year: Not on file    Number of [place] in the Last Year: Not on file    Unstable Housing in the Last Year: Not on file         ALLERGIES: Aricept [donepezil] and Onion    Review of Systems   Unable to perform ROS: Mental status change       Vitals:    11/09/21 1955 11/09/21 2002   BP: 115/72    Pulse: 100    Resp: 24    Temp:  (!) 101.7 °F (38.7 °C)   SpO2: 97%    Weight: 66.7 kg (147 lb)    Height: 5' 10\" (1.778 m)             Physical Exam  Vitals and nursing note reviewed. Constitutional:       General: He is not in acute distress. Appearance: He is well-developed. He is ill-appearing. He is not diaphoretic. Comments: Elderly, slightly disheveled appearing male patient, lethargic appearing, no coherent verbal response to questioning    HENT:      Head: Normocephalic and atraumatic. Right Ear: External ear normal.      Left Ear: External ear normal.      Nose: Nose normal.      Mouth/Throat:      Mouth: Mucous membranes are dry. Eyes:      Pupils: Pupils are equal, round, and reactive to light. Cardiovascular:      Rate and Rhythm: Normal rate and regular rhythm. Heart sounds: Normal heart sounds. No murmur heard. No friction rub. No gallop. Pulmonary:      Effort: Pulmonary effort is normal. No respiratory distress. Breath sounds: No stridor. Decreased breath sounds present. No wheezing, rhonchi or rales. Comments: Diminished, coarse. Chest:      Chest wall: No tenderness. Abdominal:      General: There is no distension. Palpations: Abdomen is soft. There is no mass. Tenderness: There is no abdominal tenderness. There is no guarding or rebound. Hernia: No hernia is present. Musculoskeletal:         General: No tenderness or deformity. Normal range of motion. Cervical back: Normal range of motion. Skin:     General: Skin is warm and dry. Neurological:      Mental Status: He is lethargic. GCS: GCS eye subscore is 4. GCS verbal subscore is 5. GCS motor subscore is 6. Cranial Nerves: No cranial nerve deficit. Psychiatric:      Comments: Lethargic, opens eyes to questioning, no verbal response. MDM  Number of Diagnoses or Management Options  Diagnosis management comments: 48-year-old male patient presenting from outpatient nursing facility, history of dementia, recent pneumonia diagnosis, concern for sepsis with hypoxia. Orders placed for labs, blood cultures, fluid bolus and broad-spectrum antibiotics. Sepsis Re-Assessment Documentation:    Date: 11/9/2021   Time: 9:36 PM      Vital Signs  Level of Consciousness: Responds to Voice (1)  Temp: (!) 101.7 °F (38.7 °C)  Temp Source: Axillary  Pulse (Heart Rate): 96  Resp Rate: 24  BP: 115/72  MAP (Monitor): 86  MAP (Calculated): 86  BP 1 Location: Left upper arm    Sepsis Reassessment note:     Arvind Link meets criteria for Sepsis -    Patient is receiving IV Fluids and Antibiotics per sepsis protocol. I did the Sepsis Reassessment at 9:35 PM 11/09/21.     Earlean Anaya, DO            Amount and/or Complexity of Data Reviewed  Clinical lab tests: ordered and reviewed  Tests in the radiology section of CPT®: ordered and reviewed  Tests in the medicine section of CPT®: ordered and reviewed  Discuss the patient with other providers: yes  Independent visualization of images, tracings, or specimens: yes    Risk of Complications, Morbidity, and/or Mortality  Presenting problems: high  Diagnostic procedures: high  Management options: high      ED Course as of 11/09/21 2131 Tue Nov 09, 2021 2131 EKG interpretation: Sinus rhythm, rate of 96, normal axis, no ischemia. [BR]   2131 X-ray suggest bilateral pneumonia. Covid negative, fully vaccinated.  [BR]      ED Course User Index  [BR] Patito Duron DO       Procedures

## 2021-11-10 NOTE — PROGRESS NOTES
LTG: Patient will tolerate least restrictive diet without overt signs or symptoms of airway compromise. STG: Patient will tolerate po trials with SLP only in attempt to identify least restrictive oral diet. STG: Patient will participate in modified barium swallow study as clinically indicated. SPEECH LANGUAGE PATHOLOGY: DYSPHAGIA- Initial Assessment    NAME/AGE/GENDER: Burr Cogan is a 79 y.o. male  DATE: 11/10/2021  PRIMARY DIAGNOSIS: Aspiration pneumonia ([facility] [address] [J69.0]  MARVEL (acute kidney injury) ([facility] [address] [N17.9]  Dehydration with hypernatremia [E86.0, E87.0]      ICD-10: Treatment Diagnosis: R13.12 Dysphagia, Oropharyngeal Phase    RECOMMENDATIONS   DIET:    NPO    MEDICATIONS: Non-oral     PRECAUTIONS, MODIFICATIONS, AND STRATEGIES  · Oral care every 3 hours       RECOMMENDATIONS FOR CONTINUED SPEECH THERAPY:   YES: Anticipate need for ongoing speech therapy during this hospitalization and as indicated by goals of care. ASSESSMENT   Patient presents with history of severe dysphagia. Modified barium swallow study completed during recent admission. Results as follows: Mr. Kilpatrick Res presents with moderate oropharyngeal dysphagia characterized by reduced pharyngeal constriction and decreased epiglottic inversion, which resulted in mod-max diffuse pharyngeal residue post swallow with liquids and pudding trials. Silent penetration to cords from pyriform residuals post swallow with thin by straw. Shallow nonclearing penetration during and after the swallow from residuals with mildly thick by cup/straw. Patient remains at increased risk for aspiration with all po intake due to significant pharyngeal residue post swallow with all presented consistencies; however, able to adequately protect airway with mildly thick liquids and puree trials. Chewable trials deferred due to edentulous state.    Recommended diet with known aspiration risk: puree and mildly thick liquids by straw (patient unable to hold cup to control sip size) . Meds crushed in puree. Needs 1:1 assistance with meals to ensure use of safe swallowing precautions listed above. Will continue to follow for diet tolerance and laryngeal strengthening exercises.    Patient evaluated at bedside this AM. Overt s/sx of aspiration with delayed cough and + desatting after ice chip trials. 3-4 swallows palpated and mild desatting after nectar by cup. High risk of aspiration given bedside presentation and history of dysphagia. Recommend NPO. Per discussion with MD, family to discuss goals later today. Will continue to follow loosely pending goals of care. EDUCATION:  · Recommendations discussed with Patient, MD and RN    REHABILITATION POTENTIAL FOR STATED GOALS: Fair    PLAN    FREQUENCY/DURATION:   Continue to follow patient 3 times a week for duration of hospital stay to address above goals. Recommendations for next treatment session: Next treatment session will address po trials    CONTINUATION OF SKILLED SERVICES/MEDICAL NECESSITY:   Patient is expected to demonstrate progress in  swallow strength, swallow timeliness, swallow function, diet tolerance and swallow safety in order to  improve swallow safety, work toward diet advancement and decrease aspiration risk.  Patient continues to require skilled intervention due to dysphagia. SUBJECTIVE   Patient easily rouse with verbal and tactile stim. Sats at 96% on 2 L nasal cannula. Dry oral mucosa with open mouth posture.      Oxygen Device: nasal cannula  Pain: Pain Scale 1: Numeric (0 - 10)  Pain Intensity 1: 0    History of Present Injury/Illness: Mr. Flor Mitchell  has a past medical history of Acoustic neuroma ([facility] [address] (2012), Cholelithiasis (08/20/2015), Colon polyps (12/31/2014), Dementia due to Parkinson's disease without behavioral disturbance ([facility] [address] (4/20/2018), [facility] (02/17/2017), Dyslipidemia, Environmental allergies, GERD (gastroesophageal reflux disease), Hiatal hernia, Hypertension, Intractable hiccups, Liver cyst (02/16/2017), Melanoma ([facility] [address] Parkinson disease ([facility] [address] Prostate cancer ([facility] [address] and Tourette syndrome (04/20/2018). He also has no past medical history of Arrhythmia, Asthma, Autoimmune disease ([address] Chronic pain, Coagulation defects, COPD, Diabetes ([address] Heart failure ([facility] [address] Malignant hyperthermia due to anesthesia, Other ill-defined conditions(799.89), Pseudocholinesterase deficiency, Psychiatric disorder, Stroke ([facility] [address] Thromboembolus ([facility] [address] Unspecified adverse effect of anesthesia, or Unspecified sleep apnea. . He also  has a past surgical history that includes hx prostatectomy (11/2009); hx knee replacement (Left); hx hernia repair (Bilateral); hx tonsillectomy; hx hemorrhoidectomy; hx heent (Right); and hx colonoscopy (12/31/2014). PRECAUTIONS/ALLERGIES: Aricept [donepezil] and Onion     Problem List:  (Impairments causing functional limitations):  1. Oropharyngeal dysphagia    Previous Dysphagia: YES Chronic dysphagia- most recent modified barium swallow study 7/2021. Concern for aspiration with all intake. Safest oral option with known risk of aspiration puree diet with mildly thick liquids. Diet Prior to Evaluation: minced/moist diet with nectar thick liquids- known risks for aspiration with all intake    Orientation:  Unable to state    OBJECTIVE   Oral Motor:   · Labial: Poor labial seal; open mouth posture with tongue protruding  · Dentition: Edentulous  · Oral Hygiene: Dry  · Lingual: No lingual movements    Dysphagia evaluation: Thermal stim applied to lips to determine appropriateness for po trials. He appropriately closed lips and use tongue to remove melted ice from lips. Single ice chip placed in oral cavity. Minimal oral manipulation, but appropriate bolus containment. Sudden drop in O2 following reflexive swallow; dropping to 79%. O2 increased and cues for deep breathing required to improve oxygenation.  Respiratory status improved to 94% after ~ 5 minutes and O2 was returned to baseline level. 2 half tsp trials of nectar thick liquids then presented. 3-4 swallows per bolus. No overt s/sx of airway compromise; however, slight drop in O2 again noted. All additional trials deferred given concerns for aspiration. Tool Used: Dysphagia Outcome and Severity Scale (LORAINE)    Score Comments   Normal Diet  [] 7 With no strategies or extra time needed   Functional Swallow  [] 6 May have mild oral or pharyngeal delay   Mild Dysphagia  [] 5 Which may require one diet consistency restricted    Mild-Moderate Dysphagia  [] 4 With 1-2 diet consistencies restricted   Moderate Dysphagia  [] 3 With 2 or more diet consistencies restricted   Moderate-Severe Dysphagia  [] 2 With partial PO strategies (trials with ST only)   Severe Dysphagia  [] 1 With inability to tolerate any PO safely      Score:  Initial: 1 Most Recent: x (Date 11/10/21 )   Interpretation of Tool: The Dysphagia Outcome and Severity Scale (LORAINE) is a simple, easy-to-use, 7-point scale developed to systematically rate the functional severity of dysphagia based on objective assessment and make recommendations for diet level, independence level, and type of nutrition. Current Medications:   No current facility-administered medications on file prior to encounter. Current Outpatient Medications on File Prior to Encounter   Medication Sig Dispense Refill    clonazePAM (KlonoPIN) 0.5 mg tablet 1/2 tabs every 4 hrs as needed for anxiety and agitation. Not to exceed 1mg in 24 hrs. 15 Tablet 0    ergocalciferol (ERGOCALCIFEROL) 1,250 mcg (50,000 unit) capsule Take 50,000 Units by mouth every seven (7) days.  pimozide (ORAP) 1 mg tablet Take 1/2 tab at 8am, 4pm and 8pm 270 Tab 3    multivitamin (ONE A DAY) tablet Take 1 Tab by mouth daily.  rivastigmine (Exelon) 13.3 mg/24 hour patch 1 Patch by TransDERmal route daily.  90 Patch 3    memantine (NAMENDA) 10 mg tablet Take 1 Tab by mouth two (2) times a day. 180 Tab 3    pyridostigmine (Mestinon) 60 mg tablet Take 1/2 tablet at 10 am and at 6 pm daily to support blood pressure. 30 Tab 3    montelukast (SINGULAIR) 10 mg tablet Take 1 Tab by mouth daily. 90 Tab 3    clotrimazole-betamethasone (LOTRISONE) topical cream Apply  to affected area two (2) times a day. 15 g 0    carbidopa-levodopa (SINEMET)  mg per tablet TAKE ONE TABLET BY MOUTH FOUR TIMES A DAY (Patient not taking: Reported on 7/25/2021) 360 Tab 3    atorvastatin (LIPITOR) 10 mg tablet Take 1 Tab by mouth daily. 90 Tab 3    escitalopram oxalate (Lexapro) 10 mg tablet Take 1 Tab by mouth daily. 90 Tab 3    nitroglycerin (NITROSTAT) 0.4 mg SL tablet Take 1 Tab by mouth every five (5) minutes as needed for Chest Pain. Sit/Lay down then put one tab under the tongue every 5 minutes as needed for chest pain for 3 doses 1 Bottle 0    aspirin delayed-release 81 mg tablet Take 1 Tab by mouth daily.  (Patient taking differently: Take 81 mg by mouth nightly.) 30 Tab 0        INTERDISCIPLINARY COLLABORATION: RN    After treatment position/precautions:  · Upright in bed  · RN and MD notified    Total Treatment Duration:   Time In: 5658  Time Out: CONSTANZA [place] [facility] MADISON DEVRIES, CCC-SLP  Speech Language Pathologist  Acute Rehabilitation Services  Contact: Grace

## 2021-11-10 NOTE — PROGRESS NOTES
Pt received from ED at 2247 on NRB at 10 lpm. Upon arrival pt responsive only to painful stimuli. External urinary catheter placed. Pt noted to be voiding. NRB weaned to 3L HF nc by RT. Pt currently maintaining SpO2 99% on HF NC. No change in pt responsiveness noted during shift.

## 2021-11-10 NOTE — ED TRIAGE NOTES
Pt BIB [facility] from [facility] 8305 for SOB. Pt on IV abx for pneumonia and thrush. O2 sats 84% RA, 95% Non Rebreather. Zosyn last dose 1530 today, getting it every 8 hr. Pt at baseline, hx of dementia. Tylenol at 1730.     En route:   400mL NS

## 2021-11-10 NOTE — PROGRESS NOTES
Pt resting in bed with eyes closed. Pt arouses to touch, but doesn't speak with nurse. Pt on 3L nc with continuous sat monitor in place with sat 95% at this time. No s/sx of distress noted at this time. Left heel elevated on pillows. Door open will monitor.  Pt DNR

## 2021-11-10 NOTE — ACP (ADVANCE CARE PLANNING)
[facility] Hospitalist [facility]  At the heart of better care     Advance Care Planning   Admit Date:  2021  7:46 PM   Name:  Royal Beckford   Age:  79 y.o. Sex:  male  :  1940   MRN:  [id number]   Room:  [facility]/    Royal Beckford is able to make his own decisions: No    If pt unable to make decisions, POA/surrogate decision maker:  Wife    Other members present in the meeting:   Daughter or other female    Patient / surrogate decision-maker directed:  Code Status: DO NOT RESUSCITATE, DO NOT INTUBATE -okay for other aggressive medical and surgical intervention    Other ACP topics discussed, if applicable:   -I broached a rose conversation with the family about his poor prognosis and that given his trajectory will likely continue to decline further over time. He is severely malnourished and clearly not meeting his needs and there's little we can do to fix his poor intake and dysphagia. He is not a PEG candidate; suspect he would pull PEG out. The family is agreeable to hospice discussion. He likely will need IV medications to keep him calm as he cannot take PO in current state. Patient or surrogate consented to discussion of the current conditions, workup, management plans, prognosis, and understand the risk for further deterioration.     Time spent: 30 minutes in direct discussion (face to face    Signed:  Thor Ag MD

## 2021-11-10 NOTE — PROGRESS NOTES
Attempted to treat. Could not arouse pt with turning on lights, verbal commands, sternal rub. Will attempt next visit.

## 2021-11-10 NOTE — ASSESSMENT & PLAN NOTE
-Palliative care consult  -cont home sinemet  -seroquel and IV ativan PRN. Hold home klonopin  -wife says he gets very combative at times.

## 2021-11-10 NOTE — PROGRESS NOTES
Pt family member (step daughter) handed this nurse a written note stating Samara Nato does not make it through night call his daughter Guanako Jean Marie [phone] and we will tell his wife. \"    Verbally confirmed details.

## 2021-11-10 NOTE — PROGRESS NOTES
Pt resting in bed. Pt more alert asking for water. No distress noted at this time. Pt remains on 2 L HF NC call light in reach, will monitor.

## 2021-11-10 NOTE — PROGRESS NOTES
Hospitalist Progress Note   Admit Date:  2021  7:46 PM   Name:  Pa Rivera   Age:  79 y.o. Sex:  male  :  1940   MRN:  [id number]   Room:  831/    Presenting Complaint: Blood infection    Reason(s) for Admission: Aspiration pneumonia ([facility] [J69.0]  MARVEL (acute kidney injury) ([facility] [N17.9]  Dehydration with hypernatremia [E86.0, E87.0]     Hospital Course & Interval History:   79 y.o. male with medical history of dementia, parkinsons, HTN, who presented with cough. Resident of SNF. History per chart and family due to pt condition. Pt apparently has been declining the last 2-3 days. Less responsive, not eating, coughing, getting more SOB. Had fever yesterday and today. SNF gave him zosyn yesterday for suspected aspiration but he is still not better today so they sent him to ER for eval.   Wife says even at baseline he only \"sometimes\" talks to her. He is currently unresponsive    Subjective (11/10/21):  Remains unresponsive today. No current complaints. No evidence of pain. Was told family be present but had not seen them after stopping by the room several times. No notification from nursing. Assessment & Plan:   * Sepsis due to pneumonia ([facility]  zosyn  -SLP consult;  for now, copied diet from last SLP note  -mucinex  -hypertonic nebs  -albuterol nebs  -daily CBC    Parkinsons disease ([facility] [facility]  -palliative care consult    Acute respiratory failure with hypoxia ([facility]  -Supplemental oxygen to maintain O2 sat greater than 92, wean as tolerated    MARVEL (acute kidney injury) ([facility]  -IVF  -daily BMP    Dehydration with hypernatremia  -D5W IVF  -daily BMP    Severe protein-calorie malnutrition ([facility]  -nutrition consult    Dementia due to Parkinson's disease with behavioral disturbance ([facility]  -Palliative care consult  -cont home sinemet  -seroquel and IV ativan PRN. Hold home klonopin  -wife says he gets very combative at times.     Dyslipidemia  -cont home statin          Dispo/Discharge Planning:    Pending family discussion    Diet:  ADULT DIET Dysphagia - Minced & Moist; No Drinking Straws  DVT PPx: Heparin  Code status: DNR    Hospital Problems as of 11/10/2021 Date Reviewed: 7/25/2021          Codes Class Noted - Resolved POA    * (Principal) Sepsis due to pneumonia [facility]) ICD-10-CM: J18.9, A41.9  ICD-9-CM: 630, 995.91  11/9/2021 - Present Yes        Parkinsons disease ([facility] (Chronic) ICD-10-CM: G20  ICD-9-CM: 332.0  6/2/2016 - Present Yes        Acute respiratory failure with hypoxia [facility]) ICD-10-CM: J96.01  ICD-9-CM: 518.81  11/9/2021 - Present Yes        Anemia (Chronic) ICD-10-CM: D64.9  ICD-9-CM: 285.9  11/9/2021 - Present Yes        Sepsis, Gram negative ([facility] ICD-10-CM: A41.50  ICD-9-CM: 038.40, 995.91  11/9/2021 - Present Yes        Aspiration pneumonia ([facility] ICD-10-CM: J69.0  ICD-9-CM: 507.0  11/9/2021 - Present Yes        Dehydration with hypernatremia ICD-10-CM: E86.0, E87.0  ICD-9-CM: 276.0  11/9/2021 - Present Yes        MARVEL (acute kidney injury) ([facility] ICD-10-CM: N17.9  ICD-9-CM: 584.9  11/9/2021 - Present Yes        Severe protein-calorie malnutrition ([facility] (Chronic) ICD-10-CM: M52  ICD-9-CM: 207  7/27/2021 - Present Yes        Neurologic orthostatic hypotension ([facility]) (Chronic) ICD-10-CM: G90.3  ICD-9-CM: 333.0  8/16/2018 - Present Yes        Dementia due to Parkinson's disease with behavioral disturbance ([facility]) (Chronic) ICD-10-CM: G20, F02.81  ICD-9-CM: 332.0, 294.11  4/20/2018 - Present Yes        Essential hypertension (Chronic) ICD-10-CM: I10  ICD-9-CM: 401.9  12/22/2017 - Present Yes        Dyslipidemia (Chronic) ICD-10-CM: E78.5  ICD-9-CM: 272.4  1/30/2015 - Present Yes              Objective:     Patient Vitals for the past 24 hrs:   Temp Pulse Resp BP SpO2   11/10/21 1529 97.7 °F (36.5 °C) 75 18 110/69 96 %   11/10/21 1334     98 %   11/10/21 1144 97.8 °F (36.6 °C) 71 18 102/62 92 %   11/10/21 0740 97.5 °F (36.4 °C) 76 18 (!) 109/56 100 %   11/10/21 0728     98 % 11/10/21 0352     99 %   11/10/21 0336 97.5 °F (36.4 °C) 61 21 (!) 102/56 100 %   11/10/21 0205     99 %   11/09/21 2303 97.9 °F (36.6 °C) 82 22 101/61 99 %   11/09/21 2205  85  (!) 100/59 98 %   11/09/21 2204 99.3 °F (37.4 °C)       11/09/21 2002 (!) 101.7 °F (38.7 °C)       11/09/21 1959  96  115/72 98 %   11/09/21 1955  100 24 115/72 97 %     Oxygen Therapy  O2 Sat (%): 96 % (11/10/21 1529)  Pulse via Oximetry: 54 beats per minute (11/10/21 1334)  O2 Device: Nasal cannula (11/10/21 1334)  O2 Flow Rate (L/min): 2 l/min (11/10/21 1334)    Estimated body mass index is 21.09 kg/m² as calculated from the following:    Height as of this encounter: 5' 10\" (1.778 m). Weight as of this encounter: 66.7 kg (147 lb). Intake/Output Summary (Last 24 hours) at 11/10/2021 1555  Last data filed at 11/10/2021 2341  Gross per 24 hour   Intake    Output 250 ml   Net -250 ml         Blood pressure 110/69, pulse 75, temperature 97.7 °F (36.5 °C), resp. rate 18, height 5' 10\" (1.778 m), weight 66.7 kg (147 lb), SpO2 96 %. Physical Exam  Vitals and nursing note reviewed. Constitutional:       General: He is not in acute distress. Appearance: He is cachectic. Comments: Temporal wasting   HENT:      Head: Normocephalic. Eyes:      Extraocular Movements: Extraocular movements intact. Cardiovascular:      Rate and Rhythm: Normal rate. Pulses:           Radial pulses are 2+ on the left side. Pulmonary:      Effort: Pulmonary effort is normal. No respiratory distress. Breath sounds: Wheezing present. Musculoskeletal:         General: No deformity. Cervical back: No rigidity. Skin:     General: Skin is warm and dry. Coloration: Skin is pale. Neurological:      General: No focal deficit present. Mental Status: Mental status is at baseline. He is disoriented. Psychiatric:         Attention and Perception: He is inattentive. Mood and Affect: Affect is flat. Behavior: Behavior is uncooperative, slowed and withdrawn. I have reviewed ordered lab tests and independently visualized imaging below:    Recent Labs:  Recent Results (from the past 48 hour(s))   EKG, 12 LEAD, INITIAL    Collection Time: 11/09/21  8:00 PM   Result Value Ref Range    Ventricular Rate 96 BPM    Atrial Rate 97 BPM    P-R Interval 142 ms    QRS Duration 96 ms    Q-T Interval 340 ms    QTC Calculation (Bezet) 429 ms    Calculated P Axis 18 degrees    Calculated R Axis -16 degrees    Calculated T Axis 69 degrees    Diagnosis       !! AGE AND GENDER SPECIFIC ECG ANALYSIS !! Normal sinus rhythm  Incomplete right bundle branch block  Minimal voltage criteria for LVH, may be normal variant  Septal infarct (cited on or before 09-NOV-2021)  Abnormal ECG  When compared with ECG of 24-JUL-2021 20:45,  Previous ECG has undetermined rhythm, needs review  Questionable change in initial forces of Septal leads  Confirmed by Imelda Lanes MD (), [place] ([id number]) on 11/9/2021 10:00:45 PM     LACTIC ACID    Collection Time: 11/09/21  8:02 PM   Result Value Ref Range    Lactic acid 1.2 0.4 - 2.0 MMOL/L   CBC WITH AUTOMATED DIFF    Collection Time: 11/09/21  8:02 PM   Result Value Ref Range    WBC 3.6 (L) 4.3 - 11.1 K/uL    RBC 3.72 (L) 4.23 - 5.6 M/uL    HGB 11.2 (L) 13.6 - 17.2 g/dL    HCT 37.2 (L) 41.1 - 50.3 %    .0 (H) 79.6 - 97.8 FL    MCH 30.1 26.1 - 32.9 PG    MCHC 30.1 (L) 31.4 - 35.0 g/dL    RDW 14.1 11.9 - 14.6 %    PLATELET 78 (L) 777 - 450 K/uL    MPV 10.8 9.4 - 12.3 FL    ABSOLUTE NRBC 0.00 0.0 - 0.2 K/uL    NEUTROPHILS 67 43 - 78 %    LYMPHOCYTES 15 13 - 44 %    MONOCYTES 10 4.0 - 12.0 %    EOSINOPHILS 0 (L) 0.5 - 7.8 %    BASOPHILS 0 0.0 - 2.0 %    IMMATURE GRANULOCYTES 8 (H) 0.0 - 5.0 %    ABS. NEUTROPHILS 2.4 1.7 - 8.2 K/UL    ABS. LYMPHOCYTES 0.5 0.5 - 4.6 K/UL    ABS. MONOCYTES 0.4 0.1 - 1.3 K/UL    ABS. EOSINOPHILS 0.0 0.0 - 0.8 K/UL    ABS. BASOPHILS 0.0 0.0 - 0.2 K/UL    ABS. IMM. [place]. 0.3 0.0 - 0.5 K/UL    RBC COMMENTS NORMOCYTIC/NORMOCHROMIC      WBC COMMENTS Result Confirmed By Smear      PLATELET COMMENTS DECREASED      DF AUTOMATED     METABOLIC PANEL, COMPREHENSIVE    Collection Time: 11/09/21  8:02 PM   Result Value Ref Range    Sodium 152 (H) 136 - 145 mmol/L    Potassium 4.0 3.5 - 5.1 mmol/L    Chloride 121 (H) 98 - 107 mmol/L    CO2 27 21 - 32 mmol/L    Anion gap 4 (L) 7 - 16 mmol/L    Glucose 133 (H) 65 - 100 mg/dL    BUN 56 (H) 8 - 23 MG/DL    Creatinine 1.62 (H) 0.8 - 1.5 MG/DL    GFR est AA 53 (L) >60 ml/min/1.73m2    GFR est non-AA 44 (L) >60 ml/min/1.73m2    Calcium 8.8 8.3 - 10.4 MG/DL    Bilirubin, total 0.6 0.2 - 1.1 MG/DL    ALT (SGPT) 14 12 - 65 U/L    AST (SGOT) 44 (H) 15 - 37 U/L    Alk.  phosphatase 51 50 - 136 U/L    Protein, total 5.6 (L) 6.3 - 8.2 g/dL    Albumin 2.1 (L) 3.2 - 4.6 g/dL    Globulin 3.5 2.3 - 3.5 g/dL    A-G Ratio 0.6 (L) 1.2 - 3.5     CULTURE, BLOOD    Collection Time: 11/09/21  8:16 PM    Specimen: Blood   Result Value Ref Range    Special Requests: LIGHT  FOREARM        Culture result: NO GROWTH AFTER 11 HOURS     COVID-19 RAPID TEST    Collection Time: 11/09/21  9:00 PM   Result Value Ref Range    Specimen source NASAL      COVID-19 rapid test Not detected NOTD     LACTIC ACID    Collection Time: 11/09/21 11:45 PM   Result Value Ref Range    Lactic acid 0.9 0.4 - 2.0 MMOL/L       All Micro Results     Procedure Component Value Units Date/Time    BLOOD CULTURE [[id number]] Collected: 11/09/21 2016    Order Status: Completed Specimen: Blood Updated: 11/10/21 0814     Special Requests: --        LIGHT  FOREARM       Culture result: NO GROWTH AFTER 11 HOURS       BLOOD CULTURE [[id number]] Collected: 11/09/21 8631    Order Status: Completed Specimen: Blood Updated: 11/10/21 0019    COVID-19 RAPID TEST [[id number]] Collected: 11/09/21 2100    Order Status: Completed Specimen: Nasopharyngeal Updated: 11/09/21 2126     Specimen source NASAL COVID-19 rapid test Not detected        Comment:      The specimen is NEGATIVE for SARS-CoV-2, the novel coronavirus associated with COVID-19. A negative result does not rule out COVID-19. This test has been authorized by the FDA under an Emergency Use Authorization (EUA) for use by authorized laboratories. Fact sheet for Healthcare Providers: ConventionYoQueVosdate.co.nz  Fact sheet for Patients: Mgv.co.nz       Methodology: Isothermal Nucleic Acid Amplification               Other Studies:  XR CHEST PORT    Result Date: 11/9/2021  CHEST X-RAY, one view. HISTORY:  Shortness of breath TECHNIQUE:  AP portable semiupright view. COMPARISON: July 2021 FINDINGS: Lungs: Bilateral alveolar infiltrates, left more than right, obscuring left hemidiaphragm. Costophrenic angles: are sharp. Heart size: is normal. Pulmonary vasculature: is unremarkable. Aorta: Unremarkable. Included portion of the upper abdomen: is unremarkable. Bones: No gross bony lesions. Other: None. Bilateral pneumonia, left more than right.        Current Meds:  Current Facility-Administered Medications   Medication Dose Route Frequency    aspirin delayed-release tablet 81 mg  81 mg Oral DAILY    atorvastatin (LIPITOR) tablet 10 mg  10 mg Oral QHS    carbidopa-levodopa (SINEMET)  mg per tablet 1 Tablet  1 Tablet Oral QID    QUEtiapine (SEROquel) tablet 25 mg  25 mg Oral BID PRN    escitalopram oxalate (LEXAPRO) tablet 10 mg  10 mg Oral DAILY    memantine (NAMENDA) tablet 10 mg  10 mg Oral BID    montelukast (SINGULAIR) tablet 10 mg  10 mg Oral DAILY    pyridostigmine (MESTINON) tablet 30 mg  30 mg Oral BID    rivastigmine (EXELON) 13.3 mg/24 hour patch 1 Patch  1 Patch TransDERmal DAILY    piperacillin-tazobactam (ZOSYN) 4.5 g in 0.9% sodium chloride (MBP/ADV) 100 mL MBP  4.5 g IntraVENous Q8H    guaiFENesin ER (MUCINEX) tablet 1,200 mg  1,200 mg Oral BID    sodium chloride (NS) flush 5-40 mL  5-40 mL IntraVENous Q8H    sodium chloride (NS) flush 5-40 mL  5-40 mL IntraVENous PRN    acetaminophen (TYLENOL) tablet 650 mg  650 mg Oral Q6H PRN    Or    acetaminophen (TYLENOL) suppository 650 mg  650 mg Rectal Q6H PRN    polyethylene glycol (MIRALAX) packet 17 g  17 g Oral DAILY PRN    bisacodyL (DULCOLAX) suppository 10 mg  10 mg Rectal DAILY PRN    ondansetron (ZOFRAN ODT) tablet 4 mg  4 mg Oral Q8H PRN    Or    ondansetron (ZOFRAN) injection 4 mg  4 mg IntraVENous Q6H PRN    famotidine (PEPCID) tablet 20 mg  20 mg Oral BID PRN    alum-mag hydroxide-simeth (MYLANTA) oral suspension 15 mL  15 mL Oral Q6H PRN    enoxaparin (LOVENOX) injection 40 mg  40 mg SubCUTAneous DAILY    sodium chloride 3% hypertonic nebulizer soln  4 mL Nebulization Q6H RT    albuterol (PROVENTIL VENTOLIN) nebulizer solution 2.5 mg  2.5 mg Nebulization Q6H PRN    lip protectant (BLISTEX) ointment 1 Each  1 Each Topical PRN    sodium chloride (NS) flush 5-10 mL  5-10 mL IntraVENous Q8H    sodium chloride (NS) flush 5-10 mL  5-10 mL IntraVENous PRN    dextrose 5% infusion  125 mL/hr IntraVENous CONTINUOUS    LORazepam (ATIVAN) injection 1 mg  1 mg IntraVENous Q2H PRN       Signed:  Dixon Ernandez MD

## 2021-11-10 NOTE — PROGRESS NOTES
Occupational Therapy Note:    OT orders received, chart reviewed, and evaluation attempted. Patient unable to be aroused. Therapist attempted verbal, visual and tactile stimulation and pt would not open eyes. Pt unable to be aroused even with sternal rub. Will plan to check back as schedule permits and patient is alert to complete occupational therapy evaluation.      Thank you,    Johnathon Deshpande, OTR/L

## 2021-11-10 NOTE — ED NOTES
TRANSFER - OUT REPORT:    Verbal report given to Becky(name) on Estefania Garcia  being transferred to 8th floor(unit) for routine progression of care       Report consisted of patients Situation, Background, Assessment and   Recommendations(SBAR). Information from the following report(s) SBAR was reviewed with the receiving nurse. Lines:   Peripheral IV 11/09/21 Anterior; Right Forearm (Active)       Peripheral IV 11/09/21 Left Forearm (Active)   Site Assessment Clean, dry, & intact 11/09/21 2210   Phlebitis Assessment 0 11/09/21 2210   Infiltration Assessment 0 11/09/21 2210   Hub Color/Line Status Green 11/09/21 2210        Opportunity for questions and clarification was provided.       Patient transported with:   O2 @ 10 liters   Tech

## 2021-11-10 NOTE — H&P
Hospitalist History and Physical   Admit Date:  2021  7:46 PM   Name:  Jonathan Abreu   Age:  79 y.o. Sex:  male  :  1940   MRN:  [id number]   Room:  ER06/    Presenting Complaint: cough, concern for pneumonia    Reason(s) for Admission: Aspiration pneumonia ([facility] [J69.0]  MARVEL (acute kidney injury) ([facility] [N17.9]  Dehydration with hypernatremia [E86.0, E87.0]     History of Present Illness:   Jonathan Abreu is a 79 y.o. male with medical history of dementia, parkinsons, HTN, who presented with cough. Resident of SNF. History per chart and family due to pt condition. Pt apparently has been declining the last 2-3 days. Less responsive, not eating, coughing, getting more SOB. Had fever yesterday and today. SNF gave him zosyn yesterday for suspected aspiration but he is still not better today so they sent him to ER for eval.   Wife says even at baseline he only \"sometimes\" talks to her. He is currently unresponsive    Review of Systems:  Unable to obtain ROS due to condition  Assessment & Plan:       Sepsis due to aspiration pneumonia    -zosyn  -SLP consult;  for now, copied diet from last SLP note  -mucinex  -hypertonic nebs  -albuterol nebs  -daily CBC      Acute Respiratory Failure with Hypoxia  -Currently O2 Flow Rate (L/min): 10 l/min. Wean as tolerated      Goals of Care  -I broached a rose conversation with the family about his poor prognosis and that given his trajectory will likely continue to decline further over time. He is severely malnourished and clearly not meeting his needs and there's little we can do to fix his poor intake and dysphagia. He is not a PEG candidate; suspect he would pull PEG out. The family is agreeable to hospice discussion. He likely will need IV medications to keep him calm as he cannot take PO in current state. .        Dehydration with hypernatremia    -D5W IVF  -daily BMP      MARVEL  -IVF  -daily BMP      Dyslipidemia   -cont home statin Parkinsons disease   -cont home sinemet      Dementia due to Parkinson's disease  -seroquel and IV ativan PRN. Hold home klonopin  -wife says he gets very combative at times. 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  -daily CBC      Dispo/Discharge Planning:   -came from SNF. Consult PT/OT/CM.       Diet: ADULT DIET Dysphagia - Minced & Moist; No Drinking Straws  VTE ppx: lovenox  Code status: DNR    Hospital Problems as of 11/9/2021 Date Reviewed: 7/25/2021          Codes Class Noted - Resolved POA    Anemia (Chronic) ICD-10-CM: D64.9  ICD-9-CM: 285.9  11/9/2021 - Present Yes        Sepsis, Gram negative ([facility] ICD-10-CM: A41.50  ICD-9-CM: 038.40, 995.91  11/9/2021 - Present Yes        * (Principal) Sepsis due to pneumonia [facility]) ICD-10-CM: J18.9, A41.9  ICD-9-CM: 224, 995.91  11/9/2021 - Present Yes        Aspiration pneumonia ([facility] ICD-10-CM: J69.0  ICD-9-CM: 507.0  11/9/2021 - Present Unknown        Dehydration with hypernatremia ICD-10-CM: E86.0, E87.0  ICD-9-CM: 276.0  11/9/2021 - Present Unknown        MARVEL (acute kidney injury) ([facility] ICD-10-CM: N17.9  ICD-9-CM: 584.9  11/9/2021 - Present Unknown        Severe protein-calorie malnutrition ([facility] (Chronic) ICD-10-CM: D72  ICD-9-CM: 262  7/27/2021 - Present Yes        Neurologic orthostatic hypotension (HCC) (Chronic) ICD-10-CM: G90.3  ICD-9-CM: 333.0  8/16/2018 - Present Yes        Dementia due to Parkinson's disease with behavioral disturbance (HCC) (Chronic) ICD-10-CM: G20, F02.81  ICD-9-CM: 332.0, 294.11  4/20/2018 - Present Yes        Essential hypertension (Chronic) ICD-10-CM: I10  ICD-9-CM: 401.9  12/22/2017 - Present Yes        Parkinsons disease ([facility] (Chronic) ICD-10-CM: G20  ICD-9-CM: 332.0  6/2/2016 - Present Yes        Dyslipidemia (Chronic) ICD-10-CM: E78.5  ICD-9-CM: 272.4  1/30/2015 - Present Yes              Past History:  Past Medical History:   Diagnosis Date    Acoustic neuroma [facility]) 2012    Right    Cholelithiasis 08/20/2015    Colon polyps 12/31/2014    Tubular Adenoma x 1 & Hyperplastic Polyp x 2    Dementia due to Parkinson's disease without behavioral disturbance ([facility] 4/20/2018    DVT (deep venous thrombosis) (HCC) 02/17/2017    Left Superficial Femoral, Popliteal, & Peroneal vein     Dyslipidemia     Environmental allergies     GERD (gastroesophageal reflux disease)     Hiatal hernia     Hypertension     Intractable hiccups     Liver cyst 02/16/2017    R Lobe    Melanoma ([facility]     Back    Parkinson disease ([facility]     Followed by Suraj Coronado    Prostate cancer [facility])     Tourette syndrome 04/20/2018    \"verbalizes as really loud hiccoughs\"     Past Surgical History:   Procedure Laterality Date    HX COLONOSCOPY  12/31/2014    Tubular Adenoma & Hyperplastic Polyps, Diverticulosis, & Internal Hemorrhoids - Recall 2017    HX HEENT Right     Ear    HX HEMORRHOIDECTOMY      HX HERNIA REPAIR Bilateral     HX KNEE REPLACEMENT Left     HX PROSTATECTOMY  11/2009    [address]    HX TONSILLECTOMY        Allergies   Allergen Reactions    Aricept [Donepezil] Nausea and Vomiting    Onion Nausea and Vomiting      Social History     Tobacco Use    Smoking status: Never Smoker    Smokeless tobacco: Never Used   Substance Use Topics    Alcohol use: No      Family History   Problem Relation Age of Onset    Cancer Father 77        Prostate    Heart Disease Brother     Cancer Brother         Prostate    Melanoma Brother     Breast Cancer Mother     Melanoma Mother     COPD Brother       Family history reviewed and negative except as otherwise noted.     Immunization History   Administered Date(s) Administered    Influenza High Dose Vaccine PF 09/09/2016, 09/01/2017, 09/28/2018, 10/10/2019, 09/20/2020    Influenza Vaccine (Tri) Adjuvanted (>65 Yrs FLUAD TRI 96200) 10/16/2018    Influenza, Quadrivalent, Adjuvanted (>65 Yrs FLUAD QUAD 20088) 09/21/2020    Pneumococcal Conjugate (PCV-13) 03/30/2017    Pneumococcal Polysaccharide (PPSV-23) 12/17/2015    TB Skin Test (PPD) Intradermal 02/17/2017, 04/15/2021, 07/27/2021    Zoster Recombinant 09/16/2019, 11/25/2019     Prior to Admit Medications:  Current Outpatient Medications   Medication Instructions    aspirin delayed-release 81 mg, Oral, DAILY    atorvastatin (LIPITOR) 10 mg, Oral, DAILY    carbidopa-levodopa (SINEMET)  mg per tablet TAKE ONE TABLET BY MOUTH FOUR TIMES A DAY    clonazePAM (KlonoPIN) 0.5 mg tablet 1/2 tabs every 4 hrs as needed for anxiety and agitation. Not to exceed 1mg in 24 hrs.  clotrimazole-betamethasone (LOTRISONE) topical cream Topical, 2 TIMES DAILY    ergocalciferol (ERGOCALCIFEROL) 50,000 Units, Oral, EVERY 7 DAYS    escitalopram oxalate (LEXAPRO) 10 mg, Oral, DAILY    memantine (NAMENDA) 10 mg, Oral, 2 TIMES DAILY    montelukast (SINGULAIR) 10 mg, Oral, DAILY    multivitamin (ONE A DAY) tablet 1 Tablet, Oral, DAILY    nitroglycerin (NITROSTAT) 0.4 mg, Oral, EVERY 5 MIN AS NEEDED, Sit/Lay down then put one tab under the tongue every 5 minutes as needed for chest pain for 3 doses    pimozide (ORAP) 1 mg tablet Take 1/2 tab at 8am, 4pm and 8pm    pyridostigmine (Mestinon) 60 mg tablet Take 1/2 tablet at 10 am and at 6 pm daily to support blood pressure.     rivastigmine (Exelon) 13.3 mg/24 hour patch 1 Patch, TransDERmal, DAILY       Objective:     Patient Vitals for the past 24 hrs:   Temp Pulse Resp BP SpO2   11/09/21 2002 (!) 101.7 °F (38.7 °C)       11/09/21 1959  96  115/72 98 %   11/09/21 1955  100 24 115/72 97 %     Oxygen Therapy  O2 Sat (%): 98 % (11/09/21 1959)  Pulse via Oximetry: 96 beats per minute (11/09/21 1959)  O2 Device: Non-rebreather mask (11/09/21 1955)  O2 Flow Rate (L/min): 10 l/min (11/09/21 1955)    Estimated body mass index is 21.09 kg/m² as calculated from the following:    Height as of this encounter: 5' 10\" (1.778 m). Weight as of this encounter: 66.7 kg (147 lb). No intake or output data in the 24 hours ending 11/09/21 2149      Physical Exam:  Blood pressure 115/72, pulse 96, temperature (!) 101.7 °F (38.7 °C), resp. rate 24, height 5' 10\" (1.778 m), weight 66.7 kg (147 lb), SpO2 98 %. General:    Severely malnourished. Unresponsive, chronically ill appearing  Head:  Temporal wasting, atraumatic  Eyes:  Sclerae appear normal.  Pupils equally round. ENT:  Nares appear normal, no drainage. Moist oral mucosa  Neck:  No restricted ROM. Trachea midline   CV:   RRR. No jugular venous distension. Lungs:   Coarse bilaterally. Mildly labored with accessory muscle use on 10L NRB  Abdomen:   Soft, nontender, nondistended. Extremities: No cyanosis or clubbing. No edema  Skin:     No rashes. pallor. Warm and dry. Neuro:  Cannot assess, unresponsive  Psych:  Cannot assess, unresponsive    I have reviewed ordered lab tests and independently visualized imaging below:    Last 24hr Labs:  Recent Results (from the past 24 hour(s))   EKG, 12 LEAD, INITIAL    Collection Time: 11/09/21  8:00 PM   Result Value Ref Range    Ventricular Rate 96 BPM    Atrial Rate 97 BPM    P-R Interval 142 ms    QRS Duration 96 ms    Q-T Interval 340 ms    QTC Calculation (Bezet) 429 ms    Calculated P Axis 18 degrees    Calculated R Axis -16 degrees    Calculated T Axis 69 degrees    Diagnosis       !! AGE AND GENDER SPECIFIC ECG ANALYSIS !!   Normal sinus rhythm  Incomplete right bundle branch block  Minimal voltage criteria for LVH, may be normal variant  Septal infarct (cited on or before 09-NOV-2021)  Abnormal ECG  When compared with ECG of 24-JUL-2021 20:45,  Previous ECG has undetermined rhythm, needs review  Questionable change in initial forces of Septal leads     LACTIC ACID    Collection Time: 11/09/21  8:02 PM   Result Value Ref Range    Lactic acid 1.2 0.4 - 2.0 MMOL/L CBC WITH AUTOMATED DIFF    Collection Time: 11/09/21  8:02 PM   Result Value Ref Range    WBC 3.6 (L) 4.3 - 11.1 K/uL    RBC 3.72 (L) 4.23 - 5.6 M/uL    HGB 11.2 (L) 13.6 - 17.2 g/dL    HCT 37.2 (L) 41.1 - 50.3 %    .0 (H) 79.6 - 97.8 FL    MCH 30.1 26.1 - 32.9 PG    MCHC 30.1 (L) 31.4 - 35.0 g/dL    RDW 14.1 11.9 - 14.6 %    PLATELET 78 (L) 124 - 450 K/uL    MPV 10.8 9.4 - 12.3 FL    ABSOLUTE NRBC 0.00 0.0 - 0.2 K/uL    NEUTROPHILS 67 43 - 78 %    LYMPHOCYTES 15 13 - 44 %    MONOCYTES 10 4.0 - 12.0 %    EOSINOPHILS 0 (L) 0.5 - 7.8 %    BASOPHILS 0 0.0 - 2.0 %    IMMATURE GRANULOCYTES 8 (H) 0.0 - 5.0 %    ABS. NEUTROPHILS 2.4 1.7 - 8.2 K/UL    ABS. LYMPHOCYTES 0.5 0.5 - 4.6 K/UL    ABS. MONOCYTES 0.4 0.1 - 1.3 K/UL    ABS. EOSINOPHILS 0.0 0.0 - 0.8 K/UL    ABS. BASOPHILS 0.0 0.0 - 0.2 K/UL    ABS. IMM. GRANS. 0.3 0.0 - 0.5 K/UL    RBC COMMENTS NORMOCYTIC/NORMOCHROMIC      WBC COMMENTS Result Confirmed By Smear      PLATELET COMMENTS DECREASED      DF AUTOMATED     METABOLIC PANEL, COMPREHENSIVE    Collection Time: 11/09/21  8:02 PM   Result Value Ref Range    Sodium 152 (H) 136 - 145 mmol/L    Potassium 4.0 3.5 - 5.1 mmol/L    Chloride 121 (H) 98 - 107 mmol/L    CO2 27 21 - 32 mmol/L    Anion gap 4 (L) 7 - 16 mmol/L    Glucose 133 (H) 65 - 100 mg/dL    BUN 56 (H) 8 - 23 MG/DL    Creatinine 1.62 (H) 0.8 - 1.5 MG/DL    GFR est AA 53 (L) >60 ml/min/1.73m2    GFR est non-AA 44 (L) >60 ml/min/1.73m2    Calcium 8.8 8.3 - 10.4 MG/DL    Bilirubin, total 0.6 0.2 - 1.1 MG/DL    ALT (SGPT) 14 12 - 65 U/L    AST (SGOT) 44 (H) 15 - 37 U/L    Alk.  phosphatase 51 50 - 136 U/L    Protein, total 5.6 (L) 6.3 - 8.2 g/dL    Albumin 2.1 (L) 3.2 - 4.6 g/dL    Globulin 3.5 2.3 - 3.5 g/dL    A-G Ratio 0.6 (L) 1.2 - 3.5     COVID-19 RAPID TEST    Collection Time: 11/09/21  9:00 PM   Result Value Ref Range    Specimen source NASAL      COVID-19 rapid test Not detected NOTD         All Micro Results     Procedure Component Value Units Date/Time    COVID-19 RAPID TEST [[id number]] Collected: 11/09/21 2100    Order Status: Completed Specimen: Nasopharyngeal Updated: 11/09/21 2126     Specimen source NASAL        COVID-19 rapid test Not detected        Comment:      The specimen is NEGATIVE for SARS-CoV-2, the novel coronavirus associated with COVID-19. A negative result does not rule out COVID-19. This test has been authorized by the FDA under an Emergency Use Authorization (EUA) for use by authorized laboratories. Fact sheet for Healthcare Providers: [employer]  Fact sheet for Patients: [employer]       Methodology: Isothermal Nucleic Acid Amplification         BLOOD CULTURE [[id number]] Collected: 11/09/21 2016    Order Status: Completed Specimen: Blood Updated: 11/09/21 2036    BLOOD CULTURE [[id number]]     Order Status: Sent Specimen: Blood           Other Studies:  XR CHEST PORT    Result Date: 11/9/2021  CHEST X-RAY, one view. HISTORY:  Shortness of breath TECHNIQUE:  AP portable semiupright view. COMPARISON: July 2021 FINDINGS: Lungs: Bilateral alveolar infiltrates, left more than right, obscuring left hemidiaphragm. Costophrenic angles: are sharp. Heart size: is normal. Pulmonary vasculature: is unremarkable. Aorta: Unremarkable. Included portion of the upper abdomen: is unremarkable. Bones: No gross bony lesions. Other: None. Bilateral pneumonia, left more than right.        Medications Administered     acetaminophen (TYLENOL) suppository 975 mg     Admin Date  11/09/2021 Action  Given Dose  975 mg Route  Rectal Administered By  Gage Thomas RN          piperacillin-tazobactam (ZOSYN) 4.5 g in 0.9% sodium chloride (MBP/ADV) 100 mL MBP     Admin Date  11/09/2021 Action  New Bag Dose  4.5 g Rate  200 mL/hr Route  IntraVENous Administered By  Gage Thomas RN          sodium chloride 0.9 % bolus infusion 1,000 mL     Admin Date  11/09/2021 Action  New Bag Dose  1,000 mL Rate  500.3 mL/hr Route  IntraVENous Administered By  Olivia Pickering RN          vancomycin (VANCOCIN) 1500 mg in  ml infusion     Admin Date  11/09/2021 Action  New Bag Dose  1,500 mg Rate  333.3 mL/hr Route  IntraVENous Administered By  Olivia Pickering RN                Signed:  Masood Haddad MD    Part of this note may have been written by using a voice dictation software. The note has been proof read but may still contain some grammatical/other typographical errors.

## 2021-11-10 NOTE — PROGRESS NOTES
TRANSFER - IN REPORT:    Verbal report received from [facility]  being received from ED for routine progression of care      Report consisted of patients Situation, Background, Assessment and   Recommendations(SBAR). Information from the following report(s) SBAR, Kardex, Intake/Output, MAR, Recent Results and Med Rec Status was reviewed with the receiving nurse. Opportunity for questions and clarification was provided. Assessment completed upon patients arrival to unit and care assumed.

## 2021-11-10 NOTE — PROGRESS NOTES
Problem: Falls - Risk of  Goal: *Absence of Falls  Description: Document Brodie Choi Fall Risk and appropriate interventions in the flowsheet.   Note: Fall Risk Interventions:  Mobility Interventions: Bed/chair exit alarm    Mentation Interventions: Adequate sleep, hydration, pain control, Bed/chair exit alarm    Medication Interventions: Evaluate medications/consider consulting pharmacy    Elimination Interventions: Bed/chair exit alarm, Call light in reach

## 2021-11-11 VITALS
HEART RATE: 59 BPM | WEIGHT: 147 LBS | TEMPERATURE: 97.8 F | DIASTOLIC BLOOD PRESSURE: 62 MMHG | HEIGHT: 70 IN | RESPIRATION RATE: 18 BRPM | BODY MASS INDEX: 21.05 KG/M2 | OXYGEN SATURATION: 95 % | SYSTOLIC BLOOD PRESSURE: 115 MMHG

## 2021-11-11 PROBLEM — A41.50 SEPSIS, GRAM NEGATIVE (HCC): Status: RESOLVED | Noted: 2021-11-09 | Resolved: 2021-11-11

## 2021-11-11 PROBLEM — J69.0 ASPIRATION PNEUMONIA (HCC): Status: RESOLVED | Noted: 2021-11-09 | Resolved: 2021-11-11

## 2021-11-11 PROBLEM — N17.9 AKI (ACUTE KIDNEY INJURY) (HCC): Status: RESOLVED | Noted: 2021-11-09 | Resolved: 2021-11-11

## 2021-11-11 PROBLEM — E87.0 DEHYDRATION WITH HYPERNATREMIA: Status: RESOLVED | Noted: 2021-11-09 | Resolved: 2021-11-11

## 2021-11-11 PROBLEM — E86.0 DEHYDRATION WITH HYPERNATREMIA: Status: RESOLVED | Noted: 2021-11-09 | Resolved: 2021-11-11

## 2021-11-11 PROBLEM — J96.01 ACUTE RESPIRATORY FAILURE WITH HYPOXIA (HCC): Status: RESOLVED | Noted: 2021-11-09 | Resolved: 2021-11-11

## 2021-11-11 PROBLEM — D50.8 IRON DEFICIENCY ANEMIA SECONDARY TO INADEQUATE DIETARY IRON INTAKE: Status: ACTIVE | Noted: 2021-11-09

## 2021-11-11 PROBLEM — A41.9 SEPSIS DUE TO PNEUMONIA (HCC): Status: RESOLVED | Noted: 2021-11-09 | Resolved: 2021-11-11

## 2021-11-11 PROBLEM — J18.9 SEPSIS DUE TO PNEUMONIA (HCC): Status: RESOLVED | Noted: 2021-11-09 | Resolved: 2021-11-11

## 2021-11-11 LAB
ANION GAP SERPL CALC-SCNC: 4 MMOL/L (ref 7–16)
BASOPHILS # BLD: 0 K/UL (ref 0–0.2)
BASOPHILS NFR BLD: 0 % (ref 0–2)
BUN SERPL-MCNC: 36 MG/DL (ref 8–23)
CALCIUM SERPL-MCNC: 8.5 MG/DL (ref 8.3–10.4)
CHLORIDE SERPL-SCNC: 115 MMOL/L (ref 98–107)
CO2 SERPL-SCNC: 28 MMOL/L (ref 21–32)
COVID-19 RAPID TEST, COVR: NOT DETECTED
CREAT SERPL-MCNC: 0.82 MG/DL (ref 0.8–1.5)
DIFFERENTIAL METHOD BLD: ABNORMAL
EOSINOPHIL # BLD: 0 K/UL (ref 0–0.8)
EOSINOPHIL NFR BLD: 0 % (ref 0.5–7.8)
ERYTHROCYTE [DISTWIDTH] IN BLOOD BY AUTOMATED COUNT: 13.5 % (ref 11.9–14.6)
GLUCOSE SERPL-MCNC: 104 MG/DL (ref 65–100)
HCT VFR BLD AUTO: 31.7 % (ref 41.1–50.3)
HGB BLD-MCNC: 9.3 G/DL (ref 13.6–17.2)
IMM GRANULOCYTES # BLD AUTO: 0 K/UL (ref 0–0.5)
IMM GRANULOCYTES NFR BLD AUTO: 0 % (ref 0–5)
LYMPHOCYTES # BLD: 1.1 K/UL (ref 0.5–4.6)
LYMPHOCYTES NFR BLD: 38 % (ref 13–44)
MCH RBC QN AUTO: 29.7 PG (ref 26.1–32.9)
MCHC RBC AUTO-ENTMCNC: 29.3 G/DL (ref 31.4–35)
MCV RBC AUTO: 101.3 FL (ref 79.6–97.8)
MONOCYTES # BLD: 0.3 K/UL (ref 0.1–1.3)
MONOCYTES NFR BLD: 9 % (ref 4–12)
NEUTS SEG # BLD: 1.6 K/UL (ref 1.7–8.2)
NEUTS SEG NFR BLD: 53 % (ref 43–78)
NRBC # BLD: 0 K/UL (ref 0–0.2)
PLATELET # BLD AUTO: 81 K/UL (ref 150–450)
PMV BLD AUTO: 11 FL (ref 9.4–12.3)
POTASSIUM SERPL-SCNC: 3.5 MMOL/L (ref 3.5–5.1)
RBC # BLD AUTO: 3.13 M/UL (ref 4.23–5.6)
SODIUM SERPL-SCNC: 147 MMOL/L (ref 138–145)
SOURCE, COVRS: NORMAL
WBC # BLD AUTO: 3 K/UL (ref 4.3–11.1)

## 2021-11-11 PROCEDURE — 94760 N-INVAS EAR/PLS OXIMETRY 1: CPT

## 2021-11-11 PROCEDURE — 94640 AIRWAY INHALATION TREATMENT: CPT

## 2021-11-11 PROCEDURE — 80048 BASIC METABOLIC PNL TOTAL CA: CPT

## 2021-11-11 PROCEDURE — 97112 NEUROMUSCULAR REEDUCATION: CPT

## 2021-11-11 PROCEDURE — 97161 PT EVAL LOW COMPLEX 20 MIN: CPT

## 2021-11-11 PROCEDURE — 97530 THERAPEUTIC ACTIVITIES: CPT

## 2021-11-11 PROCEDURE — 74011000258 HC RX REV CODE- 258: Performed by: INTERNAL MEDICINE

## 2021-11-11 PROCEDURE — 74011000250 HC RX REV CODE- 250: Performed by: INTERNAL MEDICINE

## 2021-11-11 PROCEDURE — 74011250636 HC RX REV CODE- 250/636: Performed by: INTERNAL MEDICINE

## 2021-11-11 PROCEDURE — 77010033678 HC OXYGEN DAILY

## 2021-11-11 PROCEDURE — 97535 SELF CARE MNGMENT TRAINING: CPT

## 2021-11-11 PROCEDURE — 85025 COMPLETE CBC W/AUTO DIFF WBC: CPT

## 2021-11-11 PROCEDURE — 36415 COLL VENOUS BLD VENIPUNCTURE: CPT

## 2021-11-11 PROCEDURE — 87635 SARS-COV-2 COVID-19 AMP PRB: CPT

## 2021-11-11 PROCEDURE — 97166 OT EVAL MOD COMPLEX 45 MIN: CPT

## 2021-11-11 PROCEDURE — 74011250637 HC RX REV CODE- 250/637: Performed by: INTERNAL MEDICINE

## 2021-11-11 RX ADMIN — SODIUM CHLORIDE SOLN NEBU 3% 4 ML: 3 NEBU SOLN at 08:15

## 2021-11-11 RX ADMIN — SODIUM CHLORIDE SOLN NEBU 3% 4 ML: 3 NEBU SOLN at 02:45

## 2021-11-11 RX ADMIN — DEXTROSE MONOHYDRATE 125 ML/HR: 5 INJECTION, SOLUTION INTRAVENOUS at 08:28

## 2021-11-11 RX ADMIN — Medication 10 ML: at 05:59

## 2021-11-11 RX ADMIN — ENOXAPARIN SODIUM 40 MG: 100 INJECTION SUBCUTANEOUS at 08:28

## 2021-11-11 RX ADMIN — PIPERACILLIN SODIUM AND TAZOBACTAM SODIUM 4.5 G: 4; .5 INJECTION, POWDER, LYOPHILIZED, FOR SOLUTION INTRAVENOUS at 08:28

## 2021-11-11 NOTE — DISCHARGE SUMMARY
Hospitalist Discharge Summary   Admit Date:  2021  7:46 PM   DC Note date: 2021  Name:  Lorena Paige   Age:  79 y.o.   Sex:  male  :  1940   MRN:  [id number]   Room:  [facility]  PCP:  David Marinelli MD    Presenting Complaint: Blood infection    Initial Admission Diagnosis: Aspiration pneumonia ([facility] [J69.0]  MARVEL (acute kidney injury) ([facility] [N17.9]  Dehydration with hypernatremia [E86.0, E87.0]     Problem List for this Hospitalization:  Hospital Problems as of 2021 Date Reviewed: 2021          Codes Class Noted - Resolved POA    * (Principal) RESOLVED: Sepsis due to pneumonia [facility]) ICD-10-CM: J18.9, A41.9  ICD-9-CM: 966, 995.91  2021 - 2021 Yes        Parkinsons disease ([facility] (Chronic) ICD-10-CM: G20  ICD-9-CM: 332.0  2016 - Present Yes        RESOLVED: Acute respiratory failure with hypoxia ([facility] ICD-10-CM: J96.01  ICD-9-CM: 518.81  2021 - 2021 Yes        Iron deficiency anemia secondary to inadequate dietary iron intake ICD-10-CM: D50.8  ICD-9-CM: 280.1  2021 - Present Yes        Severe protein-calorie malnutrition (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 ICD-10-CM: A41.50  ICD-9-CM: 038.40, 995.91  2021 - 2021 Yes        RESOLVED: Aspiration pneumonia ([facility] ICD-10-CM: J69.0  ICD-9-CM: 507.0  2021 - 2021 Yes        RESOLVED: Dehydration with hypernatremia ICD-10-CM: E86.0, E87.0  ICD-9-CM: 276.0  2021 - 2021 Yes        RESOLVED: MARVEL (acute kidney injury) ([facility] ICD-10-CM: N17.9  ICD-9-CM: 584.9  11/9/2021 - 11/11/2021 Yes        RESOLVED: Dyslipidemia (Chronic) ICD-10-CM: E78.5  ICD-9-CM: 272.4  1/30/2015 - 11/11/2021 Yes            Did Patient have Sepsis (YES OR NO): Yes    Hospital Course:  79 y.o. male with medical history of dementia, parkinsons, HTN, who presented with cough. Resident of SNF. History per chart and family due to pt condition. Pt apparently has been declining the last 2-3 days. Less responsive, not eating, coughing, getting more SOB. Had fever yesterday and today. SNF gave him zosyn yesterday for suspected aspiration but he is still not better today so they sent him to ER for eval.   Wife says even at baseline he only \"sometimes\" talks to her. He was given antibiotics. He was evaluated by SLP who recommend against further intake by mouth. He reported thirst and was unable to take in even small amounts by mouth without coughing. His family evaluated the progression of his symptoms and agreed that he was appropriate for discharge to hospice care. As such he was determined to be safe for discharge 11/11 to [facility] with hospice providing care. Disposition: Skilled Nursing Facility  Diet: DIET NPO  Code Status: DNR    Follow Up Orders: Follow-up Appointments   Procedures    FOLLOW UP VISIT Appointment in: 3 - 5 Days Follow up with Hospice provider upon arrival at facility. Follow up with Hospice provider upon arrival at facility. Standing Status:   Standing     Number of Occurrences:   1     Order Specific Question:   Appointment in     Answer:   3 - 5 Days       Follow-up Information     Follow up With Specialties Details Why Edna García MD Internal Medicine   [facility]   [address] [address]  [phone]            Time spent in patient discharge and coordination 42 minutes. Plan was discussed with wife, patient (present, not participating), nurse, . All questions answered.   Patient was stable at time of discharge. Instructions given to call a physician or return if any concerns. Discharge Info:   Current Discharge Medication List      CONTINUE these medications which have NOT CHANGED    Details   clonazePAM (KlonoPIN) 0.5 mg tablet 1/2 tabs every 4 hrs as needed for anxiety and agitation. Not to exceed 1mg in 24 hrs. Qty: 15 Tablet, Refills: 0    Associated Diagnoses: Anxiety      ergocalciferol (ERGOCALCIFEROL) 1,250 mcg (50,000 unit) capsule Take 50,000 Units by mouth every seven (7) days. pimozide (ORAP) 1 mg tablet Take 1/2 tab at 8am, 4pm and 8pm  Qty: 270 Tab, Refills: 3    Associated Diagnoses: Parkinsons disease (HCC)      multivitamin (ONE A DAY) tablet Take 1 Tab by mouth daily. rivastigmine (Exelon) 13.3 mg/24 hour patch 1 Patch by TransDERmal route daily. Qty: 90 Patch, Refills: 3    Associated Diagnoses: Dementia due to Parkinson's disease without behavioral disturbance (HCC)      memantine (NAMENDA) 10 mg tablet Take 1 Tab by mouth two (2) times a day. Qty: 180 Tab, Refills: 3    Associated Diagnoses: Dementia due to Parkinson's disease without behavioral disturbance (HCC)      pyridostigmine (Mestinon) 60 mg tablet Take 1/2 tablet at 10 am and at 6 pm daily to support blood pressure. Qty: 30 Tab, Refills: 3    Associated Diagnoses: Neurologic orthostatic hypotension (HCC)      montelukast (SINGULAIR) 10 mg tablet Take 1 Tab by mouth daily. Qty: 90 Tab, Refills: 3      clotrimazole-betamethasone (LOTRISONE) topical cream Apply  to affected area two (2) times a day. Qty: 15 g, Refills: 0    Associated Diagnoses: Dermatitis      carbidopa-levodopa (SINEMET)  mg per tablet TAKE ONE TABLET BY MOUTH FOUR TIMES A DAY  Qty: 360 Tab, Refills: 3    Associated Diagnoses: Parkinsons disease (HCC)      escitalopram oxalate (Lexapro) 10 mg tablet Take 1 Tab by mouth daily.   Qty: 90 Tab, Refills: 3    Associated Diagnoses: Parkinsons disease ([address]      nitroglycerin (NITROSTAT) 0.4 mg SL tablet Take 1 Tab by mouth every five (5) minutes as needed for Chest Pain. Sit/Lay down then put one tab under the tongue every 5 minutes as needed for chest pain for 3 doses  Qty: 1 Bottle, Refills: 0      aspirin delayed-release 81 mg tablet Take 1 Tab by mouth daily. Qty: 30 Tab, Refills: 0         STOP taking these medications       atorvastatin (LIPITOR) 10 mg tablet Comments:   Reason for Stopping:               Procedures done this admission:  * No surgery found *    Consults this admission:  IP CONSULT TO PALLIATIVE CARE - PROVIDER    Echocardiogram/EKG results:  No results found for this or any previous visit. EKG Results     Procedure 720 Value Units Date/Time    EKG, 12 LEAD, INITIAL [[id number]] Collected: 11/09/21 2000    Order Status: Completed Updated: 11/09/21 2200     Ventricular Rate 96 BPM      Atrial Rate 97 BPM      P-R Interval 142 ms      QRS Duration 96 ms      Q-T Interval 340 ms      QTC Calculation (Bezet) 429 ms      Calculated P Axis 18 degrees      Calculated R Axis -16 degrees      Calculated T Axis 69 degrees      Diagnosis --     !! AGE AND GENDER SPECIFIC ECG ANALYSIS !! Normal sinus rhythm  Incomplete right bundle branch block  Minimal voltage criteria for LVH, may be normal variant  Septal infarct (cited on or before 09-NOV-2021)  Abnormal ECG  When compared with ECG of 24-JUL-2021 20:45,  Previous ECG has undetermined rhythm, needs review  Questionable change in initial forces of Septal leads  Confirmed by Sierra Lazcano MD (), [place]AD[place] ([id number]) on 11/9/2021 10:00:45 PM            Diagnostic Imaging/Tests:   XR CHEST PORT    Result Date: 11/9/2021  CHEST X-RAY, one view. HISTORY:  Shortness of breath TECHNIQUE:  AP portable semiupright view. COMPARISON: July 2021 FINDINGS: Lungs: Bilateral alveolar infiltrates, left more than right, obscuring left hemidiaphragm. Costophrenic angles: are sharp.  Heart size: is normal. Pulmonary vasculature: is unremarkable. Aorta: Unremarkable. Included portion of the upper abdomen: is unremarkable. Bones: No gross bony lesions. Other: None. Bilateral pneumonia, left more than right. All Micro Results     Procedure Component Value Units Date/Time    COVID-19 RAPID TEST [[id number]] Collected: 11/11/21 0945    Order Status: Completed Updated: 11/11/21 1004    BLOOD CULTURE [[id number]] Collected: 11/09/21 2016    Order Status: Completed Specimen: Blood Updated: 11/11/21 0731     Special Requests: --        LIGHT  FOREARM       Culture result: NO GROWTH 2 DAYS       BLOOD CULTURE [[id number]] Collected: 11/09/21 2345    Order Status: Completed Specimen: Blood Updated: 11/11/21 0731     Special Requests: --        LEFT  ARM       Culture result: NO GROWTH 1 DAY       COVID-19 RAPID TEST [[id number]] Collected: 11/09/21 2100    Order Status: Completed Specimen: Nasopharyngeal Updated: 11/09/21 2126     Specimen source NASAL        COVID-19 rapid test Not detected        Comment:      The specimen is NEGATIVE for SARS-CoV-2, the novel coronavirus associated with COVID-19. A negative result does not rule out COVID-19. This test has been authorized by the FDA under an Emergency Use Authorization (EUA) for use by authorized laboratories.         Fact sheet for Healthcare Providers: ConventionUpdate.co.nz  Fact sheet for Patients: ConventionUpdate.co.nz       Methodology: Isothermal Nucleic Acid Amplification               Labs: Results:       BMP, Mg, Phos Recent Labs     11/11/21  0757 11/09/21 2002   * 152*   K 3.5 4.0   * 121*   CO2 28 27   AGAP 4* 4*   BUN 36* 56*   CREA 0.82 1.62*   CA 8.5 8.8   * 133*      CBC Recent Labs     11/11/21  0757 11/09/21 2002   WBC 3.0* 3.6*   RBC 3.13* 3.72*   HGB 9.3* 11.2*   HCT 31.7* 37.2*   PLT 81* 78*   GRANS 53 67   LYMPH 38 15   EOS 0* 0*   MONOS 9 10   BASOS 0 0   IG 0 8*   ANEU 1.6* 2.4   ABL 1.1 0.5   JANETH 0.0 0.0 ABM 0.3 0.4   ABB 0.0 0.0   AIG 0.0 0.3      LFT Recent Labs     11/09/21 2002   ALT 14   AP 51   TP 5.6*   ALB 2.1*   GLOB 3.5   AGRAT 0.6*      Cardiac Testing Lab Results   Component Value Date/Time    Troponin-I, Qt. <0.02 (L) 05/19/2020 11:54 PM      Coagulation Tests Lab Results   Component Value Date/Time    Prothrombin time 14.2 04/13/2021 02:48 PM    Prothrombin time 10.5 05/04/2010 10:34 AM    Prothrombin time 10.5 10/29/2009 12:17 PM    INR 1.1 04/13/2021 02:48 PM    INR 1.0 05/04/2010 10:34 AM    INR 1.0 10/29/2009 12:17 PM    aPTT 32.7 04/13/2021 02:48 PM    aPTT 27.9 05/04/2010 10:34 AM    aPTT 28.6 10/29/2009 12:17 PM      A1c Lab Results   Component Value Date/Time    Hemoglobin A1c <3.5 (L) 04/15/2021 09:07 AM    Hemoglobin A1c 4.9 04/13/2021 02:48 PM      Lipid Panel Lab Results   Component Value Date/Time    Cholesterol, total 141 09/16/2020 10:13 AM    HDL Cholesterol 62 09/16/2020 10:13 AM    LDL, calculated 68 09/16/2020 10:13 AM    LDL, calculated 61 06/13/2019 10:59 AM    VLDL, calculated 11 09/16/2020 10:13 AM    VLDL, calculated 10 06/13/2019 10:59 AM    Triglyceride 47 09/16/2020 10:13 AM      Thyroid Panel Lab Results   Component Value Date/Time    TSH 2.340 06/13/2019 10:59 AM    TSH 3.220 12/12/2018 11:00 AM        Most Recent UA Lab Results   Component Value Date/Time    Color YELLOW 07/27/2021 02:13 PM    Appearance CLEAR 07/27/2021 02:13 PM    Specific gravity 1.028 (H) 04/13/2021 02:40 PM    pH (UA) 6.5 07/27/2021 02:13 PM    Protein Negative 07/27/2021 02:13 PM    Glucose Negative 07/27/2021 02:13 PM    Ketone Negative 07/27/2021 02:13 PM    Bilirubin Negative 07/27/2021 02:13 PM    Blood Negative 07/27/2021 02:13 PM    Urobilinogen 2.0 (H) 07/27/2021 02:13 PM    Nitrites Negative 07/27/2021 02:13 PM    Leukocyte Esterase TRACE (A) 07/27/2021 02:13 PM    WBC 0-3 07/27/2021 02:13 PM    RBC 0-3 07/27/2021 02:13 PM    Epithelial cells 0-3 07/27/2021 02:13 PM    Bacteria 1+ (H) 07/27/2021 02:13 PM    Casts 0-3 07/25/2021 01:42 AM    Crystals, urine CA OXALATE 07/27/2021 02:13 PM    Mucus TRACE 04/13/2021 02:40 PM    Other observations RESULTS VERIFIED MANUALLY 07/27/2021 02:13 PM          All Labs from Last 24 Hrs:  Recent Results (from the past 24 hour(s))   METABOLIC PANEL, BASIC    Collection Time: 11/11/21  7:57 AM   Result Value Ref Range    Sodium 147 (H) 138 - 145 mmol/L    Potassium 3.5 3.5 - 5.1 mmol/L    Chloride 115 (H) 98 - 107 mmol/L    CO2 28 21 - 32 mmol/L    Anion gap 4 (L) 7 - 16 mmol/L    Glucose 104 (H) 65 - 100 mg/dL    BUN 36 (H) 8 - 23 MG/DL    Creatinine 0.82 0.8 - 1.5 MG/DL    GFR est AA >60 >60 ml/min/1.73m2    GFR est non-AA >60 >60 ml/min/1.73m2    Calcium 8.5 8.3 - 10.4 MG/DL   CBC WITH AUTOMATED DIFF    Collection Time: 11/11/21  7:57 AM   Result Value Ref Range    WBC 3.0 (L) 4.3 - 11.1 K/uL    RBC 3.13 (L) 4.23 - 5.6 M/uL    HGB 9.3 (L) 13.6 - 17.2 g/dL    HCT 31.7 (L) 41.1 - 50.3 %    .3 (H) 79.6 - 97.8 FL    MCH 29.7 26.1 - 32.9 PG    MCHC 29.3 (L) 31.4 - 35.0 g/dL    RDW 13.5 11.9 - 14.6 %    PLATELET 81 (L) 681 - 450 K/uL    MPV 11.0 9.4 - 12.3 FL    ABSOLUTE NRBC 0.00 0.0 - 0.2 K/uL    DF AUTOMATED      NEUTROPHILS 53 43 - 78 %    LYMPHOCYTES 38 13 - 44 %    MONOCYTES 9 4.0 - 12.0 %    EOSINOPHILS 0 (L) 0.5 - 7.8 %    BASOPHILS 0 0.0 - 2.0 %    IMMATURE GRANULOCYTES 0 0.0 - 5.0 %    ABS. NEUTROPHILS 1.6 (L) 1.7 - 8.2 K/UL    ABS. LYMPHOCYTES 1.1 0.5 - 4.6 K/UL    ABS. MONOCYTES 0.3 0.1 - 1.3 K/UL    ABS. EOSINOPHILS 0.0 0.0 - 0.8 K/UL    ABS. BASOPHILS 0.0 0.0 - 0.2 K/UL    ABS. IMM.  GRANS. 0.0 0.0 - 0.5 K/UL       Current Med List in Hospital:   Current Facility-Administered Medications   Medication Dose Route Frequency    aspirin delayed-release tablet 81 mg  81 mg Oral DAILY    carbidopa-levodopa (SINEMET)  mg per tablet 1 Tablet  1 Tablet Oral QID    QUEtiapine (SEROquel) tablet 25 mg  25 mg Oral BID PRN    escitalopram oxalate (LEXAPRO) tablet 10 mg  10 mg Oral DAILY    memantine (NAMENDA) tablet 10 mg  10 mg Oral BID    montelukast (SINGULAIR) tablet 10 mg  10 mg Oral DAILY    pyridostigmine (MESTINON) tablet 30 mg  30 mg Oral BID    rivastigmine (EXELON) 13.3 mg/24 hour patch 1 Patch  1 Patch TransDERmal DAILY    piperacillin-tazobactam (ZOSYN) 4.5 g in 0.9% sodium chloride (MBP/ADV) 100 mL MBP  4.5 g IntraVENous Q8H    guaiFENesin ER (MUCINEX) tablet 1,200 mg  1,200 mg Oral BID    sodium chloride (NS) flush 5-40 mL  5-40 mL IntraVENous Q8H    sodium chloride (NS) flush 5-40 mL  5-40 mL IntraVENous PRN    acetaminophen (TYLENOL) tablet 650 mg  650 mg Oral Q6H PRN    Or    acetaminophen (TYLENOL) suppository 650 mg  650 mg Rectal Q6H PRN    polyethylene glycol (MIRALAX) packet 17 g  17 g Oral DAILY PRN    bisacodyL (DULCOLAX) suppository 10 mg  10 mg Rectal DAILY PRN    ondansetron (ZOFRAN ODT) tablet 4 mg  4 mg Oral Q8H PRN    Or    ondansetron (ZOFRAN) injection 4 mg  4 mg IntraVENous Q6H PRN    famotidine (PEPCID) tablet 20 mg  20 mg Oral BID PRN    alum-mag hydroxide-simeth (MYLANTA) oral suspension 15 mL  15 mL Oral Q6H PRN    enoxaparin (LOVENOX) injection 40 mg  40 mg SubCUTAneous DAILY    sodium chloride 3% hypertonic nebulizer soln  4 mL Nebulization Q6H RT    albuterol (PROVENTIL VENTOLIN) nebulizer solution 2.5 mg  2.5 mg Nebulization Q6H PRN    lip protectant (BLISTEX) ointment 1 Each  1 Each Topical PRN    sodium chloride (NS) flush 5-10 mL  5-10 mL IntraVENous Q8H    sodium chloride (NS) flush 5-10 mL  5-10 mL IntraVENous PRN    dextrose 5% infusion  125 mL/hr IntraVENous CONTINUOUS    LORazepam (ATIVAN) injection 1 mg  1 mg IntraVENous Q2H PRN       Allergies   Allergen Reactions    Aricept [Donepezil] Nausea and Vomiting    Onion Nausea and Vomiting     Immunization History   Administered Date(s) Administered    Influenza High Dose Vaccine PF 09/09/2016, 09/01/2017, 09/28/2018, 10/10/2019, 09/20/2020    Influenza Vaccine (Tri) Adjuvanted (>65 Yrs FLUAD TRI 92222) 10/16/2018    Influenza, Quadrivalent, Adjuvanted (>65 Yrs FLUAD QUAD [id number]) 09/21/2020    Pneumococcal Conjugate (PCV-13) 03/30/2017    Pneumococcal Polysaccharide (PPSV-23) 12/17/2015    TB Skin Test (PPD) Intradermal 02/17/2017, 04/15/2021, 07/27/2021    Zoster Recombinant 09/16/2019, 11/25/2019       Recent Vital Data:  Patient Vitals for the past 24 hrs:   Temp Pulse Resp BP SpO2   11/11/21 0821     98 %   11/11/21 0725 97.9 °F (36.6 °C) (!) 57 18 110/62 97 %   11/11/21 0422 98 °F (36.7 °C) 62 19 116/62 96 %   11/11/21 0245     99 %   11/10/21 2300 97.7 °F (36.5 °C) 75 18 111/60 99 %   11/10/21 2042     99 %   11/10/21 2005 98 °F (36.7 °C) (!) 57 19 (!) 149/74 100 %   11/10/21 1529 97.7 °F (36.5 °C) 75 18 110/69 96 %   11/10/21 1334     98 %   11/10/21 1144 97.8 °F (36.6 °C) 71 18 102/62 92 %     Oxygen Therapy  O2 Sat (%): 98 % (11/11/21 0821)  Pulse via Oximetry: 57 beats per minute (11/11/21 0821)  O2 Device: Nasal cannula (11/11/21 0821)  O2 Flow Rate (L/min): 2 l/min (11/11/21 0821)    Estimated body mass index is 21.09 kg/m² as calculated from the following:    Height as of this encounter: 5' 10\" (1.778 m). Weight as of this encounter: 66.7 kg (147 lb). Intake/Output Summary (Last 24 hours) at 11/11/2021 1041  Last data filed at 11/11/2021 0828  Gross per 24 hour   Intake 0 ml   Output 700 ml   Net -700 ml       Physical Exam  Vitals and nursing note reviewed. Constitutional:       General: He is not in acute distress. Appearance: He is cachectic. Comments: Temporal wasting   HENT:      Head: Normocephalic. Eyes:      Extraocular Movements: Extraocular movements intact. Cardiovascular:      Rate and Rhythm: Normal rate. Pulses:           Radial pulses are 2+ on the left side. Pulmonary:      Effort: Pulmonary effort is normal. No respiratory distress. Breath sounds:  No wheezing. Musculoskeletal:         General: No deformity. Cervical back: No rigidity. Skin:     General: Skin is warm and dry. Coloration: Skin is pale. Neurological:      General: No focal deficit present. Mental Status: He is alert. Mental status is at baseline. He is disoriented. Motor: Weakness present. Psychiatric:         Attention and Perception: He is attentive. Mood and Affect: Mood normal. Affect is flat. Behavior: Behavior is slowed. Behavior is not withdrawn. Behavior is cooperative.            Signed:  Ramon Thompson MD

## 2021-11-11 NOTE — PROGRESS NOTES
Pt resting in bed with eyes closed. Pt arouses to touch, but doesn't speak with nurse. Pt on 2L nc at this time. No s/sx of distress noted at this time. Left heel elevated on pillows. Door open will monitor.  Pt DNR

## 2021-11-11 NOTE — PROGRESS NOTES
Patient in bed resting. Respirations even and unlabored. On 2L NC. All needs addressed. Safety measures in place. Call light within reach. No signs of acute distress at this time. Will continue to monitor. Preparing to give report to oncoming RN.

## 2021-11-11 NOTE — PROGRESS NOTES
Report received from [facility], [address] [place] [address]. Patient in bed resting. Respirations even and unlabored. On 2L HFNC. Safety measures in place. Call light in reach. No signs of distress at this time. Will continue to monitor.

## 2021-11-11 NOTE — PROGRESS NOTES
Report given to [facility], at [facility]. Pt awaiting EMS transport to facility.

## 2021-11-11 NOTE — PROGRESS NOTES
ACUTE PHYSICAL THERAPY GOALS:  (Developed with and agreed upon by patient and/or caregiver.)   1. Pt will perform bed mobility c Min (A)x2 in 7 therapy sessions. 2. Pt will perform sit-to-stand/ stand-to-sit transfers c Mod (A)x1 in 7 therapy sessions. 3. Pt will perform standing balance activities with minimal postural sway at EOB in 7 therapy sessions. 4. Pt will tolerate multiple sets and reps of BLE exercises in 7 therapy sessions. PHYSICAL THERAPY ASSESSMENT: Initial Assessment PT Treatment Day # 1      Kam Null is a 79 y.o. male   PRIMARY DIAGNOSIS: Sepsis due to pneumonia ([facility] [address]  Aspiration pneumonia ([facility] [address] [J69.0]  MARVEL (acute kidney injury) ([facility] [address] [N17.9]  Dehydration with hypernatremia [E86.0, E87.0]       Reason for Referral:    ICD-10: Treatment Diagnosis: Generalized Muscle Weakness (M62.81)  Other lack of cordination (R27.8)  Difficulty in walking, Not elsewhere classified (R26.2)  Other abnormalities of gait and mobility (R26.89)  [place] / Product Type: Medicare /     ASSESSMENT:     REHAB RECOMMENDATIONS:   Recommendation to date pending progress:  Setting:   Return to [facility] [place]:    To Be Determined     PRIOR LEVEL OF FUNCTION:  (Prior to Hospitalization) INITIAL/CURRENT LEVEL OF FUNCTION:  (Most Recently Demonstrated)   Bed Mobility:   Modified Independent  Sit to Stand:   Contact Guard Assistance  Transfers:   Contact Guard Assistance  Gait/Mobility:   Unable to perform Bed Mobility:   Maximal Assistance x 2  Sit to Stand:   Moderate Assistance x 2  Transfers:   Unable to perform  Gait/Mobility:   Unable to perform     ASSESSMENT:  Mr. Ken De La Rosa Is a 79 y.o M  presenting to PT after coming to [facility] ED on 11/9 c pneumonia assoc c worsening SOB. PTA pt was at [facility] where he received (A) for all ADLs/ IADLs and was able to transfer to/from wheelchair at baseline.  At time of initial evaluation, pt presents below baseline LOF with deficits in hearing, bed mobility, strength, transfers, balance and endurance limiting his overall functional mobility. Today, pt performed all bed mobility  with Max (A)x2  while requiring Mod (A)x2 for sit-to-stand from EOB. Time was spent (A) PCT c OT/ PT staff to help change pt and perform maxx-care before initiating mobility. Of note, pt was only able to stand for brief duration (~10secs) c a noticeably forward-flexed posture before needing to sit at EOB. Pt was unable to take steps and required mod VCs for proper performance/ hand-placement. As session continued, pt showing noticeable improvements in sitting balance. Additionally, d/t hearing deficits; pt required repetitive, clear and loud instruction directly into his L ear. At this time, pt is an appropriate candidate for skilled PT and will benefit from POC designed to address the aforementioned deficits. Upon completion of treatment, pt was positioned to comfort in bed with needs in reach and wife at bedside. RN was made aware of pt performance. D/C Recommendation: Return to [facility]:   Mr. Tootie Spencer states, [facility] \"    SOCIAL HISTORY/LIVING ENVIRONMENT: Previously at [facility] P[facility].   Support Systems: Spouse/Significant Other  OBJECTIVE:     PAIN: VITAL SIGNS: LINES/DRAINS:   Pre Treatment: Pain Screen  Pain Scale 1: FLACC  Pain Intensity 1: 0  Patient Stated Pain Goal: 0  Post Treatment: 0 Vital Signs  O2 Device: Nasal cannula  O2 Flow Rate (L/min): 2 l/min IV and condom cath  O2 Device: Nasal cannula     GROSS EVALUATION:  BLE Within Functional Limits Abnormal/ Functional Abnormal/ Non-Functional (see comments) Not Tested Comments:   AROM [] [x] [] []    PROM [] [] [] [x]    Strength [] [x] [] [] Globally weak   Balance [] [x] [] [] Unable to stand w/out support   Posture [] [x] [] [] Forward-flexed   Sensation [] [] [] [x]    Coordination [] [x] [] []    Tone [] [x] [] []    Edema [x] [] [] []    Activity Tolerance [] [x] [] [] Below baseline     [] [] [] []      COGNITION/  PERCEPTION: Intact Impaired   (see comments) Comments:   Orientation [x] []    Vision [x] []    Hearing [] [x] Speak LOUDLY into LEFT EAR   Command Following [x] []    Safety Awareness [] [x]     [] []      MOBILITY: I Mod I S SBA CGA Min Mod Max Total  NT x2 Comments:   Bed Mobility    Rolling [] [] [] [] [] [] [] [x] [] [] [x]    Supine to Sit [] [] [] [] [] [] [] [x] [] [] [x]    Scooting [] [] [] [] [] [] [] [x] [] [] []    Sit to Supine [] [] [] [] [] [] [] [x] [] [] [x]    Transfers    Sit to Stand [] [] [] [] [] [] [x] [] [] [] [x]    Bed to Chair [] [] [] [] [] [] [] [] [] [x] []    Stand to Sit [] [] [] [] [] [] [x] [] [] [] [x]    I=Independent, Mod I=Modified Independent, S=Supervision, SBA=Standby Assistance, CGA=Contact Guard Assistance,   Min=Minimal Assistance, Mod=Moderate Assistance, Max=Maximal Assistance, Total=Total Assistance, NT=Not Tested  GAIT: I Mod I S SBA CGA Min Mod Max Total  NT x2 Comments:   Level of Assistance [] [] [] [] [] [] [] [] [] [x] []    Distance N/A    DME N/A    Gait Quality N/A    Weightbearing Status N/A     I=Independent, Mod I=Modified Independent, S=Supervision, SBA=Standby Assistance, CGA=Contact Guard Assistance,   Min=Minimal Assistance, Mod=Moderate Assistance, Max=Maximal Assistance, Total=Total Assistance, NT=Not Tested    [employer] [facility] [id number] [facility] Inpatient Short Form       How much difficulty does the patient currently have. .. Unable A Lot A Little None   1. Turning over in bed (including adjusting bedclothes, sheets and blankets)? [] 1   [x] 2   [] 3   [] 4   2. Sitting down on and standing up from a chair with arms ( e.g., wheelchair, bedside commode, etc.)   [] 1   [x] 2   [] 3   [] 4   3. Moving from lying on back to sitting on the side of the bed? [] 1   [x] 2   [] 3   [] 4   How much help from another person does the patient currently need. ..  Total A Lot A Little None   4. Moving to and from a bed to a chair (including a wheelchair)? [x] 1   [] 2   [] 3   [] 4   5. Need to walk in hospital room? [x] 1   [] 2   [] 3   [] 4   6. Climbing 3-5 steps with a railing? [x] 1   [] 2   [] 3   [] 4   © 2007, Trustees of [address] [place] [address], under license to [employer]. All rights reserved     Score:  Initial: 9 Most Recent: X (Date: -- )    Interpretation of Tool:  Represents activities that are increasingly more difficult (i.e. Bed mobility, Transfers, Gait). PLAN:   FREQUENCY/DURATION: PT Plan of Care: 3 times/week for duration of hospital stay or until stated goals are met, whichever comes first.    PROBLEM LIST:   (Skilled intervention is medically necessary to address:)  1. Decreased ADL/Functional Activities  2. Decreased Activity Tolerance  3. Decreased AROM/PROM  4. Decreased Balance  5. Decreased Gait Ability  6. Decreased Strength  7. Decreased Transfer Abilities  8. Increased Pain   INTERVENTIONS PLANNED:   (Benefits and precautions of physical therapy have been discussed with the patient.)  1. Therapeutic Activity  2. Therapeutic Exercise/HEP  3. Neuromuscular Re-education  4. Gait Training  5. Manual Therapy  6. Education     TREATMENT:     EVALUATION: Low Complexity : (Untimed Charge)    TREATMENT:   ($$ Therapeutic Activity: 8-22 mins    )  Co-Treatment PT/OT necessary due to patient's decreased overall endurance/tolerance levels, as well as need for high level skilled assistance to complete functional transfers/mobility and functional tasks  Therapeutic Activity (26 Minutes): Therapeutic activity included Rolling, Supine to Sit, Sit to Supine, Scooting, Lateral Scooting, Transfer Training, Sitting balance  and Standing balance to improve functional Mobility, Strength, ROM and Activity tolerance.     TREATMENT GRID:  N/A    AFTER TREATMENT POSITION/PRECAUTIONS:  Alarm Activated, Bed, Needs within reach, RN notified and Visitors at bedside    INTERDISCIPLINARY COLLABORATION:  RN/PCT, PT/PTA and OT/RUBI    TOTAL TREATMENT DURATION:  PT Patient Time In/Time Out  Time In: 0928  Time Out: R Ned Rogers 51, PT

## 2021-11-11 NOTE — PROGRESS NOTES
ACUTE OT GOALS:  (Developed with and agreed upon by patient and/or caregiver.)  1. Patient will bathe/dress upper body with moderate assistance and adaptive equipment as needed. 2. Patient will tolerate 30 minutes of OT treatment with up to 3 rest breaks to increase activity tolerance for ADLs. 3. Patient will complete bed mobility with moderate assistance in preparation for functional transfers. 4. Patient will complete functional transfers with moderate assistance adaptive equipment as needed. 5. Patient will demonstrate modified independence with HEP to increase strength in BUEs for functional transfers. Timeframe: 7 visits       OCCUPATIONAL THERAPY ASSESSMENT: Initial Assessment and Daily Note OT Treatment Day # 1    Esvin Pierre is a 79 y.o. male   PRIMARY DIAGNOSIS: Sepsis due to pneumonia ([facility] [address]  Aspiration pneumonia ([facility] [address] [J69.0]  MARVEL (acute kidney injury) ([facility] [address] [N17.9]  Dehydration with hypernatremia [E86.0, E87.0]       Reason for Referral:    ICD-10: Treatment Diagnosis: Generalized Muscle Weakness (M62.81)  Other lack of cordination (R27.8)  Difficulty in walking, Not elsewhere classified (R26.2)  [place] / Product Type: Medicare /   ASSESSMENT:     REHAB RECOMMENDATIONS:   Recommendation to date pending progress:  Setting:   Short-term Rehab  Equipment:    To Be Determined     PRIOR LEVEL OF FUNCTION:  (Prior to Hospitalization)  INITIAL/CURRENT LEVEL OF FUNCTION:  (Based on today's evaluation)   Bathing:   Unknown  Dressing:   Unknown  Feeding/Grooming:   Unknown  Toileting:   Unknown  Functional Mobility:   Unknown Bathing:   Maximal Assistance  Dressing:   Maximal Assistance  Feeding/Grooming:   Maximal Assistance  Toileting:   Total Assistance  Functional Mobility:   Moderate Assistance x 2     ASSESSMENT:  Mr. Alan Campbell presents from SNF with sepsis 2° pna.  Wife at bedside reports pt has been at [facility] for the past 5 months, unable to provide clear PLOF, reports pt was transferring to w/c. Pt with deficits in strength, balance, and endurance impacting mobility and ADLs. Pt practiced bed mobility with MaxAx2/cues for technique, pt initially very stiff. Initially poor sitting balance with R trunk lean, R cervical lateral flexion noted. Cues for balance, practiced finding/maintaining midline and seated balance/tolerance activities including ADL and weightbearing through LUE. ModAx2 with bed elevation for sit > stand, pt able to stand up for several seconds, cues for posture. Sitting balance much improved to SBA after sit > stand trial. ModAx2 for sit > supine, pt left sidelying on R side with bony prominences offloaded. Pt is functioning below baseline and would benefit from continued OT to increase safety and independence. Of note, pt very [place], hears better in L ear. Loup best today without hearing aid (appears to be dead). SUBJECTIVE:   Mr. Kilpatrick Res states, \"That's my lady. \"    SOCIAL HISTORY/LIVING ENVIRONMENT:  Wife at bedside reports pt has been at [facility] for the past 5 months, unable to provide clear PLOF, reports pt was transferring to w/c.    Home Environment: Skilled nursing facility  Support Systems: Spouse/Significant Other, Skilled Nursing Facility    OBJECTIVE:     PAIN: VITAL SIGNS: LINES/DRAINS:   Pre Treatment: Pain Screen  Pain Scale 1: FLACC  Pain Intensity 1: 0  Post Treatment: same Vital Signs  O2 Device: Nasal cannula  O2 Flow Rate (L/min): 2 l/min External male catheter  O2 Device: Nasal cannula     GROSS EVALUATION:  BUEs Within Functional Limits Abnormal/ Functional Abnormal/ Non-Functional (see comments) Not Tested Comments:   AROM [] [] [x] []    PROM [] [] [] []    Strength [] [] [x] []    Balance [] [x] [x] []    Posture [] [x] [] []    Sensation [] [] [] []    Coordination [] [] [x] [] BUE tremors    Tone [] [] [] []    Edema [x] [] [] []    Activity Tolerance [] [x] [x] []     [] [] [] [] COGNITION/  PERCEPTION: Intact Impaired   (see comments) Comments: Difficult to assess 2° [place]   Orientation [] []    Vision [] []    Hearing [] [x] [place], R ear > L   Judgment/ Insight [] []    Attention [x] []    Memory [] []    Command Following [x] []    Emotional Regulation [x] []     [] []      ACTIVITIES OF DAILY LIVING: I Mod I S SBA CGA Min Mod Max Total NT Comments   BASIC ADLs:              Bathing/ Showering [] [] [] [] [] [] [] [] [] [x]    Toileting [] [] [] [] [] [] [] [] [x] [] Supine brief change/hygiene   Dressing [] [] [] [] [] [] [] [] [] [x]    Feeding [] [] [] [] [] [] [] [] [] [x]    Grooming [] [] [] [] [] [] [] [x] [x] [] Blow nose 2° BUE weakness and tremors   Personal Device Care [] [] [] [] [] [] [] [] [] [x]    Functional Mobility [] [] [] [] [] [] [x] [] [] [] x2   I=Independent, Mod I=Modified Independent, S=Supervision, SBA=Standby Assistance, CGA=Contact Guard Assistance,   Min=Minimal Assistance, Mod=Moderate Assistance, Max=Maximal Assistance, Total=Total Assistance, NT=Not Tested    MOBILITY: I Mod I S SBA CGA Min Mod Max Total  NT x2 Comments:   Supine to sit [] [] [] [] [] [] [] [x] [] [] [x]    Sit to supine [] [] [] [] [] [] [x] [] [] [] [x]    Sit to stand [] [] [] [] [] [] [x] [] [] [] [x]    Bed to chair [] [] [] [] [] [] [] [] [] [x] []    I=Independent, Mod I=Modified Independent, S=Supervision, SBA=Standby Assistance, CGA=Contact Guard Assistance,   Min=Minimal Assistance, Mod=Moderate Assistance, Max=Maximal Assistance, Total=Total Assistance, NT=Not Tested    [employer] 6 Clicks   Daily Activity Inpatient Short Form        How much help from another person does the patient currently need. .. Total A Lot A Little None   1. Putting on and taking off regular lower body clothing? [x] 1   [] 2   [] 3   [] 4   2. Bathing (including washing, rinsing, drying)? [] 1   [x] 2   [] 3   [] 4   3.   Toileting, which includes using toilet, bedpan or urinal? [x] 1   [] 2   [] 3   [] 4   4. Putting on and taking off regular upper body clothing? [] 1   [x] 2   [] 3   [] 4   5. Taking care of personal grooming such as brushing teeth? [] 1   [x] 2   [] 3   [] 4   6. Eating meals? [] 1   [x] 2   [] 3   [] 4   © 2007, Trustees of [facility] [employer], under license to [employer]. All rights reserved     Score:  Initial: 10 11/11/21 Most Recent: X (Date: -- )   Interpretation of Tool:  Represents activities that are increasingly more difficult (i.e. Bed mobility, Transfers, Gait). PLAN:   FREQUENCY/DURATION: OT Plan of Care: 3 times/week for duration of hospital stay or until stated goals are met, whichever comes first.    PROBLEM LIST:   (Skilled intervention is medically necessary to address:)  1. Decreased ADL/Functional Activities  2. Decreased Activity Tolerance  3. Decreased AROM/PROM  4. Decreased Balance  5. Decreased Coordination  6. Decreased Gait Ability  7. Decreased Strength  8. Decreased Transfer Abilities   INTERVENTIONS PLANNED:   (Benefits and precautions of occupational therapy have been discussed with the patient.)  1. Self Care Training  2. Therapeutic Activity  3. Therapeutic Exercise/HEP  4. Neuromuscular Re-education  5. Education     TREATMENT:     EVALUATION: Moderate Complexity : (Untimed Charge)    TREATMENT:   ($$ Self Care/Home Management: 8-22 mins$$ Neuromuscular Re-Education: 8-22 mins   )  Co-Treatment PT/OT necessary due to patient's decreased overall endurance/tolerance levels, as well as need for high level skilled assistance to complete functional transfers/mobility and functional tasks  Self Care (8 Minutes): Self care including Toileting and Grooming to increase independence and decrease level of assistance required.   Neuromuscular Re-education (15 Minutes): Neuromuscular Re-education included Balance Training, Coordination training, Functional mobility with facilitation, Postural training, Sitting balance training and Standing balance training to improve Balance, Coordination, Functional Mobility, Postural Control and Proprioception.     TREATMENT GRID:  N/A    AFTER TREATMENT POSITION/PRECAUTIONS:  Alarm Activated, Bed, Needs within reach, RN notified and Visitors at bedside    INTERDISCIPLINARY COLLABORATION:  RN/PCT, PT/PTA and OT/RUBI    TOTAL TREATMENT DURATION:  OT Patient Time In/Time Out  Time In: 0929  Time Out: [address], OTR/L

## 2021-11-14 LAB
BACTERIA SPEC CULT: NORMAL
SERVICE CMNT-IMP: NORMAL

## 2021-11-15 LAB
BACTERIA SPEC CULT: NORMAL
SERVICE CMNT-IMP: NORMAL
